# Patient Record
Sex: MALE | Race: WHITE | NOT HISPANIC OR LATINO
[De-identification: names, ages, dates, MRNs, and addresses within clinical notes are randomized per-mention and may not be internally consistent; named-entity substitution may affect disease eponyms.]

---

## 2018-07-18 ENCOUNTER — TRANSCRIPTION ENCOUNTER (OUTPATIENT)
Age: 73
End: 2018-07-18

## 2018-07-26 ENCOUNTER — APPOINTMENT (OUTPATIENT)
Dept: HEART AND VASCULAR | Facility: CLINIC | Age: 73
End: 2018-07-26
Payer: MEDICARE

## 2018-07-26 VITALS
HEIGHT: 67 IN | WEIGHT: 145 LBS | HEART RATE: 62 BPM | SYSTOLIC BLOOD PRESSURE: 130 MMHG | DIASTOLIC BLOOD PRESSURE: 82 MMHG | BODY MASS INDEX: 22.76 KG/M2

## 2018-07-26 DIAGNOSIS — Z87.891 PERSONAL HISTORY OF NICOTINE DEPENDENCE: ICD-10-CM

## 2018-07-26 DIAGNOSIS — I49.1 ATRIAL PREMATURE DEPOLARIZATION: ICD-10-CM

## 2018-07-26 DIAGNOSIS — Z85.828 PERSONAL HISTORY OF OTHER MALIGNANT NEOPLASM OF SKIN: ICD-10-CM

## 2018-07-26 PROCEDURE — 36415 COLL VENOUS BLD VENIPUNCTURE: CPT

## 2018-07-26 PROCEDURE — 99205 OFFICE O/P NEW HI 60 MIN: CPT | Mod: 25

## 2018-07-26 PROCEDURE — 93000 ELECTROCARDIOGRAM COMPLETE: CPT

## 2018-07-30 LAB
ALBUMIN SERPL ELPH-MCNC: 4.2 G/DL
ALP BLD-CCNC: 82 U/L
ALT SERPL-CCNC: 12 U/L
ANION GAP SERPL CALC-SCNC: 11 MMOL/L
APPEARANCE: CLEAR
AST SERPL-CCNC: 18 U/L
BACTERIA: NEGATIVE
BASOPHILS # BLD AUTO: 0.03 K/UL
BASOPHILS NFR BLD AUTO: 0.7 %
BILIRUB SERPL-MCNC: 0.4 MG/DL
BILIRUBIN URINE: NEGATIVE
BLOOD URINE: NEGATIVE
BUN SERPL-MCNC: 18 MG/DL
CALCIUM SERPL-MCNC: 9.5 MG/DL
CHLORIDE SERPL-SCNC: 106 MMOL/L
CHOLEST SERPL-MCNC: 195 MG/DL
CHOLEST/HDLC SERPL: 3.7 RATIO
CK MB BLD-MCNC: 1.5 NG/ML
CK SERPL-CCNC: 89 U/L
CO2 SERPL-SCNC: 28 MMOL/L
COLOR: YELLOW
CREAT SERPL-MCNC: 0.7 MG/DL
EOSINOPHIL # BLD AUTO: 0.08 K/UL
EOSINOPHIL NFR BLD AUTO: 1.9 %
ERYTHROCYTE [SEDIMENTATION RATE] IN BLOOD BY WESTERGREN METHOD: 12 MM/HR
GLUCOSE QUALITATIVE U: NEGATIVE MG/DL
GLUCOSE SERPL-MCNC: 101 MG/DL
HBA1C MFR BLD HPLC: 5.9 %
HCT VFR BLD CALC: 47.6 %
HDLC SERPL-MCNC: 53 MG/DL
HGB BLD-MCNC: 15.2 G/DL
HYALINE CASTS: 0 /LPF
IMM GRANULOCYTES NFR BLD AUTO: 0.2 %
KETONES URINE: NEGATIVE
LDLC SERPL CALC-MCNC: 129 MG/DL
LEUKOCYTE ESTERASE URINE: NEGATIVE
LYMPHOCYTES # BLD AUTO: 1.33 K/UL
LYMPHOCYTES NFR BLD AUTO: 31.1 %
MAGNESIUM SERPL-MCNC: 2.1 MG/DL
MAN DIFF?: NORMAL
MCHC RBC-ENTMCNC: 31 PG
MCHC RBC-ENTMCNC: 31.9 GM/DL
MCV RBC AUTO: 97.1 FL
MICROSCOPIC-UA: NORMAL
MONOCYTES # BLD AUTO: 0.39 K/UL
MONOCYTES NFR BLD AUTO: 9.1 %
NEUTROPHILS # BLD AUTO: 2.44 K/UL
NEUTROPHILS NFR BLD AUTO: 57 %
NITRITE URINE: NEGATIVE
PH URINE: 5.5
PLATELET # BLD AUTO: 201 K/UL
POTASSIUM SERPL-SCNC: 4.5 MMOL/L
PROT SERPL-MCNC: 6.9 G/DL
PROTEIN URINE: NEGATIVE MG/DL
RBC # BLD: 4.9 M/UL
RBC # FLD: 13.8 %
RED BLOOD CELLS URINE: 3 /HPF
SODIUM SERPL-SCNC: 145 MMOL/L
SPECIFIC GRAVITY URINE: 1.03
SQUAMOUS EPITHELIAL CELLS: 0 /HPF
TRIGL SERPL-MCNC: 63 MG/DL
TROPONIN I SERPL-MCNC: 0.01 NG/ML
TSH SERPL-ACNC: 0.88 UIU/ML
UROBILINOGEN URINE: NEGATIVE MG/DL
WBC # FLD AUTO: 4.28 K/UL
WHITE BLOOD CELLS URINE: 4 /HPF

## 2018-08-06 ENCOUNTER — OUTPATIENT (OUTPATIENT)
Dept: OUTPATIENT SERVICES | Facility: HOSPITAL | Age: 73
LOS: 1 days | End: 2018-08-06
Payer: MEDICARE

## 2018-08-06 ENCOUNTER — APPOINTMENT (OUTPATIENT)
Dept: HEART AND VASCULAR | Facility: CLINIC | Age: 73
End: 2018-08-06

## 2018-08-06 DIAGNOSIS — I49.1 ATRIAL PREMATURE DEPOLARIZATION: ICD-10-CM

## 2018-08-06 DIAGNOSIS — R07.9 CHEST PAIN, UNSPECIFIED: ICD-10-CM

## 2018-08-06 PROCEDURE — 93306 TTE W/DOPPLER COMPLETE: CPT | Mod: 26

## 2018-08-06 PROCEDURE — 93018 CV STRESS TEST I&R ONLY: CPT

## 2018-08-06 PROCEDURE — 93017 CV STRESS TEST TRACING ONLY: CPT

## 2018-08-06 PROCEDURE — 93306 TTE W/DOPPLER COMPLETE: CPT

## 2018-08-06 PROCEDURE — 93016 CV STRESS TEST SUPVJ ONLY: CPT

## 2018-08-20 ENCOUNTER — APPOINTMENT (OUTPATIENT)
Dept: HEART AND VASCULAR | Facility: CLINIC | Age: 73
End: 2018-08-20
Payer: MEDICARE

## 2018-08-20 VITALS
SYSTOLIC BLOOD PRESSURE: 110 MMHG | WEIGHT: 144 LBS | HEIGHT: 67 IN | DIASTOLIC BLOOD PRESSURE: 70 MMHG | BODY MASS INDEX: 22.6 KG/M2 | HEART RATE: 70 BPM

## 2018-08-20 PROCEDURE — 99213 OFFICE O/P EST LOW 20 MIN: CPT

## 2019-02-25 ENCOUNTER — APPOINTMENT (OUTPATIENT)
Dept: HEART AND VASCULAR | Facility: CLINIC | Age: 74
End: 2019-02-25

## 2022-07-14 ENCOUNTER — APPOINTMENT (OUTPATIENT)
Dept: HEART AND VASCULAR | Facility: CLINIC | Age: 77
End: 2022-07-14

## 2022-07-14 VITALS
DIASTOLIC BLOOD PRESSURE: 70 MMHG | BODY MASS INDEX: 22.44 KG/M2 | HEIGHT: 67 IN | HEART RATE: 61 BPM | OXYGEN SATURATION: 98 % | SYSTOLIC BLOOD PRESSURE: 110 MMHG | WEIGHT: 143 LBS | TEMPERATURE: 97 F

## 2022-07-14 VITALS
HEART RATE: 61 BPM | SYSTOLIC BLOOD PRESSURE: 110 MMHG | DIASTOLIC BLOOD PRESSURE: 60 MMHG | RESPIRATION RATE: 14 BRPM | OXYGEN SATURATION: 98 % | WEIGHT: 143 LBS | HEIGHT: 67 IN | BODY MASS INDEX: 22.44 KG/M2 | TEMPERATURE: 95 F

## 2022-07-14 DIAGNOSIS — Z00.00 ENCOUNTER FOR GENERAL ADULT MEDICAL EXAMINATION W/OUT ABNORMAL FINDINGS: ICD-10-CM

## 2022-07-14 PROCEDURE — 36415 COLL VENOUS BLD VENIPUNCTURE: CPT

## 2022-07-14 PROCEDURE — 93000 ELECTROCARDIOGRAM COMPLETE: CPT

## 2022-07-14 PROCEDURE — 99387 INIT PM E/M NEW PAT 65+ YRS: CPT | Mod: 25,GY

## 2022-07-14 NOTE — PHYSICAL EXAM
[Well Developed] : well developed [Well Nourished] : well nourished [No Acute Distress] : no acute distress [Normal Conjunctiva] : normal conjunctiva [Normal Venous Pressure] : normal venous pressure [No Carotid Bruit] : no carotid bruit [Normal S1, S2] : normal S1, S2 [No Murmur] : no murmur [No Rub] : no rub [No Gallop] : no gallop [Clear Lung Fields] : clear lung fields [Good Air Entry] : good air entry [No Respiratory Distress] : no respiratory distress  [Soft] : abdomen soft [Non Tender] : non-tender [No Masses/organomegaly] : no masses/organomegaly [Normal Bowel Sounds] : normal bowel sounds [Normal Gait] : normal gait [No Edema] : no edema [No Cyanosis] : no cyanosis [No Clubbing] : no clubbing [Moves all extremities] : moves all extremities [No Focal Deficits] : no focal deficits [Normal Speech] : normal speech [Alert and Oriented] : alert and oriented [Normal memory] : normal memory

## 2022-07-15 LAB
ALBUMIN SERPL ELPH-MCNC: 4.2 G/DL
ALP BLD-CCNC: 90 U/L
ALT SERPL-CCNC: 15 U/L
ANION GAP SERPL CALC-SCNC: 10 MMOL/L
APPEARANCE: CLEAR
APTT BLD: 26.9 SEC
AST SERPL-CCNC: 21 U/L
BASOPHILS # BLD AUTO: 0.04 K/UL
BASOPHILS NFR BLD AUTO: 0.9 %
BILIRUB SERPL-MCNC: 0.6 MG/DL
BILIRUBIN URINE: NEGATIVE
BLOOD URINE: NEGATIVE
BUN SERPL-MCNC: 22 MG/DL
CALCIUM SERPL-MCNC: 9.3 MG/DL
CHLORIDE SERPL-SCNC: 107 MMOL/L
CHOLEST SERPL-MCNC: 199 MG/DL
CO2 SERPL-SCNC: 26 MMOL/L
COLOR: YELLOW
CREAT SERPL-MCNC: 0.82 MG/DL
EGFR: 90 ML/MIN/1.73M2
EOSINOPHIL # BLD AUTO: 0.11 K/UL
EOSINOPHIL NFR BLD AUTO: 2.5 %
ESTIMATED AVERAGE GLUCOSE: 120 MG/DL
GLUCOSE QUALITATIVE U: NEGATIVE
GLUCOSE SERPL-MCNC: 108 MG/DL
HBA1C MFR BLD HPLC: 5.8 %
HCT VFR BLD CALC: 48.3 %
HDLC SERPL-MCNC: 60 MG/DL
HGB BLD-MCNC: 15.1 G/DL
IMM GRANULOCYTES NFR BLD AUTO: 0.2 %
INR PPP: 0.98 RATIO
KETONES URINE: NEGATIVE
LDLC SERPL CALC-MCNC: 126 MG/DL
LEUKOCYTE ESTERASE URINE: NEGATIVE
LYMPHOCYTES # BLD AUTO: 1.47 K/UL
LYMPHOCYTES NFR BLD AUTO: 33.3 %
MAN DIFF?: NORMAL
MCHC RBC-ENTMCNC: 31.3 GM/DL
MCHC RBC-ENTMCNC: 31.9 PG
MCV RBC AUTO: 101.9 FL
MONOCYTES # BLD AUTO: 0.44 K/UL
MONOCYTES NFR BLD AUTO: 10 %
NEUTROPHILS # BLD AUTO: 2.34 K/UL
NEUTROPHILS NFR BLD AUTO: 53.1 %
NITRITE URINE: NEGATIVE
NONHDLC SERPL-MCNC: 139 MG/DL
PH URINE: 5.5
PLATELET # BLD AUTO: 130 K/UL
POTASSIUM SERPL-SCNC: 4.5 MMOL/L
PROT SERPL-MCNC: 6.8 G/DL
PROTEIN URINE: NEGATIVE
PSA SERPL-MCNC: 7.27 NG/ML
PT BLD: 11.5 SEC
RBC # BLD: 4.74 M/UL
RBC # FLD: 14.1 %
SODIUM SERPL-SCNC: 143 MMOL/L
SPECIFIC GRAVITY URINE: 1.03
TRIGL SERPL-MCNC: 65 MG/DL
TSH SERPL-ACNC: 1.57 UIU/ML
UROBILINOGEN URINE: NORMAL
WBC # FLD AUTO: 4.41 K/UL

## 2022-08-25 NOTE — DISCUSSION/SUMMARY
[EKG obtained to assist in diagnosis and management of assessed problem(s)] : EKG obtained to assist in diagnosis and management of assessed problem(s) [FreeTextEntry1] : stable exam, labs\par medically optimized for procedure\par \par ADDENDUM 8/25/22\par remains stable to proceed with planned surgery

## 2022-08-25 NOTE — HISTORY OF PRESENT ILLNESS
[FreeTextEntry1] : looking to establish care\par needs excision of melanoma on forarm\par good diet\par active\par no tob\par no etoh\par not depressed

## 2022-09-01 ENCOUNTER — LABORATORY RESULT (OUTPATIENT)
Age: 77
End: 2022-09-01

## 2022-09-01 ENCOUNTER — APPOINTMENT (OUTPATIENT)
Dept: HEART AND VASCULAR | Facility: CLINIC | Age: 77
End: 2022-09-01

## 2022-09-01 VITALS
OXYGEN SATURATION: 98 % | RESPIRATION RATE: 14 BRPM | TEMPERATURE: 98 F | BODY MASS INDEX: 22.13 KG/M2 | HEIGHT: 67 IN | DIASTOLIC BLOOD PRESSURE: 74 MMHG | HEART RATE: 77 BPM | WEIGHT: 141 LBS | SYSTOLIC BLOOD PRESSURE: 110 MMHG

## 2022-09-01 DIAGNOSIS — Z01.818 ENCOUNTER FOR OTHER PREPROCEDURAL EXAMINATION: ICD-10-CM

## 2022-09-01 PROCEDURE — 36415 COLL VENOUS BLD VENIPUNCTURE: CPT

## 2022-09-01 PROCEDURE — 99214 OFFICE O/P EST MOD 30 MIN: CPT | Mod: 25

## 2022-09-01 PROCEDURE — 93000 ELECTROCARDIOGRAM COMPLETE: CPT

## 2022-09-01 NOTE — DISCUSSION/SUMMARY
[FreeTextEntry1] : stable exam, labs and ecg\par medically optimized planned procedure [EKG obtained to assist in diagnosis and management of assessed problem(s)] : EKG obtained to assist in diagnosis and management of assessed problem(s)

## 2022-09-02 LAB
ALBUMIN SERPL ELPH-MCNC: 4 G/DL
ALP BLD-CCNC: 77 U/L
ALT SERPL-CCNC: 9 U/L
ANION GAP SERPL CALC-SCNC: 12 MMOL/L
APPEARANCE: CLEAR
APTT BLD: 27.9 SEC
AST SERPL-CCNC: 17 U/L
BASOPHILS # BLD AUTO: 0.05 K/UL
BASOPHILS NFR BLD AUTO: 1.2 %
BILIRUB SERPL-MCNC: 0.6 MG/DL
BILIRUBIN URINE: NEGATIVE
BLOOD URINE: NEGATIVE
BUN SERPL-MCNC: 24 MG/DL
CALCIUM SERPL-MCNC: 9.4 MG/DL
CHLORIDE SERPL-SCNC: 109 MMOL/L
CO2 SERPL-SCNC: 24 MMOL/L
COLOR: YELLOW
CREAT SERPL-MCNC: 0.68 MG/DL
EGFR: 95 ML/MIN/1.73M2
EOSINOPHIL # BLD AUTO: 0.11 K/UL
EOSINOPHIL NFR BLD AUTO: 2.6 %
GLUCOSE QUALITATIVE U: NEGATIVE
GLUCOSE SERPL-MCNC: 116 MG/DL
HCT VFR BLD CALC: 45.9 %
HGB BLD-MCNC: 14.4 G/DL
IMM GRANULOCYTES NFR BLD AUTO: 0.2 %
INR PPP: 0.98 RATIO
KETONES URINE: NEGATIVE
LEUKOCYTE ESTERASE URINE: ABNORMAL
LYMPHOCYTES # BLD AUTO: 1.23 K/UL
LYMPHOCYTES NFR BLD AUTO: 29.3 %
MAN DIFF?: NORMAL
MCHC RBC-ENTMCNC: 31.2 PG
MCHC RBC-ENTMCNC: 31.4 GM/DL
MCV RBC AUTO: 99.6 FL
MONOCYTES # BLD AUTO: 0.38 K/UL
MONOCYTES NFR BLD AUTO: 9 %
NEUTROPHILS # BLD AUTO: 2.42 K/UL
NEUTROPHILS NFR BLD AUTO: 57.7 %
NITRITE URINE: NEGATIVE
PH URINE: 5
PLATELET # BLD AUTO: 217 K/UL
POTASSIUM SERPL-SCNC: 3.9 MMOL/L
PROT SERPL-MCNC: 6.2 G/DL
PROTEIN URINE: NORMAL
PT BLD: 11.4 SEC
RBC # BLD: 4.61 M/UL
RBC # FLD: 13.7 %
SODIUM SERPL-SCNC: 145 MMOL/L
SPECIFIC GRAVITY URINE: 1.03
UROBILINOGEN URINE: NORMAL
WBC # FLD AUTO: 4.2 K/UL

## 2022-09-07 ENCOUNTER — NON-APPOINTMENT (OUTPATIENT)
Age: 77
End: 2022-09-07

## 2022-11-28 ENCOUNTER — NON-APPOINTMENT (OUTPATIENT)
Age: 77
End: 2022-11-28

## 2022-11-28 ENCOUNTER — APPOINTMENT (OUTPATIENT)
Dept: HEART AND VASCULAR | Facility: CLINIC | Age: 77
End: 2022-11-28

## 2022-11-28 VITALS
BODY MASS INDEX: 21.97 KG/M2 | RESPIRATION RATE: 14 BRPM | TEMPERATURE: 97.6 F | HEIGHT: 67 IN | SYSTOLIC BLOOD PRESSURE: 130 MMHG | OXYGEN SATURATION: 98 % | HEART RATE: 74 BPM | DIASTOLIC BLOOD PRESSURE: 80 MMHG | WEIGHT: 140 LBS

## 2022-11-28 PROCEDURE — 99214 OFFICE O/P EST MOD 30 MIN: CPT | Mod: 25

## 2022-11-28 PROCEDURE — 93000 ELECTROCARDIOGRAM COMPLETE: CPT

## 2022-11-28 NOTE — DISCUSSION/SUMMARY
[FreeTextEntry1] : stable exam\par prolonged episode of cp/ baseline non specific ecg changes\par recommend f/u for est and echo eval [EKG obtained to assist in diagnosis and management of assessed problem(s)] : EKG obtained to assist in diagnosis and management of assessed problem(s)

## 2022-12-07 ENCOUNTER — APPOINTMENT (OUTPATIENT)
Dept: HEART AND VASCULAR | Facility: CLINIC | Age: 77
End: 2022-12-07

## 2022-12-07 VITALS
HEART RATE: 70 BPM | RESPIRATION RATE: 14 BRPM | BODY MASS INDEX: 21.97 KG/M2 | WEIGHT: 140 LBS | TEMPERATURE: 98 F | OXYGEN SATURATION: 98 % | DIASTOLIC BLOOD PRESSURE: 80 MMHG | HEIGHT: 67 IN | SYSTOLIC BLOOD PRESSURE: 128 MMHG

## 2022-12-07 PROCEDURE — 93015 CV STRESS TEST SUPVJ I&R: CPT

## 2022-12-07 PROCEDURE — 99213 OFFICE O/P EST LOW 20 MIN: CPT | Mod: 25

## 2022-12-07 NOTE — DISCUSSION/SUMMARY
[FreeTextEntry1] : stable\par cp- normal est, results discussed\par f/u for echo eval\par cont risk modification

## 2022-12-14 ENCOUNTER — APPOINTMENT (OUTPATIENT)
Dept: HEART AND VASCULAR | Facility: CLINIC | Age: 77
End: 2022-12-14

## 2022-12-14 VITALS
HEART RATE: 71 BPM | DIASTOLIC BLOOD PRESSURE: 80 MMHG | HEIGHT: 67 IN | BODY MASS INDEX: 21.82 KG/M2 | RESPIRATION RATE: 14 BRPM | OXYGEN SATURATION: 98 % | SYSTOLIC BLOOD PRESSURE: 130 MMHG | WEIGHT: 139 LBS | TEMPERATURE: 98 F

## 2022-12-14 DIAGNOSIS — R94.31 ABNORMAL ELECTROCARDIOGRAM [ECG] [EKG]: ICD-10-CM

## 2022-12-14 DIAGNOSIS — R07.9 CHEST PAIN, UNSPECIFIED: ICD-10-CM

## 2022-12-14 PROCEDURE — 93306 TTE W/DOPPLER COMPLETE: CPT

## 2022-12-14 PROCEDURE — 99213 OFFICE O/P EST LOW 20 MIN: CPT

## 2022-12-14 NOTE — DISCUSSION/SUMMARY
[FreeTextEntry1] : stable exam\par no further cp episode\par structurally normal heart by echo/ results discussed\par continue risk modification

## 2023-01-01 ENCOUNTER — LABORATORY RESULT (OUTPATIENT)
Age: 78
End: 2023-01-01

## 2023-01-01 ENCOUNTER — OUTPATIENT (OUTPATIENT)
Dept: OUTPATIENT SERVICES | Facility: HOSPITAL | Age: 78
LOS: 1 days | End: 2023-01-01
Payer: MEDICARE

## 2023-01-01 ENCOUNTER — RESULT REVIEW (OUTPATIENT)
Age: 78
End: 2023-01-01

## 2023-01-01 ENCOUNTER — APPOINTMENT (OUTPATIENT)
Dept: HEART AND VASCULAR | Facility: CLINIC | Age: 78
End: 2023-01-01
Payer: MEDICARE

## 2023-01-01 ENCOUNTER — NON-APPOINTMENT (OUTPATIENT)
Age: 78
End: 2023-01-01

## 2023-01-01 ENCOUNTER — APPOINTMENT (OUTPATIENT)
Dept: INFUSION THERAPY | Facility: CLINIC | Age: 78
End: 2023-01-01

## 2023-01-01 ENCOUNTER — OUTPATIENT (OUTPATIENT)
Dept: OUTPATIENT SERVICES | Facility: HOSPITAL | Age: 78
LOS: 1 days | Discharge: HOME | End: 2023-01-01
Payer: MEDICARE

## 2023-01-01 ENCOUNTER — APPOINTMENT (OUTPATIENT)
Dept: PULMONOLOGY | Facility: CLINIC | Age: 78
End: 2023-01-01
Payer: MEDICARE

## 2023-01-01 ENCOUNTER — APPOINTMENT (OUTPATIENT)
Dept: INTERVENTIONAL RADIOLOGY/VASCULAR | Facility: HOSPITAL | Age: 78
End: 2023-01-01

## 2023-01-01 ENCOUNTER — APPOINTMENT (OUTPATIENT)
Dept: CT IMAGING | Facility: HOSPITAL | Age: 78
End: 2023-01-01

## 2023-01-01 ENCOUNTER — APPOINTMENT (OUTPATIENT)
Dept: HEMATOLOGY ONCOLOGY | Facility: CLINIC | Age: 78
End: 2023-01-01
Payer: MEDICARE

## 2023-01-01 ENCOUNTER — APPOINTMENT (OUTPATIENT)
Dept: MRI IMAGING | Facility: CLINIC | Age: 78
End: 2023-01-01
Payer: MEDICARE

## 2023-01-01 ENCOUNTER — EMERGENCY (EMERGENCY)
Facility: HOSPITAL | Age: 78
LOS: 1 days | Discharge: HOME | End: 2023-01-01
Attending: EMERGENCY MEDICINE | Admitting: EMERGENCY MEDICINE
Payer: MEDICARE

## 2023-01-01 ENCOUNTER — APPOINTMENT (OUTPATIENT)
Dept: MRI IMAGING | Facility: HOSPITAL | Age: 78
End: 2023-01-01

## 2023-01-01 ENCOUNTER — APPOINTMENT (OUTPATIENT)
Dept: ULTRASOUND IMAGING | Facility: HOSPITAL | Age: 78
End: 2023-01-01
Payer: MEDICARE

## 2023-01-01 ENCOUNTER — APPOINTMENT (OUTPATIENT)
Dept: PALLIATIVE MEDICINE | Facility: CLINIC | Age: 78
End: 2023-01-01
Payer: MEDICARE

## 2023-01-01 ENCOUNTER — APPOINTMENT (OUTPATIENT)
Dept: PULMONOLOGY | Facility: CLINIC | Age: 78
End: 2023-01-01

## 2023-01-01 ENCOUNTER — TRANSCRIPTION ENCOUNTER (OUTPATIENT)
Age: 78
End: 2023-01-01

## 2023-01-01 ENCOUNTER — APPOINTMENT (OUTPATIENT)
Dept: RADIATION ONCOLOGY | Facility: CLINIC | Age: 78
End: 2023-01-01
Payer: MEDICARE

## 2023-01-01 ENCOUNTER — APPOINTMENT (OUTPATIENT)
Dept: HEMATOLOGY ONCOLOGY | Facility: CLINIC | Age: 78
End: 2023-01-01

## 2023-01-01 ENCOUNTER — APPOINTMENT (OUTPATIENT)
Dept: PULMONOLOGY | Facility: HOSPITAL | Age: 78
End: 2023-01-01

## 2023-01-01 VITALS
WEIGHT: 117 LBS | OXYGEN SATURATION: 98 % | HEART RATE: 85 BPM | DIASTOLIC BLOOD PRESSURE: 64 MMHG | RESPIRATION RATE: 18 BRPM | HEIGHT: 67 IN | TEMPERATURE: 97.4 F | SYSTOLIC BLOOD PRESSURE: 90 MMHG | BODY MASS INDEX: 18.36 KG/M2

## 2023-01-01 VITALS
WEIGHT: 120 LBS | SYSTOLIC BLOOD PRESSURE: 97 MMHG | RESPIRATION RATE: 18 BRPM | HEIGHT: 67 IN | TEMPERATURE: 97.1 F | OXYGEN SATURATION: 96 % | BODY MASS INDEX: 18.83 KG/M2 | HEART RATE: 88 BPM | DIASTOLIC BLOOD PRESSURE: 61 MMHG

## 2023-01-01 VITALS
OXYGEN SATURATION: 98 % | HEIGHT: 67 IN | TEMPERATURE: 96.2 F | SYSTOLIC BLOOD PRESSURE: 126 MMHG | BODY MASS INDEX: 18.83 KG/M2 | RESPIRATION RATE: 18 BRPM | DIASTOLIC BLOOD PRESSURE: 75 MMHG | HEART RATE: 81 BPM | WEIGHT: 120 LBS

## 2023-01-01 VITALS
TEMPERATURE: 98 F | RESPIRATION RATE: 17 BRPM | SYSTOLIC BLOOD PRESSURE: 114 MMHG | DIASTOLIC BLOOD PRESSURE: 62 MMHG | HEART RATE: 76 BPM | OXYGEN SATURATION: 98 %

## 2023-01-01 VITALS
HEART RATE: 60 BPM | TEMPERATURE: 97 F | TEMPERATURE: 97 F | HEIGHT: 67 IN | OXYGEN SATURATION: 98 % | DIASTOLIC BLOOD PRESSURE: 65 MMHG | HEART RATE: 79 BPM | RESPIRATION RATE: 18 BRPM | WEIGHT: 136.91 LBS | OXYGEN SATURATION: 96 % | WEIGHT: 119.93 LBS | SYSTOLIC BLOOD PRESSURE: 105 MMHG | HEIGHT: 67 IN | RESPIRATION RATE: 18 BRPM | SYSTOLIC BLOOD PRESSURE: 119 MMHG | DIASTOLIC BLOOD PRESSURE: 74 MMHG

## 2023-01-01 VITALS
SYSTOLIC BLOOD PRESSURE: 112 MMHG | DIASTOLIC BLOOD PRESSURE: 78 MMHG | SYSTOLIC BLOOD PRESSURE: 140 MMHG | BODY MASS INDEX: 19.46 KG/M2 | OXYGEN SATURATION: 99 % | BODY MASS INDEX: 21.03 KG/M2 | TEMPERATURE: 97.1 F | DIASTOLIC BLOOD PRESSURE: 63 MMHG | RESPIRATION RATE: 18 BRPM | OXYGEN SATURATION: 96 % | HEART RATE: 67 BPM | TEMPERATURE: 98.1 F | HEART RATE: 78 BPM | WEIGHT: 124 LBS | HEIGHT: 67 IN | HEIGHT: 67 IN | WEIGHT: 134 LBS

## 2023-01-01 VITALS
WEIGHT: 134.92 LBS | RESPIRATION RATE: 18 BRPM | TEMPERATURE: 98 F | SYSTOLIC BLOOD PRESSURE: 188 MMHG | HEART RATE: 78 BPM | DIASTOLIC BLOOD PRESSURE: 100 MMHG | OXYGEN SATURATION: 98 % | HEIGHT: 67 IN

## 2023-01-01 VITALS
SYSTOLIC BLOOD PRESSURE: 93 MMHG | SYSTOLIC BLOOD PRESSURE: 130 MMHG | WEIGHT: 120 LBS | BODY MASS INDEX: 18.83 KG/M2 | HEIGHT: 67 IN | HEART RATE: 80 BPM | TEMPERATURE: 97 F | HEART RATE: 65 BPM | TEMPERATURE: 97.3 F | RESPIRATION RATE: 16 BRPM | OXYGEN SATURATION: 97 % | WEIGHT: 117.95 LBS | HEIGHT: 67 IN | OXYGEN SATURATION: 97 % | DIASTOLIC BLOOD PRESSURE: 57 MMHG | RESPIRATION RATE: 18 BRPM | DIASTOLIC BLOOD PRESSURE: 80 MMHG

## 2023-01-01 VITALS
OXYGEN SATURATION: 97 % | DIASTOLIC BLOOD PRESSURE: 63 MMHG | RESPIRATION RATE: 18 BRPM | SYSTOLIC BLOOD PRESSURE: 109 MMHG | HEIGHT: 67 IN | BODY MASS INDEX: 20.25 KG/M2 | WEIGHT: 129 LBS | TEMPERATURE: 98 F | HEART RATE: 72 BPM

## 2023-01-01 VITALS
HEART RATE: 70 BPM | SYSTOLIC BLOOD PRESSURE: 120 MMHG | BODY MASS INDEX: 18.83 KG/M2 | DIASTOLIC BLOOD PRESSURE: 60 MMHG | TEMPERATURE: 98.1 F | OXYGEN SATURATION: 98 % | HEIGHT: 67 IN | RESPIRATION RATE: 18 BRPM | WEIGHT: 120 LBS

## 2023-01-01 VITALS
HEART RATE: 62 BPM | RESPIRATION RATE: 18 BRPM | OXYGEN SATURATION: 94 % | SYSTOLIC BLOOD PRESSURE: 117 MMHG | TEMPERATURE: 98 F | DIASTOLIC BLOOD PRESSURE: 68 MMHG

## 2023-01-01 VITALS
HEIGHT: 67 IN | OXYGEN SATURATION: 96 % | HEART RATE: 77 BPM | SYSTOLIC BLOOD PRESSURE: 110 MMHG | TEMPERATURE: 98 F | WEIGHT: 117.95 LBS | DIASTOLIC BLOOD PRESSURE: 66 MMHG | RESPIRATION RATE: 18 BRPM

## 2023-01-01 VITALS
DIASTOLIC BLOOD PRESSURE: 66 MMHG | BODY MASS INDEX: 18.83 KG/M2 | HEIGHT: 67 IN | SYSTOLIC BLOOD PRESSURE: 111 MMHG | RESPIRATION RATE: 18 BRPM | WEIGHT: 120 LBS | TEMPERATURE: 97.8 F | HEART RATE: 84 BPM | OXYGEN SATURATION: 97 %

## 2023-01-01 VITALS
HEIGHT: 67 IN | RESPIRATION RATE: 18 BRPM | HEART RATE: 100 BPM | OXYGEN SATURATION: 97 % | WEIGHT: 122 LBS | DIASTOLIC BLOOD PRESSURE: 72 MMHG | TEMPERATURE: 96.4 F | BODY MASS INDEX: 19.15 KG/M2 | SYSTOLIC BLOOD PRESSURE: 112 MMHG

## 2023-01-01 VITALS
HEART RATE: 80 BPM | TEMPERATURE: 97.1 F | BODY MASS INDEX: 18.52 KG/M2 | RESPIRATION RATE: 18 BRPM | WEIGHT: 118 LBS | OXYGEN SATURATION: 97 % | DIASTOLIC BLOOD PRESSURE: 57 MMHG | SYSTOLIC BLOOD PRESSURE: 93 MMHG | HEIGHT: 67 IN

## 2023-01-01 VITALS
HEART RATE: 76 BPM | BODY MASS INDEX: 18.84 KG/M2 | RESPIRATION RATE: 16 BRPM | SYSTOLIC BLOOD PRESSURE: 119 MMHG | TEMPERATURE: 97.7 F | DIASTOLIC BLOOD PRESSURE: 72 MMHG | OXYGEN SATURATION: 98 % | WEIGHT: 120.3 LBS

## 2023-01-01 VITALS
TEMPERATURE: 97.1 F | RESPIRATION RATE: 18 BRPM | HEART RATE: 80 BPM | BODY MASS INDEX: 19.15 KG/M2 | DIASTOLIC BLOOD PRESSURE: 68 MMHG | OXYGEN SATURATION: 97 % | SYSTOLIC BLOOD PRESSURE: 117 MMHG | HEIGHT: 67 IN | WEIGHT: 122 LBS

## 2023-01-01 VITALS
TEMPERATURE: 98.2 F | BODY MASS INDEX: 18.83 KG/M2 | RESPIRATION RATE: 18 BRPM | DIASTOLIC BLOOD PRESSURE: 65 MMHG | HEART RATE: 75 BPM | SYSTOLIC BLOOD PRESSURE: 108 MMHG | HEIGHT: 67 IN | WEIGHT: 120 LBS | OXYGEN SATURATION: 98 %

## 2023-01-01 VITALS
HEART RATE: 77 BPM | WEIGHT: 123.9 LBS | HEIGHT: 67 IN | RESPIRATION RATE: 18 BRPM | TEMPERATURE: 97 F | DIASTOLIC BLOOD PRESSURE: 71 MMHG | OXYGEN SATURATION: 96 % | SYSTOLIC BLOOD PRESSURE: 114 MMHG

## 2023-01-01 VITALS
RESPIRATION RATE: 14 BRPM | SYSTOLIC BLOOD PRESSURE: 124 MMHG | TEMPERATURE: 97.7 F | OXYGEN SATURATION: 97 % | WEIGHT: 137 LBS | DIASTOLIC BLOOD PRESSURE: 64 MMHG | HEIGHT: 67 IN | BODY MASS INDEX: 21.5 KG/M2 | HEART RATE: 72 BPM

## 2023-01-01 VITALS
BODY MASS INDEX: 21.5 KG/M2 | WEIGHT: 137 LBS | HEIGHT: 67 IN | HEART RATE: 77 BPM | OXYGEN SATURATION: 98 % | DIASTOLIC BLOOD PRESSURE: 64 MMHG | SYSTOLIC BLOOD PRESSURE: 100 MMHG | TEMPERATURE: 97.4 F

## 2023-01-01 VITALS
HEART RATE: 77 BPM | HEIGHT: 67 IN | WEIGHT: 119 LBS | BODY MASS INDEX: 18.68 KG/M2 | RESPIRATION RATE: 18 BRPM | OXYGEN SATURATION: 96 % | SYSTOLIC BLOOD PRESSURE: 110 MMHG | DIASTOLIC BLOOD PRESSURE: 66 MMHG | TEMPERATURE: 98.2 F

## 2023-01-01 VITALS
HEART RATE: 64 BPM | OXYGEN SATURATION: 97 % | TEMPERATURE: 97 F | RESPIRATION RATE: 18 BRPM | SYSTOLIC BLOOD PRESSURE: 109 MMHG | DIASTOLIC BLOOD PRESSURE: 67 MMHG

## 2023-01-01 VITALS
TEMPERATURE: 96 F | HEIGHT: 67 IN | RESPIRATION RATE: 18 BRPM | SYSTOLIC BLOOD PRESSURE: 112 MMHG | HEART RATE: 100 BPM | DIASTOLIC BLOOD PRESSURE: 72 MMHG | WEIGHT: 121.92 LBS | OXYGEN SATURATION: 97 %

## 2023-01-01 VITALS
SYSTOLIC BLOOD PRESSURE: 114 MMHG | TEMPERATURE: 98 F | HEART RATE: 76 BPM | RESPIRATION RATE: 18 BRPM | DIASTOLIC BLOOD PRESSURE: 80 MMHG | OXYGEN SATURATION: 97 %

## 2023-01-01 VITALS
WEIGHT: 120 LBS | DIASTOLIC BLOOD PRESSURE: 65 MMHG | RESPIRATION RATE: 18 BRPM | OXYGEN SATURATION: 96 % | SYSTOLIC BLOOD PRESSURE: 105 MMHG | BODY MASS INDEX: 18.83 KG/M2 | HEART RATE: 79 BPM | HEIGHT: 67 IN | TEMPERATURE: 97.1 F

## 2023-01-01 VITALS
SYSTOLIC BLOOD PRESSURE: 122 MMHG | DIASTOLIC BLOOD PRESSURE: 74 MMHG | HEART RATE: 76 BPM | OXYGEN SATURATION: 100 % | RESPIRATION RATE: 18 BRPM | WEIGHT: 122 LBS | BODY MASS INDEX: 19.15 KG/M2 | HEIGHT: 67 IN | TEMPERATURE: 96.3 F

## 2023-01-01 VITALS
SYSTOLIC BLOOD PRESSURE: 104 MMHG | HEART RATE: 72 BPM | DIASTOLIC BLOOD PRESSURE: 70 MMHG | HEIGHT: 67 IN | TEMPERATURE: 97.7 F | BODY MASS INDEX: 20.88 KG/M2 | WEIGHT: 133 LBS | OXYGEN SATURATION: 98 %

## 2023-01-01 VITALS
RESPIRATION RATE: 18 BRPM | OXYGEN SATURATION: 96 % | DIASTOLIC BLOOD PRESSURE: 68 MMHG | SYSTOLIC BLOOD PRESSURE: 117 MMHG | TEMPERATURE: 98 F | HEART RATE: 72 BPM

## 2023-01-01 VITALS
SYSTOLIC BLOOD PRESSURE: 106 MMHG | DIASTOLIC BLOOD PRESSURE: 70 MMHG | TEMPERATURE: 97 F | RESPIRATION RATE: 16 BRPM | OXYGEN SATURATION: 96 % | HEART RATE: 71 BPM

## 2023-01-01 VITALS
TEMPERATURE: 97 F | OXYGEN SATURATION: 98 % | SYSTOLIC BLOOD PRESSURE: 90 MMHG | DIASTOLIC BLOOD PRESSURE: 64 MMHG | HEART RATE: 85 BPM | RESPIRATION RATE: 18 BRPM

## 2023-01-01 VITALS
RESPIRATION RATE: 18 BRPM | SYSTOLIC BLOOD PRESSURE: 101 MMHG | TEMPERATURE: 98 F | HEART RATE: 68 BPM | OXYGEN SATURATION: 97 % | DIASTOLIC BLOOD PRESSURE: 63 MMHG

## 2023-01-01 VITALS
TEMPERATURE: 96.9 F | BODY MASS INDEX: 20.09 KG/M2 | RESPIRATION RATE: 18 BRPM | DIASTOLIC BLOOD PRESSURE: 76 MMHG | HEART RATE: 83 BPM | OXYGEN SATURATION: 99 % | SYSTOLIC BLOOD PRESSURE: 121 MMHG | HEIGHT: 67 IN | WEIGHT: 128 LBS

## 2023-01-01 VITALS
OXYGEN SATURATION: 97 % | WEIGHT: 121.92 LBS | SYSTOLIC BLOOD PRESSURE: 116 MMHG | RESPIRATION RATE: 18 BRPM | DIASTOLIC BLOOD PRESSURE: 68 MMHG | TEMPERATURE: 98 F | HEIGHT: 67 IN | TEMPERATURE: 97 F | HEART RATE: 80 BPM | RESPIRATION RATE: 18 BRPM | SYSTOLIC BLOOD PRESSURE: 117 MMHG | DIASTOLIC BLOOD PRESSURE: 68 MMHG | OXYGEN SATURATION: 97 % | HEART RATE: 79 BPM

## 2023-01-01 VITALS
BODY MASS INDEX: 18.83 KG/M2 | TEMPERATURE: 98.1 F | RESPIRATION RATE: 18 BRPM | HEART RATE: 70 BPM | SYSTOLIC BLOOD PRESSURE: 120 MMHG | OXYGEN SATURATION: 98 % | DIASTOLIC BLOOD PRESSURE: 71 MMHG | HEIGHT: 67 IN | WEIGHT: 120 LBS

## 2023-01-01 VITALS
DIASTOLIC BLOOD PRESSURE: 69 MMHG | RESPIRATION RATE: 18 BRPM | HEART RATE: 81 BPM | WEIGHT: 120 LBS | HEIGHT: 67 IN | TEMPERATURE: 97.2 F | OXYGEN SATURATION: 100 % | SYSTOLIC BLOOD PRESSURE: 128 MMHG | BODY MASS INDEX: 18.83 KG/M2

## 2023-01-01 VITALS
OXYGEN SATURATION: 99 % | RESPIRATION RATE: 18 BRPM | HEART RATE: 70 BPM | TEMPERATURE: 97 F | DIASTOLIC BLOOD PRESSURE: 74 MMHG | SYSTOLIC BLOOD PRESSURE: 112 MMHG

## 2023-01-01 VITALS
HEART RATE: 74 BPM | TEMPERATURE: 98.2 F | OXYGEN SATURATION: 97 % | DIASTOLIC BLOOD PRESSURE: 71 MMHG | WEIGHT: 120 LBS | HEIGHT: 67 IN | SYSTOLIC BLOOD PRESSURE: 107 MMHG | BODY MASS INDEX: 18.83 KG/M2 | RESPIRATION RATE: 18 BRPM

## 2023-01-01 VITALS
TEMPERATURE: 97 F | RESPIRATION RATE: 18 BRPM | HEART RATE: 63 BPM | SYSTOLIC BLOOD PRESSURE: 116 MMHG | OXYGEN SATURATION: 97 % | DIASTOLIC BLOOD PRESSURE: 53 MMHG

## 2023-01-01 DIAGNOSIS — Z51.5 ENCOUNTER FOR PALLIATIVE CARE: ICD-10-CM

## 2023-01-01 DIAGNOSIS — R73.09 OTHER ABNORMAL GLUCOSE: ICD-10-CM

## 2023-01-01 DIAGNOSIS — G47.00 INSOMNIA, UNSPECIFIED: ICD-10-CM

## 2023-01-01 DIAGNOSIS — C34.10 MALIGNANT NEOPLASM OF UPPER LOBE, UNSPECIFIED BRONCHUS OR LUNG: ICD-10-CM

## 2023-01-01 DIAGNOSIS — R33.9 RETENTION OF URINE, UNSPECIFIED: ICD-10-CM

## 2023-01-01 DIAGNOSIS — R91.8 OTHER NONSPECIFIC ABNORMAL FINDING OF LUNG FIELD: ICD-10-CM

## 2023-01-01 DIAGNOSIS — L30.9 DERMATITIS, UNSPECIFIED: ICD-10-CM

## 2023-01-01 DIAGNOSIS — Z87.09 PERSONAL HISTORY OF OTHER DISEASES OF THE RESPIRATORY SYSTEM: ICD-10-CM

## 2023-01-01 DIAGNOSIS — R06.02 SHORTNESS OF BREATH: ICD-10-CM

## 2023-01-01 DIAGNOSIS — R33.8 OTHER RETENTION OF URINE: ICD-10-CM

## 2023-01-01 DIAGNOSIS — Z87.891 PERSONAL HISTORY OF NICOTINE DEPENDENCE: ICD-10-CM

## 2023-01-01 DIAGNOSIS — N40.1 BENIGN PROSTATIC HYPERPLASIA WITH LOWER URINARY TRACT SYMPTOMS: ICD-10-CM

## 2023-01-01 LAB
25(OH)D3 SERPL-MCNC: 34.3 NG/ML
ALBUMIN SERPL ELPH-MCNC: 2.7 G/DL — LOW (ref 3.3–5)
ALBUMIN SERPL ELPH-MCNC: 3 G/DL
ALBUMIN SERPL ELPH-MCNC: 3.1 G/DL
ALBUMIN SERPL ELPH-MCNC: 3.2 G/DL
ALBUMIN SERPL ELPH-MCNC: 3.2 G/DL — LOW (ref 3.3–5)
ALBUMIN SERPL ELPH-MCNC: 3.4 G/DL
ALBUMIN SERPL ELPH-MCNC: 3.4 G/DL
ALBUMIN SERPL ELPH-MCNC: 3.9 G/DL
ALBUMIN SERPL ELPH-MCNC: 4.2 G/DL
ALP BLD-CCNC: 110 U/L
ALP BLD-CCNC: 113 U/L
ALP BLD-CCNC: 64 U/L
ALP BLD-CCNC: 68 U/L
ALP BLD-CCNC: 68 U/L
ALP BLD-CCNC: 70 U/L
ALP BLD-CCNC: 72 U/L
ALP BLD-CCNC: 72 U/L
ALP BLD-CCNC: 77 U/L
ALP BLD-CCNC: 79 U/L
ALP BLD-CCNC: 79 U/L
ALP BLD-CCNC: 80 U/L
ALP BLD-CCNC: 83 U/L
ALP BLD-CCNC: 84 U/L
ALP SERPL-CCNC: 67 U/L — SIGNIFICANT CHANGE UP (ref 40–120)
ALP SERPL-CCNC: 76 U/L — SIGNIFICANT CHANGE UP (ref 40–120)
ALT FLD-CCNC: 19 U/L — SIGNIFICANT CHANGE UP (ref 10–45)
ALT FLD-CCNC: 22 U/L — SIGNIFICANT CHANGE UP (ref 10–45)
ALT SERPL-CCNC: 13 U/L
ALT SERPL-CCNC: 14 U/L
ALT SERPL-CCNC: 14 U/L
ALT SERPL-CCNC: 15 U/L
ALT SERPL-CCNC: 15 U/L
ALT SERPL-CCNC: 17 U/L
ALT SERPL-CCNC: 19 U/L
ALT SERPL-CCNC: 21 U/L
ALT SERPL-CCNC: 22 U/L
ALT SERPL-CCNC: 22 U/L
ALT SERPL-CCNC: 9 U/L
ANION GAP SERPL CALC-SCNC: 0 MMOL/L
ANION GAP SERPL CALC-SCNC: 10 MMOL/L
ANION GAP SERPL CALC-SCNC: 10 MMOL/L — SIGNIFICANT CHANGE UP (ref 5–17)
ANION GAP SERPL CALC-SCNC: 12 MMOL/L
ANION GAP SERPL CALC-SCNC: 12 MMOL/L
ANION GAP SERPL CALC-SCNC: 13 MMOL/L
ANION GAP SERPL CALC-SCNC: 4 MMOL/L
ANION GAP SERPL CALC-SCNC: 5 MMOL/L
ANION GAP SERPL CALC-SCNC: 7 MMOL/L
ANION GAP SERPL CALC-SCNC: 7 MMOL/L
ANION GAP SERPL CALC-SCNC: 8 MMOL/L
APPEARANCE UR: CLEAR — SIGNIFICANT CHANGE UP
APTT BLD: 27.4 SEC
AST SERPL-CCNC: 14 U/L
AST SERPL-CCNC: 14 U/L — SIGNIFICANT CHANGE UP (ref 10–40)
AST SERPL-CCNC: 16 U/L
AST SERPL-CCNC: 18 U/L
AST SERPL-CCNC: 18 U/L
AST SERPL-CCNC: 19 U/L
AST SERPL-CCNC: 19 U/L — SIGNIFICANT CHANGE UP (ref 10–40)
AST SERPL-CCNC: 20 U/L
AST SERPL-CCNC: 20 U/L
AST SERPL-CCNC: 21 U/L
AST SERPL-CCNC: 21 U/L
AST SERPL-CCNC: 24 U/L
AST SERPL-CCNC: 24 U/L
AST SERPL-CCNC: 26 U/L
BACTERIA # UR AUTO: PRESENT /HPF
BASOPHILS # BLD AUTO: 0 K/UL
BASOPHILS # BLD AUTO: 0.04 K/UL
BASOPHILS NFR BLD AUTO: 0 %
BASOPHILS NFR BLD AUTO: 0.9 %
BILIRUB DIRECT SERPL-MCNC: <0.2 MG/DL — SIGNIFICANT CHANGE UP (ref 0–0.3)
BILIRUB INDIRECT FLD-MCNC: SIGNIFICANT CHANGE UP MG/DL (ref 0.2–1)
BILIRUB SERPL-MCNC: 0.2 MG/DL
BILIRUB SERPL-MCNC: 0.2 MG/DL — SIGNIFICANT CHANGE UP (ref 0.2–1.2)
BILIRUB SERPL-MCNC: 0.5 MG/DL
BILIRUB SERPL-MCNC: 0.5 MG/DL — SIGNIFICANT CHANGE UP (ref 0.2–1.2)
BILIRUB SERPL-MCNC: 0.6 MG/DL
BILIRUB SERPL-MCNC: 0.7 MG/DL
BILIRUB SERPL-MCNC: 0.8 MG/DL
BILIRUB SERPL-MCNC: 0.8 MG/DL
BILIRUB UR-MCNC: NEGATIVE — SIGNIFICANT CHANGE UP
BUN SERPL-MCNC: 11 MG/DL
BUN SERPL-MCNC: 13 MG/DL
BUN SERPL-MCNC: 15 MG/DL
BUN SERPL-MCNC: 16 MG/DL
BUN SERPL-MCNC: 16 MG/DL — SIGNIFICANT CHANGE UP (ref 7–23)
BUN SERPL-MCNC: 17 MG/DL
BUN SERPL-MCNC: 18 MG/DL
BUN SERPL-MCNC: 19 MG/DL
BUN SERPL-MCNC: 20 MG/DL
CALCIUM SERPL-MCNC: 10.2 MG/DL
CALCIUM SERPL-MCNC: 9.1 MG/DL
CALCIUM SERPL-MCNC: 9.1 MG/DL — SIGNIFICANT CHANGE UP (ref 8.4–10.5)
CALCIUM SERPL-MCNC: 9.2 MG/DL
CALCIUM SERPL-MCNC: 9.2 MG/DL
CALCIUM SERPL-MCNC: 9.3 MG/DL
CALCIUM SERPL-MCNC: 9.5 MG/DL
CALCIUM SERPL-MCNC: 9.6 MG/DL
CALCIUM SERPL-MCNC: 9.6 MG/DL
CALCIUM SERPL-MCNC: 9.9 MG/DL
CHLORIDE SERPL-SCNC: 101 MMOL/L
CHLORIDE SERPL-SCNC: 102 MMOL/L
CHLORIDE SERPL-SCNC: 103 MMOL/L
CHLORIDE SERPL-SCNC: 103 MMOL/L — SIGNIFICANT CHANGE UP (ref 96–108)
CHLORIDE SERPL-SCNC: 104 MMOL/L
CHLORIDE SERPL-SCNC: 105 MMOL/L
CHLORIDE SERPL-SCNC: 106 MMOL/L
CHLORIDE SERPL-SCNC: 107 MMOL/L
CHLORIDE SERPL-SCNC: 110 MMOL/L
CO2 SERPL-SCNC: 24 MMOL/L
CO2 SERPL-SCNC: 25 MMOL/L
CO2 SERPL-SCNC: 25 MMOL/L
CO2 SERPL-SCNC: 26 MMOL/L
CO2 SERPL-SCNC: 26 MMOL/L
CO2 SERPL-SCNC: 28 MMOL/L
CO2 SERPL-SCNC: 29 MMOL/L
CO2 SERPL-SCNC: 29 MMOL/L
CO2 SERPL-SCNC: 29 MMOL/L — SIGNIFICANT CHANGE UP (ref 22–31)
CO2 SERPL-SCNC: 30 MMOL/L
CO2 SERPL-SCNC: 30 MMOL/L
CO2 SERPL-SCNC: 32 MMOL/L
CO2 SERPL-SCNC: 32 MMOL/L
COLOR SPEC: YELLOW — SIGNIFICANT CHANGE UP
COMMENT - URINE: SIGNIFICANT CHANGE UP
CREAT SERPL-MCNC: 0.5 MG/DL
CREAT SERPL-MCNC: 0.5 MG/DL
CREAT SERPL-MCNC: 0.5 MG/DL — SIGNIFICANT CHANGE UP (ref 0.5–1.3)
CREAT SERPL-MCNC: 0.55 MG/DL
CREAT SERPL-MCNC: 0.6 MG/DL
CREAT SERPL-MCNC: 0.64 MG/DL
CREAT SERPL-MCNC: 0.66 MG/DL
CREAT SERPL-MCNC: 0.7 MG/DL
CREAT SERPL-MCNC: 0.8 MG/DL
DIFF PNL FLD: ABNORMAL
EGFR: 101 ML/MIN/1.73M2
EGFR: 104 ML/MIN/1.73M2
EGFR: 104 ML/MIN/1.73M2
EGFR: 104 ML/MIN/1.73M2 — SIGNIFICANT CHANGE UP
EGFR: 90 ML/MIN/1.73M2
EGFR: 94 ML/MIN/1.73M2
EGFR: 95 ML/MIN/1.73M2
EGFR: 96 ML/MIN/1.73M2
EGFR: 98 ML/MIN/1.73M2
EOSINOPHIL # BLD AUTO: 0 K/UL
EOSINOPHIL # BLD AUTO: 0.04 K/UL
EOSINOPHIL NFR BLD AUTO: 0 %
EOSINOPHIL NFR BLD AUTO: 0.9 %
EPI CELLS # UR: SIGNIFICANT CHANGE UP /HPF (ref 0–5)
ERYTHROCYTE [SEDIMENTATION RATE] IN BLOOD BY WESTERGREN METHOD: 45 MM/HR
ESTIMATED AVERAGE GLUCOSE: 128 MG/DL
GLUCOSE SERPL-MCNC: 105 MG/DL
GLUCOSE SERPL-MCNC: 116 MG/DL
GLUCOSE SERPL-MCNC: 117 MG/DL
GLUCOSE SERPL-MCNC: 138 MG/DL
GLUCOSE SERPL-MCNC: 140 MG/DL
GLUCOSE SERPL-MCNC: 143 MG/DL
GLUCOSE SERPL-MCNC: 149 MG/DL
GLUCOSE SERPL-MCNC: 159 MG/DL
GLUCOSE SERPL-MCNC: 167 MG/DL — HIGH (ref 70–99)
GLUCOSE SERPL-MCNC: 179 MG/DL
GLUCOSE SERPL-MCNC: 185 MG/DL
GLUCOSE SERPL-MCNC: 197 MG/DL
GLUCOSE SERPL-MCNC: 243 MG/DL
GLUCOSE SERPL-MCNC: 88 MG/DL
GLUCOSE SERPL-MCNC: 99 MG/DL
GLUCOSE UR QL: NEGATIVE — SIGNIFICANT CHANGE UP
HBA1C MFR BLD HPLC: 6.1 %
HCT VFR BLD CALC: 31.5 %
HCT VFR BLD CALC: 34.3 %
HCT VFR BLD CALC: 34.6 % — LOW (ref 39–50)
HGB BLD-MCNC: 10.1 G/DL
HGB BLD-MCNC: 11.2 G/DL
HGB BLD-MCNC: 11.5 G/DL — LOW (ref 13–17)
INR PPP: 1.04 RATIO
KETONES UR-MCNC: NEGATIVE — SIGNIFICANT CHANGE UP
LEUKOCYTE ESTERASE UR-ACNC: NEGATIVE — SIGNIFICANT CHANGE UP
LYMPHOCYTES # BLD AUTO: 0.1 K/UL — LOW (ref 1–3.3)
LYMPHOCYTES # BLD AUTO: 0.11 K/UL
LYMPHOCYTES # BLD AUTO: 0.18 K/UL
LYMPHOCYTES # BLD AUTO: 1.8 % — LOW (ref 13–44)
LYMPHOCYTES NFR BLD AUTO: 2.6 %
LYMPHOCYTES NFR BLD AUTO: 2.6 %
MAGNESIUM SERPL-MCNC: 1.8 MG/DL
MAGNESIUM SERPL-MCNC: 1.9 MG/DL
MAGNESIUM SERPL-MCNC: 2 MG/DL
MAGNESIUM SERPL-MCNC: 2.2 MG/DL
MAGNESIUM SERPL-MCNC: 2.4 MG/DL
MAN DIFF?: NORMAL
MAN DIFF?: NORMAL
MCHC RBC-ENTMCNC: 31.5 PG
MCHC RBC-ENTMCNC: 31.9 PG — SIGNIFICANT CHANGE UP (ref 27–34)
MCHC RBC-ENTMCNC: 32.1 GM/DL
MCHC RBC-ENTMCNC: 32.1 PG
MCHC RBC-ENTMCNC: 32.7 GM/DL
MCHC RBC-ENTMCNC: 33.2 GM/DL — SIGNIFICANT CHANGE UP (ref 32–36)
MCV RBC AUTO: 96.1 FL — SIGNIFICANT CHANGE UP (ref 80–100)
MCV RBC AUTO: 98.1 FL
MCV RBC AUTO: 98.3 FL
MONOCYTES # BLD AUTO: 0.69 K/UL
MONOCYTES # BLD AUTO: 0.84 K/UL
MONOCYTES NFR BLD AUTO: 12.2 %
MONOCYTES NFR BLD AUTO: 15.8 %
NEUTROPHILS # BLD AUTO: 2.9 K/UL — SIGNIFICANT CHANGE UP (ref 1.8–7.4)
NEUTROPHILS # BLD AUTO: 3.5 K/UL
NEUTROPHILS # BLD AUTO: 5.9 K/UL
NEUTROPHILS NFR BLD AUTO: 79.8 %
NEUTROPHILS NFR BLD AUTO: 85.2 %
NEUTROPHILS NFR BLD AUTO: 95.1 % — HIGH (ref 43–77)
NITRITE UR-MCNC: NEGATIVE — SIGNIFICANT CHANGE UP
NON-GYNECOLOGICAL CYTOLOGY STUDY: SIGNIFICANT CHANGE UP
PH UR: 5.5 — SIGNIFICANT CHANGE UP (ref 5–8)
PLATELET # BLD AUTO: 161 K/UL — SIGNIFICANT CHANGE UP (ref 150–400)
PLATELET # BLD AUTO: 184 K/UL
PLATELET # BLD AUTO: 318 K/UL
POTASSIUM SERPL-MCNC: 4 MMOL/L — SIGNIFICANT CHANGE UP (ref 3.5–5.3)
POTASSIUM SERPL-SCNC: 4 MMOL/L
POTASSIUM SERPL-SCNC: 4 MMOL/L
POTASSIUM SERPL-SCNC: 4 MMOL/L — SIGNIFICANT CHANGE UP (ref 3.5–5.3)
POTASSIUM SERPL-SCNC: 4.1 MMOL/L
POTASSIUM SERPL-SCNC: 4.3 MMOL/L
POTASSIUM SERPL-SCNC: 4.5 MMOL/L
POTASSIUM SERPL-SCNC: 4.6 MMOL/L
POTASSIUM SERPL-SCNC: 4.6 MMOL/L
POTASSIUM SERPL-SCNC: 4.7 MMOL/L
POTASSIUM SERPL-SCNC: 4.9 MMOL/L
PROT SERPL-MCNC: 6 G/DL — SIGNIFICANT CHANGE UP (ref 6–8.3)
PROT SERPL-MCNC: 6.2 G/DL
PROT SERPL-MCNC: 6.2 G/DL — SIGNIFICANT CHANGE UP (ref 6–8.3)
PROT SERPL-MCNC: 6.3 G/DL
PROT SERPL-MCNC: 6.3 G/DL
PROT SERPL-MCNC: 6.4 G/DL
PROT SERPL-MCNC: 6.5 G/DL
PROT SERPL-MCNC: 6.7 G/DL
PROT SERPL-MCNC: 6.8 G/DL
PROT SERPL-MCNC: 7 G/DL
PROT SERPL-MCNC: 7.1 G/DL
PROT SERPL-MCNC: 7.1 G/DL
PROT SERPL-MCNC: 7.2 G/DL
PROT SERPL-MCNC: 7.2 G/DL
PROT UR-MCNC: NEGATIVE MG/DL — SIGNIFICANT CHANGE UP
PT BLD: 12.2 SEC
RBC # BLD: 3.21 M/UL
RBC # BLD: 3.49 M/UL
RBC # BLD: 3.6 M/UL — LOW (ref 4.2–5.8)
RBC # FLD: 15.7 % — HIGH (ref 10.3–14.5)
RBC # FLD: 17.9 %
RBC # FLD: 17.9 %
RBC CASTS # UR COMP ASSIST: ABNORMAL /HPF
SODIUM SERPL-SCNC: 138 MMOL/L
SODIUM SERPL-SCNC: 139 MMOL/L
SODIUM SERPL-SCNC: 140 MMOL/L
SODIUM SERPL-SCNC: 141 MMOL/L
SODIUM SERPL-SCNC: 141 MMOL/L
SODIUM SERPL-SCNC: 142 MMOL/L
SODIUM SERPL-SCNC: 142 MMOL/L — SIGNIFICANT CHANGE UP (ref 135–145)
SP GR SPEC: 1.02 — SIGNIFICANT CHANGE UP (ref 1–1.03)
TSH SERPL-ACNC: 0.22 UIU/ML
TSH SERPL-ACNC: 0.23 UIU/ML
TSH SERPL-ACNC: 0.24 UIU/ML
TSH SERPL-ACNC: 0.25 UIU/ML
TSH SERPL-ACNC: 0.26 UIU/ML
TSH SERPL-ACNC: 0.27 UIU/ML
TSH SERPL-ACNC: 0.82 UIU/ML
TSH SERPL-ACNC: 0.84 UIU/ML
TSH SERPL-ACNC: 0.89 UIU/ML
TSH SERPL-ACNC: 0.96 UIU/ML
TSH SERPL-ACNC: 1.02 UIU/ML
TSH SERPL-ACNC: 1.35 UIU/ML
UROBILINOGEN FLD QL: 0.2 E.U./DL — SIGNIFICANT CHANGE UP
WBC # BLD: 3.1 K/UL — LOW (ref 3.8–10.5)
WBC # FLD AUTO: 3.1 K/UL — LOW (ref 3.8–10.5)
WBC # FLD AUTO: 4.38 K/UL
WBC # FLD AUTO: 6.92 K/UL
WBC UR QL: < 5 /HPF — SIGNIFICANT CHANGE UP

## 2023-01-01 PROCEDURE — 99214 OFFICE O/P EST MOD 30 MIN: CPT | Mod: 25

## 2023-01-01 PROCEDURE — A9585: CPT

## 2023-01-01 PROCEDURE — 32408 CORE NDL BX LNG/MED PERQ: CPT

## 2023-01-01 PROCEDURE — 51702 INSERT TEMP BLADDER CATH: CPT

## 2023-01-01 PROCEDURE — 96413 CHEMO IV INFUSION 1 HR: CPT

## 2023-01-01 PROCEDURE — 88173 CYTOPATH EVAL FNA REPORT: CPT

## 2023-01-01 PROCEDURE — 99213 OFFICE O/P EST LOW 20 MIN: CPT | Mod: 25

## 2023-01-01 PROCEDURE — 80076 HEPATIC FUNCTION PANEL: CPT

## 2023-01-01 PROCEDURE — 99283 EMERGENCY DEPT VISIT LOW MDM: CPT

## 2023-01-01 PROCEDURE — 96417 CHEMO IV INFUS EACH ADDL SEQ: CPT

## 2023-01-01 PROCEDURE — 99215 OFFICE O/P EST HI 40 MIN: CPT

## 2023-01-01 PROCEDURE — 88305 TISSUE EXAM BY PATHOLOGIST: CPT | Mod: 26

## 2023-01-01 PROCEDURE — 96375 TX/PRO/DX INJ NEW DRUG ADDON: CPT

## 2023-01-01 PROCEDURE — 82565 ASSAY OF CREATININE: CPT

## 2023-01-01 PROCEDURE — 71260 CT THORAX DX C+: CPT | Mod: 26

## 2023-01-01 PROCEDURE — 88173 CYTOPATH EVAL FNA REPORT: CPT | Mod: 26

## 2023-01-01 PROCEDURE — 96367 TX/PROPH/DG ADDL SEQ IV INF: CPT

## 2023-01-01 PROCEDURE — 71045 X-RAY EXAM CHEST 1 VIEW: CPT | Mod: 26

## 2023-01-01 PROCEDURE — 99204 OFFICE O/P NEW MOD 45 MIN: CPT | Mod: 25

## 2023-01-01 PROCEDURE — 36415 COLL VENOUS BLD VENIPUNCTURE: CPT

## 2023-01-01 PROCEDURE — 88341 IMHCHEM/IMCYTCHM EA ADD ANTB: CPT | Mod: 26

## 2023-01-01 PROCEDURE — 88305 TISSUE EXAM BY PATHOLOGIST: CPT

## 2023-01-01 PROCEDURE — 71260 CT THORAX DX C+: CPT

## 2023-01-01 PROCEDURE — 85025 COMPLETE CBC W/AUTO DIFF WBC: CPT

## 2023-01-01 PROCEDURE — 70553 MRI BRAIN STEM W/O & W/DYE: CPT

## 2023-01-01 PROCEDURE — 99213 OFFICE O/P EST LOW 20 MIN: CPT

## 2023-01-01 PROCEDURE — 80053 COMPREHEN METABOLIC PANEL: CPT

## 2023-01-01 PROCEDURE — 94727 GAS DIL/WSHOT DETER LNG VOL: CPT

## 2023-01-01 PROCEDURE — 31652 BRONCH EBUS SAMPLNG 1/2 NODE: CPT

## 2023-01-01 PROCEDURE — 31652 BRONCH EBUS SAMPLNG 1/2 NODE: CPT | Mod: GC

## 2023-01-01 PROCEDURE — 93970 EXTREMITY STUDY: CPT

## 2023-01-01 PROCEDURE — 88341 IMHCHEM/IMCYTCHM EA ADD ANTB: CPT

## 2023-01-01 PROCEDURE — 99205 OFFICE O/P NEW HI 60 MIN: CPT

## 2023-01-01 PROCEDURE — 71045 X-RAY EXAM CHEST 1 VIEW: CPT | Mod: 26,77

## 2023-01-01 PROCEDURE — 93306 TTE W/DOPPLER COMPLETE: CPT | Mod: 26

## 2023-01-01 PROCEDURE — 94729 DIFFUSING CAPACITY: CPT

## 2023-01-01 PROCEDURE — C1769: CPT

## 2023-01-01 PROCEDURE — 99203 OFFICE O/P NEW LOW 30 MIN: CPT

## 2023-01-01 PROCEDURE — 81001 URINALYSIS AUTO W/SCOPE: CPT

## 2023-01-01 PROCEDURE — 94010 BREATHING CAPACITY TEST: CPT

## 2023-01-01 PROCEDURE — A9585: CPT | Mod: JW

## 2023-01-01 PROCEDURE — 88342 IMHCHEM/IMCYTCHM 1ST ANTB: CPT | Mod: 26

## 2023-01-01 PROCEDURE — 31645 BRNCHSC W/THER ASPIR 1ST: CPT | Mod: GC

## 2023-01-01 PROCEDURE — 71045 X-RAY EXAM CHEST 1 VIEW: CPT

## 2023-01-01 PROCEDURE — 93306 TTE W/DOPPLER COMPLETE: CPT

## 2023-01-01 PROCEDURE — 93000 ELECTROCARDIOGRAM COMPLETE: CPT

## 2023-01-01 PROCEDURE — 99024 POSTOP FOLLOW-UP VISIT: CPT

## 2023-01-01 PROCEDURE — 70553 MRI BRAIN STEM W/O & W/DYE: CPT | Mod: 26

## 2023-01-01 PROCEDURE — 93970 EXTREMITY STUDY: CPT | Mod: 26

## 2023-01-01 PROCEDURE — 99283 EMERGENCY DEPT VISIT LOW MDM: CPT | Mod: 25

## 2023-01-01 RX ORDER — PALONOSETRON HYDROCHLORIDE 0.25 MG/5ML
0.25 INJECTION, SOLUTION INTRAVENOUS ONCE
Refills: 0 | Status: COMPLETED | OUTPATIENT
Start: 2023-01-01 | End: 2023-01-01

## 2023-01-01 RX ORDER — CARBOPLATIN 50 MG
160 VIAL (EA) INTRAVENOUS ONCE
Refills: 0 | Status: COMPLETED | OUTPATIENT
Start: 2023-01-01 | End: 2023-01-01

## 2023-01-01 RX ORDER — PACLITAXEL 6 MG/ML
70 INJECTION, SOLUTION, CONCENTRATE INTRAVENOUS ONCE
Refills: 0 | Status: COMPLETED | OUTPATIENT
Start: 2023-01-01 | End: 2023-01-01

## 2023-01-01 RX ORDER — FOSAPREPITANT DIMEGLUMINE 150 MG/5ML
150 INJECTION, POWDER, LYOPHILIZED, FOR SOLUTION INTRAVENOUS ONCE
Refills: 0 | Status: COMPLETED | OUTPATIENT
Start: 2023-01-01 | End: 2023-01-01

## 2023-01-01 RX ORDER — FAMOTIDINE 10 MG/ML
20 INJECTION INTRAVENOUS ONCE
Refills: 0 | Status: COMPLETED | OUTPATIENT
Start: 2023-01-01 | End: 2023-01-01

## 2023-01-01 RX ORDER — DEXAMETHASONE 0.5 MG/5ML
4 ELIXIR ORAL ONCE
Refills: 0 | Status: COMPLETED | OUTPATIENT
Start: 2023-01-01 | End: 2023-01-01

## 2023-01-01 RX ORDER — DIPHENHYDRAMINE HCL 50 MG
25 CAPSULE ORAL ONCE
Refills: 0 | Status: COMPLETED | OUTPATIENT
Start: 2023-01-01 | End: 2023-01-01

## 2023-01-01 RX ORDER — MOMETASONE FUROATE 1 MG/G
0.1 CREAM TOPICAL TWICE DAILY
Qty: 1 | Refills: 1 | Status: ACTIVE | COMMUNITY
Start: 2023-01-01 | End: 1900-01-01

## 2023-01-01 RX ORDER — DEXAMETHASONE 0.5 MG/5ML
10 ELIXIR ORAL ONCE
Refills: 0 | Status: COMPLETED | OUTPATIENT
Start: 2023-01-01 | End: 2023-01-01

## 2023-01-01 RX ORDER — DEXAMETHASONE 4 MG/1
4 TABLET ORAL
Qty: 10 | Refills: 6 | Status: ACTIVE | COMMUNITY
Start: 2023-01-01 | End: 1900-01-01

## 2023-01-01 RX ORDER — HYDROCORTISONE LOTION 20 MG/ML
LOTION TOPICAL TWICE DAILY
Refills: 0 | Status: DISCONTINUED | COMMUNITY
End: 2023-01-01

## 2023-01-01 RX ORDER — POLYETHYLENE GLYCOL 3350 17 G/17G
17 POWDER, FOR SOLUTION ORAL
Qty: 510 | Refills: 0 | Status: ACTIVE | COMMUNITY
Start: 2023-01-01 | End: 1900-01-01

## 2023-01-01 RX ORDER — MOMETASONE FUROATE 1 MG/G
0.1 CREAM TOPICAL TWICE DAILY
Qty: 1 | Refills: 2 | Status: DISCONTINUED | COMMUNITY
End: 2023-01-01

## 2023-01-01 RX ORDER — BENZONATATE 100 MG/1
100 CAPSULE ORAL 3 TIMES DAILY
Qty: 30 | Refills: 1 | Status: ACTIVE | COMMUNITY
Start: 2023-01-01 | End: 1900-01-01

## 2023-01-01 RX ORDER — TAMSULOSIN HYDROCHLORIDE 0.4 MG/1
0.4 CAPSULE ORAL
Refills: 0 | Status: ACTIVE | COMMUNITY
Start: 2023-01-01

## 2023-01-01 RX ADMIN — FOSAPREPITANT DIMEGLUMINE 150 MILLIGRAM(S): 150 INJECTION, POWDER, LYOPHILIZED, FOR SOLUTION INTRAVENOUS at 14:40

## 2023-01-01 RX ADMIN — Medication 204 MILLIGRAM(S): at 10:23

## 2023-01-01 RX ADMIN — FOSAPREPITANT DIMEGLUMINE 150 MILLIGRAM(S): 150 INJECTION, POWDER, LYOPHILIZED, FOR SOLUTION INTRAVENOUS at 13:55

## 2023-01-01 RX ADMIN — PACLITAXEL 70 MILLIGRAM(S): 6 INJECTION, SOLUTION, CONCENTRATE INTRAVENOUS at 15:09

## 2023-01-01 RX ADMIN — FOSAPREPITANT DIMEGLUMINE 500 MILLIGRAM(S): 150 INJECTION, POWDER, LYOPHILIZED, FOR SOLUTION INTRAVENOUS at 13:25

## 2023-01-01 RX ADMIN — Medication 4 MILLIGRAM(S): at 10:38

## 2023-01-01 RX ADMIN — FOSAPREPITANT DIMEGLUMINE 500 MILLIGRAM(S): 150 INJECTION, POWDER, LYOPHILIZED, FOR SOLUTION INTRAVENOUS at 14:10

## 2023-01-01 RX ADMIN — PACLITAXEL 70 MILLIGRAM(S): 6 INJECTION, SOLUTION, CONCENTRATE INTRAVENOUS at 14:52

## 2023-01-01 RX ADMIN — PACLITAXEL 70 MILLIGRAM(S): 6 INJECTION, SOLUTION, CONCENTRATE INTRAVENOUS at 16:52

## 2023-01-01 RX ADMIN — PALONOSETRON HYDROCHLORIDE 0.25 MILLIGRAM(S): 0.25 INJECTION, SOLUTION INTRAVENOUS at 13:16

## 2023-01-01 RX ADMIN — Medication 204 MILLIGRAM(S): at 15:35

## 2023-01-01 RX ADMIN — Medication 160 MILLIGRAM(S): at 13:00

## 2023-01-01 RX ADMIN — PACLITAXEL 70 MILLIGRAM(S): 6 INJECTION, SOLUTION, CONCENTRATE INTRAVENOUS at 15:45

## 2023-01-01 RX ADMIN — PACLITAXEL 70 MILLIGRAM(S): 6 INJECTION, SOLUTION, CONCENTRATE INTRAVENOUS at 12:24

## 2023-01-01 RX ADMIN — PACLITAXEL 70 MILLIGRAM(S): 6 INJECTION, SOLUTION, CONCENTRATE INTRAVENOUS at 14:09

## 2023-01-01 RX ADMIN — PACLITAXEL 70 MILLIGRAM(S): 6 INJECTION, SOLUTION, CONCENTRATE INTRAVENOUS at 16:50

## 2023-01-01 RX ADMIN — FAMOTIDINE 20 MILLIGRAM(S): 10 INJECTION INTRAVENOUS at 14:10

## 2023-01-01 RX ADMIN — Medication 25 MILLIGRAM(S): at 16:20

## 2023-01-01 RX ADMIN — Medication 204 MILLIGRAM(S): at 12:34

## 2023-01-01 RX ADMIN — FAMOTIDINE 20 MILLIGRAM(S): 10 INJECTION INTRAVENOUS at 12:33

## 2023-01-01 RX ADMIN — FOSAPREPITANT DIMEGLUMINE 500 MILLIGRAM(S): 150 INJECTION, POWDER, LYOPHILIZED, FOR SOLUTION INTRAVENOUS at 11:30

## 2023-01-01 RX ADMIN — Medication 204 MILLIGRAM(S): at 11:13

## 2023-01-01 RX ADMIN — FOSAPREPITANT DIMEGLUMINE 500 MILLIGRAM(S): 150 INJECTION, POWDER, LYOPHILIZED, FOR SOLUTION INTRAVENOUS at 10:01

## 2023-01-01 RX ADMIN — FAMOTIDINE 208 MILLIGRAM(S): 10 INJECTION INTRAVENOUS at 12:35

## 2023-01-01 RX ADMIN — Medication 160 MILLIGRAM(S): at 13:55

## 2023-01-01 RX ADMIN — FAMOTIDINE 208 MILLIGRAM(S): 10 INJECTION INTRAVENOUS at 13:55

## 2023-01-01 RX ADMIN — Medication 4 MILLIGRAM(S): at 15:50

## 2023-01-01 RX ADMIN — PALONOSETRON HYDROCHLORIDE 0.25 MILLIGRAM(S): 0.25 INJECTION, SOLUTION INTRAVENOUS at 13:20

## 2023-01-01 RX ADMIN — Medication 25 MILLIGRAM(S): at 13:10

## 2023-01-01 RX ADMIN — FOSAPREPITANT DIMEGLUMINE 150 MILLIGRAM(S): 150 INJECTION, POWDER, LYOPHILIZED, FOR SOLUTION INTRAVENOUS at 12:00

## 2023-01-01 RX ADMIN — Medication 204 MILLIGRAM(S): at 14:50

## 2023-01-01 RX ADMIN — PACLITAXEL 70 MILLIGRAM(S): 6 INJECTION, SOLUTION, CONCENTRATE INTRAVENOUS at 12:13

## 2023-01-01 RX ADMIN — FOSAPREPITANT DIMEGLUMINE 500 MILLIGRAM(S): 150 INJECTION, POWDER, LYOPHILIZED, FOR SOLUTION INTRAVENOUS at 13:20

## 2023-01-01 RX ADMIN — PALONOSETRON HYDROCHLORIDE 0.25 MILLIGRAM(S): 0.25 INJECTION, SOLUTION INTRAVENOUS at 11:17

## 2023-01-01 RX ADMIN — PACLITAXEL 70 MILLIGRAM(S): 6 INJECTION, SOLUTION, CONCENTRATE INTRAVENOUS at 12:35

## 2023-01-01 RX ADMIN — PACLITAXEL 70 MILLIGRAM(S): 6 INJECTION, SOLUTION, CONCENTRATE INTRAVENOUS at 11:24

## 2023-01-01 RX ADMIN — FAMOTIDINE 208 MILLIGRAM(S): 10 INJECTION INTRAVENOUS at 15:50

## 2023-01-01 RX ADMIN — Medication 160 MILLIGRAM(S): at 16:55

## 2023-01-01 RX ADMIN — PALONOSETRON HYDROCHLORIDE 0.25 MILLIGRAM(S): 0.25 INJECTION, SOLUTION INTRAVENOUS at 11:54

## 2023-01-01 RX ADMIN — Medication 4 MILLIGRAM(S): at 15:05

## 2023-01-01 RX ADMIN — PACLITAXEL 70 MILLIGRAM(S): 6 INJECTION, SOLUTION, CONCENTRATE INTRAVENOUS at 15:58

## 2023-01-01 RX ADMIN — Medication 160 MILLIGRAM(S): at 13:58

## 2023-01-01 RX ADMIN — FOSAPREPITANT DIMEGLUMINE 150 MILLIGRAM(S): 150 INJECTION, POWDER, LYOPHILIZED, FOR SOLUTION INTRAVENOUS at 13:50

## 2023-01-01 RX ADMIN — Medication 25 MILLIGRAM(S): at 13:55

## 2023-01-01 RX ADMIN — PACLITAXEL 70 MILLIGRAM(S): 6 INJECTION, SOLUTION, CONCENTRATE INTRAVENOUS at 13:52

## 2023-01-01 RX ADMIN — FOSAPREPITANT DIMEGLUMINE 500 MILLIGRAM(S): 150 INJECTION, POWDER, LYOPHILIZED, FOR SOLUTION INTRAVENOUS at 10:40

## 2023-01-01 RX ADMIN — Medication 204 MILLIGRAM(S): at 13:17

## 2023-01-01 RX ADMIN — PACLITAXEL 70 MILLIGRAM(S): 6 INJECTION, SOLUTION, CONCENTRATE INTRAVENOUS at 13:13

## 2023-01-01 RX ADMIN — PALONOSETRON HYDROCHLORIDE 0.25 MILLIGRAM(S): 0.25 INJECTION, SOLUTION INTRAVENOUS at 14:49

## 2023-01-01 RX ADMIN — Medication 25 MILLIGRAM(S): at 11:14

## 2023-01-01 RX ADMIN — Medication 4 MILLIGRAM(S): at 13:40

## 2023-01-01 RX ADMIN — PALONOSETRON HYDROCHLORIDE 0.25 MILLIGRAM(S): 0.25 INJECTION, SOLUTION INTRAVENOUS at 15:34

## 2023-01-01 RX ADMIN — FAMOTIDINE 208 MILLIGRAM(S): 10 INJECTION INTRAVENOUS at 13:35

## 2023-01-01 RX ADMIN — Medication 160 MILLIGRAM(S): at 12:30

## 2023-01-01 RX ADMIN — Medication 202 MILLIGRAM(S): at 13:52

## 2023-01-01 RX ADMIN — Medication 25 MILLIGRAM(S): at 13:20

## 2023-01-01 RX ADMIN — Medication 204 MILLIGRAM(S): at 12:50

## 2023-01-01 RX ADMIN — FAMOTIDINE 208 MILLIGRAM(S): 10 INJECTION INTRAVENOUS at 12:18

## 2023-01-01 RX ADMIN — FOSAPREPITANT DIMEGLUMINE 150 MILLIGRAM(S): 150 INJECTION, POWDER, LYOPHILIZED, FOR SOLUTION INTRAVENOUS at 10:28

## 2023-01-01 RX ADMIN — FAMOTIDINE 20 MILLIGRAM(S): 10 INJECTION INTRAVENOUS at 13:50

## 2023-01-01 RX ADMIN — Medication 160 MILLIGRAM(S): at 17:25

## 2023-01-01 RX ADMIN — PACLITAXEL 70 MILLIGRAM(S): 6 INJECTION, SOLUTION, CONCENTRATE INTRAVENOUS at 15:44

## 2023-01-01 RX ADMIN — FOSAPREPITANT DIMEGLUMINE 500 MILLIGRAM(S): 150 INJECTION, POWDER, LYOPHILIZED, FOR SOLUTION INTRAVENOUS at 15:05

## 2023-01-01 RX ADMIN — Medication 160 MILLIGRAM(S): at 13:28

## 2023-01-01 RX ADMIN — Medication 160 MILLIGRAM(S): at 16:15

## 2023-01-01 RX ADMIN — Medication 25 MILLIGRAM(S): at 14:08

## 2023-01-01 RX ADMIN — Medication 202 MILLIGRAM(S): at 13:40

## 2023-01-01 RX ADMIN — Medication 4 MILLIGRAM(S): at 13:05

## 2023-01-01 RX ADMIN — PACLITAXEL 70 MILLIGRAM(S): 6 INJECTION, SOLUTION, CONCENTRATE INTRAVENOUS at 14:45

## 2023-01-01 RX ADMIN — Medication 4 MILLIGRAM(S): at 13:35

## 2023-01-01 RX ADMIN — Medication 160 MILLIGRAM(S): at 15:30

## 2023-01-01 RX ADMIN — PACLITAXEL 70 MILLIGRAM(S): 6 INJECTION, SOLUTION, CONCENTRATE INTRAVENOUS at 14:50

## 2023-01-01 RX ADMIN — FAMOTIDINE 20 MILLIGRAM(S): 10 INJECTION INTRAVENOUS at 10:35

## 2023-01-01 RX ADMIN — PALONOSETRON HYDROCHLORIDE 0.25 MILLIGRAM(S): 0.25 INJECTION, SOLUTION INTRAVENOUS at 10:22

## 2023-01-01 RX ADMIN — Medication 160 MILLIGRAM(S): at 15:45

## 2023-01-01 RX ADMIN — Medication 160 MILLIGRAM(S): at 18:39

## 2023-01-01 RX ADMIN — FOSAPREPITANT DIMEGLUMINE 500 MILLIGRAM(S): 150 INJECTION, POWDER, LYOPHILIZED, FOR SOLUTION INTRAVENOUS at 16:20

## 2023-01-01 RX ADMIN — Medication 202 MILLIGRAM(S): at 13:05

## 2023-01-01 RX ADMIN — Medication 204 MILLIGRAM(S): at 10:35

## 2023-01-01 RX ADMIN — PALONOSETRON HYDROCHLORIDE 0.25 MILLIGRAM(S): 0.25 INJECTION, SOLUTION INTRAVENOUS at 12:34

## 2023-01-01 RX ADMIN — FOSAPREPITANT DIMEGLUMINE 150 MILLIGRAM(S): 150 INJECTION, POWDER, LYOPHILIZED, FOR SOLUTION INTRAVENOUS at 11:10

## 2023-01-01 RX ADMIN — FOSAPREPITANT DIMEGLUMINE 150 MILLIGRAM(S): 150 INJECTION, POWDER, LYOPHILIZED, FOR SOLUTION INTRAVENOUS at 15:35

## 2023-01-01 RX ADMIN — Medication 202 MILLIGRAM(S): at 12:55

## 2023-01-01 RX ADMIN — Medication 160 MILLIGRAM(S): at 15:00

## 2023-01-01 RX ADMIN — Medication 4 MILLIGRAM(S): at 12:49

## 2023-01-01 RX ADMIN — Medication 160 MILLIGRAM(S): at 16:45

## 2023-01-01 RX ADMIN — PALONOSETRON HYDROCHLORIDE 0.25 MILLIGRAM(S): 0.25 INJECTION, SOLUTION INTRAVENOUS at 11:13

## 2023-01-01 RX ADMIN — Medication 204 MILLIGRAM(S): at 13:25

## 2023-01-01 RX ADMIN — Medication 10 MILLIGRAM(S): at 10:50

## 2023-01-01 RX ADMIN — FAMOTIDINE 20 MILLIGRAM(S): 10 INJECTION INTRAVENOUS at 12:50

## 2023-01-01 RX ADMIN — Medication 160 MILLIGRAM(S): at 16:09

## 2023-01-01 RX ADMIN — FAMOTIDINE 20 MILLIGRAM(S): 10 INJECTION INTRAVENOUS at 16:05

## 2023-01-01 RX ADMIN — FOSAPREPITANT DIMEGLUMINE 150 MILLIGRAM(S): 150 INJECTION, POWDER, LYOPHILIZED, FOR SOLUTION INTRAVENOUS at 16:50

## 2023-01-01 RX ADMIN — PACLITAXEL 70 MILLIGRAM(S): 6 INJECTION, SOLUTION, CONCENTRATE INTRAVENOUS at 17:52

## 2023-01-01 RX ADMIN — PACLITAXEL 70 MILLIGRAM(S): 6 INJECTION, SOLUTION, CONCENTRATE INTRAVENOUS at 11:14

## 2023-01-01 RX ADMIN — Medication 202 MILLIGRAM(S): at 16:05

## 2023-01-01 RX ADMIN — Medication 160 MILLIGRAM(S): at 15:18

## 2023-01-01 RX ADMIN — FAMOTIDINE 208 MILLIGRAM(S): 10 INJECTION INTRAVENOUS at 10:20

## 2023-01-01 RX ADMIN — Medication 160 MILLIGRAM(S): at 17:55

## 2023-01-01 RX ADMIN — Medication 202 MILLIGRAM(S): at 10:59

## 2023-01-01 RX ADMIN — Medication 160 MILLIGRAM(S): at 12:25

## 2023-01-01 RX ADMIN — Medication 4 MILLIGRAM(S): at 11:28

## 2023-01-05 ENCOUNTER — APPOINTMENT (OUTPATIENT)
Dept: HEART AND VASCULAR | Facility: CLINIC | Age: 78
End: 2023-01-05
Payer: MEDICARE

## 2023-01-05 VITALS
DIASTOLIC BLOOD PRESSURE: 80 MMHG | TEMPERATURE: 98.6 F | BODY MASS INDEX: 21.97 KG/M2 | RESPIRATION RATE: 14 BRPM | SYSTOLIC BLOOD PRESSURE: 126 MMHG | WEIGHT: 140 LBS | HEART RATE: 79 BPM | OXYGEN SATURATION: 98 % | HEIGHT: 67 IN

## 2023-01-05 DIAGNOSIS — R00.2 PALPITATIONS: ICD-10-CM

## 2023-01-05 PROCEDURE — 93000 ELECTROCARDIOGRAM COMPLETE: CPT

## 2023-01-05 PROCEDURE — 99213 OFFICE O/P EST LOW 20 MIN: CPT | Mod: 25

## 2023-01-05 NOTE — DISCUSSION/SUMMARY
[FreeTextEntry1] : stable exam and ecg\par recent negative non invasive cardiac eval\par reassurance\par if persists recommend zio/ refusing now [EKG obtained to assist in diagnosis and management of assessed problem(s)] : EKG obtained to assist in diagnosis and management of assessed problem(s)

## 2023-05-25 PROBLEM — R06.02 SOB (SHORTNESS OF BREATH): Status: ACTIVE | Noted: 2023-01-01

## 2023-05-25 PROBLEM — L30.9 DERMATITIS: Status: ACTIVE | Noted: 2023-01-01

## 2023-05-25 NOTE — PHYSICAL EXAM
[Well Developed] : well developed [Well Nourished] : well nourished [No Acute Distress] : no acute distress [Normal Conjunctiva] : normal conjunctiva [Normal Venous Pressure] : normal venous pressure [No Carotid Bruit] : no carotid bruit [Normal S1, S2] : normal S1, S2 [No Murmur] : no murmur [No Rub] : no rub [No Gallop] : no gallop [Clear Lung Fields] : clear lung fields [Good Air Entry] : good air entry [No Respiratory Distress] : no respiratory distress  [Soft] : abdomen soft [Non Tender] : non-tender [No Masses/organomegaly] : no masses/organomegaly [Normal Bowel Sounds] : normal bowel sounds [Normal Gait] : normal gait [No Edema] : no edema [No Cyanosis] : no cyanosis [No Clubbing] : no clubbing [Rash] : rash [Moves all extremities] : moves all extremities [No Focal Deficits] : no focal deficits [Normal Speech] : normal speech [Alert and Oriented] : alert and oriented [Normal memory] : normal memory [de-identified] : dermitis rash arm face and head

## 2023-05-25 NOTE — DISCUSSION/SUMMARY
[FreeTextEntry1] : stable exam\par dermatitis advised starting creams\par sob- recent negative cardiac eval, recommend pulmonary eval [EKG obtained to assist in diagnosis and management of assessed problem(s)] : EKG obtained to assist in diagnosis and management of assessed problem(s)

## 2023-06-20 PROBLEM — R91.8 OPACITY OF LUNG ON IMAGING STUDY: Status: ACTIVE | Noted: 2023-01-01

## 2023-06-26 PROBLEM — R73.09 ELEVATED HEMOGLOBIN A1C: Status: ACTIVE | Noted: 2023-01-01

## 2023-06-26 NOTE — PHYSICAL EXAM
[Well Developed] : well developed [Well Nourished] : well nourished [No Acute Distress] : no acute distress [Normal Conjunctiva] : normal conjunctiva [Normal Venous Pressure] : normal venous pressure [No Carotid Bruit] : no carotid bruit [Normal S1, S2] : normal S1, S2 [No Murmur] : no murmur [No Rub] : no rub [No Gallop] : no gallop [Clear Lung Fields] : clear lung fields [Good Air Entry] : good air entry [No Respiratory Distress] : no respiratory distress  [Soft] : abdomen soft [Non Tender] : non-tender [No Masses/organomegaly] : no masses/organomegaly [Normal Bowel Sounds] : normal bowel sounds [Normal Gait] : normal gait [No Edema] : no edema [No Cyanosis] : no cyanosis [No Clubbing] : no clubbing [Rash] : rash [Moves all extremities] : moves all extremities [No Focal Deficits] : no focal deficits [Normal Speech] : normal speech [Alert and Oriented] : alert and oriented [Normal memory] : normal memory [de-identified] : dermitis rash arm face and head

## 2023-07-11 NOTE — ASSESSMENT
[FreeTextEntry1] : Data reviewed:\par \par Labs 5/2023 reviewed and notable for normal Hgb, normal CMP, normal TSH, ESR 45\par \par PFT 06/07/2023 : entirely normal\par \par Impression:\par New dyspnea in former heavy smoker\par Significant GI symptoms\par \par Plan:\par I do not think there is any link with the rash.\par The PFT is normal.\par Have to consider malignancy in the ddx in this heavy smoker w hx melanoma. CXR, and then prb CT.\par Has dysphagia, reflux - consider GI evaluation.\par --\par PA/lat CXR [facility] 6/9/2023 personally reviewed : large RUL opacity, get CT / LM on VM\par --\par CT chest [facility] 7/2023 personally reviewed : large RUL mass, subcarinal adenopathy may explain GI symptoms, spoke to him, PET then bx

## 2023-07-11 NOTE — HISTORY OF PRESENT ILLNESS
[TextBox_4] : 06/07/2023: Asked to evaluate patient by Dr Hurtado for dyspnea w negative cardiac eval. Developed a rash about 3 mos ago w concurrent cough and dyspnea. 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 He notices difficulty swallowing, sensation of solid food getting stuck, liquids go down fine, belching. No sensation of aspiration. Coughs often after eating. Coughs lying down. Sensation of regurgitation. Sensation of PND. Will cough in the cold. And dyspneic going up stairs which is new as he lives in a 6th floor walkup for many years. Newly stopping going up the stairs. No history of lung disease. Smoked x 50 years up to 1 ppd, quit 6 years ago. . Hx melanoma.\par  [ESS] : 6

## 2023-07-19 PROBLEM — R91.8 LUNG MASS: Status: ACTIVE | Noted: 2023-01-01

## 2023-07-19 NOTE — ASSESSMENT
[FreeTextEntry1] : Data reviewed:\par \par CT chest [facility] 7/7/2023 personally reviewed : RUL mass 48mm in largest dimension and subcarinal adenopathy 36mm in largest dimension\par PET [facility] 7/2023 personally reviewed and shows FDG avidity in those lesions but no extrathoracic disease\par \par PFT 06/07/2023 : entirely normal\par \par Impression:\par Lung mass 48mm in largest dimension w bulky subcarinal adenopathy: probably T3N2 lung cancer\par \par Plan:\par He will consult with Dr Chapman tomorrow and undergo bronchoscopy w EBUS tomorrow. Labs sent today, and Dr Hurtado confirms no cardiac contraindication to procedure.\par Pt is aware of my upcoming vacation and that follow up on biopsy results with be through Dr Chapman.\par For tumor board next week after biopsy.

## 2023-07-19 NOTE — HISTORY OF PRESENT ILLNESS
[TextBox_4] : 06/07/2023: Asked to evaluate patient by Dr Hurtado for dyspnea w negative cardiac eval. Developed a rash about 3 mos ago w concurrent cough and dyspnea. 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 He notices difficulty swallowing, sensation of solid food getting stuck, liquids go down fine, belching. No sensation of aspiration. Coughs often after eating. Coughs lying down. Sensation of regurgitation. Sensation of PND. Will cough in the cold. And dyspneic going up stairs which is new as he lives in a 6th floor walkup for many years. Newly stopping going up the stairs. No history of lung disease. Smoked x 50 years up to 1 ppd, quit 6 years ago. . Hx melanoma.\par \par 7/19/2023: In the interim imaging showed a lung mass and adenopathy and that is what we are here to review. He has no change in his symptoms of dyspnea and dysphagia though continues to eat a normal diet and is not losing weight.\par

## 2023-07-21 NOTE — PRE-ANESTHESIA EVALUATION ADULT - NSANTHHOMEMEDSFT_GEN_ALL_CORE
Hydrocortisone LOTN; APPLY SPARINGLY TO AFFECTED AREA(S) TWICE DAILY  Mometasone Furoate 0.1 % External Cream; APPLY SPARINGLY TO THE AFFECTED  AREA(S) TWICE DAILY

## 2023-07-21 NOTE — PACU DISCHARGE NOTE - COMMENTS
Patient will be straight cathed now for urinary retention.  Instructed to return to ED if in urinary retention

## 2023-07-22 NOTE — ED PROVIDER NOTE - OBJECTIVE STATEMENT
78M hx BPH, predm, lung mass, c/o urinary retention. pt had bronchoscopy yesterday for lung biopsy. after procedure he had retention and was straight cathed. pt states has not been able to urinate since. +lower abd discomfort, urgency and pressure. pt states has also been unable to defecate. no fevers. no vomiting.

## 2023-07-22 NOTE — ED PROVIDER NOTE - CLINICAL SUMMARY MEDICAL DECISION MAKING FREE TEXT BOX
urinary retention, likely d/t BPH and recent procedural sedation for bronchoscopy. inability to defecate also likely related to urinary retention  -place river  -check ua

## 2023-07-22 NOTE — ED PROVIDER NOTE - PROVIDER TOKENS
PROVIDER:[TOKEN:[[id number]]] PROVIDER:[TOKEN:[[id number]]],PROVIDER:[TOKEN:[[id number]]],PROVIDER:[TOKEN:[[id number]]]

## 2023-07-22 NOTE — ED ADULT NURSE NOTE - OBJECTIVE STATEMENT
Pt. 78y M pmx BPH, , melanoma, c/o urinary retention since bronchoscopy yesterday. Denies SOB, CP, n/v/d, fever or chills.

## 2023-07-22 NOTE — ED ADULT TRIAGE NOTE - MODE OF ARRIVAL
23 yo female biba for etoh intox, + etoh tonight, no drugs.  No other complaints.  No trauma.  Intoxicated, well appearing, nad, nc/at, lung cta, heart reg, abd soft/nt, ext no trauma, no gross neuro deficits.  Pt here w intox; plan dc when awake, ambulatory w steady gait. Walk in

## 2023-07-22 NOTE — ED PROVIDER NOTE - CARE PROVIDER_API CALL
Isaak Quintanilal  Urology  [address] [address]  Phone: [phone]  Fax: [phone]  Follow Up Time:    Isaak Quintanilla  Urology  [address] [address]  Phone: [phone]  Fax: [phone]  Follow Up Time:     Bogdan Up  Urology  [address]  [place], [place] [id number]  Phone: [phone]  Fax: [phone]  Follow Up Time:     Jonathan Gonzalez  Urology  [address], [address]  [place] [address]  Phone: [phone]  Fax: [phone]  Follow Up Time:

## 2023-07-22 NOTE — ED PROVIDER NOTE - CARE PROVIDERS DIRECT ADDRESSES
,[email or web address],DirectAddress_Unknown,DirectAddress_Unknown

## 2023-07-22 NOTE — ED ADULT NURSE NOTE - NSFALLUNIVINTERV_ED_ALL_ED
Bed/Stretcher in lowest position, wheels locked, appropriate side rails in place/Call bell, personal items and telephone in reach/Instruct patient to call for assistance before getting out of bed/chair/stretcher/Non-slip footwear applied when patient is off stretcher/[place] to call system/Physically safe environment - no spills, clutter or unnecessary equipment/Purposeful proactive rounding/Room/bathroom lighting operational, light cord in reach

## 2023-07-22 NOTE — ED ADULT TRIAGE NOTE - HEIGHT IN CM
----- Message from Jennyfer Chance MD sent at 7/5/2019  7:15 AM CDT -----  GBS BACTEURIA WILL NEED ABXS DURING LABOR
Noted in chart. Pt aware.
170.18

## 2023-07-22 NOTE — ED PROVIDER NOTE - PATIENT PORTAL LINK FT
You can access the FollowMyHealth Patient Portal offered by [facility] by registering at the following website: [email or web address]. By joining [facility]’s FollowMyHealth portal, you will also be able to view your health information using other applications (apps) compatible with our system.

## 2023-07-22 NOTE — ED PROVIDER NOTE - NSFOLLOWUPCLINICS_GEN_ALL_ED_FT
[facility]  Urology  [address], [address]  [place], [place] [address]  Phone: [phone]  Fax:

## 2023-07-22 NOTE — ED PROVIDER NOTE - PROGRESS NOTE DETAILS
~1L yellow urine return, pt feeling better. recommend f/u with urology, will d/c with leg bag  I have discussed the discharge plan with the patient. The patient agrees with the plan, as discussed.  The patient understands Emergency Department diagnosis is a preliminary diagnosis often based on limited information and that the patient must adhere to the follow-up plan as discussed.  The patient understands that if the symptoms worsen the patient may return to the Emergency Department at any time for further evaluation and treatment.

## 2023-07-22 NOTE — ED PROVIDER NOTE - NSFOLLOWUPINSTRUCTIONS_ED_ALL_ED_FT
Follow-up with urology    Urinary Retention in Men    WHAT YOU NEED TO KNOW:    Urinary retention is a condition that develops when your bladder does not empty completely when you urinate.    DISCHARGE INSTRUCTIONS:    Medicines:    Medicines can help decrease the size of your prostate, fight infection, and help you urinate more easily.    Take your medicine as directed. Contact your healthcare provider if you think your medicine is not helping or if you have side effects. Tell your provider if you are allergic to any medicine. Keep a list of the medicines, vitamins, and herbs you take. Include the amounts, and when and why you take them. Bring the list or the pill bottles to follow-up visits. Carry your medicine list with you in case of an emergency.  Martinez catheter care: You may need a Martinez catheter for up to 2 weeks at home. Healthcare providers will give you a smaller leg bag to collect urine. Keep the bag below your waist. This will prevent urine from flowing back into your bladder and causing an infection or other problems. Also, keep the tube free of kinks so the urine will drain properly. Do not pull on the catheter. This can cause pain and bleeding, and may cause the catheter to come out. Ask your healthcare provider or urologist for more information on Martinez catheter care.    Urinate regularly: When your catheter is removed, do not let your bladder become too full before you urinate. Set regular times each day to urinate. Urinate as soon as you feel the need or at least every 3 hours while you are awake. Do not drink liquids before you go to bed. Urinate right before you go to bed.    Follow up with your healthcare provider or urologist as directed: Write down your questions so you remember to ask them during your visits.    Contact your healthcare provider or urologist if:    You have a fever.    You have pain when you urinate.    You have blood in your urine.    You have problems with your catheter.    You have questions or concerns about your condition or care.  Return to the emergency department if:    You have severe abdominal pain.    You are breathing faster than usual.    Your heartbeat is faster than usual.    Your face, hands, feet, or ankles are swollen.    Martinez Catheter Placement and Care    WHAT YOU NEED TO KNOW:    A Martinez catheter is a sterile tube that is inserted into your bladder to drain urine. It is also called an indwelling urinary catheter.    DISCHARGE INSTRUCTIONS:    Return to the emergency department if:    Your catheter comes out.    You suddenly have material that looks like sand in the tubing or drainage bag.    No urine is draining into the bag and you have checked the system.    You have pain in your hip, back, pelvis, or lower abdomen.    You are confused or cannot think clearly.  Call your doctor or urologist if:    You have a fever.    You have bladder spasms for more than 1 day after the catheter is placed.    You see blood in the tubing or drainage bag.    You have a rash or itching where the catheter tube is secured to your skin.    Urine leaks from or around the catheter, tubing, or drainage bag.    The closed drainage system has accidently come open or apart.    You see a layer of crystals inside the tubing.    You have questions or concerns about your condition or care.  Care for your catheter and drainage bag: You can reduce your risk for infection and injury by caring for your catheter and drainage bag properly.    Wash your hands often. Wash before and after you touch your catheter, tubing, or drainage bag. Use soap and water. Wear clean disposable gloves when you care for your catheter or disconnect the drainage bag. Wash your hands before you prepare or eat food.    Clean your genital area 2 times every day. Clean your catheter area and anal opening after every bowel movement.  For men: Use a soapy cloth to clean the tip of your penis. Start where the catheter enters. Wipe backward making sure to pull back the foreskin. Then use a cloth with clear water in the same direction to clean away the soap.    For women: Use a soapy cloth to clean the area that the catheter enters your body. Make sure to separate your labia and wipe toward the anus. Then use a cloth with clear water and wipe in the same direction.    Secure the catheter tube so you do not pull or move the catheter. This helps prevent pain and bladder spasms. Healthcare providers will show you how to use medical tape or a strap to secure the catheter tube to your body.    Keep a closed drainage system. Your catheter should always be attached to the drainage bag to form a closed system. Do not disconnect any part of the closed system unless you need to change the bag.    Keep the drainage bag below the level of your waist. This helps stop urine from moving back up the tubing and into your bladder. Do not loop or kink the tubing. This can cause urine to back up and collect in your bladder. Do not let the drainage bag touch or lie on the floor.    Empty the drainage bag when needed. The weight of a full drainage bag can be painful. Empty the drainage bag every 3 to 6 hours or when it is ? full.    Clean and change the drainage bag as directed. Ask your healthcare provider how often you should change the drainage bag and what cleaning solution to use. Wear disposable gloves when you change the bag. Do not allow the end of the catheter or tubing to touch anything. Clean the ends with an alcohol pad before you reconnect them.  What to do if problems develop:    No urine is draining into the bag:  Check for kinks in the tubing and straighten them out.    Check the tape or strap used to secure the catheter tube to your skin. Make sure it is not blocking the tube.    Make sure you are not sitting or lying on the tubing.    Make sure the urine bag is hanging below the level of your waist.    Urine leaks from or around the catheter, tubing, or drainage bag: Check if the closed drainage system has accidently come open or apart. Clean the catheter and tubing ends with a new alcohol pad and reconnect them.  Follow up with your doctor or urologist as directed: Write down your questions so you remember to ask them during your visits.

## 2023-07-23 PROBLEM — R73.03 PREDIABETES: Chronic | Status: ACTIVE | Noted: 2023-01-01

## 2023-07-23 PROBLEM — N40.0 BENIGN PROSTATIC HYPERPLASIA WITHOUT LOWER URINARY TRACT SYMPTOMS: Chronic | Status: ACTIVE | Noted: 2023-01-01

## 2023-07-27 NOTE — ASSESSMENT
[FreeTextEntry1] : 78-year-old male with no significant past medical history who presents for initial evaluation of newly diagnosed T3N2, stage IIIb non-small cell lung cancer, with features of adenocarcinoma.\par His case was presented at thoracic tumor board on 7/27, unresectable.\par Tumor board recommendation was for concurrent chemoradiation.  Tissue at this time is not sufficient for molecular testing.\par He needs repeat biopsy.\par We will arrange for IR guided transthoracic biopsy.\par No need for tracheal stenting per tumor board discussion at this time.  He will need close observation once he starts treatment.\par Referred to Dr. Wernicke's to be evaluated by radiation oncology.\par MRI brain ordered.\par We will follow with him in 3 weeks after repeat biopsy and MRI brain obtained.\par Discussed chemoradiation therapy treatment schedule.  Chemotherapy consisting general of weekly carboplatin and Taxol for total 7 weeks.  This is then followed by adjuvant durvalumab, provided no targetable alterations for 12 months.  Friend April was present during visit by phone.\par All questions answered to patient's satisfaction.

## 2023-07-27 NOTE — REVIEW OF SYSTEMS
[Fatigue] : fatigue [Dysphagia] : dysphagia [Cough] : cough [SOB on Exertion] : shortness of breath during exertion [Constipation] : constipation [Skin Rash] : skin rash [FreeTextEntry4] : patient states when he swallows it 'feels like something is there" [FreeTextEntry6] : intermittent cough when lying flat [FreeTextEntry7] : acid reflux, burping [de-identified] : arms, shoulders, neck, face & head

## 2023-07-27 NOTE — HISTORY OF PRESENT ILLNESS
[Disease: _____________________] : Disease: [unfilled] [T: ___] : T[unfilled] [N: ___] : N[unfilled] [M: ___] : M[unfilled] [AJCC Stage: ____] : AJCC Stage: [unfilled] [de-identified] : Domingo Galeana is a 78 year old male who presents to the clinic for initial consultation T3N2, stage IIIb,Non-small cell lung cancer, adenocarcinoma.. \par \par Oncologic history:\par 6/7/2023: The patient was referred by Dr. Judge for dyspnea with negative cardiac evaluation.  Rash developed in February 2023 with concurrent cough and dyspnea.  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  This mass is highly suspicious for malignancy.  There is subcarinal lymphadenopathy which is likely metastatic.\par 7/18/2023: PET scan–hypermetabolic right upper lobe pulmonary mass, which is most consistent with a primary bronchogenic malignancy.  Hypermetabolic mediastinal lymphadenopathy which is most consistent with metastatic disease.  Otherwise no abnormal hypermetabolic activity to suggest malignancy at this time.  Liver cysts and additional subcentimeter low-attenuation lesions in the liver which are too small to further characterize and may represent cysts as well.  Enlarged prostate.\par 7/19/2023: Follow-up with Dr. Eason\par 7/21/2023: EBUS with : 4L atypical, 3P NSCLC with adenocarcinoma features\par 7/27/2023: Thoracic tumor board: T3N2 final stage pending MRI brain.  Patient is not surgical candidate.  Tumor board recommends chemoradiation therapy.  Pathology tissue is not sufficient for molecular testing, needs repeat biopsy. [de-identified] : Non-small cell lung cancer with attic adenocarcinoma features [de-identified] : Pulm:  [de-identified] : Patient overall is feeling well.  He is independent in all his ADLs and physically very active.  He walks several miles per day.

## 2023-07-28 NOTE — PHYSICAL EXAM
[Sclera] : the sclera and conjunctiva were normal [Outer Ear] : the ears and nose were normal in appearance [Heart Rate And Rhythm] : heart rate and rhythm were normal [Supraclavicular Lymph Nodes Enlarged Bilaterally] : supraclavicular [Normal] : no joint swelling, no clubbing or cyanosis of the fingernails and muscle strength and tone were normal [Skin Color & Pigmentation] : normal skin color and pigmentation [No Focal Deficits] : no focal deficits [Oriented To Time, Place, And Person] : oriented to person, place, and time

## 2023-07-28 NOTE — VITALS
[Maximal Pain Intensity: 0/10] : 0/10 [Least Pain Intensity: 0/10] : 0/10 [90: Able to carry normal activity; minor signs or symptoms of disease.] : 90: Able to carry normal activity; minor signs or symptoms of disease.  [1 - Distress Level] : Distress Level: 1

## 2023-07-28 NOTE — REVIEW OF SYSTEMS
[Patient Intake Form Reviewed] : Patient intake form was reviewed [Dysphagia] : dysphagia [Cough] : cough [Negative] : Heme/Lymph

## 2023-07-28 NOTE — HISTORY OF PRESENT ILLNESS
[FreeTextEntry1] : Mr. Domingo Galeana is a 79 yo M, former smoker (40 pack years) with recently diagnosed cT3N2, Stage IIIB NSCLC of the RUL with involved pratracheal lymphnode. Patient presents to for initial consultation for consideration of radiation therapy.\par \par Brief Oncological History:\par 7/7/23: CT Chest found large 3.4x4.8cm rregular mass in the right upper lobe with stranding to the the pleural surface. There are subcarinal lymphadenopathy which is likely metastatic including a 3.6x3.5cm subcarinal LN. \par \par 7/18/23: PET showing a hypermetabolic right upper lobe pulmonary mass 4.1 x 3.7 cm with central necrosis. SUV 5.3-21.9.Hypermetabolic metabolic mediastinal lymphadenopathy including a posterior trachea wall node 4.4 x 3.1cm, SUV 6.3-13.5. Additionally a 1cm node at R tracheobronchial angle - SUV 7.6-14.5.\par \par 7/21/23 - Underwent EBUS guided biopsy of 4L LN showing atypical cells but no lymphoid tissue present and 3P node positive for NSCLC, with features of adenocarcinoma. \par \par Today patient is doing well. Notes he first noticed a cough and a rash 3 months ago which prompted imaging and the findings above.  He is a formed smoker, 40 pack years. He noted mild dry cough and discomfort with swallowing food. HE is able to eat everything but discomfort mid chest when food gets stuck sometimes. HE has lost 15 pounds in the last 3 months. He saw Dr. Chapman this morning and understands plan for chemoradiation.

## 2023-08-17 NOTE — REVIEW OF SYSTEMS
[Fatigue] : fatigue [Dysphagia] : dysphagia [Cough] : cough [SOB on Exertion] : shortness of breath during exertion [Constipation] : constipation [Skin Rash] : skin rash [FreeTextEntry4] : patient states when he swallows it 'feels like something is there" [FreeTextEntry6] : intermittent cough when lying flat [FreeTextEntry7] : acid reflux, burping [de-identified] : arms, shoulders, neck, face & head

## 2023-08-17 NOTE — ASSESSMENT
[FreeTextEntry1] : 78-year-old male with no significant past medical history who presents for f/u of T3N2, stage IIIb non-small cell lung cancer, with features of adenocarcinoma. His case was presented at thoracic tumor board on 7/27, unresectable.  Given concern for near airway obstruction I recommend he start chemotherapy before starting radiation therapy.  Extensive discussion about risks, benefits, possible side effects and schedule done today with his friend Ailyn present via phone.    Plan for curative intent therapy with carboplatin/paclitaxel weekly concurrent with radiation therapy. The patient verbalized understanding we will start chemotherapy a bit sooner due to delay in radiation. Premedicate with dexamethasone 20 mg 12 hours before and 6 hours before Taxol. Zofran as needed for nausea sent to pharmacy. Plan to start treatment in 1 week. Follow-up with CHRYSTAL Mulligan during tx. Nutrition referral.  New pathology was sent for NGS via [facility]. Currently pending. All questions were answered to patient and friend satisfaction.

## 2023-08-17 NOTE — HISTORY OF PRESENT ILLNESS
[Disease: _____________________] : Disease: [unfilled] [T: ___] : T[unfilled] [N: ___] : N[unfilled] [M: ___] : M[unfilled] [AJCC Stage: ____] : AJCC Stage: [unfilled] [de-identified] : Domingo Galeana is a 78 year old male who presents to the clinic for initial consultation T3N2, stage IIIb, Non-small cell lung cancer, adenocarcinoma..   Oncologic history: 6/7/2023: The patient was referred by Dr. Judge for dyspnea with negative cardiac evaluation.  Rash developed in February 2023 with concurrent cough and dyspnea.  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 7/10/2023: Large irregular mass in the right upper lobe with stranding to the pleural surface.  This mass is highly suspicious for malignancy.  There is subcarinal lymphadenopathy which is likely metastatic. 7/18/2023: PET scan-hypermetabolic right upper lobe pulmonary mass, which is most consistent with a primary bronchogenic malignancy.  Hypermetabolic mediastinal lymphadenopathy which is most consistent with metastatic disease.  Otherwise no abnormal hypermetabolic activity to suggest malignancy at this time.  Liver cysts and additional subcentimeter low-attenuation lesions in the liver which are too small to further characterize and may represent cysts as well.  Enlarged prostate. 7/19/2023: Follow-up with Dr. Eason 7/21/2023: EBUS with : 4L atypical, 3P NSCLC with adenocarcinoma features 7/27/2023: Thoracic tumor board: T3N2 final stage pending MRI brain.  Patient is not surgical candidate.  Tumor board recommends chemoradiation therapy.  Pathology tissue is not sufficient for molecular testing, needs repeat biopsy. 8/2/23: Repeat IR biopsy: Pathology:  Specimen(s) Submitted 1  Right upper lobe lung core bx + touch prep Final Diagnosis Right upper lobe lung, core biopsy and touch prep -   Non-small cell carcinoma, with necrosis (see note). 8/16/2023: The patient canceled his simulation with [employer] yesterday. [de-identified] : Non-small cell lung cancer with attic adenocarcinoma features [de-identified] : Patient overall is feeling well.  He is independent in all his ADLs and physically very active.   He is exhibiting progressive shortness of breath.  He has not started any therapy yet.  Previously he has declined to sign consent for chemotherapy.  He canceled his simulation yesterday. We discussed at length today the need to start treatment. I rescheduled his simulation with radiation therapy again.  [de-identified] : Pulm:

## 2023-08-17 NOTE — PHYSICAL EXAM
[Fully active, able to carry on all pre-disease performance without restriction] : Status 0 - Fully active, able to carry on all pre-disease performance without restriction [Normal] : affect appropriate [Restricted in physically strenuous activity but ambulatory and able to carry out work of a light or sedentary nature] : Status 1- Restricted in physically strenuous activity but ambulatory and able to carry out work of a light or sedentary nature, e.g., light house work, office work

## 2023-08-24 NOTE — PHYSICAL EXAM
[Restricted in physically strenuous activity but ambulatory and able to carry out work of a light or sedentary nature] : Status 1- Restricted in physically strenuous activity but ambulatory and able to carry out work of a light or sedentary nature, e.g., light house work, office work [Normal] : affect appropriate [de-identified] : + bilateral dorsally excoriation and dry erythematous scabbing to forearm s

## 2023-08-24 NOTE — REVIEW OF SYSTEMS
[Dysphagia] : dysphagia [Cough] : cough [SOB on Exertion] : shortness of breath during exertion [Skin Rash] : skin rash [FreeTextEntry7] : acid reflux, burping [Constipation] : constipation [Negative] : Allergic/Immunologic [Fatigue] : no fatigue [FreeTextEntry4] : patient states when he swallows it 'feels like something is there" [FreeTextEntry6] : intermittent cough when lying flat [de-identified] : arms, shoulders, neck, face & head

## 2023-08-24 NOTE — HISTORY OF PRESENT ILLNESS
[Disease: _____________________] : Disease: [unfilled] [T: ___] : T[unfilled] [N: ___] : N[unfilled] [M: ___] : M[unfilled] [AJCC Stage: ____] : AJCC Stage: [unfilled] [de-identified] : Domingo Galeana is a 78 year old male who presents to the clinic for initial consultation T3N2, stage IIIb, Non-small cell lung cancer, adenocarcinoma..   Oncologic history: 6/7/2023: The patient was referred by Dr. Judge for dyspnea with negative cardiac evaluation.  Rash developed in February 2023 with concurrent cough and dyspnea.  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 7/10/2023: Large irregular mass in the right upper lobe with stranding to the pleural surface.  This mass is highly suspicious for malignancy.  There is subcarinal lymphadenopathy which is likely metastatic. 7/18/2023: PET scan-hypermetabolic right upper lobe pulmonary mass, which is most consistent with a primary bronchogenic malignancy.  Hypermetabolic mediastinal lymphadenopathy which is most consistent with metastatic disease.  Otherwise no abnormal hypermetabolic activity to suggest malignancy at this time.  Liver cysts and additional subcentimeter low-attenuation lesions in the liver which are too small to further characterize and may represent cysts as well.  Enlarged prostate. 7/19/2023: Follow-up with Dr. Eason 7/21/2023: EBUS with : 4L atypical, 3P NSCLC with adenocarcinoma features 7/27/2023: Thoracic tumor board: T3N2 final stage pending MRI brain.  Patient is not surgical candidate.  Tumor board recommends chemoradiation therapy.  Pathology tissue is not sufficient for molecular testing, needs repeat biopsy. 8/2/23: Repeat IR biopsy: Pathology:  Specimen(s) Submitted 1  Right upper lobe lung core bx + touch prep Final Diagnosis Right upper lobe lung, core biopsy and touch prep -   Non-small cell carcinoma, with necrosis (see note). 8/16/2023: The patient canceled his simulation with [employer] yesterday.  824/23  onc clinic   78 year old male who presents to the clinic for first tx for T3N2, stage IIIb, Non-small cell lung cancer, adenocarcinoma. Patient report of some mild constipation. Denies any recent illness, fever, colds, chest pain, n,v, or abd pain. No other ac complaints.    [de-identified] : Non-small cell lung cancer with attic adenocarcinoma features [de-identified] : Pulm:  [Treatment Protocol] : Treatment Protocol [FreeTextEntry1] : 8/24/23 C1 carboplatin/paclitaxel for 7 cycles  8/29/23  C2 carboplatin/paclitaxel [de-identified] : Patient overall is feeling well.  He is independent in all his ADLs and physically very active.   He is exhibiting progressive shortness of breath.  He has not started any therapy yet.  Previously he has declined to sign consent for chemotherapy.  He canceled his simulation yesterday. We discussed at length today the need to start treatment. I rescheduled his simulation with radiation therapy again.

## 2023-08-28 NOTE — DISCUSSION/SUMMARY
[FreeTextEntry1] : stable exam\par labs reviewed with patient\par discussed elevated A1c and diet control/ dietary choices
no back pain, no gout, no musculoskeletal pain, no neck pain, and no weakness.

## 2023-09-05 NOTE — DISCHARGE INSTRUCTIONS: CHEMOTHERAPY - PATIENT/SIGNIFICANT OTHER VERBALIZE(S) UNDERSTANDING OF TREATMENT PLAN AND THE POTENTIAL SIDE EFFECTS OF THE MEDICATIONS GIVEN DURING TREATMENT AND/OR MEDICATIONS USED AT HOME TO MANAGE SIDE EFFECTS
[address] [facility] [address]  [place] [address]  Dept: [phone]  Dept Fax: [phone]  Loc: [phone]    Visit Date: 9/15/2021    Ms. Florie Dakins is a 78 y.o. female  who presented for:  Chief Complaint   Patient presents with    New Patient     AORTIC STENOSIS/ [address]       HPI:   H/o R CHRIS in 2012 and BRENDA w/ Hgb 9.3 09/01 on iron supplementation but otherwise no other cardiac or medical history. Here because echo ordered due to lower extremity edema and heart murmur finding concerning aortic valve stenosis and regurgitation. Currently on furosemide 20 mg daily and potassium 10 mEq daily. Endorses mild dyspnea when climbing stairs but otherwise no dyspnea on exertion. Denies chest pain, back pain, abdominal pain, orthopnea, PND, SOB, headaches, and dizziness. Current Outpatient Medications:     ferrous sulfate (IRON 325) 325 (65 Fe) MG tablet, Take 325 mg by mouth daily (with breakfast), Disp: , Rfl:     furosemide (LASIX) 20 MG tablet, Take 1 tablet by mouth daily, Disp: 30 tablet, Rfl: 0    potassium chloride (KLOR-CON M) 10 MEQ extended release tablet, Take 1 tablet by mouth daily, Disp: 30 tablet, Rfl: 0    Multiple Vitamins-Minerals (PRESERVISION AREDS PO), Take 2 tablets by mouth daily, Disp: , Rfl:     fluticasone (FLONASE) 50 MCG/ACT nasal spray, 1 spray by Each Nostril route daily, Disp: 2 Bottle, Rfl: 1    Multiple Vitamin TABS, Take by mouth daily, Disp: , Rfl:     Biotin 1000 MCG TABS, Take by mouth daily, Disp: , Rfl:     Past Medical History  Andry Kumari  has no past medical history on file. Social History  Andry Kumari  reports that she quit smoking about 8 months ago. Her smoking use included cigarettes. She started smoking about 49 years ago. She has a 48.00 pack-year smoking history. She has never used smokeless tobacco. She reports current alcohol use. She reports that she does not use drugs.     Family History  Pranay Sanabria family history includes Cancer in her father; Kidney Disease in her mother. There is no family history of bicuspid aortic valve, aneurysms, heart transplant, pacemakers, defibrillators, or sudden cardiac death. Past Surgical History   Past Surgical History:   Procedure Laterality Date    TOTAL HIP ARTHROPLASTY  2012    [facility]         Review of Systems   Constitutional: Negative for chills and fever  HENT: Negative for congestion, sinus pressure, sneezing and sore throat. Eyes: Negative for pain, discharge, redness and itching. Respiratory: Negative for apnea, cough  Gastrointestinal: Negative for blood in stool, constipation, diarrhea   Endocrine: Negative for cold intolerance, heat intolerance, polydipsia. Genitourinary: Negative for dysuria, enuresis, flank pain and hematuria. Musculoskeletal: Negative for arthralgias, joint swelling and neck pain. Neurological: Negative for numbness and headaches. Psychiatric/Behavioral: Negative for agitation, confusion, decreased concentration and dysphoric mood. Objective:     BP (!) 120/54   Pulse 100   Ht 5' (1.524 m)   Wt 106 lb 6.4 oz (48.3 kg)   BMI 20.78 kg/m²     Wt Readings from Last 3 Encounters:   09/15/21 106 lb 6.4 oz (48.3 kg)   09/14/21 106 lb (48.1 kg)   08/24/21 108 lb 12.8 oz (49.4 kg)     BP Readings from Last 3 Encounters:   09/15/21 (!) 120/54   09/14/21 (!) 118/52   08/24/21 (!) 122/58       Nursing note and vitals reviewed. Physical Exam   Constitutional: Oriented to person, place, and time. Appears well-developed and well-nourished. HENT:   Head: Normocephalic and atraumatic. Eyes: EOM are normal. Pupils are equal, round, and reactive to light. Neck: Normal range of motion. Neck supple. No JVD present. Cardiovascular: Normal rate, regular rhythm, normal heart sounds and intact distal pulses. 1/2 Blowing diastolic murmur.    Pulmonary/Chest: Effort normal and breath sounds normal. No Statement Selected     Ethnicity    Account #       [de-identified]      Room Number    Accession       [id number]     Date of Study        09/09/2021  Number    Date of Birth   1955     Referring Physician  Ciara, [address]    Age             78 year(s)     [address] [facility]    Interpreting         Echo reader of the  Physician            su Felton MD    Procedure    Type of Study    TTE procedure:ECHOCARDIOGRAM COMPLETE 2D W DOPPLER W COLOR. Procedure Date  Date: 09/09/2021 Start: 10:12 AM    Study Location: Echo Lab  Technical Quality: Adequate visualization    Indications:Lower extremity edema and Heart murmur. Additional Medical History: Former smoker, heart murmur    Patient Status: Routine    Height: 64 inches Weight: 108 pounds BSA: 1.51 m^2 BMI: 18.54 kg/m^2    BP: 122/58 mmHg    Conclusions    Summary  Ejection fraction is visually estimated at 50%. Overall left ventricular function is normal.  Aortic valve appears tricuspid. Thickened aortic valve leaflets noted. Aortic valve leaflets are Moderately calcified. Moderate-to-severe aortic regurgitation is noted. Mild aortic stenosis is present. Signature    ----------------------------------------------------------------  Electronically signed by Jesse Felton MD (Interpreting  physician) on 09/09/2021 at 04:33 PM  ----------------------------------------------------------------    Findings    Mitral Valve  Mild mitral regurgitation is present. Aortic Valve  Aortic valve appears tricuspid. Thickened aortic valve leaflets noted. Aortic valve leaflets are Moderately calcified. Moderate-to-severe aortic regurgitation is noted. Mild aortic stenosis is present. Tricuspid Valve  Mild tricuspid regurgitation visualized. Pulmonic Valve  The pulmonic valve leaflets exhibited normal thickness, no calcification,  and normal cuspal separation.  DOPPLER: The transpulmonic velocity was  within the normal range with no evidence for regurgitation. Left Atrium  Left atrial size was normal.    Left Ventricle  Ejection fraction is visually estimated at 50%. Overall left ventricular function is normal.    Right Atrium  Right atrial size was normal.    Right Ventricle  The right ventricular size was normal with normal systolic function and  wall thickness. Pericardial Effusion  The pericardium was normal in appearance with no evidence of a pericardial  effusion. Pleural Effusion  No evidence of pleural effusion. Aorta / Great Vessels  -Aortic root dimension within normal limits.  -The Pulmonary artery is within normal limits. -IVC size is within normal limits with normal respiratory phasic changes.     M-Mode/2D Measurements & Calculations    LV Diastolic   LV Systolic Dimension:    AV Cusp Separation: 1.4 cmLA  Dimension: 5   3.6 cm                    Dimension: 3.2 cmAO Root  cm             LV Volume Diastolic: 530  Dimension: 3.7 cmLA Area: 18.2  LV FS:28 %     ml                        cm^2  LV PW          LV Volume Systolic: 43.1  Diastolic: 0.9 ml  cm             LV EDV/LV EDV Index: 118  Septum         ml/78 m^2LV ESV/LV ESV    RV Diastolic Dimension: 2.4 cm  Diastolic: 0.7 Index: 62.0 ml/36 m^2  cm             EF Calculated: 53.9 %     LA/Aorta: 0.86    LA volume/Index: 52.5 ml /35m^2    LVOT: 2.1 cm    Doppler Measurements & Calculations    MV Peak E-Wave:     AV Peak Velocity: 183   LVOT Peak Velocity: 131 cm/s  93.8 cm/s           cm/s                    LVOT Mean Velocity: 95.6 cm/s  MV Peak A-Wave: 59  AV Peak Gradient: 13.4  LVOT Peak Gradient: 7 mmHgLVOT  cm/s                mmHg                    Mean Gradient: 4 mmHg  MV E/A Ratio: 1.59  AV Mean Velocity: 135  MV Peak Gradient:   cm/s                    TV Peak E-Wave: 74.6 cm/s  3.52 mmHg           AV Mean Gradient: 8     TV Peak A-Wave: 42.4 cm/s  mmHg  MV Deceleration     AV VTI: 42 cm           TV Peak Gradient: 2.23 mmHg  Time: 201 msec AV Area                 TR Velocity:271 cm/s  MV P1/2t: 59 msec   (Continuity):2.09 cm^2  TR Gradient:29.38 mmHg  MVA by PHT:3.73                             PV Peak Velocity: 55.7 cm/s  cm^2                LVOT VTI: 25.4 cm       PV Peak Gradient: 1.24 mmHg  AV P1/2t: 255 msec  MV E' Septal  Velocity: 11.9 cm/s  MV A' Septal  Velocity: 15.8 cm/s AV DVI (VTI): 0.6AV DVI  MV E' Lateral       (Vmax):0.72  Velocity: 12.1 cm/s  MV A' Lateral  Velocity: 7 cm/s  E/E' septal: 7.88  E/E' lateral: 7.75  MR Velocity: 404  cm/s    [id number]       Assessment/Plan    Asymptomatic, moderate to severe aortic valve regurgitation with mild stenosis. Mild tricuspid and mitral regurgitation  EF on echo 09/09 was 50%. Last EKG NSR. Plan:   Schedule for DEREK to determine severity of aortic regurgitation, if EF is truly reduced and LV dilating, with severe AR, will need CT surgery. Continue current care. Disposition:   DEREK      Electronically signed by Kelley Dias MD   9/15/2021 at 2:44 PM EDT      Attending Supervising Physician's Attestation Statement  I performed a history and physical examination on the patient and discussed the management with the resident physician. I reviewed and agree with the findings and plan as documented in the resident's note.     Electronically signed by Kyle Dawn MD on 9/16/21 at 12:14 PM EDT

## 2023-09-06 NOTE — REVIEW OF SYSTEMS
[Dysphagia] : dysphagia [Cough] : cough [Constipation] : constipation [Skin Rash] : skin rash [Negative] : Allergic/Immunologic [Fatigue] : no fatigue [SOB on Exertion] : no shortness of breath during exertion [FreeTextEntry4] : patient states when he swallows it 'feels like something is there" [FreeTextEntry6] : intermittent cough when lying flat [de-identified] : arms, shoulders, neck, face & head

## 2023-09-06 NOTE — PHYSICAL EXAM
[Restricted in physically strenuous activity but ambulatory and able to carry out work of a light or sedentary nature] : Status 1- Restricted in physically strenuous activity but ambulatory and able to carry out work of a light or sedentary nature, e.g., light house work, office work [Normal] : affect appropriate [de-identified] : + bilateral dorsally excoriation and dry erythematous scabbing to forearms. Improved from last visit.

## 2023-09-06 NOTE — ASSESSMENT
[FreeTextEntry1] : 78-year-old male with no significant past medical history who presents for f/u of T3N2, stage IIIb non-small cell lung cancer, with features of adenocarcinoma. His case was presented at thoracic tumor board on 7/27, unresectable.  Given concern for near airway obstruction I recommend he start chemotherapy before starting radiation therapy.  Extensive discussion about risks, benefits, possible side effects and schedule done today with his friend Ailyn present via phone.    Plan for curative intent therapy with carboplatin/paclitaxel weekly concurrent with radiation therapy. The patient verbalized understanding we will start chemotherapy a bit sooner due to delay in radiation. Premedicate with dexamethasone 20 mg 12 hours before and 6 hours before Taxol. Zofran as needed for nausea sent to pharmacy. Plan to start treatment in 1 week. Follow-up with CHRYSTAL Mulligan during tx. Nutrition referral.  New pathology was sent for NGS via [facility]. Currently pending. All questions were answered to patient and friend satisfaction.  8/24/23  onc clinic 79 y/o m with h/o lung cancer her for 1st time tx. labs wnl proceed with carbo/paclitaxel C1 will monitor weekly labs and f/u x 7 cycles.  continue current meds dexa prior to every chemo tx. RTC next week for tx and f/u.  .Encourage to contact the office for any concerns or worsening symptoms. Pt verbalizes understanding.  9/5/23 onc clinic 79 y/o m with lung cancer labs wnl , proceed with cycle 2  will monitor s/s post tx  c/w current meds as directed  RTC next week for tx and f/u  .Encourage to contact the office for any concerns or worsening symptoms. Pt verbalizes understanding

## 2023-09-06 NOTE — HISTORY OF PRESENT ILLNESS
[Disease: _____________________] : Disease: [unfilled] [T: ___] : T[unfilled] [N: ___] : N[unfilled] [M: ___] : M[unfilled] [AJCC Stage: ____] : AJCC Stage: [unfilled] [Treatment Protocol] : Treatment Protocol [de-identified] : Domingo Galeana is a 78 year old male who presents to the clinic for initial consultation T3N2, stage IIIb, Non-small cell lung cancer, adenocarcinoma..   Oncologic history: 6/7/2023: The patient was referred by Dr. Judge for dyspnea with negative cardiac evaluation.  Rash developed in February 2023 with concurrent cough and dyspnea.  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 7/10/2023: Large irregular mass in the right upper lobe with stranding to the pleural surface.  This mass is highly suspicious for malignancy.  There is subcarinal lymphadenopathy which is likely metastatic. 7/18/2023: PET scan-hypermetabolic right upper lobe pulmonary mass, which is most consistent with a primary bronchogenic malignancy.  Hypermetabolic mediastinal lymphadenopathy which is most consistent with metastatic disease.  Otherwise no abnormal hypermetabolic activity to suggest malignancy at this time.  Liver cysts and additional subcentimeter low-attenuation lesions in the liver which are too small to further characterize and may represent cysts as well.  Enlarged prostate. 7/19/2023: Follow-up with Dr. Eason 7/21/2023: EBUS with : 4L atypical, 3P NSCLC with adenocarcinoma features 7/27/2023: Thoracic tumor board: T3N2 final stage pending MRI brain.  Patient is not surgical candidate.  Tumor board recommends chemoradiation therapy.  Pathology tissue is not sufficient for molecular testing, needs repeat biopsy. 8/2/23: Repeat IR biopsy: Pathology:  Specimen(s) Submitted 1  Right upper lobe lung core bx + touch prep Final Diagnosis Right upper lobe lung, core biopsy and touch prep -   Non-small cell carcinoma, with necrosis (see note). 8/16/2023: The patient canceled his simulation with [employer] yesterday.  8/24/23  onc clinic   78 year old male who presents to the clinic for first tx for T3N2, stage IIIb, Non-small cell lung cancer, adenocarcinoma. Patient report of some mild constipation. Denies any recent illness, fever, colds, chest pain, n,v, or abd pain. No other ac complaints.   9/5/23 onc clinic  77 y/o m with Lung adenocarcinoma presents to clinic for follow up and tx. Patient report of some mild constipation but has not utilized MiraLAX as prescribed. No ac symptoms. Patient actually states he felt slightly better after last chemo tx. Less discomfort in his throat and breathing more at ease.  [de-identified] : Non-small cell lung cancer with attic adenocarcinoma features [de-identified] : Pulm:  [FreeTextEntry1] : 8/24/23 C1 carboplatin/paclitaxel for 7 cycles  8/29/23  C2 carboplatin/paclitaxel [de-identified] : Patient overall is feeling well.  He is independent in all his ADLs and physically very active.   He is exhibiting progressive shortness of breath.  He has not started any therapy yet.  Previously he has declined to sign consent for chemotherapy.  He canceled his simulation yesterday. We discussed at length today the need to start treatment. I rescheduled his simulation with radiation therapy again.

## 2023-09-08 NOTE — PHYSICAL EXAM
[Restricted in physically strenuous activity but ambulatory and able to carry out work of a light or sedentary nature] : Status 1- Restricted in physically strenuous activity but ambulatory and able to carry out work of a light or sedentary nature, e.g., light house work, office work [Normal] : affect appropriate [de-identified] : + bilateral dorsally excoriation and dry erythematous scabbing to forearms. Improved from last visit.

## 2023-09-08 NOTE — REVIEW OF SYSTEMS
[Fatigue] : no fatigue [Dysphagia] : dysphagia [Cough] : cough [SOB on Exertion] : no shortness of breath during exertion [Constipation] : constipation [Skin Rash] : skin rash [Negative] : Allergic/Immunologic [FreeTextEntry4] : patient states when he swallows it 'feels like something is there" [FreeTextEntry6] : intermittent cough when lying flat [de-identified] : arms, shoulders, neck, face & head

## 2023-09-08 NOTE — HISTORY OF PRESENT ILLNESS
[Disease: _____________________] : Disease: [unfilled] [T: ___] : T[unfilled] [N: ___] : N[unfilled] [M: ___] : M[unfilled] [AJCC Stage: ____] : AJCC Stage: [unfilled] [de-identified] : Non-small cell lung cancer with attic adenocarcinoma features [de-identified] : Domingo Galeana is a 78 year old male who presents to the clinic for initial consultation T3N2, stage IIIb, Non-small cell lung cancer, adenocarcinoma..   Oncologic history: 6/7/2023: The patient was referred by Dr. Judge for dyspnea with negative cardiac evaluation.  Rash developed in February 2023 with concurrent cough and dyspnea.  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 7/10/2023: Large irregular mass in the right upper lobe with stranding to the pleural surface.  This mass is highly suspicious for malignancy.  There is subcarinal lymphadenopathy which is likely metastatic. 7/18/2023: PET scan-hypermetabolic right upper lobe pulmonary mass, which is most consistent with a primary bronchogenic malignancy.  Hypermetabolic mediastinal lymphadenopathy which is most consistent with metastatic disease.  Otherwise no abnormal hypermetabolic activity to suggest malignancy at this time.  Liver cysts and additional subcentimeter low-attenuation lesions in the liver which are too small to further characterize and may represent cysts as well.  Enlarged prostate. 7/19/2023: Follow-up with Dr. Eason 7/21/2023: EBUS with : 4L atypical, 3P NSCLC with adenocarcinoma features 7/27/2023: Thoracic tumor board: T3N2 final stage pending MRI brain.  Patient is not surgical candidate.  Tumor board recommends chemoradiation therapy.  Pathology tissue is not sufficient for molecular testing, needs repeat biopsy. 8/2/23: Repeat IR biopsy: Pathology:  Specimen(s) Submitted 1  Right upper lobe lung core bx + touch prep Final Diagnosis Right upper lobe lung, core biopsy and touch prep -   Non-small cell carcinoma, with necrosis (see note). 8/16/2023: The patient canceled his simulation with [employer] yesterday.  8/24/23  onc clinic   78 year old male who presents to the clinic for first tx for T3N2, stage IIIb, Non-small cell lung cancer, adenocarcinoma. Patient report of some mild constipation. Denies any recent illness, fever, colds, chest pain, n,v, or abd pain. No other ac complaints.   9/5/23 onc clinic  79 y/o m with Lung adenocarcinoma presents to clinic for follow up and tx. Patient report of some mild constipation but has not utilized MiraLAX as prescribed. No ac symptoms. Patient actually states he felt slightly better after last chemo tx. Less discomfort in his throat and breathing more at ease.  [de-identified] : Pulm:  [Treatment Protocol] : Treatment Protocol [FreeTextEntry1] : 8/24/23 C1 carboplatin/paclitaxel for 7 cycles  8/29/23  C2 carboplatin/paclitaxel  [de-identified] : Patient overall is feeling well.  He is independent in all his ADLs and physically very active.   He is exhibiting progressive shortness of breath.  He has not started any therapy yet.  Previously he has declined to sign consent for chemotherapy.  He canceled his simulation yesterday. We discussed at length today the need to start treatment. I rescheduled his simulation with radiation therapy again.

## 2023-09-08 NOTE — ASSESSMENT
[FreeTextEntry1] : 78-year-old male with no significant past medical history who presents for f/u of T3N2, stage IIIb non-small cell lung cancer, with features of adenocarcinoma. His case was presented at thoracic tumor board on 7/27, unresectable.  Given concern for near airway obstruction I recommend he start chemotherapy before starting radiation therapy.  Extensive discussion about risks, benefits, possible side effects and schedule done today with his friend Ailyn present via phone.    Plan for curative intent therapy with carboplatin/paclitaxel weekly concurrent with radiation therapy. The patient verbalized understanding we will start chemotherapy a bit sooner due to delay in radiation. Premedicate with dexamethasone 20 mg 12 hours before and 6 hours before Taxol. Zofran as needed for nausea sent to pharmacy. Plan to start treatment in 1 week. Follow-up with CHRYSTAL Mulligan during tx. Nutrition referral.  New pathology was sent for NGS via [facility]. Currently pending. All questions were answered to patient and friend satisfaction.  8/24/23  onc clinic 79 y/o m with h/o lung cancer her for 1st time tx. labs wnl proceed with carbo/paclitaxel C1 will monitor weekly labs and f/u x 7 cycles.  continue current meds dexa prior to every chemo tx. RTC next week for tx and f/u.  .Encourage to contact the office for any concerns or worsening symptoms. Pt verbalizes understanding.  9/5/23 onc clinic 79 y/o m with lung cancer labs wnl , proceed with cycle 2  will monitor s/s post tx  c/w current meds as directed  RTC next week for tx and f/u  .Encourage to contact the office for any concerns or worsening symptoms. Pt verbalizes understanding

## 2023-09-11 PROBLEM — Z51.5 ENCOUNTER FOR PALLIATIVE CARE: Status: ACTIVE | Noted: 2023-01-01

## 2023-09-11 PROBLEM — G47.00 INSOMNIA: Status: ACTIVE | Noted: 2023-01-01

## 2023-10-09 NOTE — DISCHARGE INSTRUCTIONS: CHEMOTHERAPY - NSRNDCEVAL_HEME_A_AMB_QA
Please share these instructions with your physicians if you are seen by a physician between treatment room visits.
none

## 2023-12-09 NOTE — PRE-ANESTHESIA EVALUATION ADULT - NSANTHAIRWAYFT_ENT_ALL_CORE
Normal mouth opening, normal thyromental distance, normal neck extension without pain.
tripped / fell walking in the street yesterday , pain both knees , mild pain  Left cheek . no LOC

## 2023-12-20 NOTE — REVIEW OF SYSTEMS
[Lower Ext Edema] : lower extremity edema [Cough] : cough [Negative] : Allergic/Immunologic [Fatigue] : no fatigue [Recent Change In Weight] : ~T no recent weight change [Dysphagia] : no dysphagia [Wheezing] : no wheezing [SOB on Exertion] : no shortness of breath during exertion [Constipation] : no constipation [Muscle Pain] : no muscle pain [FreeTextEntry6] : intermittent cough when lying flat [FreeTextEntry9] : lower leg swelling +1 peripheral edema. + improvement 12/19/23 b/l

## 2023-12-20 NOTE — ASSESSMENT
[FreeTextEntry1] : 78-year-old male with no significant past medical history who presents for f/u of T3N2, stage IIIb non-small cell lung cancer, with features of adenocarcinoma. His case was presented at thoracic tumor board on 7/27, unresectable.  Completed chemoRT on 10/27/2023. Chemotherapy was started early, due to delay in radiation therapy.  His most recent CT chest with notable decrease in primary lung mass, as well as LAD. SOC per NCCN guidelines consists of maintance durvalumab for 1 year per [place] trial.  Lengthy discussion was held with patient about benefits of durvalumab therapy. He was advised this is SOC. He still is reluctant to proceed with any further therapy, as he thinks radiation will be sufficient. I advised him this is not in line with guidelines and I do recommend treatment with durvalumab. We discussed risks of durvalumab, administered every 4 weeks, he was provided with educational materials.  I had explained to him that if he wants to proceed with therapy he should make a decision within 1 week, to follow study schema that led to the approval of the drug.  He might night gain any benefit if he waits for too long. He was given a consent form to review and sign at home if willing to.  He will bring the consent form to the office.  If he decides to not proceed with durvalumab his maintenance scans would include every 3 month CT chest. He should have a PET scan repeated 4 months post RT completion. All questions answered to the best of my ability.  Patient to call for follow up. Labs: CBC, CMP, TSH  12/19/23 onc clinic  a/p Lung cancer post chemo/rt tx 10/2023: foot pain  Pending podiatry consultation Recommend xrays Recommend RICE , tylenol PRN   F/U with podiatry post consultation  Lung cancer: Patient has not made a decision yet in regard to concurrent tx.  It has been explained to the patient on three separate visits the significance of continuing  tx. Medication explanation, risk factors, chemo education was provided. All questions have been answered.

## 2023-12-20 NOTE — PHYSICAL EXAM
[Restricted in physically strenuous activity but ambulatory and able to carry out work of a light or sedentary nature] : Status 1- Restricted in physically strenuous activity but ambulatory and able to carry out work of a light or sedentary nature, e.g., light house work, office work [Normal] : affect appropriate [de-identified] : right foot + TTP at plantar aspect along base of 5th metatarsal. + swelling appreciated. No erythema, ecchymosis or deformity. No motor or sensory deficit. NVI.  [de-identified] : All rashes have resolved and are drying up.

## 2023-12-20 NOTE — HISTORY OF PRESENT ILLNESS
[Disease: _____________________] : Disease: [unfilled] [T: ___] : T[unfilled] [N: ___] : N[unfilled] [M: ___] : M[unfilled] [AJCC Stage: ____] : AJCC Stage: [unfilled] [Treatment Protocol] : Treatment Protocol [90: Able to carry normal activity; minor signs or symptoms of disease.] : 90: Able to carry normal activity; minor signs or symptoms of disease.  [ECOG Performance Status: 1 - Restricted in physically strenuous activity but ambulatory and able to carry out work of a light or sedentary nature] : Performance Status: 1 - Restricted in physically strenuous activity but ambulatory and able to carry out work of a light or sedentary nature, e.g., light house work, office work [de-identified] : Domingo Galeana is a 78 year old male who presents to the clinic for follow up of T3N2, stage IIIB, Non-small cell lung cancer, adenocarcinoma..   Oncologic history: 6/7/2023: The patient was referred by Dr. Judge for dyspnea with negative cardiac evaluation.  Rash developed in February 2023 with concurrent cough and dyspnea.  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 7/10/2023: Large irregular mass in the right upper lobe with stranding to the pleural surface.  This mass is highly suspicious for malignancy.  There is subcarinal lymphadenopathy which is likely metastatic. 7/18/2023: PET scan-hypermetabolic right upper lobe pulmonary mass, which is most consistent with a primary bronchogenic malignancy.  Hypermetabolic mediastinal lymphadenopathy which is most consistent with metastatic disease.  Otherwise no abnormal hypermetabolic activity to suggest malignancy at this time.  Liver cysts and additional subcentimeter low-attenuation lesions in the liver which are too small to further characterize and may represent cysts as well.  Enlarged prostate. 7/19/2023: Follow-up with Dr. Eason 7/21/2023: EBUS with : 4L atypical, 3P NSCLC with adenocarcinoma features 7/27/2023: Thoracic tumor board: T3N2 final stage pending MRI brain.  Patient is not surgical candidate.  Tumor board recommends chemoradiation therapy.  Pathology tissue is not sufficient for molecular testing, needs repeat biopsy. 8/2/23: Repeat IR biopsy: Pathology:  Specimen(s) Submitted 1  Right upper lobe lung core bx + touch prep Final Diagnosis Right upper lobe lung, core biopsy and touch prep -   Non-small cell carcinoma, with necrosis (see note). 8/16/2023: The patient canceled his simulation with [employer]. 11/27/23: CT CHEST: 1. Since July 7, 2023, slightly decreased left upper lobe malignancy with new treatment induced changes and decreased thoracic melly metastases. 2. Postradiation pneumonitis and foci of large and small airway disease in right lung.  12/19/23 onc clinic 77 y/o lung cancer  post chemo/RT tx in 10/2023. Presents to clinic c/o right foot pain. He state pain has been going on for a few days. He states he has pain if he bears weight on it but at rest there is no pain. Patient report of right foot swelling and tenderness at the plantar surface.  He denies any injury, fall, accidents (banging or bumping) foot, no erythema or numbness. Patient has a podiatry appt 12/20/23.  [de-identified] : Non-small cell lung cancer with attic adenocarcinoma features [de-identified] : Pulm:  [FreeTextEntry1] : RT - planned completion 10/27/2023 8/24/23 C1 carboplatin/paclitaxel 9/5/23  C2 carboplatin/paclitaxel 9/11/23    C3 carboplatin/paclitaxel  9/18/23   C4 carboplatin/paclitaxel  9/25/23   C5 carboplatin/paclitaxel  10/2/23   C6 carboplatin/paclitaxel 10/9/23: C7 carboplatin/paclitaxel 10/16/23: C8 carboplatin/paclitaxel 10/23/23: C9 carboplatin/paclitaxel   [de-identified] : No symptoms.  He is here again to discuss benefits of durvalumab maintenance therapy per [place] trial. He asked again a number of questions.  We discussed again the side effect profile and risks.

## 2024-01-01 ENCOUNTER — INPATIENT (INPATIENT)
Facility: HOSPITAL | Age: 79
LOS: 6 days | Discharge: HOSPICE | DRG: 374 | End: 2024-03-08
Attending: INTERNAL MEDICINE | Admitting: NEUROLOGICAL SURGERY
Payer: MEDICARE

## 2024-01-01 ENCOUNTER — APPOINTMENT (OUTPATIENT)
Dept: PALLIATIVE MEDICINE | Facility: CLINIC | Age: 79
End: 2024-01-01

## 2024-01-01 ENCOUNTER — NON-APPOINTMENT (OUTPATIENT)
Age: 79
End: 2024-01-01

## 2024-01-01 ENCOUNTER — APPOINTMENT (OUTPATIENT)
Dept: HEMATOLOGY ONCOLOGY | Facility: CLINIC | Age: 79
End: 2024-01-01

## 2024-01-01 ENCOUNTER — APPOINTMENT (OUTPATIENT)
Dept: HEMATOLOGY ONCOLOGY | Facility: CLINIC | Age: 79
End: 2024-01-01
Payer: MEDICARE

## 2024-01-01 ENCOUNTER — APPOINTMENT (OUTPATIENT)
Dept: INFUSION THERAPY | Facility: CLINIC | Age: 79
End: 2024-01-01

## 2024-01-01 ENCOUNTER — LABORATORY RESULT (OUTPATIENT)
Age: 79
End: 2024-01-01

## 2024-01-01 ENCOUNTER — EMERGENCY (EMERGENCY)
Facility: HOSPITAL | Age: 79
LOS: 1 days | Discharge: HOME | End: 2024-01-01
Attending: STUDENT IN AN ORGANIZED HEALTH CARE EDUCATION/TRAINING PROGRAM | Admitting: STUDENT IN AN ORGANIZED HEALTH CARE EDUCATION/TRAINING PROGRAM
Payer: MEDICARE

## 2024-01-01 ENCOUNTER — TRANSCRIPTION ENCOUNTER (OUTPATIENT)
Age: 79
End: 2024-01-01

## 2024-01-01 ENCOUNTER — APPOINTMENT (OUTPATIENT)
Dept: HEART AND VASCULAR | Facility: CLINIC | Age: 79
End: 2024-01-01
Payer: MEDICARE

## 2024-01-01 ENCOUNTER — APPOINTMENT (OUTPATIENT)
Dept: MRI IMAGING | Facility: HOSPITAL | Age: 79
End: 2024-01-01

## 2024-01-01 ENCOUNTER — RESULT REVIEW (OUTPATIENT)
Age: 79
End: 2024-01-01

## 2024-01-01 ENCOUNTER — APPOINTMENT (OUTPATIENT)
Dept: RADIATION ONCOLOGY | Facility: CLINIC | Age: 79
End: 2024-01-01

## 2024-01-01 ENCOUNTER — OUTPATIENT (OUTPATIENT)
Dept: OUTPATIENT SERVICES | Facility: HOSPITAL | Age: 79
LOS: 1 days | End: 2024-01-01
Payer: MEDICARE

## 2024-01-01 ENCOUNTER — APPOINTMENT (OUTPATIENT)
Dept: CT IMAGING | Facility: HOSPITAL | Age: 79
End: 2024-01-01

## 2024-01-01 ENCOUNTER — INPATIENT (INPATIENT)
Facility: HOSPITAL | Age: 79
LOS: 0 days | Discharge: HOME | DRG: 180 | End: 2024-02-15
Attending: STUDENT IN AN ORGANIZED HEALTH CARE EDUCATION/TRAINING PROGRAM | Admitting: STUDENT IN AN ORGANIZED HEALTH CARE EDUCATION/TRAINING PROGRAM
Payer: MEDICARE

## 2024-01-01 ENCOUNTER — APPOINTMENT (OUTPATIENT)
Dept: NEUROSURGERY | Facility: HOSPITAL | Age: 79
End: 2024-01-01

## 2024-01-01 ENCOUNTER — INPATIENT (INPATIENT)
Facility: HOSPITAL | Age: 79
LOS: 6 days | Discharge: HOME HEALTH | DRG: 25 | End: 2024-01-17
Attending: NEUROLOGICAL SURGERY | Admitting: NEUROLOGICAL SURGERY
Payer: MEDICARE

## 2024-01-01 ENCOUNTER — APPOINTMENT (OUTPATIENT)
Dept: NEUROSURGERY | Facility: CLINIC | Age: 79
End: 2024-01-01
Payer: MEDICARE

## 2024-01-01 VITALS
SYSTOLIC BLOOD PRESSURE: 159 MMHG | OXYGEN SATURATION: 97 % | BODY MASS INDEX: 17.42 KG/M2 | DIASTOLIC BLOOD PRESSURE: 88 MMHG | TEMPERATURE: 96.8 F | HEIGHT: 67 IN | WEIGHT: 111 LBS | RESPIRATION RATE: 18 BRPM | HEART RATE: 62 BPM

## 2024-01-01 VITALS
WEIGHT: 111 LBS | HEIGHT: 67 IN | SYSTOLIC BLOOD PRESSURE: 172 MMHG | RESPIRATION RATE: 18 BRPM | BODY MASS INDEX: 17.42 KG/M2 | HEART RATE: 57 BPM | DIASTOLIC BLOOD PRESSURE: 101 MMHG | OXYGEN SATURATION: 97 % | TEMPERATURE: 97.2 F

## 2024-01-01 VITALS
BODY MASS INDEX: 17.11 KG/M2 | DIASTOLIC BLOOD PRESSURE: 70 MMHG | SYSTOLIC BLOOD PRESSURE: 116 MMHG | HEIGHT: 67 IN | OXYGEN SATURATION: 98 % | HEART RATE: 67 BPM | WEIGHT: 109 LBS | TEMPERATURE: 98.2 F

## 2024-01-01 VITALS
OXYGEN SATURATION: 93 % | HEART RATE: 71 BPM | RESPIRATION RATE: 18 BRPM | SYSTOLIC BLOOD PRESSURE: 108 MMHG | DIASTOLIC BLOOD PRESSURE: 71 MMHG | TEMPERATURE: 98 F

## 2024-01-01 VITALS
TEMPERATURE: 98 F | WEIGHT: 111 LBS | HEART RATE: 71 BPM | BODY MASS INDEX: 17.42 KG/M2 | SYSTOLIC BLOOD PRESSURE: 120 MMHG | DIASTOLIC BLOOD PRESSURE: 78 MMHG | OXYGEN SATURATION: 98 % | HEIGHT: 67 IN

## 2024-01-01 VITALS
DIASTOLIC BLOOD PRESSURE: 71 MMHG | RESPIRATION RATE: 18 BRPM | BODY MASS INDEX: 17.42 KG/M2 | HEART RATE: 70 BPM | OXYGEN SATURATION: 97 % | SYSTOLIC BLOOD PRESSURE: 136 MMHG | WEIGHT: 111 LBS | HEIGHT: 67 IN | TEMPERATURE: 96.6 F

## 2024-01-01 VITALS
HEIGHT: 67 IN | SYSTOLIC BLOOD PRESSURE: 125 MMHG | OXYGEN SATURATION: 96 % | DIASTOLIC BLOOD PRESSURE: 71 MMHG | RESPIRATION RATE: 20 BRPM | TEMPERATURE: 98 F | HEART RATE: 92 BPM

## 2024-01-01 VITALS
RESPIRATION RATE: 18 BRPM | HEART RATE: 65 BPM | OXYGEN SATURATION: 97 % | DIASTOLIC BLOOD PRESSURE: 75 MMHG | SYSTOLIC BLOOD PRESSURE: 119 MMHG

## 2024-01-01 VITALS
DIASTOLIC BLOOD PRESSURE: 57 MMHG | HEART RATE: 92 BPM | OXYGEN SATURATION: 93 % | SYSTOLIC BLOOD PRESSURE: 99 MMHG | RESPIRATION RATE: 17 BRPM

## 2024-01-01 VITALS
HEIGHT: 67 IN | WEIGHT: 110.01 LBS | TEMPERATURE: 98 F | OXYGEN SATURATION: 97 % | SYSTOLIC BLOOD PRESSURE: 146 MMHG | DIASTOLIC BLOOD PRESSURE: 85 MMHG | RESPIRATION RATE: 16 BRPM | HEART RATE: 68 BPM

## 2024-01-01 VITALS
SYSTOLIC BLOOD PRESSURE: 148 MMHG | TEMPERATURE: 98 F | DIASTOLIC BLOOD PRESSURE: 73 MMHG | WEIGHT: 108.91 LBS | OXYGEN SATURATION: 99 % | HEART RATE: 61 BPM | RESPIRATION RATE: 18 BRPM | HEIGHT: 67 IN

## 2024-01-01 VITALS
SYSTOLIC BLOOD PRESSURE: 119 MMHG | WEIGHT: 105 LBS | HEIGHT: 67 IN | TEMPERATURE: 97.1 F | DIASTOLIC BLOOD PRESSURE: 78 MMHG | RESPIRATION RATE: 18 BRPM | HEART RATE: 79 BPM | BODY MASS INDEX: 16.48 KG/M2 | OXYGEN SATURATION: 98 %

## 2024-01-01 VITALS
HEART RATE: 63 BPM | OXYGEN SATURATION: 99 % | TEMPERATURE: 98 F | RESPIRATION RATE: 18 BRPM | DIASTOLIC BLOOD PRESSURE: 81 MMHG | SYSTOLIC BLOOD PRESSURE: 153 MMHG

## 2024-01-01 VITALS
RESPIRATION RATE: 17 BRPM | DIASTOLIC BLOOD PRESSURE: 78 MMHG | SYSTOLIC BLOOD PRESSURE: 136 MMHG | OXYGEN SATURATION: 96 % | HEART RATE: 67 BPM | TEMPERATURE: 98 F

## 2024-01-01 VITALS
SYSTOLIC BLOOD PRESSURE: 99 MMHG | DIASTOLIC BLOOD PRESSURE: 63 MMHG | HEART RATE: 92 BPM | TEMPERATURE: 98 F | RESPIRATION RATE: 18 BRPM | OXYGEN SATURATION: 91 %

## 2024-01-01 DIAGNOSIS — C34.90 MALIGNANT NEOPLASM OF UNSPECIFIED PART OF UNSPECIFIED BRONCHUS OR LUNG: ICD-10-CM

## 2024-01-01 DIAGNOSIS — Z98.890 OTHER SPECIFIED POSTPROCEDURAL STATES: Chronic | ICD-10-CM

## 2024-01-01 DIAGNOSIS — Z92.89 PERSONAL HISTORY OF OTHER MEDICAL TREATMENT: Chronic | ICD-10-CM

## 2024-01-01 DIAGNOSIS — R63.0 ANOREXIA: ICD-10-CM

## 2024-01-01 DIAGNOSIS — J70.1 CHRONIC AND OTHER PULMONARY MANIFESTATIONS DUE TO RADIATION: ICD-10-CM

## 2024-01-01 DIAGNOSIS — C79.51 SECONDARY MALIGNANT NEOPLASM OF BONE: ICD-10-CM

## 2024-01-01 DIAGNOSIS — F41.9 ANXIETY DISORDER, UNSPECIFIED: ICD-10-CM

## 2024-01-01 DIAGNOSIS — H49.21 SIXTH [ABDUCENT] NERVE PALSY, RIGHT EYE: ICD-10-CM

## 2024-01-01 DIAGNOSIS — Z92.3 PERSONAL HISTORY OF IRRADIATION: ICD-10-CM

## 2024-01-01 DIAGNOSIS — E43 UNSPECIFIED SEVERE PROTEIN-CALORIE MALNUTRITION: ICD-10-CM

## 2024-01-01 DIAGNOSIS — C79.31 SECONDARY MALIGNANT NEOPLASM OF BRAIN: ICD-10-CM

## 2024-01-01 DIAGNOSIS — I26.99 OTHER PULMONARY EMBOLISM W/OUT ACUTE COR PULMONALE: ICD-10-CM

## 2024-01-01 DIAGNOSIS — N40.0 BENIGN PROSTATIC HYPERPLASIA WITHOUT LOWER URINARY TRACT SYMPTOMS: ICD-10-CM

## 2024-01-01 DIAGNOSIS — H53.2 DIPLOPIA: ICD-10-CM

## 2024-01-01 DIAGNOSIS — J86.0 PYOTHORAX WITH FISTULA: ICD-10-CM

## 2024-01-01 DIAGNOSIS — Z09 ENCOUNTER FOR FOLLOW-UP EXAMINATION AFTER COMPLETED TREATMENT FOR CONDITIONS OTHER THAN MALIGNANT NEOPLASM: ICD-10-CM

## 2024-01-01 DIAGNOSIS — I49.8 OTHER SPECIFIED CARDIAC ARRHYTHMIAS: ICD-10-CM

## 2024-01-01 DIAGNOSIS — R73.03 PREDIABETES: ICD-10-CM

## 2024-01-01 DIAGNOSIS — Z87.891 PERSONAL HISTORY OF NICOTINE DEPENDENCE: ICD-10-CM

## 2024-01-01 DIAGNOSIS — Z29.9 ENCOUNTER FOR PROPHYLACTIC MEASURES, UNSPECIFIED: ICD-10-CM

## 2024-01-01 DIAGNOSIS — L98.429 NON-PRESSURE CHRONIC ULCER OF BACK WITH UNSPECIFIED SEVERITY: ICD-10-CM

## 2024-01-01 DIAGNOSIS — Z78.9 OTHER SPECIFIED HEALTH STATUS: ICD-10-CM

## 2024-01-01 DIAGNOSIS — R13.10 DYSPHAGIA, UNSPECIFIED: ICD-10-CM

## 2024-01-01 DIAGNOSIS — R27.0 ATAXIA, UNSPECIFIED: ICD-10-CM

## 2024-01-01 DIAGNOSIS — Z85.820 PERSONAL HISTORY OF MALIGNANT MELANOMA OF SKIN: ICD-10-CM

## 2024-01-01 DIAGNOSIS — Z23 ENCOUNTER FOR IMMUNIZATION: ICD-10-CM

## 2024-01-01 DIAGNOSIS — R26.81 UNSTEADINESS ON FEET: ICD-10-CM

## 2024-01-01 DIAGNOSIS — Z87.898 PERSONAL HISTORY OF OTHER SPECIFIED CONDITIONS: Chronic | ICD-10-CM

## 2024-01-01 DIAGNOSIS — T38.0X5A ADVERSE EFFECT OF GLUCOCORTICOIDS AND SYNTHETIC ANALOGUES, INITIAL ENCOUNTER: ICD-10-CM

## 2024-01-01 DIAGNOSIS — C34.11 MALIGNANT NEOPLASM OF UPPER LOBE, RIGHT BRONCHUS OR LUNG: ICD-10-CM

## 2024-01-01 DIAGNOSIS — G93.5 COMPRESSION OF BRAIN: ICD-10-CM

## 2024-01-01 DIAGNOSIS — I26.99 OTHER PULMONARY EMBOLISM WITHOUT ACUTE COR PULMONALE: ICD-10-CM

## 2024-01-01 DIAGNOSIS — E46 UNSPECIFIED PROTEIN-CALORIE MALNUTRITION: ICD-10-CM

## 2024-01-01 DIAGNOSIS — E87.1 HYPO-OSMOLALITY AND HYPONATREMIA: ICD-10-CM

## 2024-01-01 DIAGNOSIS — G93.6 CEREBRAL EDEMA: ICD-10-CM

## 2024-01-01 DIAGNOSIS — Z79.01 LONG TERM (CURRENT) USE OF ANTICOAGULANTS: ICD-10-CM

## 2024-01-01 DIAGNOSIS — C34.91 MALIGNANT NEOPLASM OF UNSPECIFIED PART OF RIGHT BRONCHUS OR LUNG: ICD-10-CM

## 2024-01-01 DIAGNOSIS — R53.83 OTHER FATIGUE: ICD-10-CM

## 2024-01-01 DIAGNOSIS — J96.11 CHRONIC RESPIRATORY FAILURE WITH HYPOXIA: ICD-10-CM

## 2024-01-01 DIAGNOSIS — R53.82 CHRONIC FATIGUE, UNSPECIFIED: ICD-10-CM

## 2024-01-01 DIAGNOSIS — R09.89 OTHER SPECIFIED SYMPTOMS AND SIGNS INVOLVING THE CIRCULATORY AND RESPIRATORY SYSTEMS: ICD-10-CM

## 2024-01-01 DIAGNOSIS — C78.1 SECONDARY MALIGNANT NEOPLASM OF MEDIASTINUM: ICD-10-CM

## 2024-01-01 DIAGNOSIS — Z79.899 OTHER LONG TERM (CURRENT) DRUG THERAPY: ICD-10-CM

## 2024-01-01 DIAGNOSIS — G91.9 HYDROCEPHALUS, UNSPECIFIED: ICD-10-CM

## 2024-01-01 DIAGNOSIS — C34.10 MALIGNANT NEOPLASM OF UPPER LOBE, UNSPECIFIED BRONCHUS OR LUNG: ICD-10-CM

## 2024-01-01 DIAGNOSIS — Z48.02 ENCOUNTER FOR REMOVAL OF SUTURES: ICD-10-CM

## 2024-01-01 DIAGNOSIS — Z11.52 ENCOUNTER FOR SCREENING FOR COVID-19: ICD-10-CM

## 2024-01-01 DIAGNOSIS — R11.10 VOMITING, UNSPECIFIED: ICD-10-CM

## 2024-01-01 DIAGNOSIS — Z71.89 OTHER SPECIFIED COUNSELING: ICD-10-CM

## 2024-01-01 DIAGNOSIS — R33.9 RETENTION OF URINE, UNSPECIFIED: ICD-10-CM

## 2024-01-01 DIAGNOSIS — Z51.89 ENCOUNTER FOR OTHER SPECIFIED AFTERCARE: ICD-10-CM

## 2024-01-01 DIAGNOSIS — R53.0 NEOPLASTIC (MALIGNANT) RELATED FATIGUE: ICD-10-CM

## 2024-01-01 DIAGNOSIS — L89.90 PRESSURE ULCER OF UNSPECIFIED SITE, UNSPECIFIED STAGE: ICD-10-CM

## 2024-01-01 DIAGNOSIS — Z85.828 PERSONAL HISTORY OF OTHER MALIGNANT NEOPLASM OF SKIN: ICD-10-CM

## 2024-01-01 DIAGNOSIS — C78.89 SECONDARY MALIGNANT NEOPLASM OF OTHER DIGESTIVE ORGANS: ICD-10-CM

## 2024-01-01 DIAGNOSIS — R53.81 OTHER MALAISE: ICD-10-CM

## 2024-01-01 DIAGNOSIS — Z51.5 ENCOUNTER FOR PALLIATIVE CARE: ICD-10-CM

## 2024-01-01 DIAGNOSIS — Z98.890 OTHER SPECIFIED POSTPROCEDURAL STATES: ICD-10-CM

## 2024-01-01 DIAGNOSIS — K22.89 OTHER SPECIFIED DISEASE OF ESOPHAGUS: ICD-10-CM

## 2024-01-01 DIAGNOSIS — D72.829 ELEVATED WHITE BLOOD CELL COUNT, UNSPECIFIED: ICD-10-CM

## 2024-01-01 DIAGNOSIS — G96.198 OTHER DISORDERS OF MENINGES, NOT ELSEWHERE CLASSIFIED: ICD-10-CM

## 2024-01-01 DIAGNOSIS — Z66 DO NOT RESUSCITATE: ICD-10-CM

## 2024-01-01 DIAGNOSIS — C79.31 MALIGNANT NEOPLASM OF UNSPECIFIED PART OF UNSPECIFIED BRONCHUS OR LUNG: ICD-10-CM

## 2024-01-01 DIAGNOSIS — R62.7 ADULT FAILURE TO THRIVE: ICD-10-CM

## 2024-01-01 DIAGNOSIS — Z92.21 PERSONAL HISTORY OF ANTINEOPLASTIC CHEMOTHERAPY: ICD-10-CM

## 2024-01-01 DIAGNOSIS — R06.00 DYSPNEA, UNSPECIFIED: ICD-10-CM

## 2024-01-01 DIAGNOSIS — J98.59 OTHER DISEASES OF MEDIASTINUM, NOT ELSEWHERE CLASSIFIED: ICD-10-CM

## 2024-01-01 DIAGNOSIS — R79.1 ABNORMAL COAGULATION PROFILE: ICD-10-CM

## 2024-01-01 DIAGNOSIS — C43.60 MALIGNANT MELANOMA OF UNSPECIFIED UPPER LIMB, INCLUDING SHOULDER: ICD-10-CM

## 2024-01-01 DIAGNOSIS — N40.1 BENIGN PROSTATIC HYPERPLASIA WITH LOWER URINARY TRACT SYMPTOMS: ICD-10-CM

## 2024-01-01 DIAGNOSIS — R63.4 ABNORMAL WEIGHT LOSS: ICD-10-CM

## 2024-01-01 DIAGNOSIS — Z20.822 CONTACT WITH AND (SUSPECTED) EXPOSURE TO COVID-19: ICD-10-CM

## 2024-01-01 DIAGNOSIS — G89.3 NEOPLASM RELATED PAIN (ACUTE) (CHRONIC): ICD-10-CM

## 2024-01-01 DIAGNOSIS — E22.2 SYNDROME OF INAPPROPRIATE SECRETION OF ANTIDIURETIC HORMONE: ICD-10-CM

## 2024-01-01 LAB
A1C WITH ESTIMATED AVERAGE GLUCOSE RESULT: 5.9 % — HIGH (ref 4–5.6)
A1C WITH ESTIMATED AVERAGE GLUCOSE RESULT: 5.9 % — HIGH (ref 4–5.6)
ADD ON TEST-SPECIMEN IN LAB: SIGNIFICANT CHANGE UP
ALBUMIN SERPL ELPH-MCNC: 2.2 G/DL — LOW (ref 3.3–5)
ALBUMIN SERPL ELPH-MCNC: 2.3 G/DL — LOW (ref 3.3–5)
ALBUMIN SERPL ELPH-MCNC: 2.3 G/DL — LOW (ref 3.3–5)
ALBUMIN SERPL ELPH-MCNC: 2.4 G/DL — LOW (ref 3.3–5)
ALBUMIN SERPL ELPH-MCNC: 2.7 G/DL — LOW (ref 3.3–5)
ALBUMIN SERPL ELPH-MCNC: 2.9 G/DL
ALBUMIN SERPL ELPH-MCNC: 3 G/DL
ALBUMIN SERPL ELPH-MCNC: 3.3 G/DL — SIGNIFICANT CHANGE UP (ref 3.3–5)
ALBUMIN SERPL ELPH-MCNC: 3.3 G/DL — SIGNIFICANT CHANGE UP (ref 3.3–5)
ALBUMIN SERPL ELPH-MCNC: 3.8 G/DL
ALBUMIN SERPL ELPH-MCNC: 4.3 G/DL
ALP BLD-CCNC: 111 U/L
ALP BLD-CCNC: 61 U/L
ALP BLD-CCNC: 71 U/L
ALP BLD-CCNC: 87 U/L
ALP SERPL-CCNC: 77 U/L — SIGNIFICANT CHANGE UP (ref 40–120)
ALP SERPL-CCNC: 87 U/L — SIGNIFICANT CHANGE UP (ref 40–120)
ALP SERPL-CCNC: 88 U/L — SIGNIFICANT CHANGE UP (ref 40–120)
ALP SERPL-CCNC: 91 U/L — SIGNIFICANT CHANGE UP (ref 40–120)
ALP SERPL-CCNC: 91 U/L — SIGNIFICANT CHANGE UP (ref 40–120)
ALP SERPL-CCNC: 97 U/L — SIGNIFICANT CHANGE UP (ref 40–120)
ALP SERPL-CCNC: 97 U/L — SIGNIFICANT CHANGE UP (ref 40–120)
ALT FLD-CCNC: 15 U/L — SIGNIFICANT CHANGE UP (ref 10–45)
ALT FLD-CCNC: 15 U/L — SIGNIFICANT CHANGE UP (ref 10–45)
ALT FLD-CCNC: 17 U/L — SIGNIFICANT CHANGE UP (ref 10–45)
ALT FLD-CCNC: 19 U/L — SIGNIFICANT CHANGE UP (ref 10–45)
ALT FLD-CCNC: 19 U/L — SIGNIFICANT CHANGE UP (ref 10–45)
ALT FLD-CCNC: 9 U/L — LOW (ref 10–45)
ALT FLD-CCNC: 9 U/L — LOW (ref 10–45)
ALT SERPL-CCNC: 12 U/L
ALT SERPL-CCNC: 21 U/L
ALT SERPL-CCNC: 22 U/L
ALT SERPL-CCNC: 24 U/L
ANION GAP SERPL CALC-SCNC: 0 MMOL/L
ANION GAP SERPL CALC-SCNC: 11 MMOL/L — SIGNIFICANT CHANGE UP (ref 5–17)
ANION GAP SERPL CALC-SCNC: 12 MMOL/L
ANION GAP SERPL CALC-SCNC: 12 MMOL/L — SIGNIFICANT CHANGE UP (ref 5–17)
ANION GAP SERPL CALC-SCNC: 12 MMOL/L — SIGNIFICANT CHANGE UP (ref 5–17)
ANION GAP SERPL CALC-SCNC: 13 MMOL/L — SIGNIFICANT CHANGE UP (ref 5–17)
ANION GAP SERPL CALC-SCNC: 13 MMOL/L — SIGNIFICANT CHANGE UP (ref 5–17)
ANION GAP SERPL CALC-SCNC: 14 MMOL/L
ANION GAP SERPL CALC-SCNC: 2 MMOL/L
ANION GAP SERPL CALC-SCNC: 4 MMOL/L — LOW (ref 5–17)
ANION GAP SERPL CALC-SCNC: 6 MMOL/L — SIGNIFICANT CHANGE UP (ref 5–17)
ANION GAP SERPL CALC-SCNC: 7 MMOL/L — SIGNIFICANT CHANGE UP (ref 5–17)
ANION GAP SERPL CALC-SCNC: 7 MMOL/L — SIGNIFICANT CHANGE UP (ref 5–17)
ANION GAP SERPL CALC-SCNC: 8 MMOL/L — SIGNIFICANT CHANGE UP (ref 5–17)
ANION GAP SERPL CALC-SCNC: 9 MMOL/L — SIGNIFICANT CHANGE UP (ref 5–17)
ANISOCYTOSIS BLD QL: SLIGHT — SIGNIFICANT CHANGE UP
APPEARANCE UR: ABNORMAL
APPEARANCE UR: CLEAR — SIGNIFICANT CHANGE UP
APPEARANCE: ABNORMAL
APTT BLD: 22.9 SEC — LOW (ref 24.5–35.6)
APTT BLD: 22.9 SEC — LOW (ref 24.5–35.6)
APTT BLD: 23 SEC — LOW (ref 24.5–35.6)
APTT BLD: 23 SEC — LOW (ref 24.5–35.6)
APTT BLD: 24.2 SEC — LOW (ref 24.5–35.6)
APTT BLD: 24.2 SEC — LOW (ref 24.5–35.6)
APTT BLD: 26.4 SEC — SIGNIFICANT CHANGE UP (ref 24.5–35.6)
APTT BLD: 26.4 SEC — SIGNIFICANT CHANGE UP (ref 24.5–35.6)
APTT BLD: 28.1 SEC — SIGNIFICANT CHANGE UP (ref 24.5–35.6)
APTT BLD: 28.1 SEC — SIGNIFICANT CHANGE UP (ref 24.5–35.6)
APTT BLD: 29.3 SEC — SIGNIFICANT CHANGE UP (ref 24.5–35.6)
APTT BLD: 29.3 SEC — SIGNIFICANT CHANGE UP (ref 24.5–35.6)
APTT BLD: 33.4 SEC — SIGNIFICANT CHANGE UP (ref 24.5–35.6)
APTT BLD: 33.4 SEC — SIGNIFICANT CHANGE UP (ref 24.5–35.6)
APTT BLD: 44.9 SEC — HIGH (ref 24.5–35.6)
APTT BLD: 45.6 SEC — HIGH (ref 24.5–35.6)
APTT BLD: 45.7 SEC — HIGH (ref 24.5–35.6)
APTT BLD: 45.7 SEC — HIGH (ref 24.5–35.6)
APTT BLD: 45.9 SEC — HIGH (ref 24.5–35.6)
APTT BLD: 64 SEC — HIGH (ref 24.5–35.6)
APTT BLD: 79.6 SEC — HIGH (ref 24.5–35.6)
AST SERPL-CCNC: 10 U/L — SIGNIFICANT CHANGE UP (ref 10–40)
AST SERPL-CCNC: 11 U/L — SIGNIFICANT CHANGE UP (ref 10–40)
AST SERPL-CCNC: 12 U/L
AST SERPL-CCNC: 15 U/L
AST SERPL-CCNC: 16 U/L
AST SERPL-CCNC: 16 U/L — SIGNIFICANT CHANGE UP (ref 10–40)
AST SERPL-CCNC: 17 U/L
AST SERPL-CCNC: 17 U/L — SIGNIFICANT CHANGE UP (ref 10–40)
BACTERIA # UR AUTO: ABNORMAL /HPF
BACTERIA # UR AUTO: ABNORMAL /HPF
BACTERIA # UR AUTO: NEGATIVE /HPF — SIGNIFICANT CHANGE UP
BASOPHILS # BLD AUTO: 0 K/UL — SIGNIFICANT CHANGE UP (ref 0–0.2)
BASOPHILS # BLD AUTO: 0.02 K/UL
BASOPHILS # BLD AUTO: 0.03 K/UL
BASOPHILS # BLD AUTO: 0.05 K/UL — SIGNIFICANT CHANGE UP (ref 0–0.2)
BASOPHILS # BLD AUTO: 0.1 K/UL — SIGNIFICANT CHANGE UP (ref 0–0.2)
BASOPHILS # BLD AUTO: 0.1 K/UL — SIGNIFICANT CHANGE UP (ref 0–0.2)
BASOPHILS NFR BLD AUTO: 0 % — SIGNIFICANT CHANGE UP (ref 0–2)
BASOPHILS NFR BLD AUTO: 0.2 %
BASOPHILS NFR BLD AUTO: 0.5 %
BASOPHILS NFR BLD AUTO: 0.6 % — SIGNIFICANT CHANGE UP (ref 0–2)
BASOPHILS NFR BLD AUTO: 1.8 % — SIGNIFICANT CHANGE UP (ref 0–2)
BASOPHILS NFR BLD AUTO: 1.8 % — SIGNIFICANT CHANGE UP (ref 0–2)
BILIRUB DIRECT SERPL-MCNC: 0.2 MG/DL — SIGNIFICANT CHANGE UP (ref 0–0.3)
BILIRUB INDIRECT FLD-MCNC: 0.2 MG/DL — SIGNIFICANT CHANGE UP (ref 0.2–1)
BILIRUB SERPL-MCNC: 0.3 MG/DL — SIGNIFICANT CHANGE UP (ref 0.2–1.2)
BILIRUB SERPL-MCNC: 0.4 MG/DL
BILIRUB SERPL-MCNC: 0.4 MG/DL
BILIRUB SERPL-MCNC: 0.4 MG/DL — SIGNIFICANT CHANGE UP (ref 0.2–1.2)
BILIRUB SERPL-MCNC: 0.5 MG/DL — SIGNIFICANT CHANGE UP (ref 0.2–1.2)
BILIRUB SERPL-MCNC: 0.5 MG/DL — SIGNIFICANT CHANGE UP (ref 0.2–1.2)
BILIRUB SERPL-MCNC: 0.7 MG/DL
BILIRUB SERPL-MCNC: 0.8 MG/DL
BILIRUB UR-MCNC: NEGATIVE — SIGNIFICANT CHANGE UP
BILIRUBIN URINE: NEGATIVE
BLD GP AB SCN SERPL QL: NEGATIVE — SIGNIFICANT CHANGE UP
BLOOD URINE: NEGATIVE
BUN SERPL-MCNC: 10 MG/DL — SIGNIFICANT CHANGE UP (ref 7–23)
BUN SERPL-MCNC: 13 MG/DL — SIGNIFICANT CHANGE UP (ref 7–23)
BUN SERPL-MCNC: 14 MG/DL — SIGNIFICANT CHANGE UP (ref 7–23)
BUN SERPL-MCNC: 14 MG/DL — SIGNIFICANT CHANGE UP (ref 7–23)
BUN SERPL-MCNC: 15 MG/DL — SIGNIFICANT CHANGE UP (ref 7–23)
BUN SERPL-MCNC: 15 MG/DL — SIGNIFICANT CHANGE UP (ref 7–23)
BUN SERPL-MCNC: 16 MG/DL
BUN SERPL-MCNC: 18 MG/DL — SIGNIFICANT CHANGE UP (ref 7–23)
BUN SERPL-MCNC: 19 MG/DL — SIGNIFICANT CHANGE UP (ref 7–23)
BUN SERPL-MCNC: 20 MG/DL — SIGNIFICANT CHANGE UP (ref 7–23)
BUN SERPL-MCNC: 21 MG/DL — SIGNIFICANT CHANGE UP (ref 7–23)
BUN SERPL-MCNC: 22 MG/DL
BUN SERPL-MCNC: 24 MG/DL — HIGH (ref 7–23)
BUN SERPL-MCNC: 26 MG/DL
BUN SERPL-MCNC: 27 MG/DL
BUN SERPL-MCNC: 27 MG/DL — HIGH (ref 7–23)
BUN SERPL-MCNC: 29 MG/DL — HIGH (ref 7–23)
BUN SERPL-MCNC: 8 MG/DL — SIGNIFICANT CHANGE UP (ref 7–23)
BUN SERPL-MCNC: 9 MG/DL — SIGNIFICANT CHANGE UP (ref 7–23)
BURR CELLS BLD QL SMEAR: PRESENT — SIGNIFICANT CHANGE UP
CALCIUM SERPL-MCNC: 10 MG/DL
CALCIUM SERPL-MCNC: 10 MG/DL — SIGNIFICANT CHANGE UP (ref 8.4–10.5)
CALCIUM SERPL-MCNC: 10 MG/DL — SIGNIFICANT CHANGE UP (ref 8.4–10.5)
CALCIUM SERPL-MCNC: 10.4 MG/DL
CALCIUM SERPL-MCNC: 8.6 MG/DL — SIGNIFICANT CHANGE UP (ref 8.4–10.5)
CALCIUM SERPL-MCNC: 8.7 MG/DL — SIGNIFICANT CHANGE UP (ref 8.4–10.5)
CALCIUM SERPL-MCNC: 8.7 MG/DL — SIGNIFICANT CHANGE UP (ref 8.4–10.5)
CALCIUM SERPL-MCNC: 8.8 MG/DL — SIGNIFICANT CHANGE UP (ref 8.4–10.5)
CALCIUM SERPL-MCNC: 8.9 MG/DL — SIGNIFICANT CHANGE UP (ref 8.4–10.5)
CALCIUM SERPL-MCNC: 9 MG/DL — SIGNIFICANT CHANGE UP (ref 8.4–10.5)
CALCIUM SERPL-MCNC: 9.1 MG/DL — SIGNIFICANT CHANGE UP (ref 8.4–10.5)
CALCIUM SERPL-MCNC: 9.1 MG/DL — SIGNIFICANT CHANGE UP (ref 8.4–10.5)
CALCIUM SERPL-MCNC: 9.2 MG/DL
CALCIUM SERPL-MCNC: 9.2 MG/DL — SIGNIFICANT CHANGE UP (ref 8.4–10.5)
CALCIUM SERPL-MCNC: 9.3 MG/DL — SIGNIFICANT CHANGE UP (ref 8.4–10.5)
CALCIUM SERPL-MCNC: 9.4 MG/DL — SIGNIFICANT CHANGE UP (ref 8.4–10.5)
CALCIUM SERPL-MCNC: 9.5 MG/DL
CALCIUM SERPL-MCNC: 9.5 MG/DL — SIGNIFICANT CHANGE UP (ref 8.4–10.5)
CALCIUM SERPL-MCNC: 9.8 MG/DL — SIGNIFICANT CHANGE UP (ref 8.4–10.5)
CAST: 1 /LPF — SIGNIFICANT CHANGE UP (ref 0–4)
CHLORIDE SERPL-SCNC: 100 MMOL/L
CHLORIDE SERPL-SCNC: 100 MMOL/L — SIGNIFICANT CHANGE UP (ref 96–108)
CHLORIDE SERPL-SCNC: 101 MMOL/L
CHLORIDE SERPL-SCNC: 101 MMOL/L — SIGNIFICANT CHANGE UP (ref 96–108)
CHLORIDE SERPL-SCNC: 101 MMOL/L — SIGNIFICANT CHANGE UP (ref 96–108)
CHLORIDE SERPL-SCNC: 102 MMOL/L — SIGNIFICANT CHANGE UP (ref 96–108)
CHLORIDE SERPL-SCNC: 103 MMOL/L — SIGNIFICANT CHANGE UP (ref 96–108)
CHLORIDE SERPL-SCNC: 104 MMOL/L — SIGNIFICANT CHANGE UP (ref 96–108)
CHLORIDE SERPL-SCNC: 106 MMOL/L
CHLORIDE SERPL-SCNC: 96 MMOL/L — SIGNIFICANT CHANGE UP (ref 96–108)
CHLORIDE SERPL-SCNC: 96 MMOL/L — SIGNIFICANT CHANGE UP (ref 96–108)
CHLORIDE SERPL-SCNC: 97 MMOL/L — SIGNIFICANT CHANGE UP (ref 96–108)
CHLORIDE SERPL-SCNC: 99 MMOL/L
CHLORIDE SERPL-SCNC: 99 MMOL/L — SIGNIFICANT CHANGE UP (ref 96–108)
CHOLEST SERPL-MCNC: 246 MG/DL
CK SERPL-CCNC: 27 U/L
CO2 SERPL-SCNC: 22 MMOL/L — SIGNIFICANT CHANGE UP (ref 22–31)
CO2 SERPL-SCNC: 25 MMOL/L — SIGNIFICANT CHANGE UP (ref 22–31)
CO2 SERPL-SCNC: 25 MMOL/L — SIGNIFICANT CHANGE UP (ref 22–31)
CO2 SERPL-SCNC: 26 MMOL/L
CO2 SERPL-SCNC: 26 MMOL/L — SIGNIFICANT CHANGE UP (ref 22–31)
CO2 SERPL-SCNC: 27 MMOL/L — SIGNIFICANT CHANGE UP (ref 22–31)
CO2 SERPL-SCNC: 28 MMOL/L
CO2 SERPL-SCNC: 28 MMOL/L — SIGNIFICANT CHANGE UP (ref 22–31)
CO2 SERPL-SCNC: 29 MMOL/L — SIGNIFICANT CHANGE UP (ref 22–31)
CO2 SERPL-SCNC: 30 MMOL/L — SIGNIFICANT CHANGE UP (ref 22–31)
CO2 SERPL-SCNC: 30 MMOL/L — SIGNIFICANT CHANGE UP (ref 22–31)
CO2 SERPL-SCNC: 31 MMOL/L — SIGNIFICANT CHANGE UP (ref 22–31)
CO2 SERPL-SCNC: 32 MMOL/L
CO2 SERPL-SCNC: 33 MMOL/L — HIGH (ref 22–31)
CO2 SERPL-SCNC: 34 MMOL/L
COD CRY URNS QL: PRESENT
COD CRY URNS QL: PRESENT
COLOR SPEC: YELLOW — SIGNIFICANT CHANGE UP
COLOR: YELLOW
CREAT SERPL-MCNC: 0.32 MG/DL — LOW (ref 0.5–1.3)
CREAT SERPL-MCNC: 0.35 MG/DL — LOW (ref 0.5–1.3)
CREAT SERPL-MCNC: 0.37 MG/DL — LOW (ref 0.5–1.3)
CREAT SERPL-MCNC: 0.44 MG/DL — LOW (ref 0.5–1.3)
CREAT SERPL-MCNC: 0.47 MG/DL — LOW (ref 0.5–1.3)
CREAT SERPL-MCNC: 0.48 MG/DL
CREAT SERPL-MCNC: 0.49 MG/DL — LOW (ref 0.5–1.3)
CREAT SERPL-MCNC: 0.5 MG/DL
CREAT SERPL-MCNC: 0.51 MG/DL — SIGNIFICANT CHANGE UP (ref 0.5–1.3)
CREAT SERPL-MCNC: 0.51 MG/DL — SIGNIFICANT CHANGE UP (ref 0.5–1.3)
CREAT SERPL-MCNC: 0.52 MG/DL — SIGNIFICANT CHANGE UP (ref 0.5–1.3)
CREAT SERPL-MCNC: 0.53 MG/DL — SIGNIFICANT CHANGE UP (ref 0.5–1.3)
CREAT SERPL-MCNC: 0.53 MG/DL — SIGNIFICANT CHANGE UP (ref 0.5–1.3)
CREAT SERPL-MCNC: 0.56 MG/DL — SIGNIFICANT CHANGE UP (ref 0.5–1.3)
CREAT SERPL-MCNC: 0.6 MG/DL — SIGNIFICANT CHANGE UP (ref 0.5–1.3)
CREAT SERPL-MCNC: 0.6 MG/DL — SIGNIFICANT CHANGE UP (ref 0.5–1.3)
CREAT SERPL-MCNC: 0.62 MG/DL — SIGNIFICANT CHANGE UP (ref 0.5–1.3)
CREAT SERPL-MCNC: 0.65 MG/DL — SIGNIFICANT CHANGE UP (ref 0.5–1.3)
CREAT SERPL-MCNC: 0.65 MG/DL — SIGNIFICANT CHANGE UP (ref 0.5–1.3)
CREAT SERPL-MCNC: 0.7 MG/DL
CREAT SERPL-MCNC: 0.79 MG/DL
CREAT SPEC-SCNC: 90 MG/DL
CULTURE RESULTS: SIGNIFICANT CHANGE UP
CYSTATIN C SERPL-MCNC: 0.95 MG/L — SIGNIFICANT CHANGE UP (ref 0.82–1.52)
DACRYOCYTES BLD QL SMEAR: SLIGHT — SIGNIFICANT CHANGE UP
DACRYOCYTES BLD QL SMEAR: SLIGHT — SIGNIFICANT CHANGE UP
DIFF PNL FLD: ABNORMAL
DIFF PNL FLD: NEGATIVE — SIGNIFICANT CHANGE UP
EGFR: 100 ML/MIN/1.73M2 — SIGNIFICANT CHANGE UP
EGFR: 102 ML/MIN/1.73M2 — SIGNIFICANT CHANGE UP
EGFR: 102 ML/MIN/1.73M2 — SIGNIFICANT CHANGE UP
EGFR: 103 ML/MIN/1.73M2 — SIGNIFICANT CHANGE UP
EGFR: 104 ML/MIN/1.73M2
EGFR: 104 ML/MIN/1.73M2 — SIGNIFICANT CHANGE UP
EGFR: 105 ML/MIN/1.73M2
EGFR: 106 ML/MIN/1.73M2 — SIGNIFICANT CHANGE UP
EGFR: 108 ML/MIN/1.73M2 — SIGNIFICANT CHANGE UP
EGFR: 114 ML/MIN/1.73M2 — SIGNIFICANT CHANGE UP
EGFR: 116 ML/MIN/1.73M2 — SIGNIFICANT CHANGE UP
EGFR: 119 ML/MIN/1.73M2 — SIGNIFICANT CHANGE UP
EGFR: 90 ML/MIN/1.73M2
EGFR: 94 ML/MIN/1.73M2
EGFR: 96 ML/MIN/1.73M2 — SIGNIFICANT CHANGE UP
EGFR: 96 ML/MIN/1.73M2 — SIGNIFICANT CHANGE UP
EGFR: 97 ML/MIN/1.73M2 — SIGNIFICANT CHANGE UP
EGFR: 98 ML/MIN/1.73M2 — SIGNIFICANT CHANGE UP
EGFR: 98 ML/MIN/1.73M2 — SIGNIFICANT CHANGE UP
EOSINOPHIL # BLD AUTO: 0 K/UL — SIGNIFICANT CHANGE UP (ref 0–0.5)
EOSINOPHIL # BLD AUTO: 0.02 K/UL
EOSINOPHIL # BLD AUTO: 0.02 K/UL — SIGNIFICANT CHANGE UP (ref 0–0.5)
EOSINOPHIL # BLD AUTO: 0.05 K/UL
EOSINOPHIL # BLD AUTO: 0.1 K/UL — SIGNIFICANT CHANGE UP (ref 0–0.5)
EOSINOPHIL # BLD AUTO: 0.1 K/UL — SIGNIFICANT CHANGE UP (ref 0–0.5)
EOSINOPHIL NFR BLD AUTO: 0 % — SIGNIFICANT CHANGE UP (ref 0–6)
EOSINOPHIL NFR BLD AUTO: 0.2 %
EOSINOPHIL NFR BLD AUTO: 0.2 % — SIGNIFICANT CHANGE UP (ref 0–6)
EOSINOPHIL NFR BLD AUTO: 0.8 %
EOSINOPHIL NFR BLD AUTO: 1.8 % — SIGNIFICANT CHANGE UP (ref 0–6)
EOSINOPHIL NFR BLD AUTO: 1.8 % — SIGNIFICANT CHANGE UP (ref 0–6)
ESTIMATED AVERAGE GLUCOSE: 123 MG/DL — HIGH (ref 68–114)
ESTIMATED AVERAGE GLUCOSE: 123 MG/DL — HIGH (ref 68–114)
ESTIMATED AVERAGE GLUCOSE: 126 MG/DL
FLUAV AG NPH QL: SIGNIFICANT CHANGE UP
FLUAV AG NPH QL: SIGNIFICANT CHANGE UP
FLUBV AG NPH QL: SIGNIFICANT CHANGE UP
FLUBV AG NPH QL: SIGNIFICANT CHANGE UP
GFR/BSA.PRED SERPLBLD CYS-BASED-ARV: 77 ML/MIN/1.73M2 — SIGNIFICANT CHANGE UP
GIANT PLATELETS BLD QL SMEAR: PRESENT — SIGNIFICANT CHANGE UP
GLUCOSE BLDC GLUCOMTR-MCNC: 102 MG/DL — HIGH (ref 70–99)
GLUCOSE BLDC GLUCOMTR-MCNC: 110 MG/DL — HIGH (ref 70–99)
GLUCOSE BLDC GLUCOMTR-MCNC: 110 MG/DL — HIGH (ref 70–99)
GLUCOSE BLDC GLUCOMTR-MCNC: 114 MG/DL — HIGH (ref 70–99)
GLUCOSE BLDC GLUCOMTR-MCNC: 114 MG/DL — HIGH (ref 70–99)
GLUCOSE BLDC GLUCOMTR-MCNC: 116 MG/DL — HIGH (ref 70–99)
GLUCOSE BLDC GLUCOMTR-MCNC: 116 MG/DL — HIGH (ref 70–99)
GLUCOSE BLDC GLUCOMTR-MCNC: 117 MG/DL — HIGH (ref 70–99)
GLUCOSE BLDC GLUCOMTR-MCNC: 117 MG/DL — HIGH (ref 70–99)
GLUCOSE BLDC GLUCOMTR-MCNC: 119 MG/DL — HIGH (ref 70–99)
GLUCOSE BLDC GLUCOMTR-MCNC: 119 MG/DL — HIGH (ref 70–99)
GLUCOSE BLDC GLUCOMTR-MCNC: 122 MG/DL — HIGH (ref 70–99)
GLUCOSE BLDC GLUCOMTR-MCNC: 122 MG/DL — HIGH (ref 70–99)
GLUCOSE BLDC GLUCOMTR-MCNC: 123 MG/DL — HIGH (ref 70–99)
GLUCOSE BLDC GLUCOMTR-MCNC: 123 MG/DL — HIGH (ref 70–99)
GLUCOSE BLDC GLUCOMTR-MCNC: 125 MG/DL — HIGH (ref 70–99)
GLUCOSE BLDC GLUCOMTR-MCNC: 126 MG/DL — HIGH (ref 70–99)
GLUCOSE BLDC GLUCOMTR-MCNC: 131 MG/DL — HIGH (ref 70–99)
GLUCOSE BLDC GLUCOMTR-MCNC: 131 MG/DL — HIGH (ref 70–99)
GLUCOSE BLDC GLUCOMTR-MCNC: 140 MG/DL — HIGH (ref 70–99)
GLUCOSE BLDC GLUCOMTR-MCNC: 140 MG/DL — HIGH (ref 70–99)
GLUCOSE BLDC GLUCOMTR-MCNC: 145 MG/DL — HIGH (ref 70–99)
GLUCOSE BLDC GLUCOMTR-MCNC: 145 MG/DL — HIGH (ref 70–99)
GLUCOSE BLDC GLUCOMTR-MCNC: 146 MG/DL — HIGH (ref 70–99)
GLUCOSE BLDC GLUCOMTR-MCNC: 146 MG/DL — HIGH (ref 70–99)
GLUCOSE BLDC GLUCOMTR-MCNC: 154 MG/DL — HIGH (ref 70–99)
GLUCOSE BLDC GLUCOMTR-MCNC: 154 MG/DL — HIGH (ref 70–99)
GLUCOSE BLDC GLUCOMTR-MCNC: 155 MG/DL — HIGH (ref 70–99)
GLUCOSE BLDC GLUCOMTR-MCNC: 155 MG/DL — HIGH (ref 70–99)
GLUCOSE BLDC GLUCOMTR-MCNC: 160 MG/DL — HIGH (ref 70–99)
GLUCOSE BLDC GLUCOMTR-MCNC: 160 MG/DL — HIGH (ref 70–99)
GLUCOSE BLDC GLUCOMTR-MCNC: 199 MG/DL — HIGH (ref 70–99)
GLUCOSE BLDC GLUCOMTR-MCNC: 199 MG/DL — HIGH (ref 70–99)
GLUCOSE BLDC GLUCOMTR-MCNC: 207 MG/DL — HIGH (ref 70–99)
GLUCOSE BLDC GLUCOMTR-MCNC: 207 MG/DL — HIGH (ref 70–99)
GLUCOSE BLDC GLUCOMTR-MCNC: 99 MG/DL — SIGNIFICANT CHANGE UP (ref 70–99)
GLUCOSE BLDC GLUCOMTR-MCNC: 99 MG/DL — SIGNIFICANT CHANGE UP (ref 70–99)
GLUCOSE QUALITATIVE U: NEGATIVE MG/DL
GLUCOSE SERPL-MCNC: 100 MG/DL — HIGH (ref 70–99)
GLUCOSE SERPL-MCNC: 101 MG/DL — HIGH (ref 70–99)
GLUCOSE SERPL-MCNC: 101 MG/DL — HIGH (ref 70–99)
GLUCOSE SERPL-MCNC: 103 MG/DL — HIGH (ref 70–99)
GLUCOSE SERPL-MCNC: 104 MG/DL — HIGH (ref 70–99)
GLUCOSE SERPL-MCNC: 105 MG/DL
GLUCOSE SERPL-MCNC: 105 MG/DL — HIGH (ref 70–99)
GLUCOSE SERPL-MCNC: 105 MG/DL — HIGH (ref 70–99)
GLUCOSE SERPL-MCNC: 110 MG/DL
GLUCOSE SERPL-MCNC: 112 MG/DL — HIGH (ref 70–99)
GLUCOSE SERPL-MCNC: 116 MG/DL
GLUCOSE SERPL-MCNC: 117 MG/DL — HIGH (ref 70–99)
GLUCOSE SERPL-MCNC: 117 MG/DL — HIGH (ref 70–99)
GLUCOSE SERPL-MCNC: 121 MG/DL — HIGH (ref 70–99)
GLUCOSE SERPL-MCNC: 125 MG/DL — HIGH (ref 70–99)
GLUCOSE SERPL-MCNC: 125 MG/DL — HIGH (ref 70–99)
GLUCOSE SERPL-MCNC: 127 MG/DL — HIGH (ref 70–99)
GLUCOSE SERPL-MCNC: 132 MG/DL — HIGH (ref 70–99)
GLUCOSE SERPL-MCNC: 132 MG/DL — HIGH (ref 70–99)
GLUCOSE SERPL-MCNC: 134 MG/DL — HIGH (ref 70–99)
GLUCOSE SERPL-MCNC: 134 MG/DL — HIGH (ref 70–99)
GLUCOSE SERPL-MCNC: 135 MG/DL — HIGH (ref 70–99)
GLUCOSE SERPL-MCNC: 135 MG/DL — HIGH (ref 70–99)
GLUCOSE SERPL-MCNC: 137 MG/DL — HIGH (ref 70–99)
GLUCOSE SERPL-MCNC: 138 MG/DL
GLUCOSE SERPL-MCNC: 143 MG/DL — HIGH (ref 70–99)
GLUCOSE SERPL-MCNC: 143 MG/DL — HIGH (ref 70–99)
GLUCOSE SERPL-MCNC: 180 MG/DL — HIGH (ref 70–99)
GLUCOSE SERPL-MCNC: 180 MG/DL — HIGH (ref 70–99)
GLUCOSE SERPL-MCNC: 96 MG/DL — SIGNIFICANT CHANGE UP (ref 70–99)
GLUCOSE UR QL: NEGATIVE MG/DL — SIGNIFICANT CHANGE UP
HBA1C MFR BLD HPLC: 6 %
HCT VFR BLD CALC: 34.1 % — LOW (ref 39–50)
HCT VFR BLD CALC: 34.1 % — LOW (ref 39–50)
HCT VFR BLD CALC: 34.2 % — LOW (ref 39–50)
HCT VFR BLD CALC: 34.2 % — LOW (ref 39–50)
HCT VFR BLD CALC: 34.7 % — LOW (ref 39–50)
HCT VFR BLD CALC: 34.7 % — LOW (ref 39–50)
HCT VFR BLD CALC: 35.1 % — LOW (ref 39–50)
HCT VFR BLD CALC: 35.1 % — LOW (ref 39–50)
HCT VFR BLD CALC: 35.6 % — LOW (ref 39–50)
HCT VFR BLD CALC: 36.3 % — LOW (ref 39–50)
HCT VFR BLD CALC: 36.3 % — LOW (ref 39–50)
HCT VFR BLD CALC: 37.6 % — LOW (ref 39–50)
HCT VFR BLD CALC: 37.7 % — LOW (ref 39–50)
HCT VFR BLD CALC: 37.7 % — LOW (ref 39–50)
HCT VFR BLD CALC: 38.1 % — LOW (ref 39–50)
HCT VFR BLD CALC: 38.3 % — LOW (ref 39–50)
HCT VFR BLD CALC: 38.3 % — LOW (ref 39–50)
HCT VFR BLD CALC: 40 %
HCT VFR BLD CALC: 40 % — SIGNIFICANT CHANGE UP (ref 39–50)
HCT VFR BLD CALC: 42.5 % — SIGNIFICANT CHANGE UP (ref 39–50)
HCT VFR BLD CALC: 42.5 % — SIGNIFICANT CHANGE UP (ref 39–50)
HCT VFR BLD CALC: 42.8 % — SIGNIFICANT CHANGE UP (ref 39–50)
HCT VFR BLD CALC: 42.8 % — SIGNIFICANT CHANGE UP (ref 39–50)
HCT VFR BLD CALC: 43.1 % — SIGNIFICANT CHANGE UP (ref 39–50)
HCT VFR BLD CALC: 44 %
HCT VFR BLD CALC: 47.4 % — SIGNIFICANT CHANGE UP (ref 39–50)
HDLC SERPL-MCNC: 72 MG/DL
HGB BLD-MCNC: 10.6 G/DL — LOW (ref 13–17)
HGB BLD-MCNC: 10.6 G/DL — LOW (ref 13–17)
HGB BLD-MCNC: 10.8 G/DL — LOW (ref 13–17)
HGB BLD-MCNC: 11.1 G/DL — LOW (ref 13–17)
HGB BLD-MCNC: 11.1 G/DL — LOW (ref 13–17)
HGB BLD-MCNC: 11.2 G/DL — LOW (ref 13–17)
HGB BLD-MCNC: 11.2 G/DL — LOW (ref 13–17)
HGB BLD-MCNC: 11.6 G/DL — LOW (ref 13–17)
HGB BLD-MCNC: 11.7 G/DL — LOW (ref 13–17)
HGB BLD-MCNC: 11.7 G/DL — LOW (ref 13–17)
HGB BLD-MCNC: 12 G/DL — LOW (ref 13–17)
HGB BLD-MCNC: 12.1 G/DL — LOW (ref 13–17)
HGB BLD-MCNC: 12.3 G/DL — LOW (ref 13–17)
HGB BLD-MCNC: 12.4 G/DL
HGB BLD-MCNC: 13.3 G/DL
HGB BLD-MCNC: 13.3 G/DL — SIGNIFICANT CHANGE UP (ref 13–17)
HGB BLD-MCNC: 13.5 G/DL — SIGNIFICANT CHANGE UP (ref 13–17)
HGB BLD-MCNC: 13.5 G/DL — SIGNIFICANT CHANGE UP (ref 13–17)
HGB BLD-MCNC: 13.6 G/DL — SIGNIFICANT CHANGE UP (ref 13–17)
HGB BLD-MCNC: 15.3 G/DL — SIGNIFICANT CHANGE UP (ref 13–17)
HYPOCHROMIA BLD QL: SLIGHT — SIGNIFICANT CHANGE UP
IMM GRANULOCYTES NFR BLD AUTO: 0.2 %
IMM GRANULOCYTES NFR BLD AUTO: 0.4 %
IMM GRANULOCYTES NFR BLD AUTO: 1.3 % — HIGH (ref 0–0.9)
INFLUENZA A RESULT: NOT DETECTED
INFLUENZA B RESULT: NOT DETECTED
INR BLD: 0.91 — SIGNIFICANT CHANGE UP (ref 0.85–1.18)
INR BLD: 0.91 — SIGNIFICANT CHANGE UP (ref 0.85–1.18)
INR BLD: 1.02 — SIGNIFICANT CHANGE UP (ref 0.85–1.18)
INR BLD: 1.02 — SIGNIFICANT CHANGE UP (ref 0.85–1.18)
INR BLD: 1.06 — SIGNIFICANT CHANGE UP (ref 0.85–1.18)
INR BLD: 1.14 — SIGNIFICANT CHANGE UP (ref 0.85–1.18)
INR BLD: 1.22 — HIGH (ref 0.85–1.18)
KETONES UR-MCNC: 15 MG/DL
KETONES UR-MCNC: ABNORMAL MG/DL
KETONES UR-MCNC: ABNORMAL MG/DL
KETONES UR-MCNC: NEGATIVE MG/DL — SIGNIFICANT CHANGE UP
KETONES URINE: NEGATIVE MG/DL
LACTATE SERPL-SCNC: 1.2 MMOL/L — SIGNIFICANT CHANGE UP (ref 0.5–2)
LACTATE SERPL-SCNC: 1.2 MMOL/L — SIGNIFICANT CHANGE UP (ref 0.5–2)
LACTATE SERPL-SCNC: 1.8 MMOL/L — SIGNIFICANT CHANGE UP (ref 0.5–2)
LDLC SERPL CALC-MCNC: 156 MG/DL
LEUKOCYTE ESTERASE UR-ACNC: ABNORMAL
LEUKOCYTE ESTERASE UR-ACNC: ABNORMAL
LEUKOCYTE ESTERASE UR-ACNC: NEGATIVE — SIGNIFICANT CHANGE UP
LEUKOCYTE ESTERASE UR-ACNC: NEGATIVE — SIGNIFICANT CHANGE UP
LEUKOCYTE ESTERASE URINE: ABNORMAL
LIDOCAIN IGE QN: 11 U/L — SIGNIFICANT CHANGE UP (ref 7–60)
LIDOCAIN IGE QN: 11 U/L — SIGNIFICANT CHANGE UP (ref 7–60)
LYMPHOCYTES # BLD AUTO: 0 % — LOW (ref 13–44)
LYMPHOCYTES # BLD AUTO: 0 % — LOW (ref 13–44)
LYMPHOCYTES # BLD AUTO: 0 K/UL — LOW (ref 1–3.3)
LYMPHOCYTES # BLD AUTO: 0 K/UL — LOW (ref 1–3.3)
LYMPHOCYTES # BLD AUTO: 0.1 K/UL — LOW (ref 1–3.3)
LYMPHOCYTES # BLD AUTO: 0.22 K/UL
LYMPHOCYTES # BLD AUTO: 0.23 K/UL — LOW (ref 1–3.3)
LYMPHOCYTES # BLD AUTO: 0.26 K/UL
LYMPHOCYTES # BLD AUTO: 0.35 K/UL — LOW (ref 1–3.3)
LYMPHOCYTES # BLD AUTO: 0.35 K/UL — LOW (ref 1–3.3)
LYMPHOCYTES # BLD AUTO: 0.8 % — LOW (ref 13–44)
LYMPHOCYTES # BLD AUTO: 2.7 % — LOW (ref 13–44)
LYMPHOCYTES # BLD AUTO: 6.2 % — LOW (ref 13–44)
LYMPHOCYTES # BLD AUTO: 6.2 % — LOW (ref 13–44)
LYMPHOCYTES NFR BLD AUTO: 2.5 %
LYMPHOCYTES NFR BLD AUTO: 3.6 %
MACROCYTES BLD QL: SLIGHT — SIGNIFICANT CHANGE UP
MACROCYTES BLD QL: SLIGHT — SIGNIFICANT CHANGE UP
MAGNESIUM SERPL-MCNC: 1.7 MG/DL — SIGNIFICANT CHANGE UP (ref 1.6–2.6)
MAGNESIUM SERPL-MCNC: 1.7 MG/DL — SIGNIFICANT CHANGE UP (ref 1.6–2.6)
MAGNESIUM SERPL-MCNC: 1.8 MG/DL — SIGNIFICANT CHANGE UP (ref 1.6–2.6)
MAGNESIUM SERPL-MCNC: 1.9 MG/DL — SIGNIFICANT CHANGE UP (ref 1.6–2.6)
MAGNESIUM SERPL-MCNC: 2 MG/DL — SIGNIFICANT CHANGE UP (ref 1.6–2.6)
MAGNESIUM SERPL-MCNC: 2.1 MG/DL — SIGNIFICANT CHANGE UP (ref 1.6–2.6)
MAGNESIUM SERPL-MCNC: 2.2 MG/DL — SIGNIFICANT CHANGE UP (ref 1.6–2.6)
MAN DIFF?: NORMAL
MAN DIFF?: NORMAL
MANUAL SMEAR VERIFICATION: SIGNIFICANT CHANGE UP
MCHC RBC-ENTMCNC: 29.1 PG — SIGNIFICANT CHANGE UP (ref 27–34)
MCHC RBC-ENTMCNC: 29.3 PG — SIGNIFICANT CHANGE UP (ref 27–34)
MCHC RBC-ENTMCNC: 29.4 PG — SIGNIFICANT CHANGE UP (ref 27–34)
MCHC RBC-ENTMCNC: 29.4 PG — SIGNIFICANT CHANGE UP (ref 27–34)
MCHC RBC-ENTMCNC: 29.5 PG
MCHC RBC-ENTMCNC: 29.6 PG — SIGNIFICANT CHANGE UP (ref 27–34)
MCHC RBC-ENTMCNC: 29.7 PG — SIGNIFICANT CHANGE UP (ref 27–34)
MCHC RBC-ENTMCNC: 29.7 PG — SIGNIFICANT CHANGE UP (ref 27–34)
MCHC RBC-ENTMCNC: 29.8 PG — SIGNIFICANT CHANGE UP (ref 27–34)
MCHC RBC-ENTMCNC: 29.9 PG — SIGNIFICANT CHANGE UP (ref 27–34)
MCHC RBC-ENTMCNC: 30 PG
MCHC RBC-ENTMCNC: 30.1 PG — SIGNIFICANT CHANGE UP (ref 27–34)
MCHC RBC-ENTMCNC: 30.1 PG — SIGNIFICANT CHANGE UP (ref 27–34)
MCHC RBC-ENTMCNC: 30.2 GM/DL
MCHC RBC-ENTMCNC: 30.3 PG — SIGNIFICANT CHANGE UP (ref 27–34)
MCHC RBC-ENTMCNC: 30.8 GM/DL — LOW (ref 32–36)
MCHC RBC-ENTMCNC: 30.8 GM/DL — LOW (ref 32–36)
MCHC RBC-ENTMCNC: 30.9 GM/DL — LOW (ref 32–36)
MCHC RBC-ENTMCNC: 30.9 GM/DL — LOW (ref 32–36)
MCHC RBC-ENTMCNC: 31 GM/DL
MCHC RBC-ENTMCNC: 31 GM/DL — LOW (ref 32–36)
MCHC RBC-ENTMCNC: 31 GM/DL — LOW (ref 32–36)
MCHC RBC-ENTMCNC: 31.1 GM/DL — LOW (ref 32–36)
MCHC RBC-ENTMCNC: 31.1 GM/DL — LOW (ref 32–36)
MCHC RBC-ENTMCNC: 31.3 GM/DL — LOW (ref 32–36)
MCHC RBC-ENTMCNC: 31.6 GM/DL — LOW (ref 32–36)
MCHC RBC-ENTMCNC: 31.6 GM/DL — LOW (ref 32–36)
MCHC RBC-ENTMCNC: 31.7 GM/DL — LOW (ref 32–36)
MCHC RBC-ENTMCNC: 31.7 GM/DL — LOW (ref 32–36)
MCHC RBC-ENTMCNC: 31.8 GM/DL — LOW (ref 32–36)
MCHC RBC-ENTMCNC: 31.9 GM/DL — LOW (ref 32–36)
MCHC RBC-ENTMCNC: 32 GM/DL — SIGNIFICANT CHANGE UP (ref 32–36)
MCHC RBC-ENTMCNC: 32 GM/DL — SIGNIFICANT CHANGE UP (ref 32–36)
MCHC RBC-ENTMCNC: 32.3 GM/DL — SIGNIFICANT CHANGE UP (ref 32–36)
MCHC RBC-ENTMCNC: 32.3 GM/DL — SIGNIFICANT CHANGE UP (ref 32–36)
MCHC RBC-ENTMCNC: 32.6 GM/DL — SIGNIFICANT CHANGE UP (ref 32–36)
MCHC RBC-ENTMCNC: 32.9 GM/DL — SIGNIFICANT CHANGE UP (ref 32–36)
MCHC RBC-ENTMCNC: 32.9 GM/DL — SIGNIFICANT CHANGE UP (ref 32–36)
MCHC RBC-ENTMCNC: 33.3 GM/DL — SIGNIFICANT CHANGE UP (ref 32–36)
MCV RBC AUTO: 89.5 FL — SIGNIFICANT CHANGE UP (ref 80–100)
MCV RBC AUTO: 90.8 FL — SIGNIFICANT CHANGE UP (ref 80–100)
MCV RBC AUTO: 90.8 FL — SIGNIFICANT CHANGE UP (ref 80–100)
MCV RBC AUTO: 91.6 FL — SIGNIFICANT CHANGE UP (ref 80–100)
MCV RBC AUTO: 91.6 FL — SIGNIFICANT CHANGE UP (ref 80–100)
MCV RBC AUTO: 92.2 FL — SIGNIFICANT CHANGE UP (ref 80–100)
MCV RBC AUTO: 92.5 FL — SIGNIFICANT CHANGE UP (ref 80–100)
MCV RBC AUTO: 93 FL — SIGNIFICANT CHANGE UP (ref 80–100)
MCV RBC AUTO: 93.3 FL — SIGNIFICANT CHANGE UP (ref 80–100)
MCV RBC AUTO: 93.5 FL — SIGNIFICANT CHANGE UP (ref 80–100)
MCV RBC AUTO: 93.5 FL — SIGNIFICANT CHANGE UP (ref 80–100)
MCV RBC AUTO: 93.7 FL — SIGNIFICANT CHANGE UP (ref 80–100)
MCV RBC AUTO: 93.8 FL — SIGNIFICANT CHANGE UP (ref 80–100)
MCV RBC AUTO: 94 FL — SIGNIFICANT CHANGE UP (ref 80–100)
MCV RBC AUTO: 94 FL — SIGNIFICANT CHANGE UP (ref 80–100)
MCV RBC AUTO: 94.5 FL — SIGNIFICANT CHANGE UP (ref 80–100)
MCV RBC AUTO: 94.5 FL — SIGNIFICANT CHANGE UP (ref 80–100)
MCV RBC AUTO: 94.6 FL — SIGNIFICANT CHANGE UP (ref 80–100)
MCV RBC AUTO: 94.6 FL — SIGNIFICANT CHANGE UP (ref 80–100)
MCV RBC AUTO: 95 FL
MCV RBC AUTO: 95 FL — SIGNIFICANT CHANGE UP (ref 80–100)
MCV RBC AUTO: 95 FL — SIGNIFICANT CHANGE UP (ref 80–100)
MCV RBC AUTO: 95.5 FL — SIGNIFICANT CHANGE UP (ref 80–100)
MCV RBC AUTO: 95.8 FL — SIGNIFICANT CHANGE UP (ref 80–100)
MCV RBC AUTO: 95.8 FL — SIGNIFICANT CHANGE UP (ref 80–100)
MCV RBC AUTO: 99.1 FL
MICROALBUMIN 24H UR DL<=1MG/L-MCNC: <1.2 MG/DL
MICROALBUMIN/CREAT 24H UR-RTO: NORMAL MG/G
MICROCYTES BLD QL: SLIGHT — SIGNIFICANT CHANGE UP
MICROCYTES BLD QL: SLIGHT — SIGNIFICANT CHANGE UP
MONOCYTES # BLD AUTO: 0.23 K/UL — SIGNIFICANT CHANGE UP (ref 0–0.9)
MONOCYTES # BLD AUTO: 0.45 K/UL — SIGNIFICANT CHANGE UP (ref 0–0.9)
MONOCYTES # BLD AUTO: 0.59 K/UL — SIGNIFICANT CHANGE UP (ref 0–0.9)
MONOCYTES # BLD AUTO: 0.59 K/UL — SIGNIFICANT CHANGE UP (ref 0–0.9)
MONOCYTES # BLD AUTO: 0.62 K/UL
MONOCYTES # BLD AUTO: 0.68 K/UL
MONOCYTES NFR BLD AUTO: 10.3 %
MONOCYTES NFR BLD AUTO: 10.6 % — SIGNIFICANT CHANGE UP (ref 2–14)
MONOCYTES NFR BLD AUTO: 10.6 % — SIGNIFICANT CHANGE UP (ref 2–14)
MONOCYTES NFR BLD AUTO: 2.6 % — SIGNIFICANT CHANGE UP (ref 2–14)
MONOCYTES NFR BLD AUTO: 3.5 % — SIGNIFICANT CHANGE UP (ref 2–14)
MONOCYTES NFR BLD AUTO: 3.5 % — SIGNIFICANT CHANGE UP (ref 2–14)
MONOCYTES NFR BLD AUTO: 5.3 % — SIGNIFICANT CHANGE UP (ref 2–14)
MONOCYTES NFR BLD AUTO: 6.5 %
MYELOCYTES NFR BLD: 0.9 % — HIGH (ref 0–0)
NEUTROPHILS # BLD AUTO: 12.07 K/UL — HIGH (ref 1.8–7.4)
NEUTROPHILS # BLD AUTO: 12.36 K/UL — HIGH (ref 1.8–7.4)
NEUTROPHILS # BLD AUTO: 4.47 K/UL — SIGNIFICANT CHANGE UP (ref 1.8–7.4)
NEUTROPHILS # BLD AUTO: 4.47 K/UL — SIGNIFICANT CHANGE UP (ref 1.8–7.4)
NEUTROPHILS # BLD AUTO: 5.11 K/UL
NEUTROPHILS # BLD AUTO: 7.7 K/UL — HIGH (ref 1.8–7.4)
NEUTROPHILS # BLD AUTO: 8.72 K/UL — HIGH (ref 1.8–7.4)
NEUTROPHILS # BLD AUTO: 9.37 K/UL
NEUTROPHILS NFR BLD AUTO: 79.6 % — HIGH (ref 43–77)
NEUTROPHILS NFR BLD AUTO: 79.6 % — HIGH (ref 43–77)
NEUTROPHILS NFR BLD AUTO: 84.6 %
NEUTROPHILS NFR BLD AUTO: 89.9 % — HIGH (ref 43–77)
NEUTROPHILS NFR BLD AUTO: 90.2 %
NEUTROPHILS NFR BLD AUTO: 94.8 % — HIGH (ref 43–77)
NEUTROPHILS NFR BLD AUTO: 95.6 % — HIGH (ref 43–77)
NEUTROPHILS NFR BLD AUTO: 97.4 % — HIGH (ref 43–77)
NITRITE UR-MCNC: NEGATIVE — SIGNIFICANT CHANGE UP
NITRITE URINE: NEGATIVE
NONHDLC SERPL-MCNC: 174 MG/DL
NRBC # BLD: 0 /100 WBCS — SIGNIFICANT CHANGE UP (ref 0–0)
OSMOLALITY SERPL: 289 MOSM/KG — SIGNIFICANT CHANGE UP (ref 280–301)
OSMOLALITY UR: 562 MOSM/KG — SIGNIFICANT CHANGE UP (ref 300–900)
OSMOLALITY UR: 562 MOSM/KG — SIGNIFICANT CHANGE UP (ref 300–900)
OVALOCYTES BLD QL SMEAR: SLIGHT — SIGNIFICANT CHANGE UP
PH UR: 5.5 — SIGNIFICANT CHANGE UP (ref 5–8)
PH UR: 5.5 — SIGNIFICANT CHANGE UP (ref 5–8)
PH UR: 7 — SIGNIFICANT CHANGE UP (ref 5–8)
PH UR: 8 — SIGNIFICANT CHANGE UP (ref 5–8)
PH URINE: 6.5
PHOSPHATE SERPL-MCNC: 1.3 MG/DL — LOW (ref 2.5–4.5)
PHOSPHATE SERPL-MCNC: 1.3 MG/DL — LOW (ref 2.5–4.5)
PHOSPHATE SERPL-MCNC: 1.5 MG/DL — LOW (ref 2.5–4.5)
PHOSPHATE SERPL-MCNC: 1.5 MG/DL — LOW (ref 2.5–4.5)
PHOSPHATE SERPL-MCNC: 2 MG/DL — LOW (ref 2.5–4.5)
PHOSPHATE SERPL-MCNC: 2.2 MG/DL — LOW (ref 2.5–4.5)
PHOSPHATE SERPL-MCNC: 2.5 MG/DL — SIGNIFICANT CHANGE UP (ref 2.5–4.5)
PHOSPHATE SERPL-MCNC: 2.7 MG/DL — SIGNIFICANT CHANGE UP (ref 2.5–4.5)
PHOSPHATE SERPL-MCNC: 3 MG/DL — SIGNIFICANT CHANGE UP (ref 2.5–4.5)
PHOSPHATE SERPL-MCNC: 3.9 MG/DL — SIGNIFICANT CHANGE UP (ref 2.5–4.5)
PHOSPHATE SERPL-MCNC: 3.9 MG/DL — SIGNIFICANT CHANGE UP (ref 2.5–4.5)
PHOSPHATE SERPL-MCNC: 4 MG/DL — SIGNIFICANT CHANGE UP (ref 2.5–4.5)
PHOSPHATE SERPL-MCNC: 4 MG/DL — SIGNIFICANT CHANGE UP (ref 2.5–4.5)
PHOSPHATE SERPL-MCNC: 4.3 MG/DL — SIGNIFICANT CHANGE UP (ref 2.5–4.5)
PHOSPHATE SERPL-MCNC: 4.3 MG/DL — SIGNIFICANT CHANGE UP (ref 2.5–4.5)
PHOSPHATE SERPL-MCNC: 4.5 MG/DL — SIGNIFICANT CHANGE UP (ref 2.5–4.5)
PHOSPHATE SERPL-MCNC: 4.5 MG/DL — SIGNIFICANT CHANGE UP (ref 2.5–4.5)
PLAT MORPH BLD: ABNORMAL
PLAT MORPH BLD: NORMAL — SIGNIFICANT CHANGE UP
PLATELET # BLD AUTO: 126 K/UL — LOW (ref 150–400)
PLATELET # BLD AUTO: 126 K/UL — LOW (ref 150–400)
PLATELET # BLD AUTO: 185 K/UL — SIGNIFICANT CHANGE UP (ref 150–400)
PLATELET # BLD AUTO: 185 K/UL — SIGNIFICANT CHANGE UP (ref 150–400)
PLATELET # BLD AUTO: 189 K/UL — SIGNIFICANT CHANGE UP (ref 150–400)
PLATELET # BLD AUTO: 189 K/UL — SIGNIFICANT CHANGE UP (ref 150–400)
PLATELET # BLD AUTO: 209 K/UL — SIGNIFICANT CHANGE UP (ref 150–400)
PLATELET # BLD AUTO: 209 K/UL — SIGNIFICANT CHANGE UP (ref 150–400)
PLATELET # BLD AUTO: 220 K/UL — SIGNIFICANT CHANGE UP (ref 150–400)
PLATELET # BLD AUTO: 222 K/UL — SIGNIFICANT CHANGE UP (ref 150–400)
PLATELET # BLD AUTO: 222 K/UL — SIGNIFICANT CHANGE UP (ref 150–400)
PLATELET # BLD AUTO: 228 K/UL
PLATELET # BLD AUTO: 229 K/UL — SIGNIFICANT CHANGE UP (ref 150–400)
PLATELET # BLD AUTO: 237 K/UL — SIGNIFICANT CHANGE UP (ref 150–400)
PLATELET # BLD AUTO: 242 K/UL — SIGNIFICANT CHANGE UP (ref 150–400)
PLATELET # BLD AUTO: 242 K/UL — SIGNIFICANT CHANGE UP (ref 150–400)
PLATELET # BLD AUTO: 248 K/UL — SIGNIFICANT CHANGE UP (ref 150–400)
PLATELET # BLD AUTO: 257 K/UL — SIGNIFICANT CHANGE UP (ref 150–400)
PLATELET # BLD AUTO: 258 K/UL — SIGNIFICANT CHANGE UP (ref 150–400)
PLATELET # BLD AUTO: 258 K/UL — SIGNIFICANT CHANGE UP (ref 150–400)
PLATELET # BLD AUTO: 264 K/UL — SIGNIFICANT CHANGE UP (ref 150–400)
PLATELET # BLD AUTO: 269 K/UL — SIGNIFICANT CHANGE UP (ref 150–400)
PLATELET # BLD AUTO: 269 K/UL — SIGNIFICANT CHANGE UP (ref 150–400)
PLATELET # BLD AUTO: 309 K/UL — SIGNIFICANT CHANGE UP (ref 150–400)
PLATELET # BLD AUTO: 309 K/UL — SIGNIFICANT CHANGE UP (ref 150–400)
PLATELET # BLD AUTO: 428 K/UL
POIKILOCYTOSIS BLD QL AUTO: SIGNIFICANT CHANGE UP
POIKILOCYTOSIS BLD QL AUTO: SLIGHT — SIGNIFICANT CHANGE UP
POLYCHROMASIA BLD QL SMEAR: SLIGHT — SIGNIFICANT CHANGE UP
POTASSIUM SERPL-MCNC: 3.6 MMOL/L — SIGNIFICANT CHANGE UP (ref 3.5–5.3)
POTASSIUM SERPL-MCNC: 3.7 MMOL/L — SIGNIFICANT CHANGE UP (ref 3.5–5.3)
POTASSIUM SERPL-MCNC: 3.7 MMOL/L — SIGNIFICANT CHANGE UP (ref 3.5–5.3)
POTASSIUM SERPL-MCNC: 3.8 MMOL/L — SIGNIFICANT CHANGE UP (ref 3.5–5.3)
POTASSIUM SERPL-MCNC: 4 MMOL/L — SIGNIFICANT CHANGE UP (ref 3.5–5.3)
POTASSIUM SERPL-MCNC: 4.1 MMOL/L — SIGNIFICANT CHANGE UP (ref 3.5–5.3)
POTASSIUM SERPL-MCNC: 4.2 MMOL/L — SIGNIFICANT CHANGE UP (ref 3.5–5.3)
POTASSIUM SERPL-MCNC: 4.3 MMOL/L — SIGNIFICANT CHANGE UP (ref 3.5–5.3)
POTASSIUM SERPL-MCNC: 4.3 MMOL/L — SIGNIFICANT CHANGE UP (ref 3.5–5.3)
POTASSIUM SERPL-MCNC: 4.4 MMOL/L — SIGNIFICANT CHANGE UP (ref 3.5–5.3)
POTASSIUM SERPL-MCNC: 4.5 MMOL/L — SIGNIFICANT CHANGE UP (ref 3.5–5.3)
POTASSIUM SERPL-MCNC: 4.6 MMOL/L — SIGNIFICANT CHANGE UP (ref 3.5–5.3)
POTASSIUM SERPL-MCNC: SIGNIFICANT CHANGE UP MMOL/L (ref 3.5–5.3)
POTASSIUM SERPL-MCNC: SIGNIFICANT CHANGE UP MMOL/L (ref 3.5–5.3)
POTASSIUM SERPL-SCNC: 3.6 MMOL/L — SIGNIFICANT CHANGE UP (ref 3.5–5.3)
POTASSIUM SERPL-SCNC: 3.7 MMOL/L — SIGNIFICANT CHANGE UP (ref 3.5–5.3)
POTASSIUM SERPL-SCNC: 3.7 MMOL/L — SIGNIFICANT CHANGE UP (ref 3.5–5.3)
POTASSIUM SERPL-SCNC: 3.8 MMOL/L — SIGNIFICANT CHANGE UP (ref 3.5–5.3)
POTASSIUM SERPL-SCNC: 4 MMOL/L — SIGNIFICANT CHANGE UP (ref 3.5–5.3)
POTASSIUM SERPL-SCNC: 4.1 MMOL/L
POTASSIUM SERPL-SCNC: 4.1 MMOL/L — SIGNIFICANT CHANGE UP (ref 3.5–5.3)
POTASSIUM SERPL-SCNC: 4.2 MMOL/L — SIGNIFICANT CHANGE UP (ref 3.5–5.3)
POTASSIUM SERPL-SCNC: 4.3 MMOL/L — SIGNIFICANT CHANGE UP (ref 3.5–5.3)
POTASSIUM SERPL-SCNC: 4.3 MMOL/L — SIGNIFICANT CHANGE UP (ref 3.5–5.3)
POTASSIUM SERPL-SCNC: 4.4 MMOL/L — SIGNIFICANT CHANGE UP (ref 3.5–5.3)
POTASSIUM SERPL-SCNC: 4.5 MMOL/L — SIGNIFICANT CHANGE UP (ref 3.5–5.3)
POTASSIUM SERPL-SCNC: 4.6 MMOL/L
POTASSIUM SERPL-SCNC: 4.6 MMOL/L — SIGNIFICANT CHANGE UP (ref 3.5–5.3)
POTASSIUM SERPL-SCNC: 4.8 MMOL/L
POTASSIUM SERPL-SCNC: 4.9 MMOL/L
POTASSIUM SERPL-SCNC: SIGNIFICANT CHANGE UP MMOL/L (ref 3.5–5.3)
POTASSIUM SERPL-SCNC: SIGNIFICANT CHANGE UP MMOL/L (ref 3.5–5.3)
PROCALCITONIN SERPL-MCNC: 0.15 NG/ML — HIGH (ref 0.02–0.1)
PROT SERPL-MCNC: 5.3 G/DL — LOW (ref 6–8.3)
PROT SERPL-MCNC: 5.8 G/DL — LOW (ref 6–8.3)
PROT SERPL-MCNC: 5.9 G/DL — LOW (ref 6–8.3)
PROT SERPL-MCNC: 5.9 G/DL — LOW (ref 6–8.3)
PROT SERPL-MCNC: 6 G/DL — SIGNIFICANT CHANGE UP (ref 6–8.3)
PROT SERPL-MCNC: 6.1 G/DL
PROT SERPL-MCNC: 6.4 G/DL
PROT SERPL-MCNC: 6.6 G/DL
PROT SERPL-MCNC: 6.7 G/DL — SIGNIFICANT CHANGE UP (ref 6–8.3)
PROT SERPL-MCNC: 6.7 G/DL — SIGNIFICANT CHANGE UP (ref 6–8.3)
PROT SERPL-MCNC: 7.7 G/DL
PROT UR-MCNC: NEGATIVE MG/DL — SIGNIFICANT CHANGE UP
PROT UR-MCNC: NEGATIVE MG/DL — SIGNIFICANT CHANGE UP
PROT UR-MCNC: SIGNIFICANT CHANGE UP MG/DL
PROT UR-MCNC: SIGNIFICANT CHANGE UP MG/DL
PROTEIN URINE: NEGATIVE MG/DL
PROTHROM AB SERPL-ACNC: 10.4 SEC — SIGNIFICANT CHANGE UP (ref 9.5–13)
PROTHROM AB SERPL-ACNC: 10.4 SEC — SIGNIFICANT CHANGE UP (ref 9.5–13)
PROTHROM AB SERPL-ACNC: 11.6 SEC — SIGNIFICANT CHANGE UP (ref 9.5–13)
PROTHROM AB SERPL-ACNC: 11.6 SEC — SIGNIFICANT CHANGE UP (ref 9.5–13)
PROTHROM AB SERPL-ACNC: 12.1 SEC — SIGNIFICANT CHANGE UP (ref 9.5–13)
PROTHROM AB SERPL-ACNC: 13 SEC — SIGNIFICANT CHANGE UP (ref 9.5–13)
PROTHROM AB SERPL-ACNC: 13.8 SEC — HIGH (ref 9.5–13)
RAPID RVP RESULT: SIGNIFICANT CHANGE UP
RAPID RVP RESULT: SIGNIFICANT CHANGE UP
RBC # BLD: 3.57 M/UL — LOW (ref 4.2–5.8)
RBC # BLD: 3.57 M/UL — LOW (ref 4.2–5.8)
RBC # BLD: 3.61 M/UL — LOW (ref 4.2–5.8)
RBC # BLD: 3.61 M/UL — LOW (ref 4.2–5.8)
RBC # BLD: 3.71 M/UL — LOW (ref 4.2–5.8)
RBC # BLD: 3.71 M/UL — LOW (ref 4.2–5.8)
RBC # BLD: 3.79 M/UL — LOW (ref 4.2–5.8)
RBC # BLD: 3.79 M/UL — LOW (ref 4.2–5.8)
RBC # BLD: 3.82 M/UL — LOW (ref 4.2–5.8)
RBC # BLD: 3.82 M/UL — LOW (ref 4.2–5.8)
RBC # BLD: 3.85 M/UL — LOW (ref 4.2–5.8)
RBC # BLD: 3.92 M/UL — LOW (ref 4.2–5.8)
RBC # BLD: 3.92 M/UL — LOW (ref 4.2–5.8)
RBC # BLD: 4.01 M/UL — LOW (ref 4.2–5.8)
RBC # BLD: 4.03 M/UL — LOW (ref 4.2–5.8)
RBC # BLD: 4.03 M/UL — LOW (ref 4.2–5.8)
RBC # BLD: 4.06 M/UL — LOW (ref 4.2–5.8)
RBC # BLD: 4.08 M/UL — LOW (ref 4.2–5.8)
RBC # BLD: 4.12 M/UL — LOW (ref 4.2–5.8)
RBC # BLD: 4.21 M/UL
RBC # BLD: 4.44 M/UL
RBC # BLD: 4.47 M/UL — SIGNIFICANT CHANGE UP (ref 4.2–5.8)
RBC # BLD: 4.52 M/UL — SIGNIFICANT CHANGE UP (ref 4.2–5.8)
RBC # BLD: 4.52 M/UL — SIGNIFICANT CHANGE UP (ref 4.2–5.8)
RBC # BLD: 4.57 M/UL — SIGNIFICANT CHANGE UP (ref 4.2–5.8)
RBC # BLD: 4.57 M/UL — SIGNIFICANT CHANGE UP (ref 4.2–5.8)
RBC # BLD: 4.6 M/UL — SIGNIFICANT CHANGE UP (ref 4.2–5.8)
RBC # BLD: 5.08 M/UL — SIGNIFICANT CHANGE UP (ref 4.2–5.8)
RBC # FLD: 15.1 % — HIGH (ref 10.3–14.5)
RBC # FLD: 15.3 % — HIGH (ref 10.3–14.5)
RBC # FLD: 15.3 % — HIGH (ref 10.3–14.5)
RBC # FLD: 15.4 % — HIGH (ref 10.3–14.5)
RBC # FLD: 15.4 % — HIGH (ref 10.3–14.5)
RBC # FLD: 15.5 % — HIGH (ref 10.3–14.5)
RBC # FLD: 15.6 % — HIGH (ref 10.3–14.5)
RBC # FLD: 15.9 %
RBC # FLD: 15.9 % — HIGH (ref 10.3–14.5)
RBC # FLD: 15.9 % — HIGH (ref 10.3–14.5)
RBC # FLD: 16.5 % — HIGH (ref 10.3–14.5)
RBC # FLD: 16.9 % — HIGH (ref 10.3–14.5)
RBC # FLD: 17.6 %
RBC # FLD: 17.7 % — HIGH (ref 10.3–14.5)
RBC BLD AUTO: ABNORMAL
RBC CASTS # UR COMP ASSIST: 2 /HPF — SIGNIFICANT CHANGE UP (ref 0–4)
RBC CASTS # UR COMP ASSIST: 2 /HPF — SIGNIFICANT CHANGE UP (ref 0–4)
RBC CASTS # UR COMP ASSIST: 5 /HPF — HIGH (ref 0–4)
RESP SYN VIRUS RESULT: NOT DETECTED
RH IG SCN BLD-IMP: POSITIVE — SIGNIFICANT CHANGE UP
RSV RNA NPH QL NAA+NON-PROBE: SIGNIFICANT CHANGE UP
RSV RNA NPH QL NAA+NON-PROBE: SIGNIFICANT CHANGE UP
SARS-COV-2 RESULT: NOT DETECTED
SARS-COV-2 RNA SPEC QL NAA+PROBE: SIGNIFICANT CHANGE UP
SCHISTOCYTES BLD QL AUTO: SLIGHT — SIGNIFICANT CHANGE UP
SCHISTOCYTES BLD QL AUTO: SLIGHT — SIGNIFICANT CHANGE UP
SODIUM SERPL-SCNC: 132 MMOL/L — LOW (ref 135–145)
SODIUM SERPL-SCNC: 132 MMOL/L — LOW (ref 135–145)
SODIUM SERPL-SCNC: 133 MMOL/L — LOW (ref 135–145)
SODIUM SERPL-SCNC: 134 MMOL/L — LOW (ref 135–145)
SODIUM SERPL-SCNC: 134 MMOL/L — LOW (ref 135–145)
SODIUM SERPL-SCNC: 135 MMOL/L — SIGNIFICANT CHANGE UP (ref 135–145)
SODIUM SERPL-SCNC: 136 MMOL/L — SIGNIFICANT CHANGE UP (ref 135–145)
SODIUM SERPL-SCNC: 137 MMOL/L
SODIUM SERPL-SCNC: 137 MMOL/L — SIGNIFICANT CHANGE UP (ref 135–145)
SODIUM SERPL-SCNC: 138 MMOL/L
SODIUM SERPL-SCNC: 138 MMOL/L
SODIUM SERPL-SCNC: 138 MMOL/L — SIGNIFICANT CHANGE UP (ref 135–145)
SODIUM SERPL-SCNC: 139 MMOL/L — SIGNIFICANT CHANGE UP (ref 135–145)
SODIUM SERPL-SCNC: 141 MMOL/L
SODIUM SERPL-SCNC: 141 MMOL/L — SIGNIFICANT CHANGE UP (ref 135–145)
SODIUM SERPL-SCNC: 141 MMOL/L — SIGNIFICANT CHANGE UP (ref 135–145)
SODIUM UR-SCNC: 133 MMOL/L — SIGNIFICANT CHANGE UP
SODIUM UR-SCNC: 133 MMOL/L — SIGNIFICANT CHANGE UP
SP GR SPEC: 1.02 — SIGNIFICANT CHANGE UP (ref 1–1.03)
SP GR SPEC: >1.03 — HIGH (ref 1–1.03)
SPECIFIC GRAVITY URINE: 1.02
SPECIMEN SOURCE: SIGNIFICANT CHANGE UP
SPHEROCYTES BLD QL SMEAR: SLIGHT — SIGNIFICANT CHANGE UP
SPHEROCYTES BLD QL SMEAR: SLIGHT — SIGNIFICANT CHANGE UP
SQUAMOUS # UR AUTO: 0 /HPF — SIGNIFICANT CHANGE UP (ref 0–5)
SQUAMOUS # UR AUTO: 1 /HPF — SIGNIFICANT CHANGE UP (ref 0–5)
SQUAMOUS # UR AUTO: 1 /HPF — SIGNIFICANT CHANGE UP (ref 0–5)
SURGICAL PATHOLOGY STUDY: SIGNIFICANT CHANGE UP
TRIGL SERPL-MCNC: 100 MG/DL
TSH SERPL-ACNC: 0.28 UIU/ML
TSH SERPL-ACNC: 1.02 UIU/ML
UROBILINOGEN FLD QL: 1 MG/DL — SIGNIFICANT CHANGE UP (ref 0.2–1)
UROBILINOGEN FLD QL: 2 MG/DL (ref 0.2–1)
UROBILINOGEN URINE: 0.2 MG/DL
VARIANT LYMPHS # BLD: 0.9 % — SIGNIFICANT CHANGE UP (ref 0–6)
VIT B12 SERPL-MCNC: 373 PG/ML
WBC # BLD: 10.2 K/UL — SIGNIFICANT CHANGE UP (ref 3.8–10.5)
WBC # BLD: 10.36 K/UL — SIGNIFICANT CHANGE UP (ref 3.8–10.5)
WBC # BLD: 11.82 K/UL — HIGH (ref 3.8–10.5)
WBC # BLD: 11.82 K/UL — HIGH (ref 3.8–10.5)
WBC # BLD: 12.44 K/UL — HIGH (ref 3.8–10.5)
WBC # BLD: 12.44 K/UL — HIGH (ref 3.8–10.5)
WBC # BLD: 12.73 K/UL — HIGH (ref 3.8–10.5)
WBC # BLD: 12.93 K/UL — HIGH (ref 3.8–10.5)
WBC # BLD: 13.39 K/UL — HIGH (ref 3.8–10.5)
WBC # BLD: 13.39 K/UL — HIGH (ref 3.8–10.5)
WBC # BLD: 13.59 K/UL — HIGH (ref 3.8–10.5)
WBC # BLD: 13.59 K/UL — HIGH (ref 3.8–10.5)
WBC # BLD: 13.6 K/UL — HIGH (ref 3.8–10.5)
WBC # BLD: 14.23 K/UL — HIGH (ref 3.8–10.5)
WBC # BLD: 14.23 K/UL — HIGH (ref 3.8–10.5)
WBC # BLD: 14.35 K/UL — HIGH (ref 3.8–10.5)
WBC # BLD: 14.79 K/UL — HIGH (ref 3.8–10.5)
WBC # BLD: 14.79 K/UL — HIGH (ref 3.8–10.5)
WBC # BLD: 4.5 K/UL — SIGNIFICANT CHANGE UP (ref 3.8–10.5)
WBC # BLD: 4.5 K/UL — SIGNIFICANT CHANGE UP (ref 3.8–10.5)
WBC # BLD: 5.61 K/UL — SIGNIFICANT CHANGE UP (ref 3.8–10.5)
WBC # BLD: 5.61 K/UL — SIGNIFICANT CHANGE UP (ref 3.8–10.5)
WBC # BLD: 6.87 K/UL — SIGNIFICANT CHANGE UP (ref 3.8–10.5)
WBC # BLD: 6.87 K/UL — SIGNIFICANT CHANGE UP (ref 3.8–10.5)
WBC # BLD: 8.56 K/UL — SIGNIFICANT CHANGE UP (ref 3.8–10.5)
WBC # BLD: 8.95 K/UL — SIGNIFICANT CHANGE UP (ref 3.8–10.5)
WBC # FLD AUTO: 10.2 K/UL — SIGNIFICANT CHANGE UP (ref 3.8–10.5)
WBC # FLD AUTO: 10.36 K/UL — SIGNIFICANT CHANGE UP (ref 3.8–10.5)
WBC # FLD AUTO: 10.39 K/UL
WBC # FLD AUTO: 11.82 K/UL — HIGH (ref 3.8–10.5)
WBC # FLD AUTO: 11.82 K/UL — HIGH (ref 3.8–10.5)
WBC # FLD AUTO: 12.44 K/UL — HIGH (ref 3.8–10.5)
WBC # FLD AUTO: 12.44 K/UL — HIGH (ref 3.8–10.5)
WBC # FLD AUTO: 12.73 K/UL — HIGH (ref 3.8–10.5)
WBC # FLD AUTO: 12.93 K/UL — HIGH (ref 3.8–10.5)
WBC # FLD AUTO: 13.39 K/UL — HIGH (ref 3.8–10.5)
WBC # FLD AUTO: 13.39 K/UL — HIGH (ref 3.8–10.5)
WBC # FLD AUTO: 13.59 K/UL — HIGH (ref 3.8–10.5)
WBC # FLD AUTO: 13.59 K/UL — HIGH (ref 3.8–10.5)
WBC # FLD AUTO: 13.6 K/UL — HIGH (ref 3.8–10.5)
WBC # FLD AUTO: 14.23 K/UL — HIGH (ref 3.8–10.5)
WBC # FLD AUTO: 14.23 K/UL — HIGH (ref 3.8–10.5)
WBC # FLD AUTO: 14.35 K/UL — HIGH (ref 3.8–10.5)
WBC # FLD AUTO: 14.79 K/UL — HIGH (ref 3.8–10.5)
WBC # FLD AUTO: 14.79 K/UL — HIGH (ref 3.8–10.5)
WBC # FLD AUTO: 4.5 K/UL — SIGNIFICANT CHANGE UP (ref 3.8–10.5)
WBC # FLD AUTO: 4.5 K/UL — SIGNIFICANT CHANGE UP (ref 3.8–10.5)
WBC # FLD AUTO: 5.61 K/UL — SIGNIFICANT CHANGE UP (ref 3.8–10.5)
WBC # FLD AUTO: 5.61 K/UL — SIGNIFICANT CHANGE UP (ref 3.8–10.5)
WBC # FLD AUTO: 6.04 K/UL
WBC # FLD AUTO: 6.87 K/UL — SIGNIFICANT CHANGE UP (ref 3.8–10.5)
WBC # FLD AUTO: 6.87 K/UL — SIGNIFICANT CHANGE UP (ref 3.8–10.5)
WBC # FLD AUTO: 8.56 K/UL — SIGNIFICANT CHANGE UP (ref 3.8–10.5)
WBC # FLD AUTO: 8.95 K/UL — SIGNIFICANT CHANGE UP (ref 3.8–10.5)
WBC UR QL: 2 /HPF — SIGNIFICANT CHANGE UP (ref 0–5)
WBC UR QL: 7 /HPF — HIGH (ref 0–5)
WBC UR QL: 7 /HPF — HIGH (ref 0–5)

## 2024-01-01 PROCEDURE — 85730 THROMBOPLASTIN TIME PARTIAL: CPT

## 2024-01-01 PROCEDURE — 99233 SBSQ HOSP IP/OBS HIGH 50: CPT | Mod: GC

## 2024-01-01 PROCEDURE — 71260 CT THORAX DX C+: CPT | Mod: 26

## 2024-01-01 PROCEDURE — 99291 CRITICAL CARE FIRST HOUR: CPT

## 2024-01-01 PROCEDURE — 61797 SRS CRAN LES SIMPLE ADDL: CPT | Mod: 78

## 2024-01-01 PROCEDURE — 72158 MRI LUMBAR SPINE W/O & W/DYE: CPT | Mod: 26

## 2024-01-01 PROCEDURE — 80053 COMPREHEN METABOLIC PANEL: CPT

## 2024-01-01 PROCEDURE — 85027 COMPLETE CBC AUTOMATED: CPT

## 2024-01-01 PROCEDURE — 71045 X-RAY EXAM CHEST 1 VIEW: CPT

## 2024-01-01 PROCEDURE — 83605 ASSAY OF LACTIC ACID: CPT

## 2024-01-01 PROCEDURE — 97110 THERAPEUTIC EXERCISES: CPT

## 2024-01-01 PROCEDURE — 82962 GLUCOSE BLOOD TEST: CPT

## 2024-01-01 PROCEDURE — 94640 AIRWAY INHALATION TREATMENT: CPT

## 2024-01-01 PROCEDURE — 97116 GAIT TRAINING THERAPY: CPT

## 2024-01-01 PROCEDURE — 99221 1ST HOSP IP/OBS SF/LOW 40: CPT

## 2024-01-01 PROCEDURE — 83036 HEMOGLOBIN GLYCOSYLATED A1C: CPT

## 2024-01-01 PROCEDURE — 99215 OFFICE O/P EST HI 40 MIN: CPT

## 2024-01-01 PROCEDURE — 99223 1ST HOSP IP/OBS HIGH 75: CPT

## 2024-01-01 PROCEDURE — 88307 TISSUE EXAM BY PATHOLOGIST: CPT | Mod: 26

## 2024-01-01 PROCEDURE — 70553 MRI BRAIN STEM W/O & W/DYE: CPT

## 2024-01-01 PROCEDURE — 99233 SBSQ HOSP IP/OBS HIGH 50: CPT

## 2024-01-01 PROCEDURE — 85610 PROTHROMBIN TIME: CPT

## 2024-01-01 PROCEDURE — 77334 RADIATION TREATMENT AID(S): CPT

## 2024-01-01 PROCEDURE — C8929: CPT

## 2024-01-01 PROCEDURE — 88307 TISSUE EXAM BY PATHOLOGIST: CPT

## 2024-01-01 PROCEDURE — 61781 SCAN PROC CRANIAL INTRA: CPT

## 2024-01-01 PROCEDURE — 85025 COMPLETE CBC W/AUTO DIFF WBC: CPT

## 2024-01-01 PROCEDURE — 96374 THER/PROPH/DIAG INJ IV PUSH: CPT

## 2024-01-01 PROCEDURE — C1889: CPT

## 2024-01-01 PROCEDURE — 36415 COLL VENOUS BLD VENIPUNCTURE: CPT

## 2024-01-01 PROCEDURE — 84100 ASSAY OF PHOSPHORUS: CPT

## 2024-01-01 PROCEDURE — 99232 SBSQ HOSP IP/OBS MODERATE 35: CPT

## 2024-01-01 PROCEDURE — 0225U NFCT DS DNA&RNA 21 SARSCOV2: CPT

## 2024-01-01 PROCEDURE — 88342 IMHCHEM/IMCYTCHM 1ST ANTB: CPT | Mod: 26

## 2024-01-01 PROCEDURE — 83930 ASSAY OF BLOOD OSMOLALITY: CPT

## 2024-01-01 PROCEDURE — 99024 POSTOP FOLLOW-UP VISIT: CPT

## 2024-01-01 PROCEDURE — 86901 BLOOD TYPING SEROLOGIC RH(D): CPT

## 2024-01-01 PROCEDURE — 61520 REMOVAL OF BRAIN LESION: CPT

## 2024-01-01 PROCEDURE — A9585: CPT

## 2024-01-01 PROCEDURE — 99232 SBSQ HOSP IP/OBS MODERATE 35: CPT | Mod: GC

## 2024-01-01 PROCEDURE — 97165 OT EVAL LOW COMPLEX 30 MIN: CPT

## 2024-01-01 PROCEDURE — 71260 CT THORAX DX C+: CPT | Mod: 26,MA

## 2024-01-01 PROCEDURE — 99284 EMERGENCY DEPT VISIT MOD MDM: CPT

## 2024-01-01 PROCEDURE — 83690 ASSAY OF LIPASE: CPT

## 2024-01-01 PROCEDURE — 86900 BLOOD TYPING SEROLOGIC ABO: CPT

## 2024-01-01 PROCEDURE — 93010 ELECTROCARDIOGRAM REPORT: CPT

## 2024-01-01 PROCEDURE — 71260 CT THORAX DX C+: CPT

## 2024-01-01 PROCEDURE — 97161 PT EVAL LOW COMPLEX 20 MIN: CPT

## 2024-01-01 PROCEDURE — 93005 ELECTROCARDIOGRAM TRACING: CPT

## 2024-01-01 PROCEDURE — 74018 RADEX ABDOMEN 1 VIEW: CPT | Mod: 26

## 2024-01-01 PROCEDURE — 72156 MRI NECK SPINE W/O & W/DYE: CPT | Mod: 26

## 2024-01-01 PROCEDURE — 71260 CT THORAX DX C+: CPT | Mod: MA

## 2024-01-01 PROCEDURE — 43235 EGD DIAGNOSTIC BRUSH WASH: CPT | Mod: GC

## 2024-01-01 PROCEDURE — 99285 EMERGENCY DEPT VISIT HI MDM: CPT

## 2024-01-01 PROCEDURE — G2211 COMPLEX E/M VISIT ADD ON: CPT

## 2024-01-01 PROCEDURE — 81001 URINALYSIS AUTO W/SCOPE: CPT

## 2024-01-01 PROCEDURE — 83735 ASSAY OF MAGNESIUM: CPT

## 2024-01-01 PROCEDURE — 80076 HEPATIC FUNCTION PANEL: CPT

## 2024-01-01 PROCEDURE — 99223 1ST HOSP IP/OBS HIGH 75: CPT | Mod: 25

## 2024-01-01 PROCEDURE — 70450 CT HEAD/BRAIN W/O DYE: CPT | Mod: 26

## 2024-01-01 PROCEDURE — 88332 PATH CONSLTJ SURG EA ADD BLK: CPT

## 2024-01-01 PROCEDURE — 74177 CT ABD & PELVIS W/CONTRAST: CPT | Mod: 26

## 2024-01-01 PROCEDURE — 99214 OFFICE O/P EST MOD 30 MIN: CPT | Mod: 25

## 2024-01-01 PROCEDURE — 88334 PATH CONSLTJ SURG CYTO XM EA: CPT | Mod: 26,59

## 2024-01-01 PROCEDURE — 88333 PATH CONSLTJ SURG CYTO XM 1: CPT

## 2024-01-01 PROCEDURE — 88331 PATH CONSLTJ SURG 1 BLK 1SPC: CPT

## 2024-01-01 PROCEDURE — 97530 THERAPEUTIC ACTIVITIES: CPT

## 2024-01-01 PROCEDURE — 99285 EMERGENCY DEPT VISIT HI MDM: CPT | Mod: 25

## 2024-01-01 PROCEDURE — 80048 BASIC METABOLIC PNL TOTAL CA: CPT

## 2024-01-01 PROCEDURE — 77334 RADIATION TREATMENT AID(S): CPT | Mod: 26

## 2024-01-01 PROCEDURE — 87086 URINE CULTURE/COLONY COUNT: CPT

## 2024-01-01 PROCEDURE — 70553 MRI BRAIN STEM W/O & W/DYE: CPT | Mod: 26

## 2024-01-01 PROCEDURE — 88341 IMHCHEM/IMCYTCHM EA ADD ANTB: CPT | Mod: 26

## 2024-01-01 PROCEDURE — 62140 CRNOP SKULL DEFECT<5 CM DIAM: CPT | Mod: 59

## 2024-01-01 PROCEDURE — 72157 MRI CHEST SPINE W/O & W/DYE: CPT

## 2024-01-01 PROCEDURE — 70450 CT HEAD/BRAIN W/O DYE: CPT

## 2024-01-01 PROCEDURE — 99204 OFFICE O/P NEW MOD 45 MIN: CPT

## 2024-01-01 PROCEDURE — 70450 CT HEAD/BRAIN W/O DYE: CPT | Mod: MC

## 2024-01-01 PROCEDURE — 72156 MRI NECK SPINE W/O & W/DYE: CPT

## 2024-01-01 PROCEDURE — 99239 HOSP IP/OBS DSCHRG MGMT >30: CPT

## 2024-01-01 PROCEDURE — 69990 MICROSURGERY ADD-ON: CPT | Mod: 59

## 2024-01-01 PROCEDURE — 99222 1ST HOSP IP/OBS MODERATE 55: CPT | Mod: GC

## 2024-01-01 PROCEDURE — 83935 ASSAY OF URINE OSMOLALITY: CPT

## 2024-01-01 PROCEDURE — 87040 BLOOD CULTURE FOR BACTERIA: CPT

## 2024-01-01 PROCEDURE — 71260 CT THORAX DX C+: CPT | Mod: MC

## 2024-01-01 PROCEDURE — 61798 SRS CRANIAL LESION COMPLEX: CPT | Mod: 78

## 2024-01-01 PROCEDURE — 74177 CT ABD & PELVIS W/CONTRAST: CPT | Mod: MH

## 2024-01-01 PROCEDURE — 71045 X-RAY EXAM CHEST 1 VIEW: CPT | Mod: 26

## 2024-01-01 PROCEDURE — 74177 CT ABD & PELVIS W/CONTRAST: CPT | Mod: MC

## 2024-01-01 PROCEDURE — 86850 RBC ANTIBODY SCREEN: CPT

## 2024-01-01 PROCEDURE — 99497 ADVNCD CARE PLAN 30 MIN: CPT | Mod: 25

## 2024-01-01 PROCEDURE — 88341 IMHCHEM/IMCYTCHM EA ADD ANTB: CPT

## 2024-01-01 PROCEDURE — 93306 TTE W/DOPPLER COMPLETE: CPT | Mod: 26

## 2024-01-01 PROCEDURE — 70491 CT SOFT TISSUE NECK W/DYE: CPT | Mod: 26,MA

## 2024-01-01 PROCEDURE — C1713: CPT

## 2024-01-01 PROCEDURE — 77290 THER RAD SIMULAJ FIELD CPLX: CPT | Mod: 26

## 2024-01-01 PROCEDURE — 92610 EVALUATE SWALLOWING FUNCTION: CPT

## 2024-01-01 PROCEDURE — 74018 RADEX ABDOMEN 1 VIEW: CPT

## 2024-01-01 PROCEDURE — 87637 SARSCOV2&INF A&B&RSV AMP PRB: CPT

## 2024-01-01 PROCEDURE — 72157 MRI CHEST SPINE W/O & W/DYE: CPT | Mod: 26

## 2024-01-01 PROCEDURE — 77290 THER RAD SIMULAJ FIELD CPLX: CPT

## 2024-01-01 PROCEDURE — 82610 CYSTATIN C: CPT

## 2024-01-01 PROCEDURE — 99284 EMERGENCY DEPT VISIT MOD MDM: CPT | Mod: 25

## 2024-01-01 PROCEDURE — 88331 PATH CONSLTJ SURG 1 BLK 1SPC: CPT | Mod: 26

## 2024-01-01 PROCEDURE — 70491 CT SOFT TISSUE NECK W/DYE: CPT | Mod: MA

## 2024-01-01 PROCEDURE — 84300 ASSAY OF URINE SODIUM: CPT

## 2024-01-01 PROCEDURE — 88332 PATH CONSLTJ SURG EA ADD BLK: CPT | Mod: 26

## 2024-01-01 PROCEDURE — 84145 PROCALCITONIN (PCT): CPT

## 2024-01-01 PROCEDURE — 93970 EXTREMITY STUDY: CPT

## 2024-01-01 PROCEDURE — 72158 MRI LUMBAR SPINE W/O & W/DYE: CPT

## 2024-01-01 PROCEDURE — 99239 HOSP IP/OBS DSCHRG MGMT >30: CPT | Mod: GC

## 2024-01-01 PROCEDURE — 93970 EXTREMITY STUDY: CPT | Mod: 26

## 2024-01-01 DEVICE — CLIP APPLIER ETHICON LIGACLIP 11.5" MEDIUM: Type: IMPLANTABLE DEVICE | Site: LEFT | Status: FUNCTIONAL

## 2024-01-01 DEVICE — CLIP APPLIER ETHICON LIGACLIP 9 3/8" SMALL: Type: IMPLANTABLE DEVICE | Site: LEFT | Status: FUNCTIONAL

## 2024-01-01 DEVICE — DURASEAL SEALANT 5ML: Type: IMPLANTABLE DEVICE | Site: LEFT | Status: FUNCTIONAL

## 2024-01-01 DEVICE — MAYFIELD SKULL PIN ADULT PLASTIC: Type: IMPLANTABLE DEVICE | Site: LEFT | Status: FUNCTIONAL

## 2024-01-01 DEVICE — PLATE RETROSIGMOID MALLEABLE MED: Type: IMPLANTABLE DEVICE | Site: LEFT | Status: FUNCTIONAL

## 2024-01-01 DEVICE — TACHOSIL 4.8 X 4.8CM: Type: IMPLANTABLE DEVICE | Site: LEFT | Status: FUNCTIONAL

## 2024-01-01 DEVICE — CLIP APPLIER COVIDIEN SURGICLIP III 9" SM: Type: IMPLANTABLE DEVICE | Site: LEFT | Status: FUNCTIONAL

## 2024-01-01 DEVICE — SURGCEL 4 X 8": Type: IMPLANTABLE DEVICE | Site: LEFT | Status: FUNCTIONAL

## 2024-01-01 DEVICE — SCREW UN3 AXS SELF DRILL 1.5X4MM: Type: IMPLANTABLE DEVICE | Site: LEFT | Status: FUNCTIONAL

## 2024-01-01 DEVICE — SURGIFLO HEMOSTATIC MATRIX KIT: Type: IMPLANTABLE DEVICE | Site: LEFT | Status: FUNCTIONAL

## 2024-01-01 DEVICE — FLOSEAL NT 5ML: Type: IMPLANTABLE DEVICE | Site: LEFT | Status: FUNCTIONAL

## 2024-01-01 DEVICE — SURGICEL FIBRILLAR 4 X 4": Type: IMPLANTABLE DEVICE | Site: LEFT | Status: FUNCTIONAL

## 2024-01-01 DEVICE — CEMENT HYDROSET INJ 5CC: Type: IMPLANTABLE DEVICE | Site: LEFT | Status: FUNCTIONAL

## 2024-01-01 DEVICE — SURGIFOAM PAD 8CM X 12.5CM X 10MM (100): Type: IMPLANTABLE DEVICE | Site: LEFT | Status: FUNCTIONAL

## 2024-01-01 RX ORDER — DEXAMETHASONE 0.5 MG/5ML
1 ELIXIR ORAL
Qty: 60 | Refills: 0
Start: 2024-01-01 | End: 2024-01-01

## 2024-01-01 RX ORDER — PANTOPRAZOLE SODIUM 20 MG/1
40 TABLET, DELAYED RELEASE ORAL ONCE
Refills: 0 | Status: COMPLETED | OUTPATIENT
Start: 2024-01-01 | End: 2024-01-01

## 2024-01-01 RX ORDER — OXYCODONE HYDROCHLORIDE 5 MG/1
5 TABLET ORAL EVERY 4 HOURS
Refills: 0 | Status: DISCONTINUED | OUTPATIENT
Start: 2024-01-01 | End: 2024-01-01

## 2024-01-01 RX ORDER — ACETAMINOPHEN 500 MG
1000 TABLET ORAL EVERY 8 HOURS
Refills: 0 | Status: DISCONTINUED | OUTPATIENT
Start: 2024-01-01 | End: 2024-01-01

## 2024-01-01 RX ORDER — TAMSULOSIN HYDROCHLORIDE 0.4 MG/1
0.4 CAPSULE ORAL AT BEDTIME
Refills: 0 | Status: DISCONTINUED | OUTPATIENT
Start: 2024-01-01 | End: 2024-01-01

## 2024-01-01 RX ORDER — SODIUM CHLORIDE 9 MG/ML
1000 INJECTION INTRAMUSCULAR; INTRAVENOUS; SUBCUTANEOUS
Refills: 0 | Status: DISCONTINUED | OUTPATIENT
Start: 2024-01-01 | End: 2024-01-01

## 2024-01-01 RX ORDER — IPRATROPIUM/ALBUTEROL SULFATE 18-103MCG
3 AEROSOL WITH ADAPTER (GRAM) INHALATION EVERY 6 HOURS
Refills: 0 | Status: DISCONTINUED | OUTPATIENT
Start: 2024-01-01 | End: 2024-01-01

## 2024-01-01 RX ORDER — ACETAMINOPHEN 500 MG
750 TABLET ORAL EVERY 6 HOURS
Refills: 0 | Status: DISCONTINUED | OUTPATIENT
Start: 2024-01-01 | End: 2024-01-01

## 2024-01-01 RX ORDER — ROBINUL 0.2 MG/ML
0.4 INJECTION INTRAMUSCULAR; INTRAVENOUS
Qty: 0 | Refills: 0 | DISCHARGE
Start: 2024-01-01

## 2024-01-01 RX ORDER — SENNOSIDES 8.6 MG/1
8.6 CAPSULE, GELATIN COATED ORAL AT BEDTIME
Qty: 28 | Refills: 0 | Status: ACTIVE | COMMUNITY
Start: 2024-01-01 | End: 1900-01-01

## 2024-01-01 RX ORDER — INSULIN LISPRO 100/ML
VIAL (ML) SUBCUTANEOUS
Refills: 0 | Status: DISCONTINUED | OUTPATIENT
Start: 2024-01-01 | End: 2024-01-01

## 2024-01-01 RX ORDER — CEFAZOLIN SODIUM 1 G
2000 VIAL (EA) INJECTION EVERY 8 HOURS
Refills: 0 | Status: COMPLETED | OUTPATIENT
Start: 2024-01-01 | End: 2024-01-01

## 2024-01-01 RX ORDER — APIXABAN 2.5 MG/1
2 TABLET, FILM COATED ORAL
Qty: 160 | Refills: 0
Start: 2024-01-01 | End: 2024-01-01

## 2024-01-01 RX ORDER — HEPARIN SODIUM 5000 [USP'U]/ML
1000 INJECTION INTRAVENOUS; SUBCUTANEOUS
Qty: 25000 | Refills: 0 | Status: DISCONTINUED | OUTPATIENT
Start: 2024-01-01 | End: 2024-01-01

## 2024-01-01 RX ORDER — POLYETHYLENE GLYCOL 3350 17 G/17G
17 POWDER, FOR SOLUTION ORAL
Refills: 0 | Status: DISCONTINUED | OUTPATIENT
Start: 2024-01-01 | End: 2024-01-01

## 2024-01-01 RX ORDER — BUDESONIDE AND FORMOTEROL FUMARATE DIHYDRATE 160; 4.5 UG/1; UG/1
1 AEROSOL RESPIRATORY (INHALATION) ONCE
Refills: 0 | Status: DISCONTINUED | OUTPATIENT
Start: 2024-01-01 | End: 2024-01-01

## 2024-01-01 RX ORDER — APREPITANT 80 MG/1
40 CAPSULE ORAL ONCE
Refills: 0 | Status: DISCONTINUED | OUTPATIENT
Start: 2024-01-01 | End: 2024-01-01

## 2024-01-01 RX ORDER — MORPHINE SULFATE 50 MG/1
2 CAPSULE, EXTENDED RELEASE ORAL EVERY 4 HOURS
Refills: 0 | Status: DISCONTINUED | OUTPATIENT
Start: 2024-01-01 | End: 2024-01-01

## 2024-01-01 RX ORDER — REPOTRECTINIB 40 MG/1
40 CAPSULE ORAL
Qty: 56 | Refills: 0 | Status: ACTIVE | COMMUNITY
Start: 2024-01-01 | End: 1900-01-01

## 2024-01-01 RX ORDER — SODIUM CHLORIDE 9 MG/ML
1000 INJECTION, SOLUTION INTRAVENOUS
Refills: 0 | Status: DISCONTINUED | OUTPATIENT
Start: 2024-01-01 | End: 2024-01-01

## 2024-01-01 RX ORDER — MORPHINE SULFATE 50 MG/1
2 CAPSULE, EXTENDED RELEASE ORAL
Qty: 60 | Refills: 0
Start: 2024-01-01 | End: 2024-01-01

## 2024-01-01 RX ORDER — HEPARIN SODIUM 5000 [USP'U]/ML
1600 INJECTION INTRAVENOUS; SUBCUTANEOUS
Qty: 25000 | Refills: 0 | Status: DISCONTINUED | OUTPATIENT
Start: 2024-01-01 | End: 2024-01-01

## 2024-01-01 RX ORDER — SENNA PLUS 8.6 MG/1
2 TABLET ORAL AT BEDTIME
Refills: 0 | Status: DISCONTINUED | OUTPATIENT
Start: 2024-01-01 | End: 2024-01-01

## 2024-01-01 RX ORDER — SODIUM CHLORIDE 9 MG/ML
2 INJECTION INTRAMUSCULAR; INTRAVENOUS; SUBCUTANEOUS EVERY 8 HOURS
Refills: 0 | Status: DISCONTINUED | OUTPATIENT
Start: 2024-01-01 | End: 2024-01-01

## 2024-01-01 RX ORDER — DEXAMETHASONE 0.5 MG/5ML
4 ELIXIR ORAL EVERY 8 HOURS
Refills: 0 | Status: COMPLETED | OUTPATIENT
Start: 2024-01-01 | End: 2024-01-01

## 2024-01-01 RX ORDER — PANTOPRAZOLE SODIUM 20 MG/1
40 TABLET, DELAYED RELEASE ORAL DAILY
Refills: 0 | Status: DISCONTINUED | OUTPATIENT
Start: 2024-01-01 | End: 2024-01-01

## 2024-01-01 RX ORDER — ROBINUL 0.2 MG/ML
0.4 INJECTION INTRAMUSCULAR; INTRAVENOUS EVERY 6 HOURS
Refills: 0 | Status: DISCONTINUED | OUTPATIENT
Start: 2024-01-01 | End: 2024-01-01

## 2024-01-01 RX ORDER — DEXAMETHASONE 1 MG/1
1 TABLET ORAL
Qty: 120 | Refills: 2 | Status: ACTIVE | COMMUNITY
Start: 2024-01-01 | End: 1900-01-01

## 2024-01-01 RX ORDER — DEXAMETHASONE 0.5 MG/5ML
3 ELIXIR ORAL ONCE
Refills: 0 | Status: COMPLETED | OUTPATIENT
Start: 2024-01-01 | End: 2024-01-01

## 2024-01-01 RX ORDER — REPOTRECTINIB 40 MG/1
40 CAPSULE ORAL
Qty: 120 | Refills: 6 | Status: ACTIVE | COMMUNITY
Start: 2024-01-01 | End: 1900-01-01

## 2024-01-01 RX ORDER — ACETAMINOPHEN 500 MG
650 TABLET ORAL EVERY 6 HOURS
Refills: 0 | Status: DISCONTINUED | OUTPATIENT
Start: 2024-01-01 | End: 2024-01-01

## 2024-01-01 RX ORDER — FOLIC ACID 0.8 MG
1 TABLET ORAL DAILY
Refills: 0 | Status: COMPLETED | OUTPATIENT
Start: 2024-01-01 | End: 2024-01-01

## 2024-01-01 RX ORDER — SODIUM CHLORIDE 9 MG/ML
1000 INJECTION INTRAMUSCULAR; INTRAVENOUS; SUBCUTANEOUS ONCE
Refills: 0 | Status: COMPLETED | OUTPATIENT
Start: 2024-01-01 | End: 2024-01-01

## 2024-01-01 RX ORDER — DEXAMETHASONE 0.5 MG/5ML
2 ELIXIR ORAL EVERY 12 HOURS
Refills: 0 | Status: DISCONTINUED | OUTPATIENT
Start: 2024-01-01 | End: 2024-01-01

## 2024-01-01 RX ORDER — ACETAMINOPHEN 500 MG
1000 TABLET ORAL EVERY 6 HOURS
Refills: 0 | Status: DISCONTINUED | OUTPATIENT
Start: 2024-01-01 | End: 2024-01-01

## 2024-01-01 RX ORDER — HEPARIN SODIUM 5000 [USP'U]/ML
1400 INJECTION INTRAVENOUS; SUBCUTANEOUS
Qty: 25000 | Refills: 0 | Status: DISCONTINUED | OUTPATIENT
Start: 2024-01-01 | End: 2024-01-01

## 2024-01-01 RX ORDER — APIXABAN 5 MG/1
5 TABLET, FILM COATED ORAL
Qty: 60 | Refills: 5 | Status: ACTIVE | COMMUNITY
Start: 2024-01-01 | End: 1900-01-01

## 2024-01-01 RX ORDER — ACETAMINOPHEN 500 MG
2 TABLET ORAL
Qty: 0 | Refills: 0 | DISCHARGE
Start: 2024-01-01

## 2024-01-01 RX ORDER — HYDROPHILIC CREAM
PASTE (GRAM) TOPICAL
Qty: 1 | Refills: 0 | Status: ACTIVE | COMMUNITY
Start: 2024-01-01 | End: 1900-01-01

## 2024-01-01 RX ORDER — REPOTRECTINIB 40 MG/1
40 CAPSULE ORAL
Qty: 240 | Refills: 4 | Status: ACTIVE | COMMUNITY
Start: 2024-01-01 | End: 1900-01-01

## 2024-01-01 RX ORDER — CHLORHEXIDINE GLUCONATE 213 G/1000ML
1 SOLUTION TOPICAL
Refills: 0 | Status: DISCONTINUED | OUTPATIENT
Start: 2024-01-01 | End: 2024-01-01

## 2024-01-01 RX ORDER — ENOXAPARIN SODIUM 100 MG/ML
40 INJECTION SUBCUTANEOUS
Refills: 0 | Status: DISCONTINUED | OUTPATIENT
Start: 2024-01-01 | End: 2024-01-01

## 2024-01-01 RX ORDER — PANTOPRAZOLE 40 MG/1
40 TABLET, DELAYED RELEASE ORAL
Qty: 30 | Refills: 3 | Status: ACTIVE | COMMUNITY
Start: 2024-01-01 | End: 1900-01-01

## 2024-01-01 RX ORDER — MORPHINE SULFATE 50 MG/1
2 CAPSULE, EXTENDED RELEASE ORAL
Refills: 0 | Status: DISCONTINUED | OUTPATIENT
Start: 2024-01-01 | End: 2024-01-01

## 2024-01-01 RX ORDER — POLYETHYLENE GLYCOL 3350 17 G/17G
17 POWDER, FOR SOLUTION ORAL DAILY
Refills: 0 | Status: DISCONTINUED | OUTPATIENT
Start: 2024-01-01 | End: 2024-01-01

## 2024-01-01 RX ORDER — SCOPALAMINE 1 MG/3D
1 PATCH, EXTENDED RELEASE TRANSDERMAL
Refills: 0 | Status: DISCONTINUED | OUTPATIENT
Start: 2024-01-01 | End: 2024-01-01

## 2024-01-01 RX ORDER — DEXTROSE 50 % IN WATER 50 %
12.5 SYRINGE (ML) INTRAVENOUS ONCE
Refills: 0 | Status: DISCONTINUED | OUTPATIENT
Start: 2024-01-01 | End: 2024-01-01

## 2024-01-01 RX ORDER — ACETAMINOPHEN 500 MG
1000 TABLET ORAL EVERY 8 HOURS
Refills: 0 | Status: COMPLETED | OUTPATIENT
Start: 2024-01-01 | End: 2024-01-01

## 2024-01-01 RX ORDER — ACETAMINOPHEN 500 MG
750 TABLET ORAL ONCE
Refills: 0 | Status: COMPLETED | OUTPATIENT
Start: 2024-01-01 | End: 2024-01-01

## 2024-01-01 RX ORDER — HEPARIN SODIUM 5000 [USP'U]/ML
1500 INJECTION INTRAVENOUS; SUBCUTANEOUS
Qty: 25000 | Refills: 0 | Status: DISCONTINUED | OUTPATIENT
Start: 2024-01-01 | End: 2024-01-01

## 2024-01-01 RX ORDER — INFLUENZA VIRUS VACCINE 15; 15; 15; 15 UG/.5ML; UG/.5ML; UG/.5ML; UG/.5ML
0.7 SUSPENSION INTRAMUSCULAR ONCE
Refills: 0 | Status: DISCONTINUED | OUTPATIENT
Start: 2024-01-01 | End: 2024-01-01

## 2024-01-01 RX ORDER — POLYETHYLENE GLYCOL 3350 17 G/17G
17 POWDER, FOR SOLUTION ORAL
Qty: 28 | Refills: 0 | Status: ACTIVE | COMMUNITY
Start: 2024-01-01 | End: 1900-01-01

## 2024-01-01 RX ORDER — DEXAMETHASONE 4 MG/1
4 TABLET ORAL
Qty: 60 | Refills: 0 | Status: ACTIVE | COMMUNITY
Start: 2024-01-01 | End: 1900-01-01

## 2024-01-01 RX ORDER — PANTOPRAZOLE SODIUM 20 MG/1
40 TABLET, DELAYED RELEASE ORAL
Refills: 0 | Status: DISCONTINUED | OUTPATIENT
Start: 2024-01-01 | End: 2024-01-01

## 2024-01-01 RX ORDER — PIPERACILLIN AND TAZOBACTAM 4; .5 G/20ML; G/20ML
4.5 INJECTION, POWDER, LYOPHILIZED, FOR SOLUTION INTRAVENOUS EVERY 8 HOURS
Refills: 0 | Status: DISCONTINUED | OUTPATIENT
Start: 2024-01-01 | End: 2024-01-01

## 2024-01-01 RX ORDER — DEXAMETHASONE 0.5 MG/5ML
10 ELIXIR ORAL ONCE
Refills: 0 | Status: COMPLETED | OUTPATIENT
Start: 2024-01-01 | End: 2024-01-01

## 2024-01-01 RX ORDER — ONDANSETRON 8 MG/1
4 TABLET, FILM COATED ORAL ONCE
Refills: 0 | Status: COMPLETED | OUTPATIENT
Start: 2024-01-01 | End: 2024-01-01

## 2024-01-01 RX ORDER — APIXABAN 2.5 MG/1
5 TABLET, FILM COATED ORAL EVERY 12 HOURS
Refills: 0 | Status: DISCONTINUED | OUTPATIENT
Start: 2024-01-01 | End: 2024-01-01

## 2024-01-01 RX ORDER — SODIUM,POTASSIUM PHOSPHATES 278-250MG
1 POWDER IN PACKET (EA) ORAL ONCE
Refills: 0 | Status: COMPLETED | OUTPATIENT
Start: 2024-01-01 | End: 2024-01-01

## 2024-01-01 RX ORDER — ONDANSETRON 8 MG/1
4 TABLET, FILM COATED ORAL EVERY 6 HOURS
Refills: 0 | Status: COMPLETED | OUTPATIENT
Start: 2024-01-01 | End: 2024-01-01

## 2024-01-01 RX ORDER — TAMSULOSIN HYDROCHLORIDE 0.4 MG/1
1 CAPSULE ORAL
Refills: 0 | DISCHARGE

## 2024-01-01 RX ORDER — APIXABAN 2.5 MG/1
1 TABLET, FILM COATED ORAL
Qty: 30 | Refills: 0
Start: 2024-01-01

## 2024-01-01 RX ORDER — HEPARIN SODIUM 5000 [USP'U]/ML
INJECTION INTRAVENOUS; SUBCUTANEOUS
Qty: 25000 | Refills: 0 | Status: DISCONTINUED | OUTPATIENT
Start: 2024-01-01 | End: 2024-01-01

## 2024-01-01 RX ORDER — DEXAMETHASONE 0.5 MG/5ML
4 ELIXIR ORAL EVERY 6 HOURS
Refills: 0 | Status: COMPLETED | OUTPATIENT
Start: 2024-01-01 | End: 2024-01-01

## 2024-01-01 RX ORDER — PANTOPRAZOLE SODIUM 20 MG/1
1 TABLET, DELAYED RELEASE ORAL
Qty: 30 | Refills: 0
Start: 2024-01-01 | End: 2024-01-01

## 2024-01-01 RX ORDER — DEXAMETHASONE 0.5 MG/5ML
ELIXIR ORAL
Refills: 0 | Status: COMPLETED | OUTPATIENT
Start: 2024-01-01 | End: 2024-01-01

## 2024-01-01 RX ORDER — IPRATROPIUM/ALBUTEROL SULFATE 18-103MCG
3 AEROSOL WITH ADAPTER (GRAM) INHALATION
Qty: 21 | Refills: 0
Start: 2024-01-01 | End: 2024-01-01

## 2024-01-01 RX ORDER — ACETAMINOPHEN 500 MG
750 TABLET ORAL ONCE
Refills: 0 | Status: DISCONTINUED | OUTPATIENT
Start: 2024-01-01 | End: 2024-01-01

## 2024-01-01 RX ORDER — DEXTROSE 50 % IN WATER 50 %
25 SYRINGE (ML) INTRAVENOUS ONCE
Refills: 0 | Status: DISCONTINUED | OUTPATIENT
Start: 2024-01-01 | End: 2024-01-01

## 2024-01-01 RX ORDER — SODIUM CHLORIDE 9 MG/ML
2 INJECTION INTRAMUSCULAR; INTRAVENOUS; SUBCUTANEOUS
Qty: 42 | Refills: 0
Start: 2024-01-01 | End: 2024-01-01

## 2024-01-01 RX ORDER — MAGNESIUM SULFATE 500 MG/ML
1 VIAL (ML) INJECTION ONCE
Refills: 0 | Status: COMPLETED | OUTPATIENT
Start: 2024-01-01 | End: 2024-01-01

## 2024-01-01 RX ORDER — ONDANSETRON 4 MG/1
4 TABLET ORAL DAILY
Qty: 10 | Refills: 0 | Status: ACTIVE | COMMUNITY
Start: 2024-01-01 | End: 1900-01-01

## 2024-01-01 RX ORDER — DEXAMETHASONE 0.5 MG/5ML
3 ELIXIR ORAL EVERY 8 HOURS
Refills: 0 | Status: COMPLETED | OUTPATIENT
Start: 2024-01-01 | End: 2024-01-01

## 2024-01-01 RX ORDER — HEPARIN SODIUM 5000 [USP'U]/ML
1200 INJECTION INTRAVENOUS; SUBCUTANEOUS
Qty: 25000 | Refills: 0 | Status: DISCONTINUED | OUTPATIENT
Start: 2024-01-01 | End: 2024-01-01

## 2024-01-01 RX ORDER — GLUCAGON INJECTION, SOLUTION 0.5 MG/.1ML
1 INJECTION, SOLUTION SUBCUTANEOUS ONCE
Refills: 0 | Status: DISCONTINUED | OUTPATIENT
Start: 2024-01-01 | End: 2024-01-01

## 2024-01-01 RX ORDER — SCOPALAMINE 1 MG/3D
1 PATCH, EXTENDED RELEASE TRANSDERMAL
Qty: 1 | Refills: 2
Start: 2024-01-01 | End: 2024-06-04

## 2024-01-01 RX ORDER — POVIDONE-IODINE 5 %
1 AEROSOL (ML) TOPICAL ONCE
Refills: 0 | Status: COMPLETED | OUTPATIENT
Start: 2024-01-01 | End: 2024-01-01

## 2024-01-01 RX ORDER — CHLORHEXIDINE GLUCONATE 213 G/1000ML
1 SOLUTION TOPICAL EVERY 12 HOURS
Refills: 0 | Status: COMPLETED | OUTPATIENT
Start: 2024-01-01 | End: 2024-01-01

## 2024-01-01 RX ORDER — DEXAMETHASONE 0.5 MG/5ML
8.33 ELIXIR ORAL
Qty: 0 | Refills: 0 | DISCHARGE
Start: 2024-01-01

## 2024-01-01 RX ORDER — DEXAMETHASONE 0.5 MG/5ML
4 ELIXIR ORAL EVERY 6 HOURS
Refills: 0 | Status: DISCONTINUED | OUTPATIENT
Start: 2024-01-01 | End: 2024-01-01

## 2024-01-01 RX ORDER — POLYETHYLENE GLYCOL 3350 17 G/17G
17 POWDER, FOR SOLUTION ORAL
Qty: 0 | Refills: 0 | DISCHARGE
Start: 2024-01-01

## 2024-01-01 RX ORDER — SODIUM CHLORIDE 5 G/100ML
250 INJECTION, SOLUTION INTRAVENOUS ONCE
Refills: 0 | Status: COMPLETED | OUTPATIENT
Start: 2024-01-01 | End: 2024-01-01

## 2024-01-01 RX ORDER — DEXAMETHASONE 0.5 MG/5ML
2 ELIXIR ORAL
Refills: 0 | Status: DISCONTINUED | OUTPATIENT
Start: 2024-01-01 | End: 2024-01-01

## 2024-01-01 RX ORDER — APIXABAN 2.5 MG/1
10 TABLET, FILM COATED ORAL EVERY 12 HOURS
Refills: 0 | Status: DISCONTINUED | OUTPATIENT
Start: 2024-01-01 | End: 2024-01-01

## 2024-01-01 RX ORDER — ENOXAPARIN SODIUM 100 MG/ML
50 INJECTION SUBCUTANEOUS EVERY 12 HOURS
Refills: 0 | Status: DISCONTINUED | OUTPATIENT
Start: 2024-01-01 | End: 2024-01-01

## 2024-01-01 RX ORDER — IOHEXOL 300 MG/ML
30 INJECTION, SOLUTION INTRAVENOUS ONCE
Refills: 0 | Status: COMPLETED | OUTPATIENT
Start: 2024-01-01 | End: 2024-01-01

## 2024-01-01 RX ORDER — SENNA PLUS 8.6 MG/1
2 TABLET ORAL
Qty: 0 | Refills: 0 | DISCHARGE
Start: 2024-01-01

## 2024-01-01 RX ORDER — DEXTROSE 50 % IN WATER 50 %
15 SYRINGE (ML) INTRAVENOUS ONCE
Refills: 0 | Status: DISCONTINUED | OUTPATIENT
Start: 2024-01-01 | End: 2024-01-01

## 2024-01-01 RX ORDER — THIAMINE MONONITRATE (VIT B1) 100 MG
100 TABLET ORAL DAILY
Refills: 0 | Status: COMPLETED | OUTPATIENT
Start: 2024-01-01 | End: 2024-01-01

## 2024-01-01 RX ORDER — ONDANSETRON 8 MG/1
1 TABLET, FILM COATED ORAL
Qty: 1 | Refills: 0
Start: 2024-01-01 | End: 2024-01-01

## 2024-01-01 RX ORDER — MAGNESIUM OXIDE 400 MG ORAL TABLET 241.3 MG
400 TABLET ORAL ONCE
Refills: 0 | Status: COMPLETED | OUTPATIENT
Start: 2024-01-01 | End: 2024-01-01

## 2024-01-01 RX ORDER — ONDANSETRON 8 MG/1
4 TABLET, FILM COATED ORAL EVERY 6 HOURS
Refills: 0 | Status: DISCONTINUED | OUTPATIENT
Start: 2024-01-01 | End: 2024-01-01

## 2024-01-01 RX ORDER — LACTULOSE 10 G/15ML
10 SOLUTION ORAL ONCE
Refills: 0 | Status: DISCONTINUED | OUTPATIENT
Start: 2024-01-01 | End: 2024-01-01

## 2024-01-01 RX ORDER — INFLUENZA VIRUS VACCINE 15; 15; 15; 15 UG/.5ML; UG/.5ML; UG/.5ML; UG/.5ML
0.7 SUSPENSION INTRAMUSCULAR ONCE
Refills: 0 | Status: COMPLETED | OUTPATIENT
Start: 2024-01-01 | End: 2024-01-01

## 2024-01-01 RX ORDER — LACTULOSE 10 G/15ML
10 SOLUTION ORAL ONCE
Refills: 0 | Status: COMPLETED | OUTPATIENT
Start: 2024-01-01 | End: 2024-01-01

## 2024-01-01 RX ORDER — BACLOFEN 5 MG/5ML
5 SOLUTION ORAL 3 TIMES DAILY
Qty: 1 | Refills: 0 | Status: ACTIVE | COMMUNITY
Start: 2024-01-01 | End: 1900-01-01

## 2024-01-01 RX ORDER — SODIUM,POTASSIUM PHOSPHATES 278-250MG
2 POWDER IN PACKET (EA) ORAL EVERY 4 HOURS
Refills: 0 | Status: DISCONTINUED | OUTPATIENT
Start: 2024-01-01 | End: 2024-01-01

## 2024-01-01 RX ORDER — OMEPRAZOLE 20 MG/1
20 TABLET, DELAYED RELEASE ORAL
Qty: 30 | Refills: 1 | Status: ACTIVE | COMMUNITY
Start: 2024-01-01 | End: 1900-01-01

## 2024-01-01 RX ADMIN — Medication 1: at 06:36

## 2024-01-01 RX ADMIN — Medication 3 MILLILITER(S): at 03:06

## 2024-01-01 RX ADMIN — ONDANSETRON 4 MILLIGRAM(S): 8 TABLET, FILM COATED ORAL at 23:11

## 2024-01-01 RX ADMIN — SODIUM CHLORIDE 75 MILLILITER(S): 9 INJECTION INTRAMUSCULAR; INTRAVENOUS; SUBCUTANEOUS at 09:51

## 2024-01-01 RX ADMIN — MORPHINE SULFATE 2 MILLIGRAM(S): 50 CAPSULE, EXTENDED RELEASE ORAL at 00:59

## 2024-01-01 RX ADMIN — SCOPALAMINE 1 PATCH: 1 PATCH, EXTENDED RELEASE TRANSDERMAL at 18:11

## 2024-01-01 RX ADMIN — CHLORHEXIDINE GLUCONATE 1 APPLICATION(S): 213 SOLUTION TOPICAL at 19:07

## 2024-01-01 RX ADMIN — ONDANSETRON 4 MILLIGRAM(S): 8 TABLET, FILM COATED ORAL at 12:05

## 2024-01-01 RX ADMIN — Medication 2 MILLIGRAM(S): at 12:24

## 2024-01-01 RX ADMIN — Medication 1000 MILLIGRAM(S): at 21:29

## 2024-01-01 RX ADMIN — Medication 2 MILLIGRAM(S): at 18:03

## 2024-01-01 RX ADMIN — Medication 3 MILLILITER(S): at 13:27

## 2024-01-01 RX ADMIN — Medication 3 MILLIGRAM(S): at 21:43

## 2024-01-01 RX ADMIN — PANTOPRAZOLE SODIUM 40 MILLIGRAM(S): 20 TABLET, DELAYED RELEASE ORAL at 11:24

## 2024-01-01 RX ADMIN — Medication 1 TABLET(S): at 12:35

## 2024-01-01 RX ADMIN — Medication 1 TABLET(S): at 11:46

## 2024-01-01 RX ADMIN — Medication 4 MILLIGRAM(S): at 00:14

## 2024-01-01 RX ADMIN — Medication 1: at 21:22

## 2024-01-01 RX ADMIN — Medication 2 MILLIGRAM(S): at 05:16

## 2024-01-01 RX ADMIN — SODIUM CHLORIDE 60 MILLILITER(S): 9 INJECTION, SOLUTION INTRAVENOUS at 17:58

## 2024-01-01 RX ADMIN — TAMSULOSIN HYDROCHLORIDE 0.4 MILLIGRAM(S): 0.4 CAPSULE ORAL at 21:00

## 2024-01-01 RX ADMIN — HEPARIN SODIUM 15 UNIT(S)/HR: 5000 INJECTION INTRAVENOUS; SUBCUTANEOUS at 15:25

## 2024-01-01 RX ADMIN — Medication 4 MILLIGRAM(S): at 00:11

## 2024-01-01 RX ADMIN — MORPHINE SULFATE 2 MILLIGRAM(S): 50 CAPSULE, EXTENDED RELEASE ORAL at 09:42

## 2024-01-01 RX ADMIN — APIXABAN 5 MILLIGRAM(S): 2.5 TABLET, FILM COATED ORAL at 12:16

## 2024-01-01 RX ADMIN — ENOXAPARIN SODIUM 40 MILLIGRAM(S): 100 INJECTION SUBCUTANEOUS at 21:29

## 2024-01-01 RX ADMIN — Medication 4 MILLIGRAM(S): at 06:55

## 2024-01-01 RX ADMIN — Medication 4 MILLIGRAM(S): at 18:19

## 2024-01-01 RX ADMIN — SODIUM CHLORIDE 1000 MILLILITER(S): 9 INJECTION INTRAMUSCULAR; INTRAVENOUS; SUBCUTANEOUS at 23:11

## 2024-01-01 RX ADMIN — Medication 2: at 12:01

## 2024-01-01 RX ADMIN — SODIUM CHLORIDE 1000 MILLILITER(S): 9 INJECTION INTRAMUSCULAR; INTRAVENOUS; SUBCUTANEOUS at 15:50

## 2024-01-01 RX ADMIN — Medication 4 MILLIGRAM(S): at 13:21

## 2024-01-01 RX ADMIN — HEPARIN SODIUM 900 UNIT(S)/HR: 5000 INJECTION INTRAVENOUS; SUBCUTANEOUS at 03:05

## 2024-01-01 RX ADMIN — Medication 3 MILLIGRAM(S): at 13:44

## 2024-01-01 RX ADMIN — TAMSULOSIN HYDROCHLORIDE 0.4 MILLIGRAM(S): 0.4 CAPSULE ORAL at 21:12

## 2024-01-01 RX ADMIN — SODIUM CHLORIDE 2 GRAM(S): 9 INJECTION INTRAMUSCULAR; INTRAVENOUS; SUBCUTANEOUS at 12:27

## 2024-01-01 RX ADMIN — Medication 100 MILLIGRAM(S): at 12:35

## 2024-01-01 RX ADMIN — Medication 1 MILLIGRAM(S): at 12:35

## 2024-01-01 RX ADMIN — Medication 3 MILLILITER(S): at 12:37

## 2024-01-01 RX ADMIN — ONDANSETRON 4 MILLIGRAM(S): 8 TABLET, FILM COATED ORAL at 00:12

## 2024-01-01 RX ADMIN — ENOXAPARIN SODIUM 50 MILLIGRAM(S): 100 INJECTION SUBCUTANEOUS at 18:31

## 2024-01-01 RX ADMIN — MORPHINE SULFATE 2 MILLIGRAM(S): 50 CAPSULE, EXTENDED RELEASE ORAL at 16:44

## 2024-01-01 RX ADMIN — Medication 1000 MILLIGRAM(S): at 06:07

## 2024-01-01 RX ADMIN — CHLORHEXIDINE GLUCONATE 1 APPLICATION(S): 213 SOLUTION TOPICAL at 06:09

## 2024-01-01 RX ADMIN — POLYETHYLENE GLYCOL 3350 17 GRAM(S): 17 POWDER, FOR SOLUTION ORAL at 05:21

## 2024-01-01 RX ADMIN — Medication 3 MILLILITER(S): at 00:31

## 2024-01-01 RX ADMIN — Medication 1 MILLIGRAM(S): at 11:08

## 2024-01-01 RX ADMIN — Medication 4 MILLIGRAM(S): at 12:05

## 2024-01-01 RX ADMIN — SODIUM CHLORIDE 60 MILLILITER(S): 9 INJECTION, SOLUTION INTRAVENOUS at 19:35

## 2024-01-01 RX ADMIN — Medication 3 MILLILITER(S): at 23:33

## 2024-01-01 RX ADMIN — ENOXAPARIN SODIUM 50 MILLIGRAM(S): 100 INJECTION SUBCUTANEOUS at 05:50

## 2024-01-01 RX ADMIN — Medication 2 MILLIGRAM(S): at 18:31

## 2024-01-01 RX ADMIN — Medication 4 MILLIGRAM(S): at 19:20

## 2024-01-01 RX ADMIN — OXYCODONE HYDROCHLORIDE 5 MILLIGRAM(S): 5 TABLET ORAL at 15:51

## 2024-01-01 RX ADMIN — MORPHINE SULFATE 2 MILLIGRAM(S): 50 CAPSULE, EXTENDED RELEASE ORAL at 22:59

## 2024-01-01 RX ADMIN — Medication 100 MILLIGRAM(S): at 11:09

## 2024-01-01 RX ADMIN — SCOPALAMINE 1 PATCH: 1 PATCH, EXTENDED RELEASE TRANSDERMAL at 19:06

## 2024-01-01 RX ADMIN — Medication 750 MILLIGRAM(S): at 23:30

## 2024-01-01 RX ADMIN — Medication 2 MILLIGRAM(S): at 18:43

## 2024-01-01 RX ADMIN — Medication 3 MILLILITER(S): at 06:54

## 2024-01-01 RX ADMIN — Medication 3 MILLILITER(S): at 12:03

## 2024-01-01 RX ADMIN — SODIUM CHLORIDE 60 MILLILITER(S): 9 INJECTION, SOLUTION INTRAVENOUS at 05:21

## 2024-01-01 RX ADMIN — MORPHINE SULFATE 2 MILLIGRAM(S): 50 CAPSULE, EXTENDED RELEASE ORAL at 17:05

## 2024-01-01 RX ADMIN — MORPHINE SULFATE 2 MILLIGRAM(S): 50 CAPSULE, EXTENDED RELEASE ORAL at 11:30

## 2024-01-01 RX ADMIN — MORPHINE SULFATE 2 MILLIGRAM(S): 50 CAPSULE, EXTENDED RELEASE ORAL at 22:13

## 2024-01-01 RX ADMIN — Medication 2 MILLIGRAM(S): at 18:05

## 2024-01-01 RX ADMIN — Medication 300 MILLIGRAM(S): at 22:49

## 2024-01-01 RX ADMIN — Medication 4 MILLIGRAM(S): at 09:49

## 2024-01-01 RX ADMIN — APIXABAN 10 MILLIGRAM(S): 2.5 TABLET, FILM COATED ORAL at 05:50

## 2024-01-01 RX ADMIN — MORPHINE SULFATE 2 MILLIGRAM(S): 50 CAPSULE, EXTENDED RELEASE ORAL at 11:08

## 2024-01-01 RX ADMIN — Medication 62.5 MILLIMOLE(S): at 13:44

## 2024-01-01 RX ADMIN — SODIUM CHLORIDE 2 GRAM(S): 9 INJECTION INTRAMUSCULAR; INTRAVENOUS; SUBCUTANEOUS at 05:50

## 2024-01-01 RX ADMIN — Medication 102 MILLIGRAM(S): at 15:50

## 2024-01-01 RX ADMIN — SENNA PLUS 2 TABLET(S): 8.6 TABLET ORAL at 21:00

## 2024-01-01 RX ADMIN — POLYETHYLENE GLYCOL 3350 17 GRAM(S): 17 POWDER, FOR SOLUTION ORAL at 05:50

## 2024-01-01 RX ADMIN — HEPARIN SODIUM 900 UNIT(S)/HR: 5000 INJECTION INTRAVENOUS; SUBCUTANEOUS at 12:49

## 2024-01-01 RX ADMIN — Medication 3 MILLILITER(S): at 22:19

## 2024-01-01 RX ADMIN — Medication 3 MILLILITER(S): at 12:09

## 2024-01-01 RX ADMIN — PANTOPRAZOLE SODIUM 40 MILLIGRAM(S): 20 TABLET, DELAYED RELEASE ORAL at 11:07

## 2024-01-01 RX ADMIN — MORPHINE SULFATE 2 MILLIGRAM(S): 50 CAPSULE, EXTENDED RELEASE ORAL at 00:30

## 2024-01-01 RX ADMIN — Medication 3 MILLILITER(S): at 07:22

## 2024-01-01 RX ADMIN — TAMSULOSIN HYDROCHLORIDE 0.4 MILLIGRAM(S): 0.4 CAPSULE ORAL at 22:19

## 2024-01-01 RX ADMIN — Medication 1000 MILLIGRAM(S): at 22:58

## 2024-01-01 RX ADMIN — Medication 3 MILLILITER(S): at 17:58

## 2024-01-01 RX ADMIN — Medication 1 TABLET(S): at 11:24

## 2024-01-01 RX ADMIN — Medication 4 MILLIGRAM(S): at 11:31

## 2024-01-01 RX ADMIN — POLYETHYLENE GLYCOL 3350 17 GRAM(S): 17 POWDER, FOR SOLUTION ORAL at 11:24

## 2024-01-01 RX ADMIN — Medication 3 MILLILITER(S): at 05:16

## 2024-01-01 RX ADMIN — Medication 2 MILLIGRAM(S): at 02:38

## 2024-01-01 RX ADMIN — SODIUM CHLORIDE 2 GRAM(S): 9 INJECTION INTRAMUSCULAR; INTRAVENOUS; SUBCUTANEOUS at 13:44

## 2024-01-01 RX ADMIN — Medication 3 MILLILITER(S): at 11:04

## 2024-01-01 RX ADMIN — PANTOPRAZOLE SODIUM 40 MILLIGRAM(S): 20 TABLET, DELAYED RELEASE ORAL at 12:16

## 2024-01-01 RX ADMIN — Medication 3 MILLILITER(S): at 06:59

## 2024-01-01 RX ADMIN — ENOXAPARIN SODIUM 50 MILLIGRAM(S): 100 INJECTION SUBCUTANEOUS at 18:05

## 2024-01-01 RX ADMIN — MORPHINE SULFATE 2 MILLIGRAM(S): 50 CAPSULE, EXTENDED RELEASE ORAL at 23:30

## 2024-01-01 RX ADMIN — SCOPALAMINE 1 PATCH: 1 PATCH, EXTENDED RELEASE TRANSDERMAL at 18:19

## 2024-01-01 RX ADMIN — SCOPALAMINE 1 PATCH: 1 PATCH, EXTENDED RELEASE TRANSDERMAL at 07:00

## 2024-01-01 RX ADMIN — Medication 2 MILLIGRAM(S): at 17:58

## 2024-01-01 RX ADMIN — Medication 3 MILLILITER(S): at 18:31

## 2024-01-01 RX ADMIN — PANTOPRAZOLE SODIUM 40 MILLIGRAM(S): 20 TABLET, DELAYED RELEASE ORAL at 12:05

## 2024-01-01 RX ADMIN — SODIUM CHLORIDE 45 MILLILITER(S): 9 INJECTION INTRAMUSCULAR; INTRAVENOUS; SUBCUTANEOUS at 00:00

## 2024-01-01 RX ADMIN — Medication 3 MILLILITER(S): at 12:29

## 2024-01-01 RX ADMIN — POLYETHYLENE GLYCOL 3350 17 GRAM(S): 17 POWDER, FOR SOLUTION ORAL at 05:20

## 2024-01-01 RX ADMIN — Medication 3 MILLILITER(S): at 23:26

## 2024-01-01 RX ADMIN — Medication 1: at 16:48

## 2024-01-01 RX ADMIN — Medication 100 MILLIGRAM(S): at 11:24

## 2024-01-01 RX ADMIN — Medication 10 MILLIGRAM(S): at 21:44

## 2024-01-01 RX ADMIN — Medication 1000 MILLIGRAM(S): at 23:16

## 2024-01-01 RX ADMIN — TAMSULOSIN HYDROCHLORIDE 0.4 MILLIGRAM(S): 0.4 CAPSULE ORAL at 21:44

## 2024-01-01 RX ADMIN — TAMSULOSIN HYDROCHLORIDE 0.4 MILLIGRAM(S): 0.4 CAPSULE ORAL at 21:28

## 2024-01-01 RX ADMIN — Medication 4 MILLIGRAM(S): at 06:01

## 2024-01-01 RX ADMIN — Medication 1000 MILLIGRAM(S): at 21:59

## 2024-01-01 RX ADMIN — SODIUM CHLORIDE 60 MILLILITER(S): 9 INJECTION, SOLUTION INTRAVENOUS at 18:43

## 2024-01-01 RX ADMIN — CHLORHEXIDINE GLUCONATE 1 APPLICATION(S): 213 SOLUTION TOPICAL at 06:10

## 2024-01-01 RX ADMIN — PIPERACILLIN AND TAZOBACTAM 25 GRAM(S): 4; .5 INJECTION, POWDER, LYOPHILIZED, FOR SOLUTION INTRAVENOUS at 03:04

## 2024-01-01 RX ADMIN — Medication 100 MILLIGRAM(S): at 11:07

## 2024-01-01 RX ADMIN — Medication 100 MILLIGRAM(S): at 23:15

## 2024-01-01 RX ADMIN — Medication 3 MILLILITER(S): at 16:21

## 2024-01-01 RX ADMIN — Medication 100 GRAM(S): at 19:20

## 2024-01-01 RX ADMIN — MORPHINE SULFATE 2 MILLIGRAM(S): 50 CAPSULE, EXTENDED RELEASE ORAL at 22:49

## 2024-01-01 RX ADMIN — PANTOPRAZOLE SODIUM 40 MILLIGRAM(S): 20 TABLET, DELAYED RELEASE ORAL at 11:09

## 2024-01-01 RX ADMIN — SENNA PLUS 2 TABLET(S): 8.6 TABLET ORAL at 21:12

## 2024-01-01 RX ADMIN — ROBINUL 0.4 MILLIGRAM(S): 0.2 INJECTION INTRAMUSCULAR; INTRAVENOUS at 04:20

## 2024-01-01 RX ADMIN — SCOPALAMINE 1 PATCH: 1 PATCH, EXTENDED RELEASE TRANSDERMAL at 07:30

## 2024-01-01 RX ADMIN — ENOXAPARIN SODIUM 40 MILLIGRAM(S): 100 INJECTION SUBCUTANEOUS at 22:58

## 2024-01-01 RX ADMIN — MORPHINE SULFATE 2 MILLIGRAM(S): 50 CAPSULE, EXTENDED RELEASE ORAL at 10:30

## 2024-01-01 RX ADMIN — Medication 3 MILLILITER(S): at 06:11

## 2024-01-01 RX ADMIN — ONDANSETRON 4 MILLIGRAM(S): 8 TABLET, FILM COATED ORAL at 21:00

## 2024-01-01 RX ADMIN — OXYCODONE HYDROCHLORIDE 5 MILLIGRAM(S): 5 TABLET ORAL at 14:36

## 2024-01-01 RX ADMIN — Medication 1000 MILLIGRAM(S): at 06:37

## 2024-01-01 RX ADMIN — Medication 4 MILLIGRAM(S): at 06:08

## 2024-01-01 RX ADMIN — PANTOPRAZOLE SODIUM 40 MILLIGRAM(S): 20 TABLET, DELAYED RELEASE ORAL at 12:20

## 2024-01-01 RX ADMIN — APIXABAN 5 MILLIGRAM(S): 2.5 TABLET, FILM COATED ORAL at 02:38

## 2024-01-01 RX ADMIN — Medication 1000 MILLIGRAM(S): at 06:09

## 2024-01-01 RX ADMIN — IOHEXOL 30 MILLILITER(S): 300 INJECTION, SOLUTION INTRAVENOUS at 16:15

## 2024-01-01 RX ADMIN — SCOPALAMINE 1 PATCH: 1 PATCH, EXTENDED RELEASE TRANSDERMAL at 13:41

## 2024-01-01 RX ADMIN — SODIUM CHLORIDE 83.33 MILLILITER(S): 5 INJECTION, SOLUTION INTRAVENOUS at 22:49

## 2024-01-01 RX ADMIN — Medication 85 MILLIMOLE(S): at 10:14

## 2024-01-01 RX ADMIN — SCOPALAMINE 1 PATCH: 1 PATCH, EXTENDED RELEASE TRANSDERMAL at 05:47

## 2024-01-01 RX ADMIN — Medication 2 MILLIGRAM(S): at 05:33

## 2024-01-01 RX ADMIN — MORPHINE SULFATE 2 MILLIGRAM(S): 50 CAPSULE, EXTENDED RELEASE ORAL at 04:48

## 2024-01-01 RX ADMIN — Medication 3 MILLILITER(S): at 22:03

## 2024-01-01 RX ADMIN — SODIUM CHLORIDE 250 MILLILITER(S): 9 INJECTION INTRAMUSCULAR; INTRAVENOUS; SUBCUTANEOUS at 07:50

## 2024-01-01 RX ADMIN — LACTULOSE 10 GRAM(S): 10 SOLUTION ORAL at 11:09

## 2024-01-01 RX ADMIN — Medication 1 MILLIGRAM(S): at 11:09

## 2024-01-01 RX ADMIN — APIXABAN 10 MILLIGRAM(S): 2.5 TABLET, FILM COATED ORAL at 17:38

## 2024-01-01 RX ADMIN — Medication 3 MILLILITER(S): at 05:48

## 2024-01-01 RX ADMIN — POLYETHYLENE GLYCOL 3350 17 GRAM(S): 17 POWDER, FOR SOLUTION ORAL at 11:09

## 2024-01-01 RX ADMIN — Medication 3 MILLILITER(S): at 10:23

## 2024-01-01 RX ADMIN — TAMSULOSIN HYDROCHLORIDE 0.4 MILLIGRAM(S): 0.4 CAPSULE ORAL at 22:58

## 2024-01-01 RX ADMIN — Medication 4 MILLIGRAM(S): at 14:17

## 2024-01-01 RX ADMIN — Medication 1 TABLET(S): at 11:08

## 2024-01-01 RX ADMIN — PIPERACILLIN AND TAZOBACTAM 25 GRAM(S): 4; .5 INJECTION, POWDER, LYOPHILIZED, FOR SOLUTION INTRAVENOUS at 10:23

## 2024-01-01 RX ADMIN — SCOPALAMINE 1 PATCH: 1 PATCH, EXTENDED RELEASE TRANSDERMAL at 17:35

## 2024-01-01 RX ADMIN — Medication 3 MILLILITER(S): at 18:04

## 2024-01-01 RX ADMIN — SENNA PLUS 2 TABLET(S): 8.6 TABLET ORAL at 22:58

## 2024-01-01 RX ADMIN — SODIUM CHLORIDE 2 GRAM(S): 9 INJECTION INTRAMUSCULAR; INTRAVENOUS; SUBCUTANEOUS at 22:19

## 2024-01-01 RX ADMIN — ENOXAPARIN SODIUM 50 MILLIGRAM(S): 100 INJECTION SUBCUTANEOUS at 17:58

## 2024-01-01 RX ADMIN — Medication 4 MILLIGRAM(S): at 03:56

## 2024-01-01 RX ADMIN — Medication 2 MILLIGRAM(S): at 14:29

## 2024-01-01 RX ADMIN — Medication 4 MILLIGRAM(S): at 21:12

## 2024-01-01 RX ADMIN — Medication 2 MILLIGRAM(S): at 05:50

## 2024-01-01 RX ADMIN — Medication 3 MILLILITER(S): at 05:33

## 2024-01-01 RX ADMIN — ONDANSETRON 4 MILLIGRAM(S): 8 TABLET, FILM COATED ORAL at 06:55

## 2024-01-01 RX ADMIN — CHLORHEXIDINE GLUCONATE 1 APPLICATION(S): 213 SOLUTION TOPICAL at 06:56

## 2024-01-01 RX ADMIN — ENOXAPARIN SODIUM 50 MILLIGRAM(S): 100 INJECTION SUBCUTANEOUS at 05:48

## 2024-01-01 RX ADMIN — MORPHINE SULFATE 2 MILLIGRAM(S): 50 CAPSULE, EXTENDED RELEASE ORAL at 04:17

## 2024-01-01 RX ADMIN — Medication 1 TABLET(S): at 12:27

## 2024-01-01 RX ADMIN — SODIUM CHLORIDE 60 MILLILITER(S): 9 INJECTION, SOLUTION INTRAVENOUS at 11:54

## 2024-01-01 RX ADMIN — CHLORHEXIDINE GLUCONATE 1 APPLICATION(S): 213 SOLUTION TOPICAL at 05:21

## 2024-01-01 RX ADMIN — Medication 2 MILLIGRAM(S): at 18:30

## 2024-01-01 RX ADMIN — Medication 3 MILLIGRAM(S): at 05:19

## 2024-01-01 RX ADMIN — SODIUM CHLORIDE 60 MILLILITER(S): 9 INJECTION, SOLUTION INTRAVENOUS at 03:04

## 2024-01-01 RX ADMIN — Medication 1000 MILLIGRAM(S): at 06:01

## 2024-01-01 RX ADMIN — Medication 3 MILLILITER(S): at 03:52

## 2024-01-01 RX ADMIN — Medication 3 MILLIGRAM(S): at 02:35

## 2024-01-01 RX ADMIN — ENOXAPARIN SODIUM 50 MILLIGRAM(S): 100 INJECTION SUBCUTANEOUS at 18:03

## 2024-01-01 RX ADMIN — Medication 1 MILLIGRAM(S): at 11:24

## 2024-01-01 RX ADMIN — Medication 1 PACKET(S): at 07:25

## 2024-01-01 RX ADMIN — Medication 1 APPLICATION(S): at 06:23

## 2024-01-01 RX ADMIN — SODIUM CHLORIDE 75 MILLILITER(S): 9 INJECTION INTRAMUSCULAR; INTRAVENOUS; SUBCUTANEOUS at 19:21

## 2024-01-01 RX ADMIN — Medication 1: at 17:21

## 2024-01-01 RX ADMIN — Medication 1 TABLET(S): at 11:09

## 2024-01-01 RX ADMIN — Medication 2 MILLIGRAM(S): at 05:00

## 2024-01-01 RX ADMIN — Medication 4 MILLIGRAM(S): at 21:28

## 2024-01-01 RX ADMIN — HEPARIN SODIUM 16 UNIT(S)/HR: 5000 INJECTION INTRAVENOUS; SUBCUTANEOUS at 07:00

## 2024-01-01 RX ADMIN — Medication 4 MILLIGRAM(S): at 21:00

## 2024-01-01 RX ADMIN — Medication 3 MILLILITER(S): at 17:35

## 2024-01-01 RX ADMIN — Medication 85 MILLIMOLE(S): at 14:22

## 2024-01-01 RX ADMIN — POLYETHYLENE GLYCOL 3350 17 GRAM(S): 17 POWDER, FOR SOLUTION ORAL at 12:05

## 2024-01-01 RX ADMIN — Medication 2 MILLIGRAM(S): at 06:54

## 2024-01-01 RX ADMIN — Medication 4 MILLIGRAM(S): at 05:21

## 2024-01-01 RX ADMIN — Medication 2 MILLIGRAM(S): at 06:59

## 2024-01-01 RX ADMIN — ONDANSETRON 4 MILLIGRAM(S): 8 TABLET, FILM COATED ORAL at 18:19

## 2024-01-01 RX ADMIN — Medication 100 MILLIGRAM(S): at 06:55

## 2024-01-01 RX ADMIN — ONDANSETRON 4 MILLIGRAM(S): 8 TABLET, FILM COATED ORAL at 15:50

## 2024-01-01 RX ADMIN — SENNA PLUS 2 TABLET(S): 8.6 TABLET ORAL at 21:44

## 2024-01-01 RX ADMIN — POLYETHYLENE GLYCOL 3350 17 GRAM(S): 17 POWDER, FOR SOLUTION ORAL at 17:35

## 2024-01-01 RX ADMIN — MAGNESIUM OXIDE 400 MG ORAL TABLET 400 MILLIGRAM(S): 241.3 TABLET ORAL at 07:25

## 2024-01-01 RX ADMIN — Medication 2 MILLIGRAM(S): at 17:35

## 2024-01-01 RX ADMIN — CHLORHEXIDINE GLUCONATE 1 APPLICATION(S): 213 SOLUTION TOPICAL at 06:08

## 2024-01-01 RX ADMIN — SENNA PLUS 2 TABLET(S): 8.6 TABLET ORAL at 21:29

## 2024-01-01 RX ADMIN — Medication 2 MILLIGRAM(S): at 06:11

## 2024-01-05 PROBLEM — R63.4 WEIGHT LOSS, UNINTENTIONAL: Status: ACTIVE | Noted: 2023-01-01

## 2024-01-05 PROBLEM — R53.82 CHRONIC FATIGUE: Status: ACTIVE | Noted: 2024-01-01

## 2024-01-05 NOTE — PHYSICAL EXAM
[No Acute Distress] : no acute distress [Frail] : frail [Normal Conjunctiva] : normal conjunctiva [Normal Venous Pressure] : normal venous pressure [No Carotid Bruit] : no carotid bruit [Normal S1, S2] : normal S1, S2 [No Murmur] : no murmur [No Rub] : no rub [No Gallop] : no gallop [Clear Lung Fields] : clear lung fields [Good Air Entry] : good air entry [No Respiratory Distress] : no respiratory distress  [Soft] : abdomen soft [Non Tender] : non-tender [No Masses/organomegaly] : no masses/organomegaly [Normal Bowel Sounds] : normal bowel sounds [Normal Gait] : normal gait [No Edema] : no edema [No Cyanosis] : no cyanosis [No Clubbing] : no clubbing [Rash] : rash [Moves all extremities] : moves all extremities [No Focal Deficits] : no focal deficits [Normal Speech] : normal speech [Alert and Oriented] : alert and oriented [Normal memory] : normal memory [de-identified] : dermitis rash arm face and head

## 2024-01-05 NOTE — DISCUSSION/SUMMARY
[FreeTextEntry1] : stable exam, labs in office, chart reviewed weight loss/ fatigue- liberalize diet, check labs f/u 2 weeks

## 2024-01-05 NOTE — HISTORY OF PRESENT ILLNESS
[FreeTextEntry1] : complete recent chemo, to start immuno therapy since completing c/o worsening fatigue and weight loss

## 2024-01-07 NOTE — ED ADULT NURSE NOTE - CHPI ED NUR SYMPTOMS NEG
no burning urination/no chills/no diarrhea/no dysuria/no fever/no hematuria no abdominal distension/no burning urination/no chills/no diarrhea/no dysuria/no fever/no hematuria

## 2024-01-07 NOTE — ED PROVIDER NOTE - PHYSICAL EXAMINATION
VITAL SIGNS: I have reviewed nursing notes and confirm.  CONSTITUTIONAL: Well-developed; in no acute distress.   SKIN:  warm and dry, no acute rash.   HEAD:  normocephalic, atraumatic.  EYES: PERRL, EOM intact; conjunctiva and sclera clear.  ENT: No nasal discharge; airway clear. Moist mucous membranes.  NECK: Supple; non tender.  CARD: S1, S2 normal; no murmurs, gallops, or rubs. Regular rate and rhythm.   RESP:  Clear to auscultation b/l, no wheezes, rales or rhonchi.  ABD: Normal bowel sounds; soft; non-distended; non-tender; no guarding/ rebound.  EXT: Normal ROM. No clubbing, cyanosis or edema. 2+ pulses to b/l ue/le.  NEURO: Alert, oriented, grossly unremarkable  PSYCH: Cooperative, mood and affect appropriate.

## 2024-01-07 NOTE — ED PROVIDER NOTE - CLINICAL SUMMARY MEDICAL DECISION MAKING FREE TEXT BOX
Pt is afebrile and hemodynamically stable. He is non-toxic appearing. He has a soft non-tender abdomen. Reviewed labs from his visit with Dr. Hurtado 2d ago. He has no acute abnormalities today. He has trace leuk esterase and 7 wbcs, will follow urine culture as pt states he does not have urinary symptoms. Flu/covid/rsv neg. Has close follow up with pmd. Will send rx for zofran, plan to purchase miralax over the counter. Strict return precautions given.

## 2024-01-07 NOTE — ED PROVIDER NOTE - PATIENT PORTAL LINK FT
You can access the FollowMyHealth Patient Portal offered by [facility] by registering at the following website: [email or web address]. By joining [employer]’s FollowMyHealth portal, you will also be able to view your health information using other applications (apps) compatible with our system. You can access the FollowMyHealth Patient Portal offered by [facility] by registering at the following website: [email or web address]. By joining [employer]’s FollowMyHealth portal, you will also be able to view your health information using other applications (apps) compatible with our system.

## 2024-01-07 NOTE — ED PROVIDER NOTE - OBJECTIVE STATEMENT
77yo male former smoker (40 pack years) with pmhx of Stage IIIB NSCLC (completed radiation therapy and chemo ~2mo ago, planning to start immunotherapy) who presents with 1wk of fatigue, weight loss (~10lb), and intermittent vomiting. Pt reports he is exhausted, sleeps most of the day. He reports occasional NBNB emesis after solids and liquids over the past few days. He denies fever, sore throat, cough, chest pain, shortness of breath, abdominal pain, diarrhea, constipation, urinary symptoms. He was seen by  his pmd Dr. Hurtado 2d ago for same complaint, labs were sent at that time but he does not know the results. Tonight he was concerned he may be dehydrated so he came to ED.

## 2024-01-07 NOTE — ED ADULT TRIAGE NOTE - CHIEF COMPLAINT QUOTE
I feel dehydrated, weak and tired for 1 week; has been vomiting the last 2 days; denies pain, fever or cough

## 2024-01-07 NOTE — ED ADULT NURSE NOTE - NSFALLHARMRISKINTERV_ED_ALL_ED
Communicate risk of Fall with Harm to all staff, patient, and family/Provide visual cue: red socks, yellow wristband, yellow gown, etc/Reinforce activity limits and safety measures with patient and family/Bed in lowest position, wheels locked, appropriate side rails in place/Call bell, personal items and telephone in reach/Instruct patient to call for assistance before getting out of bed/chair/stretcher/Non-slip footwear applied when patient is off stretcher/[place] to call system/Physically safe environment - no spills, clutter or unnecessary equipment/Purposeful Proactive Rounding/Room/bathroom lighting operational, light cord in reach Communicate risk of Fall with Harm to all staff, patient, and family/Provide visual cue: red socks, yellow wristband, yellow gown, etc/Reinforce activity limits and safety measures with patient and family/Bed in lowest position, wheels locked, appropriate side rails in place/Call bell, personal items and telephone in reach/Instruct patient to call for assistance before getting out of bed/chair/stretcher/Non-slip footwear applied when patient is off stretcher/[place] to call system/Physically safe environment - no spills, clutter or unnecessary equipment/Purposeful Proactive Rounding/Room/bathroom lighting operational, light cord in reach

## 2024-01-07 NOTE — ED ADULT NURSE NOTE - OBJECTIVE STATEMENT
pt presents to the ED c/o a week of decreased energy, decreased PO intake, " I feel dehydrated" - pt has been sleeping for prolonged periods of the day due to decreased energy. As per daughter - pt is a lung ca pt - finished chemo and radiation in the fall and is to begin immunotherapy trmt.

## 2024-01-07 NOTE — ED ADULT NURSE NOTE - HOW PATIENT ADDRESSED, PROFILE
Infusion Nursing Note:  Bonny Raymundo presents today for 1 unit Platelets.    Patient seen by provider today: No   present during visit today: Not Applicable.    Note: N/A.      Intravenous Access:  Peripheral IV placed.    Treatment Conditions:  Lab Results   Component Value Date    HGB 8.6 (L) 10/19/2023    WBC 1.5 (L) 10/19/2023    ANEU 0.3 (LL) 10/19/2023    ANEUTAUTO 1.3 (L) 09/12/2022    PLT 5 (LL) 10/19/2023        Results reviewed, labs MET treatment parameters, ok to proceed with treatment.  Blood transfusion consent signed 08/02/23.      Post Infusion Assessment:  Patient tolerated infusion without incident.  Blood return noted pre and post infusion.  Site patent and intact, free from redness, edema or discomfort.  No evidence of extravasations.  Access discontinued per protocol.       Discharge Plan:   Patient declined prescription refills.  Discharge instructions reviewed with: Patient.  Patient and/or family verbalized understanding of discharge instructions and all questions answered.  AVS to patient via [employer].  Patient will return 10/28 for next appointment.   Patient discharged in stable condition accompanied by: self.  Departure Mode: Ambulatory.      Rohith Bell RN    
[place]

## 2024-01-07 NOTE — ED ADULT NURSE NOTE - COVID-19 RESULT
Your recent endoscopy showed a lot of irritation in your stomach which could be the result of the Motrin/Naprosyn/ibuprofen that you take for your pain.  Hopefully Dexilant will help reduce that inflammation but every GI doctor automatically will recommend stopping those offending agents.  I would suggest the Carafate as it can bind to those irritated areas and hopefully protect the meds that he will so if you would like I can give you a prescription for that.  The alternative over-the-counter medication for pain is Tylenol.  Any other pain medicines are going to be by prescription only and of the opiate or opiate like variety.   NEGATIVE

## 2024-01-07 NOTE — ED PROVIDER NOTE - NSFOLLOWUPINSTRUCTIONS_ED_ALL_ED_FT
Your labs today were reassuring.    Your urine had a small amount of bacteria. We will follow the urine culture and contact you to prescribe antibiotics if it grows out bacteria.    Take zofran 30 minutes prior to eating or drinking to minimize nausea/vomiting. This was sent to your pharmacy.    Take miralax per package instructions to help with constipation. Increase water and fiber intake.    Please follow up with Dr. Hurtado.    Return to the Emergency Department if you develop fever>100.4F, shortness of breath, chest pain, worsening abdominal pain, inability to eat or drink due to vomiting, or any other concerns.

## 2024-01-07 NOTE — ED PROVIDER NOTE - NS ED ROS FT
Constitutional: No fever. No chills. +fatigue. +weight loss.   Eyes: No redness. No discharge. No vision change.   ENT: No sore throat. No ear pain.  Cardiovascular: No chest pain. No leg swelling.  Respiratory: No cough. No shortness of breath.  GI: No abdominal pain. +vomiting. No diarrhea.   MSK: No joint pain. No back pain.   Skin: No rash. No abrasions.   Neuro: No numbness. No weakness.   Psych: No known mental health issues.

## 2024-01-09 PROBLEM — R27.0: Status: ACTIVE | Noted: 2024-01-01

## 2024-01-10 PROBLEM — R26.81 UNSTEADY GAIT: Status: ACTIVE | Noted: 2024-01-01

## 2024-01-10 PROBLEM — G93.6 VASOGENIC BRAIN EDEMA: Status: ACTIVE | Noted: 2024-01-01

## 2024-01-10 PROBLEM — H53.2 DOUBLE VISION: Status: ACTIVE | Noted: 2024-01-01

## 2024-01-10 PROBLEM — C34.10 NSCLC OF UPPER LOBE: Status: ACTIVE | Noted: 2023-01-01

## 2024-01-10 NOTE — ED ADULT TRIAGE NOTE - CHIEF COMPLAINT QUOTE
"I was sent in by my doctor for surgery tomorrow. I have a cyst in my brain that they are operating on."

## 2024-01-10 NOTE — ED PROVIDER NOTE - NSICDXPASTMEDICALHX_GEN_ALL_CORE_FT
PAST MEDICAL HISTORY:  BPH (benign prostatic hyperplasia)     Lung cancer metastatic to brain     Prediabetes

## 2024-01-10 NOTE — H&P ADULT - HISTORY OF PRESENT ILLNESS
77 y/o M with pmh melanoma (L arm skin resection 2023), basal cell carcinoma, non small cell lung cancer - adenocarcinoma (s/p radiation chemo, completed 10/2023), and BPH presents with loss of balance, HA, fatigue, generalized weakness, diplopia and b/l foot tingling x 2 weeks and nausea/vomiting x 2-4 days. Patient also admits to mild memory loss x 3-4 months. Pt found to have 5 areas of brain metastases, largest in the  L cerebellum, on outpatient MRI today. Patient sent to ER for neurosurgery admission. 79 y/o M with pmh melanoma (L arm skin resection 2023), basal cell carcinoma, non small cell lung cancer - adenocarcinoma (s/p radiation chemo, completed 10/2023), and BPH presents with loss of balance, HA, fatigue, generalized weakness, diplopia and b/l foot tingling x 2 weeks and nausea/vomiting x 3-4 days. Patient also admits to mild memory loss x 3-4 months. Pt found to have 5 areas of brain metastases, largest in the  L cerebellum, on outpatient MRI today. Patient sent to ER for neurosurgery admission. 77 y/o M with pmh melanoma (L arm skin resection 2023), basal cell carcinoma, non small cell lung cancer - adenocarcinoma (s/p radiation chemo, completed 10/2023), and BPH presents with loss of balance, HA, fatigue, generalized weakness, diplopia and b/l foot tingling x 2 weeks and nausea/vomiting x 3-4 days. Patient also admits to mild memory loss x 3-4 months. Pt found to have 5 areas of brain metastases, largest in the  L cerebellum, on outpatient MRI today. Patient sent to ER for neurosurgery admission.

## 2024-01-10 NOTE — ED ADULT NURSE NOTE - NSFALLUNIVINTERV_ED_ALL_ED
Bed/Stretcher in lowest position, wheels locked, appropriate side rails in place/Call bell, personal items and telephone in reach/Instruct patient to call for assistance before getting out of bed/chair/stretcher/Non-slip footwear applied when patient is off stretcher/[place] to call system/Physically safe environment - no spills, clutter or unnecessary equipment/Purposeful proactive rounding/Room/bathroom lighting operational, light cord in reach Bed/Stretcher in lowest position, wheels locked, appropriate side rails in place/Call bell, personal items and telephone in reach/Instruct patient to call for assistance before getting out of bed/chair/stretcher/Non-slip footwear applied when patient is off stretcher/[place] to call system/Physically safe environment - no spills, clutter or unnecessary equipment/Purposeful proactive rounding/Room/bathroom lighting operational, light cord in reach

## 2024-01-10 NOTE — ED PROVIDER NOTE - PROGRESS NOTE DETAILS
NSG consulted - request preop labs and pt tba to Dr D'amico.  They are clarifying steroid dose and state they will enter the order for the steroids.

## 2024-01-10 NOTE — H&P ADULT - NSHPLABSRESULTS_GEN_ALL_CORE
.  LABS:                         13.5   6.87  )-----------( 222      ( 10 Familia 2024 15:18 )             42.5     01-10    138  |  99  |  15  ----------------------------<  105<H>  see note   |  28  |  0.56    Ca    10.0      10 Familia 2024 15:18      PT/INR - ( 10 Familia 2024 15:18 )   PT: 11.6 sec;   INR: 1.02          PTT - ( 10 Familia 2024 15:18 )  PTT:24.2 sec  Urinalysis Basic - ( 10 Familia 2024 15:18 )    Color: x / Appearance: x / SG: x / pH: x  Gluc: 105 mg/dL / Ketone: x  / Bili: x / Urobili: x   Blood: x / Protein: x / Nitrite: x   Leuk Esterase: x / RBC: x / WBC x   Sq Epi: x / Non Sq Epi: x / Bacteria: x            RADIOLOGY, EKG & ADDITIONAL TESTS: Reviewed.

## 2024-01-10 NOTE — CONSULT NOTE ADULT - ASSESSMENT
78 YOM with PMH of TUD and metastatic NSCLC s/p chemoradiation presenting with FTT and double vision/gait instability, found to have metastatic brain lesions pending OR.     Metastatic brain lesions  - outpatient MRI with five brain tumors of different sizes and locations  - management per NSGY, started on steroids with plan for OR tomorrow    Pre-op risk stratification and medical optimization  - elevated risk procedure: craniotomy  - METS >4, able to walk flight of stairs, no s/s ACS or ADHF  - ECG interpretation 1/10: poor baseline, sinus oracio 55, no acute ischemic changes, similar to prior  - CXR pending, CT chest 11/2023 reviewed  - TTE 10/2023: LVEF normal, no gross valvular abnormalities, PASP 47mmHg  - RCRI score of 0: class I risk (3.9% 30-day risk of MACE)  - FRANCISCO with 1.5% 30-day risk of MACE  - [facility] with above average risk (0.5% risk of cardiac events, 4.9%risk of death)  - patient is at intermediate risk for intermediate risk procedure, can proceed to OR without further testing    RUL NSCLC  - diagnosed 7/2023, initial staging T3N2 IIIB adeno  - s/p 9-cycles carboplatin and paclitaxel 8/24/23-10/23/23  - s/p 60 Gy to R lung 9/18/23 - 10/27/23  - plan was to start adjuvant maintenance durvalumab x12mo (before MRI results)    Malnutrition  Nausea/vomiting, abdominal pain  - BMI 17 in setting of malignancy and anorexia, recent worsening of symptoms with N/V  - obtain KUB and bladder scan to r/o retention/constipation as contributing factors  - ondansetron PRN  - nutrition consult    Dispo: TBD    Plan discussed with primary team and daughter at bedside. 78 YOM with PMH of TUD and metastatic NSCLC s/p chemoradiation presenting with FTT and double vision/gait instability, found to have metastatic brain lesions pending OR.     Metastatic brain lesions  - outpatient MRI with five brain tumors of different sizes and locations  - management per NSGY, started on steroids with plan for OR tomorrow    Pre-op risk stratification and medical optimization  - elevated risk procedure: craniotomy  - METS >4, able to walk flight of stairs, no s/s ACS or ADHF  - ECG interpretation 1/10: poor baseline, sinus oracio 55, no acute ischemic changes, similar to prior  - CXR pending, CT chest 11/2023 reviewed  - TTE 10/2023: LVEF normal, no gross valvular abnormalities, PASP 47mmHg  - RCRI score of 0: class I risk (3.9% 30-day risk of MACE)  - FRANCISCO with 1.5% 30-day risk of MACE  - [facility] with above average risk (0.5% risk of cardiac events, 4.9%risk of death)  - patient is at intermediate risk for intermediate risk procedure, can proceed to OR without further testing    RUL NSCLC  - diagnosed 7/2023, initial staging T3N2 IIIB adeno  - s/p 9-cycles carboplatin and paclitaxel 8/24/23-10/23/23  - s/p 60 Gy to R lung 9/18/23 - 10/27/23  - plan was to start adjuvant maintenance durvalumab x12mo (before MRI results)    Malnutrition  Nausea/vomiting, abdominal pain  - BMI 17 in setting of malignancy and anorexia, recent worsening of symptoms with N/V  - obtain KUB and bladder scan to r/o retention/constipation as contributing factors  - ondansetron PRN  - nutrition consult    Dispo: TBD    Plan discussed with primary team and daughter at bedside.

## 2024-01-10 NOTE — H&P ADULT - NSHPPHYSICALEXAM_GEN_ALL_CORE
General: patient seen laying supine in bed in NAD  Neuro: AAOx3, follows commands, opens eyes spontaneously, speech clear and fluent, CNII-XI grossly intact, face symmetric, no pronator drift, finger to nose intact b/l, heel to shin intact b/l, rapid alternating movements intact b/l UEs, strength 5/5 b/l upper extremities and lower extremities, sensation intact to light touch throughout  HEENT: PERRL, EOMI, VFs intact b/l  Neck: supple  Cardiac: RRR, S1S2  Pulmonary: chest rise symmetric  Abdomen: soft, nontender, nondistended  Ext: perfusing well  Skin: warm, dry

## 2024-01-10 NOTE — PATIENT PROFILE ADULT - FUNCTIONAL ASSESSMENT - BASIC MOBILITY 6.
2-calculated by average/Not able to assess (calculate score using [facility] averaging method)  2-calculated by average/Not able to assess (calculate score using [facility] averaging method)  2-calculated by average/Not able to assess (calculate score using [facility] averaging method)

## 2024-01-10 NOTE — ED PROVIDER NOTE - PHYSICAL EXAMINATION
VITAL SIGNS: I have reviewed nursing notes and confirm.  CONSTITUTIONAL:  in no acute distress.   SKIN:  warm and dry, no acute rash.   HEAD:  normocephalic, atraumatic.  EYES: PERRL, EOM intact; conjunctiva and sclera clear.  ENT: No nasal discharge; airway clear.   NECK: Supple; non tender.  CARD: S1, S2 normal; no murmurs, gallops, or rubs. Regular rate and rhythm.   RESP:  Clear to auscultation b/l, no wheezes, rales or rhonchi.  ABD: Normal bowel sounds; soft; non-distended; non-tender; no guarding/ rebound.  MSK: Normal ROM. No clubbing, cyanosis or edema. no ttp bilat le  NEURO: Alert, oriented, grossly unremarkable  PSYCH: Cooperative, mood and affect appropriate.

## 2024-01-10 NOTE — HISTORY OF PRESENT ILLNESS
[de-identified] : 79 y/o male with hx of non small cell lung cancer, adenocarcinoma who presents today as referral from oncologist Dr. Chapman for new finding of brain metastases.   In brief:  Underwent workup with Dr. Hurtado for cough and dyspnea, along with rash. Biopsy of rash showed lichenoid dermatitis. 7/10/2023: Large irregular mass in the right upper lobe with stranding to the pleural surface. This mass is highly suspicious for malignancy. There is subcarinal lymphadenopathy which is likely metastatic. 7/18/2023: PET scan-hypermetabolic right upper lobe pulmonary mass, which is most consistent with a primary bronchogenic malignancy. Hypermetabolic mediastinal lymphadenopathy which is most consistent with metastatic disease. Otherwise no abnormal hypermetabolic activity to suggest malignancy at this time. Liver cysts and additional subcentimeter low-attenuation lesions in the liver which are too small to further characterize and may represent cysts as well. Enlarged prostate. 7/21/2023: EBUS with : 4L atypical, 3P NSCLC with adenocarcinoma features 7/27/2023: Thoracic tumor board: T3N2 final stage pending MRI brain. Patient is not surgical candidate. Tumor board recommends chemoradiation therapy. Pathology tissue is not sufficient for molecular testing, needs repeat biopsy. 8/2/23: Repeat IR biopsy: Non-small cell carcinoma, with necrosis 8/24/23 started on cycle 1 carboplatin/paclitaxel per Dr. Chapman 10/27/23 completed chemo/RT 1/9/24 f/u with onc Dr. Serrano and noted to have double vision and intermittent nausea, Also gait instability, leaning to the right. MRI brain ordered.   TODAY patient completed MRI brain concerning for multiple brain mets. (prev MRI brain 10/5/23 without intracranial mets).  Referred to neurosurgery.  Patient endorses about 2 weeks of progressively worsening double vision, headaches, balance issues. Also with worsening nausea. Was vomiting all night last night.  Endorses generalized body weakness. Denies right or left sided weakness.  Also trouble ambulating that has worsened over the past few days.    Onc: Billy Chapman Cardiac: Abelino Hurtado Pulm:

## 2024-01-10 NOTE — ED ADULT NURSE NOTE - OBJECTIVE STATEMENT
Pt A&Ox4 and able to speak in complete sentences. Pt arrived d/t mets to brain being discovered on MRI per pt and now requiring surgery for cyst in brain. Pt endorsing occasional nausea, double vision and headache. Pt denies cp, n, v, lightheadedness, dizziness, sob, fevers, chills at this time. pt endorsed constipation over the last five days. pt endorsing some suprapubic discomfort.

## 2024-01-10 NOTE — ED PROVIDER NOTE - CLINICAL SUMMARY MEDICAL DECISION MAKING FREE TEXT BOX
Pt sent for admit for new brain mets for surg mgmt w nsg.  Plan for preop eval, nsg consult, admit.  Pt c/o n, weakness - will order ivf, antiemetics.

## 2024-01-10 NOTE — PATIENT PROFILE ADULT - FALL HARM RISK - HARM RISK INTERVENTIONS
Assistance with ambulation/Assistance OOB with selected safe patient handling equipment/Communicate Risk of Fall with Harm to all staff/Discuss with provider need for PT consult/Monitor gait and stability/Reinforce activity limits and safety measures with patient and family/Tailored Fall Risk Interventions/Visual Cue: Yellow wristband and red socks/Bed in lowest position, wheels locked, appropriate side rails in place/Call bell, personal items and telephone in reach/Instruct patient to call for assistance before getting out of bed or chair/Non-slip footwear when patient is out of bed/[place] to call system/Physically safe environment - no spills, clutter or unnecessary equipment/Purposeful Proactive Rounding/Room/bathroom lighting operational, light cord in reach Assistance with ambulation/Assistance OOB with selected safe patient handling equipment/Communicate Risk of Fall with Harm to all staff/Discuss with provider need for PT consult/Monitor gait and stability/Reinforce activity limits and safety measures with patient and family/Tailored Fall Risk Interventions/Visual Cue: Yellow wristband and red socks/Bed in lowest position, wheels locked, appropriate side rails in place/Call bell, personal items and telephone in reach/Instruct patient to call for assistance before getting out of bed or chair/Non-slip footwear when patient is out of bed/[place] to call system/Physically safe environment - no spills, clutter or unnecessary equipment/Purposeful Proactive Rounding/Room/bathroom lighting operational, light cord in reach Assistance with ambulation/Assistance OOB with selected safe patient handling equipment/Communicate Risk of Fall with Harm to all staff/Discuss with provider need for PT consult/Monitor gait and stability/Reinforce activity limits and safety measures with patient and family/Tailored Fall Risk Interventions/Visual Cue: Yellow wristband and red socks/Bed in lowest position, wheels locked, appropriate side rails in place/Call bell, personal items and telephone in reach/Instruct patient to call for assistance before getting out of bed or chair/Non-slip footwear when patient is out of bed/[place] to call system/Physically safe environment - no spills, clutter or unnecessary equipment/Purposeful Proactive Rounding/Room/bathroom lighting operational, light cord in reach

## 2024-01-10 NOTE — PHYSICAL EXAM
[FreeTextEntry5] : CN II-XII grossly intact  [FreeTextEntry6] : LUE strength 4/5 with positive drift. Otherwise 4+/5 strength

## 2024-01-10 NOTE — PROGRESS NOTE ADULT - SUBJECTIVE AND OBJECTIVE BOX
Surgery: Left suboccipital craniotomy for brain tumor resection  Consent: Signed by patient vs HCP - pending                   NAME/NUMBER of HCP:     Representative Consent: [  x] Signed by patient vs HCP                                                 [  ] N/A -> only for cerebral angiogram    No Known Allergies    Subjective: MRI brain completed outpatient. Patient admitted to [facility] for OR tomorrow.    T(C): 36.5 (01-10-24 @ 16:09), Max: 36.6 (01-10-24 @ 13:33)  HR: 75 (01-10-24 @ 16:09) (61 - 75)  BP: 155/75 (01-10-24 @ 16:09) (148/73 - 155/75)  RR: 17 (01-10-24 @ 16:09) (17 - 18)  SpO2: 99% (01-10-24 @ 16:09) (99% - 99%)  Wt(kg): --    EXAM:      01-10    138  |  99  |  15  ----------------------------<  105<H>  see note   |  28  |  0.56    Ca    10.0      10 Familia 2024 15:18      CBC Full  -  ( 10 Familia 2024 15:18 )  WBC Count : 6.87 K/uL  RBC Count : 4.52 M/uL  Hemoglobin : 13.5 g/dL  Hematocrit : 42.5 %  Platelet Count - Automated : 222 K/uL  Mean Cell Volume : 94.0 fl  Mean Cell Hemoglobin : 29.9 pg  Mean Cell Hemoglobin Concentration : 31.8 gm/dL  Auto Neutrophil # : x  Auto Lymphocyte # : x  Auto Monocyte # : x  Auto Eosinophil # : x  Auto Basophil # : x  Auto Neutrophil % : x  Auto Lymphocyte % : x  Auto Monocyte % : x  Auto Eosinophil % : x  Auto Basophil % : x    PT/INR - ( 10 Familia 2024 15:18 )   PT: 11.6 sec;   INR: 1.02          PTT - ( 10 Familia 2024 15:18 )  PTT:24.2 sec    Pregnancy test (serum hcg for any female under 56, must be resulted day before OR): [ ] Negative Result  [ ] Positive Result  [ ] N/A : male or female>55 y/o  Type & Screen (in past 72hrs):     2 Type & Screen within 72 hours if anticipate blood need in OR:  _ Y _ N     Blood ordered and on hold for OR:   [ ] No need     [ ] 1u pRBC on hold      [ ] 2u pRBC on hold    COVID swab (in past 48hrs): _ Y  _N    CXR: no infiltrates  EKG: sinus bradycardia HR 55  Medical Clearances: Dr. Daniel 1/10 -  patient is at intermediate risk for intermediate risk procedure, can proceed to OR without further testing  TTE 10/2023: LVEF normal, no gross valvular abnormalities, PASP 47mmHg      Last dose of antiplatelet/anticoagulation drug: none    Implanted Devices (pacemaker, drug pump...etc):  []YES   [x] NO                  If yes --> EPS consulted to interrogate device: [ ] YES  [ ] NO                            If yes -->  EPS called to let them know patient going for surgery: [ ] device needs to be turned off                                                                                                                                                 [ ] magnet needs to be placed for surgery                                                                                                                                                [ ] nothing to do per EP, may proceed with cautery use in OR                                       3M nasal swab ordered?  x_Y  _N    Cranial surgery: Order written for hair to be shampooed night before surgery and morning before surgery  [x] yes   []no  Chlorhexidine Wipes ordered for Neck Down?  x_ Y  _ N  (twice a day if 1 day before surgery, daily for 3 days if 3 days prior, daily if in ICU)                 Assessment:  77 y/o M with pmh melanoma (L arm skin resection 2023), basal cell carcinoma, non small cell lung cancer - adenocarcinoma (s/p radiation chemo, completed 10/2023), and BPH presents with loss of balance, HA, fatigue, generalized weakness, diplopia and b/l foot tingling x 2 weeks and nausea/vomiting x 2-4 days. Pt found to have 5 areas of brain metastases, largest in the  L cerebellum, on outpatient MRI. Admitted for OR tomorrow    Plan:  - preop for OR tomorrow  - pending consent signing  - preop ERAS ordered  - NPO after midnight with IVF  - pending KUB and bladder scan  - pending CT A/P for metastatic workup  - medically cleared      D/ w Dr. D'Amico    Assessment:  Present when checked    []  GCS  E   V  M     Heart Failure: []Acute, [] acute on chronic , []chronic  Heart Failure:  [] Diastolic (HFpEF), [] Systolic (HFrEF), []Combined (HFpEF and HFrEF), [] RHF, [] Pulm HTN, [] Other    [] MEHREEN, [] ATN, [] AIN, [] other  [] CKD1, [] CKD2, [] CKD 3, [] CKD 4, [] CKD 5, []ESRD    Encephalopathy: [] Metabolic, [] Hepatic, [] toxic, [] Neurological, [] Other    Abnormal Nurtitional Status: [] malnurtition (see nutrition note), [ ]underweight: BMI < 19, [] morbid obesity: BMI >40, [] Cachexia    [] Sepsis  [] hypovolemic shock,[] cardiogenic shock, [] hemorrhagic shock, [] neuogenic shock  [] Acute Respiratory Failure  []Cerebral edema, [] Brain compression/ herniation,   [] Functional quadriplegia  [] Acute blood loss anemia   Surgery: Left suboccipital craniotomy for brain tumor resection  Consent: Signed by patient vs HCP - pending                   NAME/NUMBER of HCP:     Representative Consent: [  x] Signed by patient vs HCP                                                 [  ] N/A -> only for cerebral angiogram    No Known Allergies    Subjective: MRI brain completed outpatient. Patient admitted to [facility] for OR tomorrow.    T(C): 36.5 (01-10-24 @ 16:09), Max: 36.6 (01-10-24 @ 13:33)  HR: 75 (01-10-24 @ 16:09) (61 - 75)  BP: 155/75 (01-10-24 @ 16:09) (148/73 - 155/75)  RR: 17 (01-10-24 @ 16:09) (17 - 18)  SpO2: 99% (01-10-24 @ 16:09) (99% - 99%)  Wt(kg): --    EXAM:      01-10    138  |  99  |  15  ----------------------------<  105<H>  see note   |  28  |  0.56    Ca    10.0      10 Familia 2024 15:18      CBC Full  -  ( 10 Familia 2024 15:18 )  WBC Count : 6.87 K/uL  RBC Count : 4.52 M/uL  Hemoglobin : 13.5 g/dL  Hematocrit : 42.5 %  Platelet Count - Automated : 222 K/uL  Mean Cell Volume : 94.0 fl  Mean Cell Hemoglobin : 29.9 pg  Mean Cell Hemoglobin Concentration : 31.8 gm/dL  Auto Neutrophil # : x  Auto Lymphocyte # : x  Auto Monocyte # : x  Auto Eosinophil # : x  Auto Basophil # : x  Auto Neutrophil % : x  Auto Lymphocyte % : x  Auto Monocyte % : x  Auto Eosinophil % : x  Auto Basophil % : x    PT/INR - ( 10 Familia 2024 15:18 )   PT: 11.6 sec;   INR: 1.02          PTT - ( 10 Familia 2024 15:18 )  PTT:24.2 sec    Pregnancy test (serum hcg for any female under 56, must be resulted day before OR): [ ] Negative Result  [ ] Positive Result  [ ] N/A : male or female>57 y/o  Type & Screen (in past 72hrs):     2 Type & Screen within 72 hours if anticipate blood need in OR:  _ Y _ N     Blood ordered and on hold for OR:   [ ] No need     [ ] 1u pRBC on hold      [ ] 2u pRBC on hold    COVID swab (in past 48hrs): _ Y  _N    CXR: no infiltrates  EKG: sinus bradycardia HR 55  Medical Clearances: Dr. Daniel 1/10 -  patient is at intermediate risk for intermediate risk procedure, can proceed to OR without further testing  TTE 10/2023: LVEF normal, no gross valvular abnormalities, PASP 47mmHg      Last dose of antiplatelet/anticoagulation drug: none    Implanted Devices (pacemaker, drug pump...etc):  []YES   [x] NO                  If yes --> EPS consulted to interrogate device: [ ] YES  [ ] NO                            If yes -->  EPS called to let them know patient going for surgery: [ ] device needs to be turned off                                                                                                                                                 [ ] magnet needs to be placed for surgery                                                                                                                                                [ ] nothing to do per EP, may proceed with cautery use in OR                                       3M nasal swab ordered?  x_Y  _N    Cranial surgery: Order written for hair to be shampooed night before surgery and morning before surgery  [x] yes   []no  Chlorhexidine Wipes ordered for Neck Down?  x_ Y  _ N  (twice a day if 1 day before surgery, daily for 3 days if 3 days prior, daily if in ICU)                 Assessment:  79 y/o M with pmh melanoma (L arm skin resection 2023), basal cell carcinoma, non small cell lung cancer - adenocarcinoma (s/p radiation chemo, completed 10/2023), and BPH presents with loss of balance, HA, fatigue, generalized weakness, diplopia and b/l foot tingling x 2 weeks and nausea/vomiting x 2-4 days. Pt found to have 5 areas of brain metastases, largest in the  L cerebellum, on outpatient MRI. Admitted for OR tomorrow    Plan:  - preop for OR tomorrow  - pending consent signing  - preop ERAS ordered  - NPO after midnight with IVF  - pending KUB and bladder scan  - pending CT A/P for metastatic workup  - medically cleared      D/ w Dr. D'Amico    Assessment:  Present when checked    []  GCS  E   V  M     Heart Failure: []Acute, [] acute on chronic , []chronic  Heart Failure:  [] Diastolic (HFpEF), [] Systolic (HFrEF), []Combined (HFpEF and HFrEF), [] RHF, [] Pulm HTN, [] Other    [] MEHREEN, [] ATN, [] AIN, [] other  [] CKD1, [] CKD2, [] CKD 3, [] CKD 4, [] CKD 5, []ESRD    Encephalopathy: [] Metabolic, [] Hepatic, [] toxic, [] Neurological, [] Other    Abnormal Nurtitional Status: [] malnurtition (see nutrition note), [ ]underweight: BMI < 19, [] morbid obesity: BMI >40, [] Cachexia    [] Sepsis  [] hypovolemic shock,[] cardiogenic shock, [] hemorrhagic shock, [] neuogenic shock  [] Acute Respiratory Failure  []Cerebral edema, [] Brain compression/ herniation,   [] Functional quadriplegia  [] Acute blood loss anemia   Surgery: Left suboccipital craniotomy for brain tumor resection  Consent: Signed by patient vs HCP                       Representative Consent: [  x] Signed by patient vs HCP                                                 [  ] N/A -> only for cerebral angiogram    No Known Allergies    Subjective: MRI brain completed outpatient. Patient admitted to [facility] for OR tomorrow.    T(C): 36.5 (01-10-24 @ 16:09), Max: 36.6 (01-10-24 @ 13:33)  HR: 75 (01-10-24 @ 16:09) (61 - 75)  BP: 155/75 (01-10-24 @ 16:09) (148/73 - 155/75)  RR: 17 (01-10-24 @ 16:09) (17 - 18)  SpO2: 99% (01-10-24 @ 16:09) (99% - 99%)  Wt(kg): --    EXAM:  General: patient seen laying supine in bed in NAD  Neuro: AAOx3, FC, OE spontaneously, speech clear and fluent, CNII-XI grossly intact, face symmetric, no pronator drift, +b/l UE dysmetria,   strength 5/5 b/l UE and LE, sensation intact to light touch throughout  HEENT: PERRL, EOMI  Neck: supple  Cardiac: RRR, S1S2  Pulmonary: chest rise symmetric  Abdomen: soft, nontender, nondistended  Ext: perfusing well  Skin: warm, dry        01-10    138  |  99  |  15  ----------------------------<  105<H>  see note   |  28  |  0.56    Ca    10.0      10 Familia 2024 15:18      CBC Full  -  ( 10 Familia 2024 15:18 )  WBC Count : 6.87 K/uL  RBC Count : 4.52 M/uL  Hemoglobin : 13.5 g/dL  Hematocrit : 42.5 %  Platelet Count - Automated : 222 K/uL  Mean Cell Volume : 94.0 fl  Mean Cell Hemoglobin : 29.9 pg  Mean Cell Hemoglobin Concentration : 31.8 gm/dL  Auto Neutrophil # : x  Auto Lymphocyte # : x  Auto Monocyte # : x  Auto Eosinophil # : x  Auto Basophil # : x  Auto Neutrophil % : x  Auto Lymphocyte % : x  Auto Monocyte % : x  Auto Eosinophil % : x  Auto Basophil % : x    PT/INR - ( 10 Familia 2024 15:18 )   PT: 11.6 sec;   INR: 1.02          PTT - ( 10 Familia 2024 15:18 )  PTT:24.2 sec    Pregnancy test (serum hcg for any female under 56, must be resulted day before OR): [ ] Negative Result  [ ] Positive Result  [X ] N/A : male or female>55 y/o  Type & Screen (in past 72hrs):     2 Type & Screen within 72 hours if anticipate blood need in OR:  X_ Y _ N     Blood ordered and on hold for OR:   [ ] No need     [ ] 1u pRBC on hold      [ x] 2u pRBC on hold    COVID swab (in past 48hrs): _ Y  X_N    CXR: no infiltrates  EKG: sinus bradycardia HR 55  Medical Clearances: Dr. Daniel 1/10 -  patient is at intermediate risk for intermediate risk procedure, can proceed to OR without further testing  TTE 10/2023: LVEF normal, no gross valvular abnormalities, PASP 47mmHg      Last dose of antiplatelet/anticoagulation drug: none    Implanted Devices (pacemaker, drug pump...etc):  []YES   [x] NO                  If yes --> EPS consulted to interrogate device: [ ] YES  [ ] NO                            If yes -->  EPS called to let them know patient going for surgery: [ ] device needs to be turned off                                                                                                                                                 [ ] magnet needs to be placed for surgery                                                                                                                                                [ ] nothing to do per EP, may proceed with cautery use in OR                                       3M nasal swab ordered?  x_Y  _N    Cranial surgery: Order written for hair to be shampooed night before surgery and morning before surgery  [x] yes   []no  Chlorhexidine Wipes ordered for Neck Down?  x_ Y  _ N  (twice a day if 1 day before surgery, daily for 3 days if 3 days prior, daily if in ICU)                 Assessment:  79 y/o M with pmh melanoma (L arm skin resection 2023), basal cell carcinoma, non small cell lung cancer - adenocarcinoma (s/p radiation chemo, completed 10/2023), and BPH presents with loss of balance, HA, fatigue, generalized weakness, diplopia and b/l foot tingling x 2 weeks and nausea/vomiting x 2-4 days. Pt found to have 5 areas of brain metastases, largest in the  L cerebellum, on outpatient MRI. Admitted for OR tomorrow    Plan:  - preop for OR tomorrow  - Consent signed and in chart  - preop ERAS ordered  - NPO after midnight with IVF  - pending KUB official read, bladder scan PVR <300  - CT A/P negative for metastatic workup  - medically cleared      D/ w Dr. D'Amico    Assessment:  Present when checked    []  GCS  E   V  M     Heart Failure: []Acute, [] acute on chronic , []chronic  Heart Failure:  [] Diastolic (HFpEF), [] Systolic (HFrEF), []Combined (HFpEF and HFrEF), [] RHF, [] Pulm HTN, [] Other    [] MEHREEN, [] ATN, [] AIN, [] other  [] CKD1, [] CKD2, [] CKD 3, [] CKD 4, [] CKD 5, []ESRD    Encephalopathy: [] Metabolic, [] Hepatic, [] toxic, [] Neurological, [] Other    Abnormal Nurtitional Status: [] malnurtition (see nutrition note), [ ]underweight: BMI < 19, [] morbid obesity: BMI >40, [] Cachexia    [] Sepsis  [] hypovolemic shock,[] cardiogenic shock, [] hemorrhagic shock, [] neuogenic shock  [] Acute Respiratory Failure  []Cerebral edema, [] Brain compression/ herniation,   [] Functional quadriplegia  [] Acute blood loss anemia   Surgery: Left suboccipital craniotomy for brain tumor resection  Consent: Signed by patient vs HCP                       Representative Consent: [  x] Signed by patient vs HCP                                                 [  ] N/A -> only for cerebral angiogram    No Known Allergies    Subjective: MRI brain completed outpatient. Patient admitted to [facility] for OR tomorrow.    T(C): 36.5 (01-10-24 @ 16:09), Max: 36.6 (01-10-24 @ 13:33)  HR: 75 (01-10-24 @ 16:09) (61 - 75)  BP: 155/75 (01-10-24 @ 16:09) (148/73 - 155/75)  RR: 17 (01-10-24 @ 16:09) (17 - 18)  SpO2: 99% (01-10-24 @ 16:09) (99% - 99%)  Wt(kg): --    EXAM:  General: patient seen laying supine in bed in NAD  Neuro: AAOx3, FC, OE spontaneously, speech clear and fluent, CNII-XI grossly intact, face symmetric, no pronator drift, +b/l UE dysmetria,   strength 5/5 b/l UE and LE, sensation intact to light touch throughout  HEENT: PERRL, EOMI  Neck: supple  Cardiac: RRR, S1S2  Pulmonary: chest rise symmetric  Abdomen: soft, nontender, nondistended  Ext: perfusing well  Skin: warm, dry        01-10    138  |  99  |  15  ----------------------------<  105<H>  see note   |  28  |  0.56    Ca    10.0      10 Familia 2024 15:18      CBC Full  -  ( 10 Familia 2024 15:18 )  WBC Count : 6.87 K/uL  RBC Count : 4.52 M/uL  Hemoglobin : 13.5 g/dL  Hematocrit : 42.5 %  Platelet Count - Automated : 222 K/uL  Mean Cell Volume : 94.0 fl  Mean Cell Hemoglobin : 29.9 pg  Mean Cell Hemoglobin Concentration : 31.8 gm/dL  Auto Neutrophil # : x  Auto Lymphocyte # : x  Auto Monocyte # : x  Auto Eosinophil # : x  Auto Basophil # : x  Auto Neutrophil % : x  Auto Lymphocyte % : x  Auto Monocyte % : x  Auto Eosinophil % : x  Auto Basophil % : x    PT/INR - ( 10 Familia 2024 15:18 )   PT: 11.6 sec;   INR: 1.02          PTT - ( 10 Familia 2024 15:18 )  PTT:24.2 sec    Pregnancy test (serum hcg for any female under 56, must be resulted day before OR): [ ] Negative Result  [ ] Positive Result  [X ] N/A : male or female>57 y/o  Type & Screen (in past 72hrs):     2 Type & Screen within 72 hours if anticipate blood need in OR:  X_ Y _ N     Blood ordered and on hold for OR:   [ ] No need     [ ] 1u pRBC on hold      [ x] 2u pRBC on hold    COVID swab (in past 48hrs): _ Y  X_N    CXR: no infiltrates  EKG: sinus bradycardia HR 55  Medical Clearances: Dr. Daniel 1/10 -  patient is at intermediate risk for intermediate risk procedure, can proceed to OR without further testing  TTE 10/2023: LVEF normal, no gross valvular abnormalities, PASP 47mmHg      Last dose of antiplatelet/anticoagulation drug: none    Implanted Devices (pacemaker, drug pump...etc):  []YES   [x] NO                  If yes --> EPS consulted to interrogate device: [ ] YES  [ ] NO                            If yes -->  EPS called to let them know patient going for surgery: [ ] device needs to be turned off                                                                                                                                                 [ ] magnet needs to be placed for surgery                                                                                                                                                [ ] nothing to do per EP, may proceed with cautery use in OR                                       3M nasal swab ordered?  x_Y  _N    Cranial surgery: Order written for hair to be shampooed night before surgery and morning before surgery  [x] yes   []no  Chlorhexidine Wipes ordered for Neck Down?  x_ Y  _ N  (twice a day if 1 day before surgery, daily for 3 days if 3 days prior, daily if in ICU)                 Assessment:  79 y/o M with pmh melanoma (L arm skin resection 2023), basal cell carcinoma, non small cell lung cancer - adenocarcinoma (s/p radiation chemo, completed 10/2023), and BPH presents with loss of balance, HA, fatigue, generalized weakness, diplopia and b/l foot tingling x 2 weeks and nausea/vomiting x 2-4 days. Pt found to have 5 areas of brain metastases, largest in the  L cerebellum, on outpatient MRI. Admitted for OR tomorrow    Plan:  - preop for OR tomorrow  - Consent signed and in chart  - preop ERAS ordered  - NPO after midnight with IVF  - pending KUB official read, bladder scan PVR <300  - CT A/P negative for metastatic workup  - medically cleared      D/ w Dr. D'Amico    Assessment:  Present when checked    []  GCS  E   V  M     Heart Failure: []Acute, [] acute on chronic , []chronic  Heart Failure:  [] Diastolic (HFpEF), [] Systolic (HFrEF), []Combined (HFpEF and HFrEF), [] RHF, [] Pulm HTN, [] Other    [] MEHREEN, [] ATN, [] AIN, [] other  [] CKD1, [] CKD2, [] CKD 3, [] CKD 4, [] CKD 5, []ESRD    Encephalopathy: [] Metabolic, [] Hepatic, [] toxic, [] Neurological, [] Other    Abnormal Nurtitional Status: [] malnurtition (see nutrition note), [ ]underweight: BMI < 19, [] morbid obesity: BMI >40, [] Cachexia    [] Sepsis  [] hypovolemic shock,[] cardiogenic shock, [] hemorrhagic shock, [] neuogenic shock  [] Acute Respiratory Failure  []Cerebral edema, [] Brain compression/ herniation,   [] Functional quadriplegia  [] Acute blood loss anemia

## 2024-01-10 NOTE — PATIENT PROFILE ADULT - FUNCTIONAL ASSESSMENT - BASIC MOBILITY 5.
Patient has been assessed for Home Oxygen needs. Oxygen readings:    *Pulse oximetry (SpO2) = 89% on room air at rest while awake.    *SpO2 improved to 95% on 1 liter/minute at rest.    *SpO2 = 86% on room air during activity/with exercise.    *SpO2 improved to 92% on 1 liter/minute during activity/with exercise.       2 = A lot of assistance

## 2024-01-10 NOTE — CONSULT NOTE ADULT - SUBJECTIVE AND OBJECTIVE BOX
HPI:  78 YOM with PMH of TUD, BPH, and metastatic NSCLC s/p chemoradiation presenting with two weeks of fatigue, anorexia, N/V, and double vision. He was seen in ED on 1/7/24 for similar symptoms, recieved IVFH and anti-emetic prior to discharge home with close PCP follow up. Has been following outpatient with his PCP as well as heme/onc ([facility]). Most recent visit with Dr. Mart - patient c/o double vision and gait instability. Plan was to start immunotherapy if repeat MRI brain negative. However, MRI brain revealed multiple brain mets so patient was sent in by Dr. D'Amico today for further neurosurgical management.     Patient states fatigue and anorexia started about two weeks ago. He has been having N/V with difficulty keeping food down - feels intermittent cramping in abdomen when nausea severe. States has not had a BM for several days, voiding spontaneously. Also endorses doubel vision for 1.5 weeks, no headache or extremity weakness/numbness though his gait has been off. Lives in a 6th floor walk-up with his sister. Around Oct/Nov time, patient was able to walk up all five flights (usually stopping at each level). However daughter notes in recent 2-4w stairs have become more difficult though patient states he is still able to do them (last climbed them yesterday). Has never had chest pain going up stairs. Does grocery shopping as well for himself and his sister. Denies fever, rhinorrhea, sore throat, chest pain, cough, orthopnea, PND, LE edema, diarrhea, urinary symptoms, rash, LOC.     ROS: negative except as per HPI    PMH: NSCLC  PSH: melanoma resection on arm (last year), dental surgery  Medications: tamsulosin  Allergies: reviewed  SH: ~50-pack year history of smoking quit 5 years ago  FH:    Diet, NPO after Midnight:      NPO Start Date: 10-Familia-2024,   NPO Start Time: 23:59  Except Medications (01-10-24 @ 15:53) [Active]      MEDICATIONS:  MEDICATIONS  (STANDING):  chlorhexidine 2% Cloths 1 Application(s) Topical every 12 hours  dexAMETHasone     Tablet 4 milliGRAM(s) Oral every 6 hours  insulin lispro (ADMELOG) corrective regimen sliding scale   SubCutaneous three times a day before meals  iohexol 300 mG (iodine)/mL Oral Solution 30 milliLiter(s) Oral once  pantoprazole    Tablet 40 milliGRAM(s) Oral before breakfast  sodium chloride 0.9%. 1000 milliLiter(s) (45 mL/Hr) IV Continuous <Continuous>  tamsulosin 0.4 milliGRAM(s) Oral at bedtime    MEDICATIONS  (PRN):  acetaminophen     Tablet .. 650 milliGRAM(s) Oral every 6 hours PRN Temp greater or equal to 38.5C (101.3F), Mild Pain (1 - 3)  ondansetron Injectable 4 milliGRAM(s) IV Push every 6 hours PRN Nausea and/or Vomiting      Allergies    No Known Allergies    Intolerances        OBJECTIVE:  Vital Signs Last 24 Hrs  T(C): 36.6 (10 Familia 2024 13:33), Max: 36.6 (10 Familia 2024 13:33)  T(F): 97.9 (10 Familia 2024 13:33), Max: 97.9 (10 Familia 2024 13:33)  HR: 61 (10 Familia 2024 13:33) (61 - 61)  BP: 148/73 (10 Familia 2024 13:33) (148/73 - 148/73)  BP(mean): --  RR: 18 (10 Familia 2024 13:33) (18 - 18)  SpO2: 99% (10 Familia 2024 13:33) (99% - 99%)    Parameters below as of 10 Familia 2024 13:33  Patient On (Oxygen Delivery Method): room air      I&O's Summary      PHYSICAL EXAM:  Gen: appears stated age, resting comfortably, thin appearing  HEENT: NCAT, MMM, clear OP  Neck: supple  CV: RRR, no m/r/g, peripheral pulses 2+  Pulm: CTAB, no increased work of breathing, no rales/rhonchi  Abd: soft, ND, NT, no rebound or guarding  Skin: warm and dry  Ext: non-tender, no edema  Neuro: AOx3, speaking in full sentences  Psych: affect and behavior appropriate, pleasant at time of interview    LABS:                        13.5   6.87  )-----------( 222      ( 10 Familia 2024 15:18 )             42.5     01-10    138  |  99  |  15  ----------------------------<  105<H>  see note   |  28  |  0.56    Ca    10.0      10 Familia 2024 15:18          CAPILLARY BLOOD GLUCOSE        Urinalysis Basic - ( 10 Familia 2024 15:18 )    Color: x / Appearance: x / SG: x / pH: x  Gluc: 105 mg/dL / Ketone: x  / Bili: x / Urobili: x   Blood: x / Protein: x / Nitrite: x   Leuk Esterase: x / RBC: x / WBC x   Sq Epi: x / Non Sq Epi: x / Bacteria: x        MICRODATA:    Urinalysis with Rflx Culture (collected 07 Jan 2024 22:48)    Culture - Blood (collected 07 Jan 2024 22:48)  Source: .Blood Blood-Peripheral  Preliminary Report (10 Familia 2024 02:00):    No growth at 2 days.    Culture - Urine (collected 07 Jan 2024 22:48)  Source: Clean Catch None  Final Report (09 Jan 2024 08:57):    Less than 10,000 cols/cc; Insignificant amount of growth.    Culture - Blood (collected 07 Jan 2024 22:48)  Source: .Blood Blood-Peripheral  Preliminary Report (10 Familia 2024 02:00):    No growth at 2 days.        RADIOLOGY/OTHER STUDIES:    TTE 10/20/23:  CONCLUSIONS:  1. Normal left and right ventricular size and systolic function.  2. Left ventricular global longitudinal strain was -18.00 % (normal =< -18%).  3. Mildly dilated right atrium.  4. Aortic sclerosis without significant stenosis.  5. Mild tricuspid regurgitation.  6. Pulmonary hypertension present, pulmonary artery systolic pressure is 47 mmHg.  7. No pericardial effusion.

## 2024-01-10 NOTE — H&P ADULT - NSICDXPASTMEDICALHX_GEN_ALL_CORE_FT
PAST MEDICAL HISTORY:  Basal cell carcinoma     BPH (benign prostatic hyperplasia)     Lung cancer metastatic to brain     Prediabetes     Skin melanoma

## 2024-01-10 NOTE — H&P ADULT - ASSESSMENT
Assessment:   77 y/o M with pmh melanoma (L arm skin resection 2023), basal cell carcinoma, non small cell lung cancer - adenocarcinoma (s/p radiation chemo, completed 10/2023), and BPH presents with loss of balance, HA, fatigue, generalized weakness, diplopia and b/l foot tingling x 2 weeks and nausea/vomiting x 2-4 days. Pt found to have 5 areas of brain metastases, largest in the  L cerebellum, on outpatient MRI. Admitted for OR tomorrow.    Plan:    Neuro:  - admit to neurosurgery  - neuro/vital checks q4  - pain control prn  - pending medicine clearance  - MRI brain completed outpatient 1/10  - preop for OR 1/11    Cardiac:  - SBP goal 100-160    Pulmonary:  - on RA    GI:  - NPO after midnight for OR  - bowel regimen  - protonix while on steroids  - zofran prn nausea    Renal:  - NS at 45 when NPO  - pending KUB and bladder scan to r/o retention  - cont home flomax    Heme:  - pending CT A/P for metastatic workup  - SCDs for DVT prophylaxis, hold chemoprophylaxis due to preop status    Endo:  - ISS, f/u A1C    ID:  - afebrile    Disposition: SDU status, full code, dispo pending      D/w Dr. D'Amico   Assessment:   77 y/o M with pmh melanoma (L arm skin resection 2023), basal cell carcinoma, non small cell lung cancer - adenocarcinoma (s/p radiation chemo, completed 10/2023), and BPH presents with loss of balance, HA, fatigue, generalized weakness, diplopia and b/l foot tingling x 2 weeks and nausea/vomiting x 3-4 days. Pt found to have 5 areas of brain metastases, largest in the  L cerebellum, on outpatient MRI. Admitted for OR tomorrow.    Plan:    Neuro:  - admit to neurosurgery  - neuro/vital checks q4  - pain control prn  - pending medicine clearance  - MRI brain completed outpatient 1/10  - preop for OR 1/11    Cardiac:  - SBP goal 100-160    Pulmonary:  - on RA    GI:  - NPO after midnight for OR  - bowel regimen  - protonix while on steroids  - zofran prn nausea    Renal:  - NS at 45 when NPO  - pending KUB and bladder scan to r/o retention  - cont home flomax    Heme:  - pending CT A/P for metastatic workup  - SCDs for DVT prophylaxis, hold chemoprophylaxis due to preop status    Endo:  - ISS, f/u A1C    ID:  - afebrile    Disposition: SDU status, full code, dispo pending      D/w Dr. D'Amico

## 2024-01-10 NOTE — ASSESSMENT
[FreeTextEntry1] : Patient diagnosed with lung cancer (lung adenocarcinoma s/p chemo and radiation to lung; PDL 1 - 100%) six to seven months ago, currently under the care of Dr. Clark. Recently developed Worsening balance issues and nausea. MRI of the brain revealed five brain tumors of different sizes and locations. Larger tumors display cystic components and surrounding swelling causing compression on the brain in left cerebellar hemisphere with associated mild hydrocephalus. Immediate steps involve admitting the patient to the hospital via the ER, starting IV steroids, and preparing for necessary surgery tomorrow to address the major brain tumor. This will prepare the patient for further treatment with immunotherapy and targeted radiation.  Dr. D'Amico independently reviewed all available images with patient and his daughter.    PLAN: - Sent to ER   Patient verbalizes understanding of today's discussion and next steps in treatment plan.     A total of 45 minutes was spent reviewing the labs, imaging and physical examination of the patient. We discussed the diagnosis, and the plan. The patient's questions were answered. The patient demonstrated an excellent understanding of the plan.

## 2024-01-10 NOTE — ED PROVIDER NOTE - OBJECTIVE STATEMENT
79 yo male former smoker (40 pack years) h/o skin ca, Stage IIIB NSCLC (completed radiation therapy and chemo ~2mo ago, planning to start immunotherapy) recently diagnosed mult brain mets seen in ed 1/7 for c/o n/v, s/p fu w onc 1/9 and noted to have ha, double vision and nausea, MRI done and showed mult brain mets w vasogenic edema sent by NSG tba for iv steroids, surg mgmt of mets.  No sig change in ongoing sx.

## 2024-01-11 PROBLEM — C34.90 MALIGNANT NEOPLASM OF UNSPECIFIED PART OF UNSPECIFIED BRONCHUS OR LUNG: Chronic | Status: ACTIVE | Noted: 2024-01-01

## 2024-01-11 NOTE — PRE-ANESTHESIA EVALUATION ADULT - NSANTHSNORERD_ENT_A_CORE
General Surgery Progress Note     Surgery: sylvia repair, SBR, primary anastomosis    Subjective:   No acute events overnight. Afebrile,tachycardic to 120-150s currently in Afib, otherwise VSS. No complaints of pain. Tolerating clear liquids.   (-) BM, (+) flatus    Objective:   Vitals:    11/06/21 0006 11/06/21 0320 11/06/21 0506 11/06/21 0523   BP: 132/78 126/83 128/80 108/71   Pulse: (!) 125 (!) 122 (!) 129 (!) 137   Resp:  18  18   Temp: 98.1 °F (36.7 °C)   97.7 °F (36.5 °C)   TempSrc: Oral   Oral   SpO2: 98% 95%  94%   Weight:       Height:          General- in no acute distress   HEENT- normocephalic, atraumatic   Cardiac- regular rate, well perfused   Pulm- unlabored respirations, symmetric chest rise   Abd- soft, non-tender, non-distended, incision site CDI  Ext- moves all 4   Neuro- no focal deficits  Psych- cooperative     No intake or output data in the 24 hours ending 11/06/21 0547  Labs    Recent Labs     11/03/21  1632 11/03/21  1705 11/03/21  2327 11/05/21  0742   SODIUM 130*  --  132* 134*   POTASSIUM 4.1  --  4.1 4.9   CHLORIDE 99  --  101 107   CO2 23  --  22 23   BUN 42*  --  41* 35*   CREATININE 1.28*  --  1.20* 1.12*   GLUCOSE 92  --  111* 98   WBC  --  7.5 3.8* 8.6   HCT  --  32.5* 35.8* 27.3*   HGB  --  10.8* 11.4* 8.7*   PLT  --  291 311 235   AST 51*  --   --   --    GPT 35  --   --   --    ALKPT 98  --   --   --    BILIRUBIN 1.2*  --   --   --      Assessment/Plan   Sugey Soares is an 81 F with left hernia incarcerated ischemic bowel  s/p open left inguinal hernia repair- Sylvia repair, small bowel resection with primary anastomosis.    - per cardiology for Afib  - OK for AC  - continue multimodal pain mgmt  -  Awaiting return of bowel function  - clear liquid diet --> advance diet  - Activity as ordered, PT/OT help ambulation  - Rest of care per primary team    Discussed with attending Dr. Wilcox, addendum as appropriate    Soledad Salinas MD PGY1  Family Medicine    Please Perfect  serve \"general surgery intern\" for question/concerns regarding patient.    No

## 2024-01-11 NOTE — CONSULT NOTE ADULT - ATTENDING COMMENTS
Patient seen on 1/12/2024.    77 yo M with h/o basal cell carcinoma and melanoma (resected several years ago), stage IIIB NSCLC s/p chemoRT (completed 10/2023) now developed nausea, vomiting, double vision, unsteady gait. Urgent outpatient MRI done showing multiple new brain metastases, largest one left cerebellum.  Sent urgently to see Dr.D'Amico, who sent him to ED.  Now s/p craniotomy with tumor resection on 1/11/2024 by Dr.D'Amico.  Seen postoperatively today, doing much better. Dysmetria improved. Passed SLP eval.  Plan for PT today.    Overall treatment plan is to start single agent pembrolizumab (due to PDL1 TPS score 100%) as soon as possible as outpatient (IV 30 mins every 3 weeks). Except a response>60% given high PDL1 positivity.  In addition to that, he should get SRS to the other BMs, most importantly the brain stem one (likely total 3 fractions), and dc planning should not delay this plan, as delay in treatment of his residual BMs might significantly worsen his morbidity/mortality.    Please take into account above recommendations with dc planning.    Total time spent on encounter, including but not limited to counseling/coordination of care: 75 mis.

## 2024-01-11 NOTE — PROGRESS NOTE ADULT - SUBJECTIVE AND OBJECTIVE BOX
=================================  NEUROCRITICAL CARE ATTENDING NOTE  =================================    ERMA HER   MRN-[id number]  Summary:  78y/M  with melanoma (L arm skin resection 2023), basal cell carcinoma, non small cell lung cancer - adenocarcinoma (s/p radiation chemo, completed 10/2023), and BPH presents with loss of balance, HA, fatigue, generalized weakness, diplopia and b/l foot tingling x 2 weeks and nausea/vomiting x 3-4 days. Patient also admits to mild memory loss x 3-4 months. Pt found to have 5 areas of brain metastases, largest in the  L cerebellum, on outpatient MRI today. Patient sent to ER for neurosurgery admission. (10 Familia 2024 15:57)    COURSE IN THE HOSPITAL:  01/10 admitted to [facility]  01/11 s/p retrosig resection (Frozen metastatic carcinoma)    Past Medical History: BPH (benign prostatic hyperplasia) Lung mass Prediabetes Lung cancer metastatic to brain Skin melanoma Basal cell carcinoma  Allergies:  No Known Allergies  Home meds:   ·	tamsulosin 0.4 mg oral capsule: 1 cap(s) orally once a day    PHYSICAL EXAMINATION  T(C): 36.7 (01-11 @ 10:50), Max: 36.7 (01-11 @ 09:22) HR: 56 (01-11 @ 10:50) (56 - 99) BP: 120/63 (01-11 @ 10:50) (120/63 - 168/80) RR: 18 (01-11 @ 10:50) (16 - 18) SpO2: 98% (01-11 @ 10:50) (96% - 99%)  NEUROLOGIC EXAMINATION:  Patient is lethargic, waking up from anesthesia  GENERAL: not intubated, not in cardiorespiratory distress  EENT:  anicteric  CARDIOVASCULAR: (+) S1 S2, bradycardic rate and regular rhythm  PULMONARY: clear to auscultation bilaterally  ABDOMEN: soft, nontender with normoactive bowel sounds  EXTREMITIES: no edema  SKIN: no rash    LABS:  CAPILLARY BLOOD GLUCOSE 114 110 99                13.3   4.50  )-----------( 269      ( 11 Jan 2024 05:30 )             42.8     141  |  100  |  14  ----------------------------<  117<H>  4.3   |  28  |  0.62    Ca    9.8      11 Jan 2024 05:30  Phos  4.5     01-11  Mg     2.1     01-11    01-10 @ 07:01 - 01-11 @ 07:00  --------------------------------------------------------  IN: 425 mL / OUT: 1050 mL / NET: -625 mL    01-11 @ 07:01 - 01-11 @ 16:56  --------------------------------------------------------  IN: 100 mL / OUT: 0 mL / NET: 100 mL    Bacteriology:  CSF studies:  EEG:  Neuroimaging:  Other imaging:    MEDICATIONS: 01-11    ·	acetaminophen     Tablet .. 1000 Oral every 6 hours  ·	ondansetron Injectable 4 IV Push every 6 hours    IV FLUIDS:  DRIPS:  DIET:  Lines:  Drains:    01-10-24 @ 07:01  -  01-11-24 @ 07:00  --------------------------------------------------------  OUT:    Voided (mL): 1050 mL  Total OUT: 1050 mL        Wounds:    CODE STATUS:  Full Code                       GOALS OF CARE:  aggressive                      DISPOSITION:  ICU =================================  NEUROCRITICAL CARE ATTENDING NOTE  =================================    ERMA HER   MRN-[id number]  Summary:  78y/M  with melanoma (L arm skin resection 2023), basal cell carcinoma, non small cell lung cancer - adenocarcinoma (s/p radiation chemo, completed 10/2023), and BPH presents with loss of balance, HA, fatigue, generalized weakness, diplopia and b/l foot tingling x 2 weeks and nausea/vomiting x 3-4 days. Patient also admits to mild memory loss x 3-4 months. Pt found to have 5 areas of brain metastases, largest in the  L cerebellum, on outpatient MRI today. Patient sent to ER for neurosurgery admission. (10 Familia 2024 15:57)    COURSE IN THE HOSPITAL:  01/10 admitted to [facility]  01/11 s/p retrosig resection (Frozen metastatic carcinoma)    Past Medical History: BPH (benign prostatic hyperplasia) Lung mass Prediabetes Lung cancer metastatic to brain Skin melanoma Basal cell carcinoma  Allergies:  No Known Allergies  Home meds:   ·	tamsulosin 0.4 mg oral capsule: 1 cap(s) orally once a day    PHYSICAL EXAMINATION  T(C): 36.7 (01-11 @ 10:50), Max: 36.7 (01-11 @ 09:22) HR: 56 (01-11 @ 10:50) (56 - 99) BP: 120/63 (01-11 @ 10:50) (120/63 - 168/80) RR: 18 (01-11 @ 10:50) (16 - 18) SpO2: 98% (01-11 @ 10:50) (96% - 99%)  NEUROLOGIC EXAMINATION:  Patient is lethargic, waking up from anesthesia  GENERAL: not intubated, not in cardiorespiratory distress  EENT:  anicteric  CARDIOVASCULAR: (+) S1 S2, bradycardic rate and regular rhythm  PULMONARY: clear to auscultation bilaterally  ABDOMEN: soft, nontender with normoactive bowel sounds  EXTREMITIES: no edema  SKIN: no rash    LABS:  CAPILLARY BLOOD GLUCOSE 114 110 99                13.3   4.50  )-----------( 269      ( 11 Jan 2024 05:30 )             42.8     141  |  100  |  14  ----------------------------<  117<H>  4.3   |  28  |  0.62    Ca    9.8      11 Jan 2024 05:30  Phos  4.5     01-11  Mg     2.1     01-11    01-10 @ 07:01 - 01-11 @ 07:00  --------------------------------------------------------  IN: 425 mL / OUT: 1050 mL / NET: -625 mL    01-11 @ 07:01 - 01-11 @ 16:56  --------------------------------------------------------  IN: 100 mL / OUT: 0 mL / NET: 100 mL    Bacteriology:  CSF studies:  EEG:  Neuroimaging:  Other imaging:    MEDICATIONS: 01-11    ·	acetaminophen     Tablet .. 1000 Oral every 6 hours  ·	ondansetron Injectable 4 IV Push every 6 hours    IV FLUIDS:  DRIPS:  DIET:  Lines:  Drains:    01-10-24 @ 07:01  -  01-11-24 @ 07:00  --------------------------------------------------------  OUT:    Voided (mL): 1050 mL  Total OUT: 1050 mL        Wounds:    CODE STATUS:  Full Code                       GOALS OF CARE:  aggressive                      DISPOSITION:  ICU

## 2024-01-11 NOTE — PROGRESS NOTE ADULT - ASSESSMENT
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  BPH (benign prostatic hyperplasia)  Prediabetes  Skin melanoma and Basal cell carcinoma    PLAN:   NEURO: neurochecks q1h, PRN pain meds with acetaminophen  MRI stepdown, steroids over 1 week taper  REHAB:  physical therapy evaluation and management    EARLY MOB:      PULM:  PRN O2 support to keep sats >/=92%, incentive spirometry  CARDIO:  SBP goal 100-140mm Hg  ENDO:  Blood sugar goals 140-180 mg/dL, continue insulin sliding scale  GI:  PPI for GI prophylaxis  DIET: NPO advance as tolerated  RENAL:  judicious fluids, cont tamsulosin  HEM/ONC: check post-op Hb  VTE Prophylaxis: SCDs, no DVT chemoprophylaxis for now as patient is high risk for bleed (fresh post-op)  ID: afebrile, no leukocytosis, periop ancef  Social: will update family    Active issues:  What's keeping patient in the ICU? close neurochecks   What is this patient's dispo plan? stepdown once stasble    ATTENDING ATTESTATION:  I was physically present for the key portions of the evaluation and management (E/M) service provided.  I agree with the above history, physical and plan, which I have reviewed and edited where appropriate.    Patient at high risk for neurological deterioration or death due to:  ICU delirium, aspiration PNA, DVT / PE.  Critical care time:  I have personally provided 60 minutes of critical care time, excluding time spent on separate procedures.      Plan discussed with RN, house staff. 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; brain compression, cerebral edema  BPH (benign prostatic hyperplasia)  Prediabetes  Skin melanoma and Basal cell carcinoma    PLAN:   NEURO: neurochecks q1h, PRN pain meds with acetaminophen  MRI stepdown, steroids over 1 week taper  REHAB:  physical therapy evaluation and management    EARLY MOB:      PULM:  PRN O2 support to keep sats >/=92%, incentive spirometry  CARDIO:  SBP goal 100-140mm Hg  ENDO:  Blood sugar goals 140-180 mg/dL, continue insulin sliding scale  GI:  PPI for GI prophylaxis  DIET: NPO advance as tolerated  RENAL:  judicious fluids, cont tamsulosin  HEM/ONC: check post-op Hb  VTE Prophylaxis: SCDs, no DVT chemoprophylaxis for now as patient is high risk for bleed (fresh post-op)  ID: afebrile, no leukocytosis, periop ancef  Social: will update family    Active issues:  What's keeping patient in the ICU? close neurochecks   What is this patient's dispo plan? stepdown once stasble    ATTENDING ATTESTATION:  I was physically present for the key portions of the evaluation and management (E/M) service provided.  I agree with the above history, physical and plan, which I have reviewed and edited where appropriate.    Patient at high risk for neurological deterioration or death due to:  ICU delirium, aspiration PNA, DVT / PE.  Critical care time:  I have personally provided 60 minutes of critical care time, excluding time spent on separate procedures.      Plan discussed with RN, house staff.

## 2024-01-11 NOTE — PRE-ANESTHESIA EVALUATION ADULT - NSANTHPMHFT_GEN_ALL_CORE
7 y/o M with pmh melanoma (L arm skin resection 2023), basal cell carcinoma, non small cell lung cancer - adenocarcinoma (s/p radiation chemo, completed 10/2023), and BPH presents with loss of balance, HA, fatigue, generalized weakness, diplopia and b/l foot tingling x 2 weeks and nausea/vomiting x 3-4 days. Pt found to have 5 areas of brain metastases, largest in the  L cerebellum, on outpatient MRI 9 y/o M with pmh melanoma (L arm skin resection 2023), basal cell carcinoma, non small cell lung cancer - adenocarcinoma (s/p radiation chemo, completed 10/2023), and BPH presents with loss of balance, HA, fatigue, generalized weakness, diplopia and b/l foot tingling x 2 weeks and nausea/vomiting x 3-4 days. Pt found to have 5 areas of brain metastases, largest in the  L cerebellum, on outpatient MRI

## 2024-01-11 NOTE — PROGRESS NOTE ADULT - SUBJECTIVE AND OBJECTIVE BOX
NSCU ATTENDING -- ADDITIONAL PROGRESS NOTE    Nighttime rounds were performed      HPI: 79 y/o M  with melanoma (L arm skin resection 2023), basal cell carcinoma, non small cell lung cancer - adenocarcinoma (s/p radiation chemo, completed 10/2023), and BPH presents with loss of balance, HA, fatigue, generalized weakness, diplopia and b/l foot tingling x 2 weeks and nausea/vomiting x 3-4 days. Patient also admits to mild memory loss x 3-4 months. Pt found to have 5 areas of brain metastases, largest in the  L cerebellum, on outpatient MRI today. Patient sent to ER for neurosurgery admission. (10 Familia 2024 15:57)      ICU Vital Signs Last 24 Hrs  T(C): 36.1 (11 Jan 2024 17:59), Max: 36.7 (11 Jan 2024 09:22)  T(F): 97 (11 Jan 2024 17:59), Max: 98.1 (11 Jan 2024 09:22)  HR: 62 (11 Jan 2024 18:45) (56 - 81)  BP: 119/71 (11 Jan 2024 18:15) (119/71 - 159/70)  BP(mean): 90 (11 Jan 2024 18:15) (85 - 107)  ABP: 128/63 (11 Jan 2024 18:45) (124/60 - 137/61)  ABP(mean): 89 (11 Jan 2024 18:45) (85 - 91)  RR: 21 (11 Jan 2024 18:45) (16 - 38)  SpO2: 95% (11 Jan 2024 18:45) (93% - 98%)      01-10-24 @ 07:01 - 01-11-24 @ 07:00  --------------------------------------------------------  IN: 425 mL / OUT: 1050 mL / NET: -625 mL    01-11-24 @ 07:01 - 01-11-24 @ 18:50  --------------------------------------------------------  IN: 100 mL / OUT: 0 mL / NET: 100 mL      PHYSICAL EXAMINATION  NEUROLOGIC EXAMINATION:  Patient is lethargic, waking up from anesthesia  GENERAL:  Calm  EENT: Anicteric  CARDIOVASCULAR: (+) S1 S2, bradycardic rate and regular rhythm  PULMONARY:Clear to auscultation bilaterally  ABDOMEN: Soft, nontender with normoactive bowel sounds  EXTREMITIES: No edema  SKIN: No rash      MEDICATIONS:   acetaminophen     Tablet .. 1000 milliGRAM(s) Oral every 6 hours  acetaminophen   IVPB .. 750 milliGRAM(s) IV Intermittent once  bisacodyl 5 milliGRAM(s) Oral daily PRN  budesonide  80 MICROgram(s)/formoterol 4.5 MICROgram(s) Inhaler 1 Puff(s) Inhalation once  ceFAZolin   IVPB 2000 milliGRAM(s) IV Intermittent every 8 hours  chlorhexidine 2% Cloths 1 Application(s) Topical <User Schedule>  dexAMETHasone  Injectable   IV Push   dexAMETHasone  Injectable 4 milliGRAM(s) IV Push every 6 hours  glucagon  Injectable 1 milliGRAM(s) IntraMuscular once  influenza  Vaccine (HIGH DOSE) 0.7 milliLiter(s) IntraMuscular once  insulin lispro (ADMELOG) corrective regimen sliding scale   SubCutaneous Before meals and at bedtime  magnesium sulfate  IVPB 1 Gram(s) IV Intermittent once  ondansetron Injectable 4 milliGRAM(s) IV Push every 6 hours  oxyCODONE    IR 5 milliGRAM(s) Oral every 4 hours PRN  pantoprazole  Injectable 40 milliGRAM(s) IV Push daily  polyethylene glycol 3350 17 Gram(s) Oral daily  senna 2 Tablet(s) Oral at bedtime  sodium chloride 0.9%. 1000 milliLiter(s) (75 mL/Hr) IV Continuous <Continuous>  tamsulosin 0.4 milliGRAM(s) Oral at bedtime      LABS:                         11.7   13.39 )-----------( 258      ( 11 Jan 2024 17:14 )             35.6     01-11    134<L>  |  100  |  18  ----------------------------<  134<H>  4.1   |  26  |  0.62    Ca    9.0      11 Jan 2024 17:14  Phos  4.3     01-11  Mg     1.7     01-11      ASSESSMENT:   79 y/o M with:  Lung cancer metastatic to brain - nonsmall cell + adeno; brain compression, cerebral edema  BPH (benign prostatic hyperplasia)  Prediabetes  Skin melanoma and Basal cell carcinoma    PLAN:   Neurochecks Q1h  MRI within 72 hours, steroids over 1 week taper  Seizure ppx:  PRN O2 support to keep sats >/=92%, incentive spirometry  SBP goal 100-140mm Hg  Goal euglycemia (140-180 mg/dL). A1c: Continue insulin sliding scale  PPI for GI prophylaxis  NPO advance as tolerated  Mild post op hyponatremia:   Judicious fluids. Continue tamsulosin  Martinez  H/H- mild decrease. 13.3-> 11. 7. Monitor  VTE Prophylaxis: SCDs, no VTE chemoprophylaxis for now as patient fresh post op  Afebrile, mild leukocytosis. Continue periop ancef    Continue care per daytime intensivist Kiet GEE     Patient remains critically ill.    Additional 45 minutes of critical care time.   NSCU ATTENDING -- ADDITIONAL PROGRESS NOTE    Nighttime rounds were performed      HPI: 77 y/o M  with melanoma (L arm skin resection 2023), basal cell carcinoma, non small cell lung cancer - adenocarcinoma (s/p radiation chemo, completed 10/2023), and BPH presents with loss of balance, HA, fatigue, generalized weakness, diplopia and b/l foot tingling x 2 weeks and nausea/vomiting x 3-4 days. Patient also admits to mild memory loss x 3-4 months. Pt found to have 5 areas of brain metastases, largest in the  L cerebellum, on outpatient MRI today. Patient sent to ER for neurosurgery admission. (10 Familia 2024 15:57)      ICU Vital Signs Last 24 Hrs  T(C): 36.1 (11 Jan 2024 17:59), Max: 36.7 (11 Jan 2024 09:22)  T(F): 97 (11 Jan 2024 17:59), Max: 98.1 (11 Jan 2024 09:22)  HR: 62 (11 Jan 2024 18:45) (56 - 81)  BP: 119/71 (11 Jan 2024 18:15) (119/71 - 159/70)  BP(mean): 90 (11 Jan 2024 18:15) (85 - 107)  ABP: 128/63 (11 Jan 2024 18:45) (124/60 - 137/61)  ABP(mean): 89 (11 Jan 2024 18:45) (85 - 91)  RR: 21 (11 Jan 2024 18:45) (16 - 38)  SpO2: 95% (11 Jan 2024 18:45) (93% - 98%)      01-10-24 @ 07:01 - 01-11-24 @ 07:00  --------------------------------------------------------  IN: 425 mL / OUT: 1050 mL / NET: -625 mL    01-11-24 @ 07:01 - 01-11-24 @ 18:50  --------------------------------------------------------  IN: 100 mL / OUT: 0 mL / NET: 100 mL      PHYSICAL EXAMINATION  NEUROLOGIC EXAMINATION: Awake, alert x 3, pupils 3mm and reactive, EOMI  Power LUE 4+/5 with drift, LLE 5/5  RUE and RLE 5/5. Dysmetria L>R  GENERAL: Calm  EENT: Anicteric  CARDIOVASCULAR: (+) S1 S2, bradycardic rate and regular rhythm  PULMONARY: Clear to auscultation bilaterally  ABDOMEN: Soft, nontender with normoactive bowel sounds  EXTREMITIES: No edema  SKIN: No rash. Headwrap C/D/I      MEDICATIONS:   acetaminophen     Tablet .. 1000 milliGRAM(s) Oral every 6 hours  acetaminophen   IVPB .. 750 milliGRAM(s) IV Intermittent once  bisacodyl 5 milliGRAM(s) Oral daily PRN  budesonide  80 MICROgram(s)/formoterol 4.5 MICROgram(s) Inhaler 1 Puff(s) Inhalation once  ceFAZolin   IVPB 2000 milliGRAM(s) IV Intermittent every 8 hours  chlorhexidine 2% Cloths 1 Application(s) Topical <User Schedule>  dexAMETHasone  Injectable   IV Push   dexAMETHasone  Injectable 4 milliGRAM(s) IV Push every 6 hours  glucagon  Injectable 1 milliGRAM(s) IntraMuscular once  influenza  Vaccine (HIGH DOSE) 0.7 milliLiter(s) IntraMuscular once  insulin lispro (ADMELOG) corrective regimen sliding scale   SubCutaneous Before meals and at bedtime  magnesium sulfate  IVPB 1 Gram(s) IV Intermittent once  ondansetron Injectable 4 milliGRAM(s) IV Push every 6 hours  oxyCODONE    IR 5 milliGRAM(s) Oral every 4 hours PRN  pantoprazole  Injectable 40 milliGRAM(s) IV Push daily  polyethylene glycol 3350 17 Gram(s) Oral daily  senna 2 Tablet(s) Oral at bedtime  sodium chloride 0.9%. 1000 milliLiter(s) (75 mL/Hr) IV Continuous <Continuous>  tamsulosin 0.4 milliGRAM(s) Oral at bedtime      LABS:                         11.7   13.39 )-----------( 258      ( 11 Jan 2024 17:14 )             35.6     01-11    134<L>  |  100  |  18  ----------------------------<  134<H>  4.1   |  26  |  0.62    Ca    9.0      11 Jan 2024 17:14  Phos  4.3     01-11  Mg     1.7     01-11      ASSESSMENT:   77 y/o M with:  Lung cancer metastatic to brain - nonsmall cell + adeno; brain compression, cerebral edema  BPH (benign prostatic hyperplasia)  Prediabetes  Skin melanoma and Basal cell carcinoma    PLAN:   Neurochecks Q1h  MRI within 72 hours, steroids over 1 week taper  Seizure ppx: Non indicated as posterior fossa  PRN O2 support to keep sats >/=92%, incentive spirometry  SBP goal 100-140mm Hg. DC Fanny in am  Goal euglycemia (140-180 mg/dL). A1c: 5.9. Continue insulin sliding scale  PPI for GI prophylaxis  NPO advance as tolerated (speech/swallow eval)  Mild post op hyponatremia: 3% 250cc bolus x1. Monitor BMP  Judicious fluids.  Replete lytes. Continue tamsulosin  Martinez; DC in AM  H/H- mild decrease. 13.3-> 11. 7. Monitor  VTE Prophylaxis: SCDs, no VTE chemoprophylaxis for now as patient fresh post op  Afebrile, mild leukocytosis. Continue periop ancef    Continue care per daytime intensivist Kiet GEE     Patient remains critically ill.    Additional 45 minutes of critical care time.   NSCU ATTENDING -- ADDITIONAL PROGRESS NOTE    Nighttime rounds were performed      HPI: 79 y/o M  with melanoma (L arm skin resection 2023), basal cell carcinoma, non small cell lung cancer - adenocarcinoma (s/p radiation chemo, completed 10/2023), and BPH presents with loss of balance, HA, fatigue, generalized weakness, diplopia and b/l foot tingling x 2 weeks and nausea/vomiting x 3-4 days. Patient also admits to mild memory loss x 3-4 months. Pt found to have 5 areas of brain metastases, largest in the  L cerebellum, on outpatient MRI today. Patient sent to ER for neurosurgery admission. (10 Familia 2024 15:57)      ICU Vital Signs Last 24 Hrs  T(C): 36.1 (11 Jan 2024 17:59), Max: 36.7 (11 Jan 2024 09:22)  T(F): 97 (11 Jan 2024 17:59), Max: 98.1 (11 Jan 2024 09:22)  HR: 62 (11 Jan 2024 18:45) (56 - 81)  BP: 119/71 (11 Jan 2024 18:15) (119/71 - 159/70)  BP(mean): 90 (11 Jan 2024 18:15) (85 - 107)  ABP: 128/63 (11 Jan 2024 18:45) (124/60 - 137/61)  ABP(mean): 89 (11 Jan 2024 18:45) (85 - 91)  RR: 21 (11 Jan 2024 18:45) (16 - 38)  SpO2: 95% (11 Jan 2024 18:45) (93% - 98%)      01-10-24 @ 07:01 - 01-11-24 @ 07:00  --------------------------------------------------------  IN: 425 mL / OUT: 1050 mL / NET: -625 mL    01-11-24 @ 07:01 - 01-11-24 @ 18:50  --------------------------------------------------------  IN: 100 mL / OUT: 0 mL / NET: 100 mL      PHYSICAL EXAMINATION  NEUROLOGIC EXAMINATION: Awake, alert x 3, pupils 3mm and reactive, EOMI  Power LUE 4+/5 with drift, LLE 5/5  RUE and RLE 5/5. Dysmetria L>R  GENERAL: Calm  EENT: Anicteric  CARDIOVASCULAR: (+) S1 S2, bradycardic rate and regular rhythm  PULMONARY: Clear to auscultation bilaterally  ABDOMEN: Soft, nontender with normoactive bowel sounds  EXTREMITIES: No edema  SKIN: No rash. Headwrap C/D/I      MEDICATIONS:   acetaminophen     Tablet .. 1000 milliGRAM(s) Oral every 6 hours  acetaminophen   IVPB .. 750 milliGRAM(s) IV Intermittent once  bisacodyl 5 milliGRAM(s) Oral daily PRN  budesonide  80 MICROgram(s)/formoterol 4.5 MICROgram(s) Inhaler 1 Puff(s) Inhalation once  ceFAZolin   IVPB 2000 milliGRAM(s) IV Intermittent every 8 hours  chlorhexidine 2% Cloths 1 Application(s) Topical <User Schedule>  dexAMETHasone  Injectable   IV Push   dexAMETHasone  Injectable 4 milliGRAM(s) IV Push every 6 hours  glucagon  Injectable 1 milliGRAM(s) IntraMuscular once  influenza  Vaccine (HIGH DOSE) 0.7 milliLiter(s) IntraMuscular once  insulin lispro (ADMELOG) corrective regimen sliding scale   SubCutaneous Before meals and at bedtime  magnesium sulfate  IVPB 1 Gram(s) IV Intermittent once  ondansetron Injectable 4 milliGRAM(s) IV Push every 6 hours  oxyCODONE    IR 5 milliGRAM(s) Oral every 4 hours PRN  pantoprazole  Injectable 40 milliGRAM(s) IV Push daily  polyethylene glycol 3350 17 Gram(s) Oral daily  senna 2 Tablet(s) Oral at bedtime  sodium chloride 0.9%. 1000 milliLiter(s) (75 mL/Hr) IV Continuous <Continuous>  tamsulosin 0.4 milliGRAM(s) Oral at bedtime      LABS:                         11.7   13.39 )-----------( 258      ( 11 Jan 2024 17:14 )             35.6     01-11    134<L>  |  100  |  18  ----------------------------<  134<H>  4.1   |  26  |  0.62    Ca    9.0      11 Jan 2024 17:14  Phos  4.3     01-11  Mg     1.7     01-11      ASSESSMENT:   79 y/o M with:  Lung cancer metastatic to brain - nonsmall cell + adeno; brain compression, cerebral edema  BPH (benign prostatic hyperplasia)  Prediabetes  Skin melanoma and Basal cell carcinoma    PLAN:   Neurochecks Q1h  MRI within 72 hours, steroids over 1 week taper  Seizure ppx: Non indicated as posterior fossa  PRN O2 support to keep sats >/=92%, incentive spirometry  SBP goal 100-140mm Hg. DC Fanny in am  Goal euglycemia (140-180 mg/dL). A1c: 5.9. Continue insulin sliding scale  PPI for GI prophylaxis  NPO advance as tolerated (speech/swallow eval)  Mild post op hyponatremia: 3% 250cc bolus x1. Monitor BMP  Judicious fluids.  Replete lytes. Continue tamsulosin  Martinez; DC in AM  H/H- mild decrease. 13.3-> 11. 7. Monitor  VTE Prophylaxis: SCDs, no VTE chemoprophylaxis for now as patient fresh post op  Afebrile, mild leukocytosis. Continue periop ancef    Continue care per daytime intensivist Kiet GEE     Patient remains critically ill.    Additional 45 minutes of critical care time.

## 2024-01-11 NOTE — CONSULT NOTE ADULT - ASSESSMENT
ERMA HER is a a 78 year old male with PMH unresectable T3N3, stage IIIb non-small cell lung cancer, with features of adenocarcinoma on chemoRT (carboplatin/paclitaxel finished 10/27/2023), though did not start durvalumab due to patient refusal, who presents to the hospital after being sent by NSGY OP after newly found brain metastasis on recent MRI performed for double vision, ataxia and unsteady gait. Hematology consulted given history of lung cancer.    #Non-small cell lung cancer with features of adenocarcinoma  - onc history as above  - s/p chemoRT (carboplatin/paclitaxel) finishing 10/27/23; patient had not yet started durvalumab due to patient reluctancy to consent   -  MRI Brain 1/10/24 pending formal read, though concerning for multiple (5) brain tumors of different sizes and locations, with some cystic components and surrounding swelling causing compression on the brain in the left cerebellar hemisphere with associated hydrocephalus per NSGY.   - [facility] 1 testing pending  - surgical plan and steroids per NSGY   - will discuss chemotherapy options as outpatient - no plans for chemotherapy for at least 4 weeks after surgery    To be seen and discussed with Dr. Chapman ERMA HER is a a 78 year old male with PMH unresectable T3N3, stage IIIb non-small cell lung cancer, with features of adenocarcinoma on chemoRT (carboplatin/paclitaxel finished 10/27/2023), though did not start durvalumab due to patient refusal, who presents to the hospital after being sent by NSGY OP after newly found brain metastasis on recent MRI performed for double vision, ataxia and unsteady gait. Hematology consulted given history of lung cancer.    #Non-small cell lung cancer with features of adenocarcinoma  - onc history as above  - s/p chemoRT (carboplatin/paclitaxel) finishing 10/27/23; patient had not yet started durvalumab due to patient reluctancy to consent   -  MRI Brain 1/10/24 pending formal read, though concerning for multiple (5) brain tumors of different sizes and locations, with some cystic components and surrounding swelling causing compression on the brain in the left cerebellar hemisphere with associated hydrocephalus per NSGY.   - surgical plan and steroids per NSGY   - will discuss chemotherapy options as outpatient - no plans for chemotherapy for at least 4 weeks after surgery

## 2024-01-11 NOTE — CONSULT NOTE ADULT - SUBJECTIVE AND OBJECTIVE BOX
Hematology Oncology Consult Note     ERMA HER is a a 78 year old male with PMH unresectable T3N3, stage IIIb non-small cell lung cancer, with features of adenocarcinoma on chemoRT (carboplatin/paclitaxel finished 10/27/2023), though did not start durvalumab due to patient refusal, who presents to the hospital after being sent by [facility] OP after newly found brain metastasis on recent MRI performed for double vision, ataxia and unsteady gait.     Patient seen and examined at bedside, in no acute distress. Reports he still has double vision in distance view and difficulty with ambulation. Denies any fevers, chest pain, shortness of breath, nausea, vomiting, or other systemic symptoms at this time.    Oncologic history   6/7/2023: The patient was referred by Dr. Judge for dyspnea with negative cardiac evaluation. Rash developed in February 2023 with concurrent cough and dyspnea. 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  7/10/2023: Large irregular mass in the right upper lobe with stranding to the pleural surface. This mass is highly suspicious for malignancy. There is subcarinal lymphadenopathy which is likely metastatic.  7/18/2023: PET scan-hypermetabolic right upper lobe pulmonary mass, which is most consistent with a primary bronchogenic malignancy. Hypermetabolic mediastinal lymphadenopathy which is most consistent with metastatic disease. Otherwise no abnormal hypermetabolic activity to suggest malignancy at this time. Liver cysts and additional subcentimeter low-attenuation lesions in the liver which are too small to further characterize and may represent cysts as well. Enlarged prostate.  7/19/2023: Follow-up with Dr. Eason  7/21/2023: EBUS with : 4L atypical, 3P NSCLC with adenocarcinoma features  7/27/2023: Thoracic tumor board: T3N2 final stage pending MRI brain. Patient is not surgical candidate. Tumor board recommends chemoradiation therapy. Pathology tissue is not sufficient for molecular testing, needs repeat biopsy.  8/2/23: Repeat IR biopsy:  Pathology:  Specimen(s) Submitted  1 Right upper lobe lung core bx + touch prep  Final Diagnosis  Right upper lobe lung, core biopsy and touch prep  - Non-small cell carcinoma, with necrosis (see note).  8/16/2023: The patient canceled his simulation with [email or web address].  11/27/23: CT CHEST: 1. Since July 7, 2023, slightly decreased left upper lobe malignancy with new treatment induced changes and decreased thoracic melly metastases.  2. Postradiation pneumonitis and foci of large and small airway disease in right lung.  MRI Brain 1/10/24 pending formal read, though concerning for multiple (5) brain tumors of different sizes and locations, with some cystic components and surrounding swelling causing compression on the brain in the left cerebellar hemisphere with associated hydrocephalus per NSGY.         PAST MEDICAL & SURGICAL HISTORY:  BPH (benign prostatic hyperplasia)      Prediabetes      Lung cancer metastatic to brain      Skin melanoma      Basal cell carcinoma      History of dental surgery      S/P skin cancer resection          Allergies:  No Known Allergies      Medications:        Social History:    FAMILY HISTORY:      PHYSICAL EXAM:    T(F): 98.1 (01-11-24 @ 09:22), Max: 98.1 (01-11-24 @ 09:22)  HR: 56 (01-11-24 @ 08:19) (56 - 99)  BP: 120/63 (01-11-24 @ 08:19) (120/63 - 168/80)  RR: 18 (01-11-24 @ 08:19) (16 - 18)  SpO2: 98% (01-11-24 @ 08:19) (96% - 99%)  Wt(kg): --    Daily Height in cm: 170.18 (10 Familia 2024 13:33)    Daily     Gen: well developed, well nourished, comfortable  Neck: supple, no masses, no JVD  Cardiovascular: RR, nl S1S2, no murmurs/rubs/gallops  Respiratory: clear air entry b/l  Gastrointestinal: BS+, soft, NT/ND, no masses, no splenomegaly, no hepatomegaly, no evidence for ascites  Extremities: no clubbing/cyanosis, no edema, no calf tenderness  Neurological:  + dysmetria; no focal deficits   Lymph Nodes:  no cervical/supraclavicular LAD, no axillary/groin LAD  Musculoskeletal:  full ROM  Psychiatric:  mood stable            Labs:                          13.3   4.50  )-----------( 269      ( 11 Jan 2024 05:30 )             42.8     CBC Full  -  ( 11 Jan 2024 05:30 )  WBC Count : 4.50 K/uL  RBC Count : 4.57 M/uL  Hemoglobin : 13.3 g/dL  Hematocrit : 42.8 %  Platelet Count - Automated : 269 K/uL  Mean Cell Volume : 93.7 fl  Mean Cell Hemoglobin : 29.1 pg  Mean Cell Hemoglobin Concentration : 31.1 gm/dL  Auto Neutrophil # : x  Auto Lymphocyte # : x  Auto Monocyte # : x  Auto Eosinophil # : x  Auto Basophil # : x  Auto Neutrophil % : x  Auto Lymphocyte % : x  Auto Monocyte % : x  Auto Eosinophil % : x  Auto Basophil % : x    PT/INR - ( 10 Familia 2024 15:18 )   PT: 11.6 sec;   INR: 1.02          PTT - ( 10 Familia 2024 15:18 )  PTT:24.2 sec    01-11    141  |  100  |  14  ----------------------------<  117<H>  4.3   |  28  |  0.62    Ca    9.8      11 Jan 2024 05:30  Phos  4.5     01-11  Mg     2.1     01-11        Urinalysis Basic - ( 11 Jan 2024 05:30 )    Color: x / Appearance: x / SG: x / pH: x  Gluc: 117 mg/dL / Ketone: x  / Bili: x / Urobili: x   Blood: x / Protein: x / Nitrite: x   Leuk Esterase: x / RBC: x / WBC x   Sq Epi: x / Non Sq Epi: x / Bacteria: x Hematology Oncology Consult Note     ERMA HER is a a 78 year old male with PMH unresectable T3N3, stage IIIb non-small cell lung cancer, with features of adenocarcinoma on chemoRT (carboplatin/paclitaxel finished 10/27/2023), though did not start durvalumab due to patient refusal, who presents to the hospital after being sent by [facility] OP after newly found brain metastasis on recent MRI performed for double vision, ataxia and unsteady gait.     Patient seen and examined at bedside, in no acute distress. Reports he still has double vision in distance view and difficulty with ambulation. Denies any fevers, chest pain, shortness of breath, nausea, vomiting, or other systemic symptoms at this time.    Oncologic history   6/7/2023: The patient was referred by Dr. Judge for dyspnea with negative cardiac evaluation. Rash developed in February 2023 with concurrent cough and dyspnea. 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  7/10/2023: Large irregular mass in the right upper lobe with stranding to the pleural surface. This mass is highly suspicious for malignancy. There is subcarinal lymphadenopathy which is likely metastatic.  7/18/2023: PET scan-hypermetabolic right upper lobe pulmonary mass, which is most consistent with a primary bronchogenic malignancy. Hypermetabolic mediastinal lymphadenopathy which is most consistent with metastatic disease. Otherwise no abnormal hypermetabolic activity to suggest malignancy at this time. Liver cysts and additional subcentimeter low-attenuation lesions in the liver which are too small to further characterize and may represent cysts as well. Enlarged prostate.  7/19/2023: Follow-up with Dr. Eason  7/21/2023: EBUS with : 4L atypical, 3P NSCLC with adenocarcinoma features  7/27/2023: Thoracic tumor board: T3N2 final stage pending MRI brain. Patient is not surgical candidate. Tumor board recommends chemoradiation therapy. Pathology tissue is not sufficient for molecular testing, needs repeat biopsy.  8/2/23: Repeat IR biopsy:  Pathology:  Specimen(s) Submitted  1 Right upper lobe lung core bx + touch prep  Final Diagnosis  Right upper lobe lung, core biopsy and touch prep  - Non-small cell carcinoma, with necrosis (see note).  8/16/2023: The patient canceled his simulation with [employer].  11/27/23: CT CHEST: 1. Since July 7, 2023, slightly decreased left upper lobe malignancy with new treatment induced changes and decreased thoracic melly metastases.  2. Postradiation pneumonitis and foci of large and small airway disease in right lung.  MRI Brain 1/10/24 pending formal read, though concerning for multiple (5) brain tumors of different sizes and locations, with some cystic components and surrounding swelling causing compression on the brain in the left cerebellar hemisphere with associated hydrocephalus per NSGY.         PAST MEDICAL & SURGICAL HISTORY:  BPH (benign prostatic hyperplasia)      Prediabetes      Lung cancer metastatic to brain      Skin melanoma      Basal cell carcinoma      History of dental surgery      S/P skin cancer resection          Allergies:  No Known Allergies      Medications:        Social History:    FAMILY HISTORY:      PHYSICAL EXAM:    T(F): 98.1 (01-11-24 @ 09:22), Max: 98.1 (01-11-24 @ 09:22)  HR: 56 (01-11-24 @ 08:19) (56 - 99)  BP: 120/63 (01-11-24 @ 08:19) (120/63 - 168/80)  RR: 18 (01-11-24 @ 08:19) (16 - 18)  SpO2: 98% (01-11-24 @ 08:19) (96% - 99%)  Wt(kg): --    Daily Height in cm: 170.18 (10 Familia 2024 13:33)    Daily     Gen: well developed, well nourished, comfortable  Neck: supple, no masses, no JVD  Cardiovascular: RR, nl S1S2, no murmurs/rubs/gallops  Respiratory: clear air entry b/l  Gastrointestinal: BS+, soft, NT/ND, no masses, no splenomegaly, no hepatomegaly, no evidence for ascites  Extremities: no clubbing/cyanosis, no edema, no calf tenderness  Neurological:  + dysmetria; no focal deficits   Lymph Nodes:  no cervical/supraclavicular LAD, no axillary/groin LAD  Musculoskeletal:  full ROM  Psychiatric:  mood stable            Labs:                          13.3   4.50  )-----------( 269      ( 11 Jan 2024 05:30 )             42.8     CBC Full  -  ( 11 Jan 2024 05:30 )  WBC Count : 4.50 K/uL  RBC Count : 4.57 M/uL  Hemoglobin : 13.3 g/dL  Hematocrit : 42.8 %  Platelet Count - Automated : 269 K/uL  Mean Cell Volume : 93.7 fl  Mean Cell Hemoglobin : 29.1 pg  Mean Cell Hemoglobin Concentration : 31.1 gm/dL  Auto Neutrophil # : x  Auto Lymphocyte # : x  Auto Monocyte # : x  Auto Eosinophil # : x  Auto Basophil # : x  Auto Neutrophil % : x  Auto Lymphocyte % : x  Auto Monocyte % : x  Auto Eosinophil % : x  Auto Basophil % : x    PT/INR - ( 10 Familia 2024 15:18 )   PT: 11.6 sec;   INR: 1.02          PTT - ( 10 Familia 2024 15:18 )  PTT:24.2 sec    01-11    141  |  100  |  14  ----------------------------<  117<H>  4.3   |  28  |  0.62    Ca    9.8      11 Jan 2024 05:30  Phos  4.5     01-11  Mg     2.1     01-11        Urinalysis Basic - ( 11 Jan 2024 05:30 )    Color: x / Appearance: x / SG: x / pH: x  Gluc: 117 mg/dL / Ketone: x  / Bili: x / Urobili: x   Blood: x / Protein: x / Nitrite: x   Leuk Esterase: x / RBC: x / WBC x   Sq Epi: x / Non Sq Epi: x / Bacteria: x Hematology Oncology Consult Note     ERMA HER is a a 78 year old male with PMH unresectable T3N3, stage IIIb non-small cell lung cancer, with features of adenocarcinoma on chemoRT (carboplatin/paclitaxel finished 10/27/2023), though did not start durvalumab due to patient refusal, who presents to the hospital after being sent by [facility] OP after newly found brain metastasis on recent MRI performed for double vision, ataxia and unsteady gait.     Patient seen and examined at bedside, in no acute distress. Reports he still has double vision in distance view and difficulty with ambulation. Denies any fevers, chest pain, shortness of breath, nausea, vomiting, or other systemic symptoms at this time.    Oncologic history   6/7/2023: The patient was referred by Dr. Judge for dyspnea with negative cardiac evaluation. Rash developed in February 2023 with concurrent cough and dyspnea. 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  7/10/2023: Large irregular mass in the right upper lobe with stranding to the pleural surface. This mass is highly suspicious for malignancy. There is subcarinal lymphadenopathy which is likely metastatic.  7/18/2023: PET scan-hypermetabolic right upper lobe pulmonary mass, which is most consistent with a primary bronchogenic malignancy. Hypermetabolic mediastinal lymphadenopathy which is most consistent with metastatic disease. Otherwise no abnormal hypermetabolic activity to suggest malignancy at this time. Liver cysts and additional subcentimeter low-attenuation lesions in the liver which are too small to further characterize and may represent cysts as well. Enlarged prostate.  7/19/2023: Follow-up with Dr. Eason  7/21/2023: EBUS with : 4L atypical, 3P NSCLC with adenocarcinoma features  7/27/2023: Thoracic tumor board: T3N2 final stage pending MRI brain. Patient is not surgical candidate. Tumor board recommends chemoradiation therapy. Pathology tissue is not sufficient for molecular testing, needs repeat biopsy.  8/2/23: Repeat IR biopsy:  Pathology:  Specimen(s) Submitted  1 Right upper lobe lung core bx + touch prep  Final Diagnosis  Right upper lobe lung, core biopsy and touch prep  - Non-small cell carcinoma, with necrosis (see note).  8/16/2023: The patient canceled his simulation with [email or web address].  11/27/23: CT CHEST: 1. Since July 7, 2023, slightly decreased left upper lobe malignancy with new treatment induced changes and decreased thoracic melly metastases.  2. Postradiation pneumonitis and foci of large and small airway disease in right lung.  MRI Brain 1/10/24 pending formal read, though concerning for multiple (5) brain tumors of different sizes and locations, with some cystic components and surrounding swelling causing compression on the brain in the left cerebellar hemisphere with associated hydrocephalus per NSGY.         PAST MEDICAL & SURGICAL HISTORY:  BPH (benign prostatic hyperplasia)      Prediabetes      Lung cancer metastatic to brain      Skin melanoma      Basal cell carcinoma      History of dental surgery      S/P skin cancer resection          Allergies:  No Known Allergies      Medications:        Social History:    FAMILY HISTORY:      PHYSICAL EXAM:    T(F): 98.1 (01-11-24 @ 09:22), Max: 98.1 (01-11-24 @ 09:22)  HR: 56 (01-11-24 @ 08:19) (56 - 99)  BP: 120/63 (01-11-24 @ 08:19) (120/63 - 168/80)  RR: 18 (01-11-24 @ 08:19) (16 - 18)  SpO2: 98% (01-11-24 @ 08:19) (96% - 99%)  Wt(kg): --    Daily Height in cm: 170.18 (10 Familia 2024 13:33)    Daily     Gen: well developed, well nourished, comfortable  Neck: supple, no masses, no JVD  Cardiovascular: RR, nl S1S2, no murmurs/rubs/gallops  Respiratory: clear air entry b/l  Gastrointestinal: BS+, soft, NT/ND, no masses, no splenomegaly, no hepatomegaly, no evidence for ascites  Extremities: no clubbing/cyanosis, no edema, no calf tenderness  Neurological:  + dysmetria; LUE5-/5 rest 5/5  Lymph Nodes:  no cervical/supraclavicular LAD, no axillary/groin LAD  Musculoskeletal:  full ROM  Psychiatric:  mood stable            Labs:                          13.3   4.50  )-----------( 269      ( 11 Jan 2024 05:30 )             42.8     CBC Full  -  ( 11 Jan 2024 05:30 )  WBC Count : 4.50 K/uL  RBC Count : 4.57 M/uL  Hemoglobin : 13.3 g/dL  Hematocrit : 42.8 %  Platelet Count - Automated : 269 K/uL  Mean Cell Volume : 93.7 fl  Mean Cell Hemoglobin : 29.1 pg  Mean Cell Hemoglobin Concentration : 31.1 gm/dL  Auto Neutrophil # : x  Auto Lymphocyte # : x  Auto Monocyte # : x  Auto Eosinophil # : x  Auto Basophil # : x  Auto Neutrophil % : x  Auto Lymphocyte % : x  Auto Monocyte % : x  Auto Eosinophil % : x  Auto Basophil % : x    PT/INR - ( 10 Familia 2024 15:18 )   PT: 11.6 sec;   INR: 1.02          PTT - ( 10 Familia 2024 15:18 )  PTT:24.2 sec    01-11    141  |  100  |  14  ----------------------------<  117<H>  4.3   |  28  |  0.62    Ca    9.8      11 Jan 2024 05:30  Phos  4.5     01-11  Mg     2.1     01-11        Urinalysis Basic - ( 11 Jan 2024 05:30 )    Color: x / Appearance: x / SG: x / pH: x  Gluc: 117 mg/dL / Ketone: x  / Bili: x / Urobili: x   Blood: x / Protein: x / Nitrite: x   Leuk Esterase: x / RBC: x / WBC x   Sq Epi: x / Non Sq Epi: x / Bacteria: x Hematology Oncology Consult Note     ERMA HER is a a 78 year old male with PMH unresectable T3N3, stage IIIb non-small cell lung cancer, with features of adenocarcinoma on chemoRT (carboplatin/paclitaxel finished 10/27/2023), though did not start durvalumab due to patient refusal, who presents to the hospital after being sent by [facility] OP after newly found brain metastasis on recent MRI performed for double vision, ataxia and unsteady gait.     Patient seen and examined at bedside, in no acute distress. Reports he still has double vision in distance view and difficulty with ambulation. Denies any fevers, chest pain, shortness of breath, nausea, vomiting, or other systemic symptoms at this time.    Oncologic history   6/7/2023: The patient was referred by Dr. Judge for dyspnea with negative cardiac evaluation. Rash developed in February 2023 with concurrent cough and dyspnea. 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  7/10/2023: Large irregular mass in the right upper lobe with stranding to the pleural surface. This mass is highly suspicious for malignancy. There is subcarinal lymphadenopathy which is likely metastatic.  7/18/2023: PET scan-hypermetabolic right upper lobe pulmonary mass, which is most consistent with a primary bronchogenic malignancy. Hypermetabolic mediastinal lymphadenopathy which is most consistent with metastatic disease. Otherwise no abnormal hypermetabolic activity to suggest malignancy at this time. Liver cysts and additional subcentimeter low-attenuation lesions in the liver which are too small to further characterize and may represent cysts as well. Enlarged prostate.  7/19/2023: Follow-up with Dr. Eason  7/21/2023: EBUS with : 4L atypical, 3P NSCLC with adenocarcinoma features  7/27/2023: Thoracic tumor board: T3N2 final stage pending MRI brain. Patient is not surgical candidate. Tumor board recommends chemoradiation therapy. Pathology tissue is not sufficient for molecular testing, needs repeat biopsy.  8/2/23: Repeat IR biopsy:  Pathology:  Specimen(s) Submitted  1 Right upper lobe lung core bx + touch prep  Final Diagnosis  Right upper lobe lung, core biopsy and touch prep  - Non-small cell carcinoma, with necrosis (see note).  8/16/2023: The patient canceled his simulation with [employer].  11/27/23: CT CHEST: 1. Since July 7, 2023, slightly decreased left upper lobe malignancy with new treatment induced changes and decreased thoracic melly metastases.  2. Postradiation pneumonitis and foci of large and small airway disease in right lung.  MRI Brain 1/10/24 pending formal read, though concerning for multiple (5) brain tumors of different sizes and locations, with some cystic components and surrounding swelling causing compression on the brain in the left cerebellar hemisphere with associated hydrocephalus per NSGY.         PAST MEDICAL & SURGICAL HISTORY:  BPH (benign prostatic hyperplasia)      Prediabetes      Lung cancer metastatic to brain      Skin melanoma      Basal cell carcinoma      History of dental surgery      S/P skin cancer resection          Allergies:  No Known Allergies      Medications:        Social History:    FAMILY HISTORY:      PHYSICAL EXAM:    T(F): 98.1 (01-11-24 @ 09:22), Max: 98.1 (01-11-24 @ 09:22)  HR: 56 (01-11-24 @ 08:19) (56 - 99)  BP: 120/63 (01-11-24 @ 08:19) (120/63 - 168/80)  RR: 18 (01-11-24 @ 08:19) (16 - 18)  SpO2: 98% (01-11-24 @ 08:19) (96% - 99%)  Wt(kg): --    Daily Height in cm: 170.18 (10 Familia 2024 13:33)    Daily     Gen: well developed, well nourished, comfortable  Neck: supple, no masses, no JVD  Cardiovascular: RR, nl S1S2, no murmurs/rubs/gallops  Respiratory: clear air entry b/l  Gastrointestinal: BS+, soft, NT/ND, no masses, no splenomegaly, no hepatomegaly, no evidence for ascites  Extremities: no clubbing/cyanosis, no edema, no calf tenderness  Neurological:  + dysmetria; LUE5-/5 rest 5/5  Lymph Nodes:  no cervical/supraclavicular LAD, no axillary/groin LAD  Musculoskeletal:  full ROM  Psychiatric:  mood stable            Labs:                          13.3   4.50  )-----------( 269      ( 11 Jan 2024 05:30 )             42.8     CBC Full  -  ( 11 Jan 2024 05:30 )  WBC Count : 4.50 K/uL  RBC Count : 4.57 M/uL  Hemoglobin : 13.3 g/dL  Hematocrit : 42.8 %  Platelet Count - Automated : 269 K/uL  Mean Cell Volume : 93.7 fl  Mean Cell Hemoglobin : 29.1 pg  Mean Cell Hemoglobin Concentration : 31.1 gm/dL  Auto Neutrophil # : x  Auto Lymphocyte # : x  Auto Monocyte # : x  Auto Eosinophil # : x  Auto Basophil # : x  Auto Neutrophil % : x  Auto Lymphocyte % : x  Auto Monocyte % : x  Auto Eosinophil % : x  Auto Basophil % : x    PT/INR - ( 10 Familia 2024 15:18 )   PT: 11.6 sec;   INR: 1.02          PTT - ( 10 Familia 2024 15:18 )  PTT:24.2 sec    01-11    141  |  100  |  14  ----------------------------<  117<H>  4.3   |  28  |  0.62    Ca    9.8      11 Jan 2024 05:30  Phos  4.5     01-11  Mg     2.1     01-11        Urinalysis Basic - ( 11 Jan 2024 05:30 )    Color: x / Appearance: x / SG: x / pH: x  Gluc: 117 mg/dL / Ketone: x  / Bili: x / Urobili: x   Blood: x / Protein: x / Nitrite: x   Leuk Esterase: x / RBC: x / WBC x   Sq Epi: x / Non Sq Epi: x / Bacteria: x

## 2024-01-11 NOTE — PROGRESS NOTE ADULT - SUBJECTIVE AND OBJECTIVE BOX
79 y/o M with pmh melanoma (L arm skin resection 2023), basal cell carcinoma, non small cell lung cancer - adenocarcinoma (s/p radiation chemo, completed 10/2023), and BPH presents with loss of balance, HA, fatigue, generalized weakness, diplopia and b/l foot tingling x 2 weeks and nausea/vomiting x 2-4 days. Pt found to have 5 areas of brain metastases, largest in the  L cerebellum, on outpatient MRI. s/p restrosig crani resection of tumor (1/11-) frozen NSCC     Hospital Course:   POD# 0  s/p restrosig crani resection of tumor (1/11-) frozen NSCC, no drains, dex taper over 1 week to 2 bid, MRI post-op pending, SBP<140         Vital Signs Last 24 Hrs  T(C): 34.5 (11 Jan 2024 17:00), Max: 36.7 (11 Jan 2024 09:22)  T(F): 94.1 (11 Jan 2024 17:00), Max: 98.1 (11 Jan 2024 09:22)  HR: 65 (11 Jan 2024 17:30) (56 - 99)  BP: 128/61 (11 Jan 2024 17:30) (120/63 - 168/80)  BP(mean): 88 (11 Jan 2024 17:30) (85 - 107)  RR: 19 (11 Jan 2024 17:30) (16 - 38)  SpO2: 97% (11 Jan 2024 17:30) (95% - 99%)    Parameters below as of 11 Jan 2024 17:30  Patient On (Oxygen Delivery Method): room air        I&O's Detail    10 Familia 2024 07:01  -  11 Jan 2024 07:00  --------------------------------------------------------  IN:    Oral Fluid: 200 mL    sodium chloride 0.9%: 225 mL  Total IN: 425 mL    OUT:    Voided (mL): 1050 mL  Total OUT: 1050 mL    Total NET: -625 mL      11 Jan 2024 07:01  -  11 Jan 2024 17:43  --------------------------------------------------------  IN:    Oral Fluid: 100 mL  Total IN: 100 mL    OUT:  Total OUT: 0 mL    Total NET: 100 mL        I&O's Summary    10 Familia 2024 07:01  -  11 Jan 2024 07:00  --------------------------------------------------------  IN: 425 mL / OUT: 1050 mL / NET: -625 mL    11 Jan 2024 07:01  -  11 Jan 2024 17:43  --------------------------------------------------------  IN: 100 mL / OUT: 0 mL / NET: 100 mL        PHYSICAL EXAM:  Neurological:  somnolent, arousable to verb stim, opens eye to verb stim, tracks, + dysarthric speech, PERRL, EOM intact b/l, face symmetric, tongue protr midline,  RUE and RLE 5/5, + no dysmetria,  LUE 4/5    Motor exam:         [] Upper extremity              Bi(c5)  WE(c6)  EE(c7)   FF(c8)                                                R         5/5        5/5        5/5       5/5                                               L          5/5        5/5        5/5       5/5         [] Lower extremeity          HF(l2)   KE(l3)    TA(l4)   EHL(l5)  GS(s1)                                                 R        5/5        5/5        5/5       5/5         5/5                                               L         5/5        5/5       5/5       5/5          5/5                                                        [] warm well perfused; capillary refill <3 seconds     Sensation: [] intact to light touch  [] decreased:       Cardiovascular:  Respiratory:  Gastrointestinal:  Genitourinary:  Extremities:    Incision/Wound:    DEVICE/DRAIN DRESSING:    TUBES/LINES:  [] CVC  [] A-line  [] Lumbar Drain  [] Ventriculostomy  [] Other    DIET:  [] NPO  [] Mechanical  [] Tube feeds    LABS:                        11.7   13.39 )-----------( 258      ( 11 Jan 2024 17:14 )             35.6     01-11    x   |  100  |  18  ----------------------------<  134<H>  4.1   |  26  |  0.62    Ca    9.0      11 Jan 2024 17:14  Phos  4.3     01-11  Mg     1.7     01-11      PT/INR - ( 11 Jan 2024 17:14 )   PT: 12.1 sec;   INR: 1.06          PTT - ( 11 Jan 2024 17:14 )  PTT:23.0 sec  Urinalysis Basic - ( 11 Jan 2024 17:14 )    Color: x / Appearance: x / SG: x / pH: x  Gluc: 134 mg/dL / Ketone: x  / Bili: x / Urobili: x   Blood: x / Protein: x / Nitrite: x   Leuk Esterase: x / RBC: x / WBC x   Sq Epi: x / Non Sq Epi: x / Bacteria: x          CAPILLARY BLOOD GLUCOSE      POCT Blood Glucose.: 114 mg/dL (11 Jan 2024 10:53)  POCT Blood Glucose.: 110 mg/dL (11 Jan 2024 06:17)  POCT Blood Glucose.: 99 mg/dL (10 Familia 2024 18:16)      Drug Levels: [] N/A    CSF Analysis: [] N/A      Allergies    No Known Allergies    Intolerances      MEDICATIONS:  Antibiotics:  ceFAZolin   IVPB 2000 milliGRAM(s) IV Intermittent every 8 hours    Neuro:  acetaminophen     Tablet .. 1000 milliGRAM(s) Oral every 6 hours  ondansetron Injectable 4 milliGRAM(s) IV Push every 6 hours  oxyCODONE    IR 5 milliGRAM(s) Oral every 4 hours PRN    Anticoagulation:    OTHER:  bisacodyl 5 milliGRAM(s) Oral daily PRN  budesonide  80 MICROgram(s)/formoterol 4.5 MICROgram(s) Inhaler 1 Puff(s) Inhalation once  chlorhexidine 2% Cloths 1 Application(s) Topical <User Schedule>  dexAMETHasone  Injectable 4 milliGRAM(s) IV Push every 6 hours  dexAMETHasone  Injectable   IV Push   glucagon  Injectable 1 milliGRAM(s) IntraMuscular once  influenza  Vaccine (HIGH DOSE) 0.7 milliLiter(s) IntraMuscular once  insulin lispro (ADMELOG) corrective regimen sliding scale   SubCutaneous Before meals and at bedtime  pantoprazole  Injectable 40 milliGRAM(s) IV Push daily  polyethylene glycol 3350 17 Gram(s) Oral daily  senna 2 Tablet(s) Oral at bedtime  tamsulosin 0.4 milliGRAM(s) Oral at bedtime    IVF:  magnesium sulfate  IVPB 1 Gram(s) IV Intermittent once  sodium chloride 0.9%. 1000 milliLiter(s) IV Continuous <Continuous>    CULTURES:  Culture Results:   No growth at 3 days. (01-07 @ 22:48)  Culture Results:   No growth at 3 days. (01-07 @ 22:48)    RADIOLOGY & ADDITIONAL TESTS:      ASSESSMENT:  78y Male s/p    LUNG CANCER    Handoff    MEWS Score    BPH (benign prostatic hyperplasia)    Lung mass    Prediabetes    Lung cancer metastatic to brain    Skin melanoma    Basal cell carcinoma    Lung cancer metastatic to brain    Craniotomy for suboccipital neoplasm    History of dental surgery    S/P skin cancer resection    PRE OP    2    [email or web address]        PLAN:  NEURO:    CARDIOVASCULAR:    PULMONARY:    RENAL:    GI:    HEME:    ID:    ENDO:    DVT PROPHYLAXIS:  [] Venodynes                                [] Heparin/Lovenox    FALL RISK:  [] Low Risk                                    [] Impulsive    DISPOSITION:  79 y/o M with pmh melanoma (L arm skin resection 2023), basal cell carcinoma, non small cell lung cancer - adenocarcinoma (s/p radiation chemo, completed 10/2023), and BPH presents with loss of balance, HA, fatigue, generalized weakness, diplopia and b/l foot tingling x 2 weeks and nausea/vomiting x 2-4 days. Pt found to have 5 areas of brain metastases, largest in the  L cerebellum, on outpatient MRI. s/p restrosig crani resection of tumor (1/11-) frozen NSCC     Hospital Course:   POD# 0  s/p restrosig crani resection of tumor (1/11-) frozen NSCC, no drains, dex taper over 1 week to 2 bid, MRI post-op pending, SBP<140         Vital Signs Last 24 Hrs  T(C): 34.5 (11 Jan 2024 17:00), Max: 36.7 (11 Jan 2024 09:22)  T(F): 94.1 (11 Jan 2024 17:00), Max: 98.1 (11 Jan 2024 09:22)  HR: 65 (11 Jan 2024 17:30) (56 - 99)  BP: 128/61 (11 Jan 2024 17:30) (120/63 - 168/80)  BP(mean): 88 (11 Jan 2024 17:30) (85 - 107)  RR: 19 (11 Jan 2024 17:30) (16 - 38)  SpO2: 97% (11 Jan 2024 17:30) (95% - 99%)    Parameters below as of 11 Jan 2024 17:30  Patient On (Oxygen Delivery Method): room air        I&O's Detail    10 Familia 2024 07:01  -  11 Jan 2024 07:00  --------------------------------------------------------  IN:    Oral Fluid: 200 mL    sodium chloride 0.9%: 225 mL  Total IN: 425 mL    OUT:    Voided (mL): 1050 mL  Total OUT: 1050 mL    Total NET: -625 mL      11 Jan 2024 07:01  -  11 Jan 2024 17:43  --------------------------------------------------------  IN:    Oral Fluid: 100 mL  Total IN: 100 mL    OUT:  Total OUT: 0 mL    Total NET: 100 mL        I&O's Summary    10 Familia 2024 07:01  -  11 Jan 2024 07:00  --------------------------------------------------------  IN: 425 mL / OUT: 1050 mL / NET: -625 mL    11 Jan 2024 07:01  -  11 Jan 2024 17:43  --------------------------------------------------------  IN: 100 mL / OUT: 0 mL / NET: 100 mL        PHYSICAL EXAM:  Neurological:  somnolent, arousable to verb stim, opens eye to verb stim, tracks, + dysarthric speech, PERRL, EOM intact b/l, face symmetric, tongue protr midline,  RUE and RLE 5/5, + no dysmetria, - drift  LUE 4/5 + drift, + dysmetria, LLE 4/5, - dysmetria,   Lungs: CTA b/l, no wheezing  Abd: soft, non-tender, + BS  : + river        DEVICE/DRAIN DRESSING: none    TUBES/LINES:  [] CVC  [x] A-line  [] Lumbar Drain  [] Ventriculostomy  [] Other    DIET:  [x] NPO  [] Mechanical  [] Tube feeds    LABS:                        11.7   13.39 )-----------( 258      ( 11 Jan 2024 17:14 )             35.6     01-11    x   |  100  |  18  ----------------------------<  134<H>  4.1   |  26  |  0.62    Ca    9.0      11 Jan 2024 17:14  Phos  4.3     01-11  Mg     1.7     01-11      PT/INR - ( 11 Jan 2024 17:14 )   PT: 12.1 sec;   INR: 1.06          PTT - ( 11 Jan 2024 17:14 )  PTT:23.0 sec  Urinalysis Basic - ( 11 Jan 2024 17:14 )    Color: x / Appearance: x / SG: x / pH: x  Gluc: 134 mg/dL / Ketone: x  / Bili: x / Urobili: x   Blood: x / Protein: x / Nitrite: x   Leuk Esterase: x / RBC: x / WBC x   Sq Epi: x / Non Sq Epi: x / Bacteria: x          CAPILLARY BLOOD GLUCOSE      POCT Blood Glucose.: 114 mg/dL (11 Jan 2024 10:53)  POCT Blood Glucose.: 110 mg/dL (11 Jan 2024 06:17)  POCT Blood Glucose.: 99 mg/dL (10 Familia 2024 18:16)      Drug Levels: [] N/A    CSF Analysis: [] N/A      Allergies    No Known Allergies    Intolerances      MEDICATIONS:  Antibiotics:  ceFAZolin   IVPB 2000 milliGRAM(s) IV Intermittent every 8 hours    Neuro:  acetaminophen     Tablet .. 1000 milliGRAM(s) Oral every 6 hours  ondansetron Injectable 4 milliGRAM(s) IV Push every 6 hours  oxyCODONE    IR 5 milliGRAM(s) Oral every 4 hours PRN    Anticoagulation:    OTHER:  bisacodyl 5 milliGRAM(s) Oral daily PRN  budesonide  80 MICROgram(s)/formoterol 4.5 MICROgram(s) Inhaler 1 Puff(s) Inhalation once  chlorhexidine 2% Cloths 1 Application(s) Topical <User Schedule>  dexAMETHasone  Injectable 4 milliGRAM(s) IV Push every 6 hours  dexAMETHasone  Injectable   IV Push   glucagon  Injectable 1 milliGRAM(s) IntraMuscular once  influenza  Vaccine (HIGH DOSE) 0.7 milliLiter(s) IntraMuscular once  insulin lispro (ADMELOG) corrective regimen sliding scale   SubCutaneous Before meals and at bedtime  pantoprazole  Injectable 40 milliGRAM(s) IV Push daily  polyethylene glycol 3350 17 Gram(s) Oral daily  senna 2 Tablet(s) Oral at bedtime  tamsulosin 0.4 milliGRAM(s) Oral at bedtime    IVF:  magnesium sulfate  IVPB 1 Gram(s) IV Intermittent once  sodium chloride 0.9%. 1000 milliLiter(s) IV Continuous <Continuous>    CULTURES:  Culture Results:   No growth at 3 days. (01-07 @ 22:48)  Culture Results:   No growth at 3 days. (01-07 @ 22:48)    RADIOLOGY & ADDITIONAL TESTS:      ASSESSMENT:  79 y/o M with pmh melanoma (L arm skin resection 2023), basal cell carcinoma, non small cell lung cancer - adenocarcinoma (s/p radiation chemo, completed 10/2023), and BPH presents with loss of balance, HA, fatigue, generalized weakness, diplopia and b/l foot tingling x 2 weeks and nausea/vomiting x 2-4 days. Pt found to have 5 areas of brain metastases, largest in the  L cerebellum, on outpatient MRI. s/p restrosig crani resection of tumor (1/11-) frozen [facility]    LUNG CANCER    Handoff    MEWS Score    BPH (benign prostatic hyperplasia)    Lung mass    Prediabetes    Lung cancer metastatic to brain    Skin melanoma    Basal cell carcinoma    Lung cancer metastatic to brain    Craniotomy for suboccipital neoplasm    History of dental surgery    S/P skin cancer resection    PRE OP    2    [email or web address]        PLAN:  Neuro:  - neuro/vital checks q1  - pain control w/ cranial ERAS  - Dex taper over 1 week to 2 BID  - MRI postop pending  - No HCT post-op unless changes in neuro exam    Cardiac:  - SBP goal 100-140    Pulmonary:  - on RA    GI:  - advance as tolerated  - bowel regimen  - protonix while on steroids  - zofran prn nausea  - CT A/P- Negative for mets  - KUB- nonobstructive bowel gas pattern    Renal:  - NS at 75 while NPO  - +river  - cont home flomax    Heme:  - SCDs for DVT prophylaxis  - CT A/P 1/10 for metastatic workup: negative    Endo:  - ISS, A1C 5.9    ID:  -postop ancef  -afebrile    Dispo:  ICU status  Full code  Family and Dr CORREA/Amico updated 79 y/o M with pmh melanoma (L arm skin resection 2023), basal cell carcinoma, non small cell lung cancer - adenocarcinoma (s/p radiation chemo, completed 10/2023), and BPH presents with loss of balance, HA, fatigue, generalized weakness, diplopia and b/l foot tingling x 2 weeks and nausea/vomiting x 2-4 days. Pt found to have 5 areas of brain metastases, largest in the  L cerebellum, on outpatient MRI. s/p restrosig crani resection of tumor (1/11-) frozen NSCC     Hospital Course:   POD# 0  s/p restrosig crani resection of tumor (1/11-) frozen NSCC, no drains, dex taper over 1 week to 2 bid, MRI post-op pending, SBP<140         Vital Signs Last 24 Hrs  T(C): 34.5 (11 Jan 2024 17:00), Max: 36.7 (11 Jan 2024 09:22)  T(F): 94.1 (11 Jan 2024 17:00), Max: 98.1 (11 Jan 2024 09:22)  HR: 65 (11 Jan 2024 17:30) (56 - 99)  BP: 128/61 (11 Jan 2024 17:30) (120/63 - 168/80)  BP(mean): 88 (11 Jan 2024 17:30) (85 - 107)  RR: 19 (11 Jan 2024 17:30) (16 - 38)  SpO2: 97% (11 Jan 2024 17:30) (95% - 99%)    Parameters below as of 11 Jan 2024 17:30  Patient On (Oxygen Delivery Method): room air        I&O's Detail    10 Familia 2024 07:01  -  11 Jan 2024 07:00  --------------------------------------------------------  IN:    Oral Fluid: 200 mL    sodium chloride 0.9%: 225 mL  Total IN: 425 mL    OUT:    Voided (mL): 1050 mL  Total OUT: 1050 mL    Total NET: -625 mL      11 Jan 2024 07:01  -  11 Jan 2024 17:43  --------------------------------------------------------  IN:    Oral Fluid: 100 mL  Total IN: 100 mL    OUT:  Total OUT: 0 mL    Total NET: 100 mL        I&O's Summary    10 Familia 2024 07:01  -  11 Jan 2024 07:00  --------------------------------------------------------  IN: 425 mL / OUT: 1050 mL / NET: -625 mL    11 Jan 2024 07:01  -  11 Jan 2024 17:43  --------------------------------------------------------  IN: 100 mL / OUT: 0 mL / NET: 100 mL        PHYSICAL EXAM:  Neurological:  somnolent, arousable to verb stim, opens eye to verb stim, tracks, + dysarthric speech, PERRL, EOM intact b/l, face symmetric, tongue protr midline,  RUE and RLE 5/5, + no dysmetria, - drift  LUE 4/5 + drift, + dysmetria, LLE 4/5, - dysmetria,   Lungs: CTA b/l, no wheezing  Abd: soft, non-tender, + BS  : + river        DEVICE/DRAIN DRESSING: none    TUBES/LINES:  [] CVC  [x] A-line  [] Lumbar Drain  [] Ventriculostomy  [] Other    DIET:  [x] NPO  [] Mechanical  [] Tube feeds    LABS:                        11.7   13.39 )-----------( 258      ( 11 Jan 2024 17:14 )             35.6     01-11    x   |  100  |  18  ----------------------------<  134<H>  4.1   |  26  |  0.62    Ca    9.0      11 Jan 2024 17:14  Phos  4.3     01-11  Mg     1.7     01-11      PT/INR - ( 11 Jan 2024 17:14 )   PT: 12.1 sec;   INR: 1.06          PTT - ( 11 Jan 2024 17:14 )  PTT:23.0 sec  Urinalysis Basic - ( 11 Jan 2024 17:14 )    Color: x / Appearance: x / SG: x / pH: x  Gluc: 134 mg/dL / Ketone: x  / Bili: x / Urobili: x   Blood: x / Protein: x / Nitrite: x   Leuk Esterase: x / RBC: x / WBC x   Sq Epi: x / Non Sq Epi: x / Bacteria: x          CAPILLARY BLOOD GLUCOSE      POCT Blood Glucose.: 114 mg/dL (11 Jan 2024 10:53)  POCT Blood Glucose.: 110 mg/dL (11 Jan 2024 06:17)  POCT Blood Glucose.: 99 mg/dL (10 Familia 2024 18:16)      Drug Levels: [] N/A    CSF Analysis: [] N/A      Allergies    No Known Allergies    Intolerances      MEDICATIONS:  Antibiotics:  ceFAZolin   IVPB 2000 milliGRAM(s) IV Intermittent every 8 hours    Neuro:  acetaminophen     Tablet .. 1000 milliGRAM(s) Oral every 6 hours  ondansetron Injectable 4 milliGRAM(s) IV Push every 6 hours  oxyCODONE    IR 5 milliGRAM(s) Oral every 4 hours PRN    Anticoagulation:    OTHER:  bisacodyl 5 milliGRAM(s) Oral daily PRN  budesonide  80 MICROgram(s)/formoterol 4.5 MICROgram(s) Inhaler 1 Puff(s) Inhalation once  chlorhexidine 2% Cloths 1 Application(s) Topical <User Schedule>  dexAMETHasone  Injectable 4 milliGRAM(s) IV Push every 6 hours  dexAMETHasone  Injectable   IV Push   glucagon  Injectable 1 milliGRAM(s) IntraMuscular once  influenza  Vaccine (HIGH DOSE) 0.7 milliLiter(s) IntraMuscular once  insulin lispro (ADMELOG) corrective regimen sliding scale   SubCutaneous Before meals and at bedtime  pantoprazole  Injectable 40 milliGRAM(s) IV Push daily  polyethylene glycol 3350 17 Gram(s) Oral daily  senna 2 Tablet(s) Oral at bedtime  tamsulosin 0.4 milliGRAM(s) Oral at bedtime    IVF:  magnesium sulfate  IVPB 1 Gram(s) IV Intermittent once  sodium chloride 0.9%. 1000 milliLiter(s) IV Continuous <Continuous>    CULTURES:  Culture Results:   No growth at 3 days. (01-07 @ 22:48)  Culture Results:   No growth at 3 days. (01-07 @ 22:48)    RADIOLOGY & ADDITIONAL TESTS:      ASSESSMENT:  79 y/o M with pmh melanoma (L arm skin resection 2023), basal cell carcinoma, non small cell lung cancer - adenocarcinoma (s/p radiation chemo, completed 10/2023), and BPH presents with loss of balance, HA, fatigue, generalized weakness, diplopia and b/l foot tingling x 2 weeks and nausea/vomiting x 2-4 days. Pt found to have 5 areas of brain metastases, largest in the  L cerebellum, on outpatient MRI. s/p restrosig crani resection of tumor (1/11-) frozen [facility]    LUNG CANCER    Handoff    MEWS Score    BPH (benign prostatic hyperplasia)    Lung mass    Prediabetes    Lung cancer metastatic to brain    Skin melanoma    Basal cell carcinoma    Lung cancer metastatic to brain    Craniotomy for suboccipital neoplasm    History of dental surgery    S/P skin cancer resection    PRE OP    2    SysAdmin_VstLnk        PLAN:  Neuro:  - neuro/vital checks q1  - pain control w/ cranial ERAS  - Dex taper over 1 week to 2 BID  - MRI postop pending  - No HCT post-op unless changes in neuro exam    Cardiac:  - SBP goal 100-140    Pulmonary:  - on RA    GI:  - advance as tolerated  - bowel regimen  - protonix while on steroids  - zofran prn nausea  - CT A/P- Negative for mets  - KUB- nonobstructive bowel gas pattern    Renal:  - NS at 74 while NPO  - +river  - cont home flomax    Heme:  - SCDs for DVT prophylaxis  - CT A/P 1/10 for metastatic workup: negative    Endo:  - ISS, A1C 5.9    ID:  -postop ancef  -afebrile    Dispo:  ICU status  Full code  Family and Dr CORREA/Amico updated 79 y/o M with pmh melanoma (L arm skin resection 2023), basal cell carcinoma, non small cell lung cancer - adenocarcinoma (s/p radiation chemo, completed 10/2023), and BPH presents with loss of balance, HA, fatigue, generalized weakness, diplopia and b/l foot tingling x 2 weeks and nausea/vomiting x 2-4 days. Pt found to have 5 areas of brain metastases, largest in the  L cerebellum, on outpatient MRI. s/p restrosig crani resection of tumor (1/11-) frozen NSCC     Hospital Course:   POD# 0  s/p restrosig crani resection of tumor (1/11-) frozen NSCC, no drains, dex taper over 1 week to 2 bid, MRI post-op pending, SBP<140         Vital Signs Last 24 Hrs  T(C): 34.5 (11 Jan 2024 17:00), Max: 36.7 (11 Jan 2024 09:22)  T(F): 94.1 (11 Jan 2024 17:00), Max: 98.1 (11 Jan 2024 09:22)  HR: 65 (11 Jan 2024 17:30) (56 - 99)  BP: 128/61 (11 Jan 2024 17:30) (120/63 - 168/80)  BP(mean): 88 (11 Jan 2024 17:30) (85 - 107)  RR: 19 (11 Jan 2024 17:30) (16 - 38)  SpO2: 97% (11 Jan 2024 17:30) (95% - 99%)    Parameters below as of 11 Jan 2024 17:30  Patient On (Oxygen Delivery Method): room air        I&O's Detail    10 Familia 2024 07:01  -  11 Jan 2024 07:00  --------------------------------------------------------  IN:    Oral Fluid: 200 mL    sodium chloride 0.9%: 225 mL  Total IN: 425 mL    OUT:    Voided (mL): 1050 mL  Total OUT: 1050 mL    Total NET: -625 mL      11 Jan 2024 07:01  -  11 Jan 2024 17:43  --------------------------------------------------------  IN:    Oral Fluid: 100 mL  Total IN: 100 mL    OUT:  Total OUT: 0 mL    Total NET: 100 mL        I&O's Summary    10 Familia 2024 07:01  -  11 Jan 2024 07:00  --------------------------------------------------------  IN: 425 mL / OUT: 1050 mL / NET: -625 mL    11 Jan 2024 07:01  -  11 Jan 2024 17:43  --------------------------------------------------------  IN: 100 mL / OUT: 0 mL / NET: 100 mL        PHYSICAL EXAM:  Neurological:  somnolent, arousable to verb stim, opens eye to verb stim, tracks, + dysarthric speech, PERRL, EOM intact b/l, face symmetric, tongue protr midline,  RUE and RLE 5/5, neg dysmetria, neg drift  LUE 4/5 + drift, + dysmetria, LLE 4/5,,   Lungs: CTA b/l, no wheezing  Abd: soft, non-tender, + BS  : + river    Wound: Head dressing clean and dry , no drains        DEVICE/DRAIN DRESSING: none    TUBES/LINES:  [] CVC  [x] A-line  [] Lumbar Drain  [] Ventriculostomy  [] Other    DIET:  [x] NPO  [] Mechanical  [] Tube feeds    LABS:                        11.7   13.39 )-----------( 258      ( 11 Jan 2024 17:14 )             35.6     01-11    x   |  100  |  18  ----------------------------<  134<H>  4.1   |  26  |  0.62    Ca    9.0      11 Jan 2024 17:14  Phos  4.3     01-11  Mg     1.7     01-11      PT/INR - ( 11 Jan 2024 17:14 )   PT: 12.1 sec;   INR: 1.06          PTT - ( 11 Jan 2024 17:14 )  PTT:23.0 sec  Urinalysis Basic - ( 11 Jan 2024 17:14 )    Color: x / Appearance: x / SG: x / pH: x  Gluc: 134 mg/dL / Ketone: x  / Bili: x / Urobili: x   Blood: x / Protein: x / Nitrite: x   Leuk Esterase: x / RBC: x / WBC x   Sq Epi: x / Non Sq Epi: x / Bacteria: x          CAPILLARY BLOOD GLUCOSE      POCT Blood Glucose.: 114 mg/dL (11 Jan 2024 10:53)  POCT Blood Glucose.: 110 mg/dL (11 Jan 2024 06:17)  POCT Blood Glucose.: 99 mg/dL (10 Familia 2024 18:16)      Drug Levels: [] N/A    CSF Analysis: [] N/A      Allergies    No Known Allergies    Intolerances      MEDICATIONS:  Antibiotics:  ceFAZolin   IVPB 2000 milliGRAM(s) IV Intermittent every 8 hours    Neuro:  acetaminophen     Tablet .. 1000 milliGRAM(s) Oral every 6 hours  ondansetron Injectable 4 milliGRAM(s) IV Push every 6 hours  oxyCODONE    IR 5 milliGRAM(s) Oral every 4 hours PRN    Anticoagulation:    OTHER:  bisacodyl 5 milliGRAM(s) Oral daily PRN  budesonide  80 MICROgram(s)/formoterol 4.5 MICROgram(s) Inhaler 1 Puff(s) Inhalation once  chlorhexidine 2% Cloths 1 Application(s) Topical <User Schedule>  dexAMETHasone  Injectable 4 milliGRAM(s) IV Push every 6 hours  dexAMETHasone  Injectable   IV Push   glucagon  Injectable 1 milliGRAM(s) IntraMuscular once  influenza  Vaccine (HIGH DOSE) 0.7 milliLiter(s) IntraMuscular once  insulin lispro (ADMELOG) corrective regimen sliding scale   SubCutaneous Before meals and at bedtime  pantoprazole  Injectable 40 milliGRAM(s) IV Push daily  polyethylene glycol 3350 17 Gram(s) Oral daily  senna 2 Tablet(s) Oral at bedtime  tamsulosin 0.4 milliGRAM(s) Oral at bedtime    IVF:  magnesium sulfate  IVPB 1 Gram(s) IV Intermittent once  sodium chloride 0.9%. 1000 milliLiter(s) IV Continuous <Continuous>    CULTURES:  Culture Results:   No growth at 3 days. (01-07 @ 22:48)  Culture Results:   No growth at 3 days. (01-07 @ 22:48)    RADIOLOGY & ADDITIONAL TESTS:      ASSESSMENT:  79 y/o M with pmh melanoma (L arm skin resection 2023), basal cell carcinoma, non small cell lung cancer - adenocarcinoma (s/p radiation chemo, completed 10/2023), and BPH presents with loss of balance, HA, fatigue, generalized weakness, diplopia and b/l foot tingling x 2 weeks and nausea/vomiting x 2-4 days. Pt found to have 5 areas of brain metastases, largest in the  L cerebellum, on outpatient MRI. s/p restrosig crani resection of tumor (1/11-) frozen [facility]    LUNG CANCER    Handoff    MEWS Score    BPH (benign prostatic hyperplasia)    Lung mass    Prediabetes    Lung cancer metastatic to brain    Skin melanoma    Basal cell carcinoma    Lung cancer metastatic to brain    Craniotomy for suboccipital neoplasm    History of dental surgery    S/P skin cancer resection    PRE OP    2    [email or web address]        PLAN:  Neuro:  - neuro/vital checks q1  - pain control w/ cranial ERAS  - Dex taper over 1 week to 2 BID  - MRI postop pending  - No HCT post-op unless changes in neuro exam    Cardiac:  - SBP goal 100-140    Pulmonary:  - on RA    GI:  - advance as tolerated  - bowel regimen  - protonix while on steroids  - zofran prn nausea  - CT A/P- Negative for mets  - KUB- nonobstructive bowel gas pattern    Renal:  - NS at 75 while NPO  - +river  - cont home flomax    Heme:  - CT chest pending as per Onc  - SCDs for DVT prophylaxis  - CT A/P 1/10 for metastatic workup: negative    Endo:  - ISS, A1C 5.9    ID:  -postop ancef  -afebrile    Dispo:  ICU status  Full code  Family and Dr CORREA/Amico updated 79 y/o M with pmh melanoma (L arm skin resection 2023), basal cell carcinoma, non small cell lung cancer - adenocarcinoma (s/p radiation chemo, completed 10/2023), and BPH presents with loss of balance, HA, fatigue, generalized weakness, diplopia and b/l foot tingling x 2 weeks and nausea/vomiting x 2-4 days. Pt found to have 5 areas of brain metastases, largest in the  L cerebellum, on outpatient MRI. s/p restrosig crani resection of tumor (1/11-) frozen NSCC     Hospital Course:   POD# 0  s/p restrosig crani resection of tumor (1/11-) frozen NSCC, no drains, dex taper over 1 week to 2 bid, MRI post-op pending, SBP<140         Vital Signs Last 24 Hrs  T(C): 34.5 (11 Jan 2024 17:00), Max: 36.7 (11 Jan 2024 09:22)  T(F): 94.1 (11 Jan 2024 17:00), Max: 98.1 (11 Jan 2024 09:22)  HR: 65 (11 Jan 2024 17:30) (56 - 99)  BP: 128/61 (11 Jan 2024 17:30) (120/63 - 168/80)  BP(mean): 88 (11 Jan 2024 17:30) (85 - 107)  RR: 19 (11 Jan 2024 17:30) (16 - 38)  SpO2: 97% (11 Jan 2024 17:30) (95% - 99%)    Parameters below as of 11 Jan 2024 17:30  Patient On (Oxygen Delivery Method): room air        I&O's Detail    10 Familia 2024 07:01  -  11 Jan 2024 07:00  --------------------------------------------------------  IN:    Oral Fluid: 200 mL    sodium chloride 0.9%: 225 mL  Total IN: 425 mL    OUT:    Voided (mL): 1050 mL  Total OUT: 1050 mL    Total NET: -625 mL      11 Jan 2024 07:01  -  11 Jan 2024 17:43  --------------------------------------------------------  IN:    Oral Fluid: 100 mL  Total IN: 100 mL    OUT:  Total OUT: 0 mL    Total NET: 100 mL        I&O's Summary    10 Familia 2024 07:01  -  11 Jan 2024 07:00  --------------------------------------------------------  IN: 425 mL / OUT: 1050 mL / NET: -625 mL    11 Jan 2024 07:01  -  11 Jan 2024 17:43  --------------------------------------------------------  IN: 100 mL / OUT: 0 mL / NET: 100 mL        PHYSICAL EXAM:  Neurological:  somnolent, arousable to verb stim, opens eye to verb stim, tracks, + dysarthric speech, PERRL, EOM intact b/l, face symmetric, tongue protr midline,  RUE and RLE 5/5, neg dysmetria, neg drift  LUE 4/5 + drift, + dysmetria, LLE 4/5,,   Lungs: CTA b/l, no wheezing  Abd: soft, non-tender, + BS  : + river    Wound: Head dressing clean and dry , no drains        DEVICE/DRAIN DRESSING: none    TUBES/LINES:  [] CVC  [x] A-line  [] Lumbar Drain  [] Ventriculostomy  [] Other    DIET:  [x] NPO  [] Mechanical  [] Tube feeds    LABS:                        11.7   13.39 )-----------( 258      ( 11 Jan 2024 17:14 )             35.6     01-11    x   |  100  |  18  ----------------------------<  134<H>  4.1   |  26  |  0.62    Ca    9.0      11 Jan 2024 17:14  Phos  4.3     01-11  Mg     1.7     01-11      PT/INR - ( 11 Jan 2024 17:14 )   PT: 12.1 sec;   INR: 1.06          PTT - ( 11 Jan 2024 17:14 )  PTT:23.0 sec  Urinalysis Basic - ( 11 Jan 2024 17:14 )    Color: x / Appearance: x / SG: x / pH: x  Gluc: 134 mg/dL / Ketone: x  / Bili: x / Urobili: x   Blood: x / Protein: x / Nitrite: x   Leuk Esterase: x / RBC: x / WBC x   Sq Epi: x / Non Sq Epi: x / Bacteria: x          CAPILLARY BLOOD GLUCOSE      POCT Blood Glucose.: 114 mg/dL (11 Jan 2024 10:53)  POCT Blood Glucose.: 110 mg/dL (11 Jan 2024 06:17)  POCT Blood Glucose.: 99 mg/dL (10 Familia 2024 18:16)      Drug Levels: [] N/A    CSF Analysis: [] N/A      Allergies    No Known Allergies    Intolerances      MEDICATIONS:  Antibiotics:  ceFAZolin   IVPB 2000 milliGRAM(s) IV Intermittent every 8 hours    Neuro:  acetaminophen     Tablet .. 1000 milliGRAM(s) Oral every 6 hours  ondansetron Injectable 4 milliGRAM(s) IV Push every 6 hours  oxyCODONE    IR 5 milliGRAM(s) Oral every 4 hours PRN    Anticoagulation:    OTHER:  bisacodyl 5 milliGRAM(s) Oral daily PRN  budesonide  80 MICROgram(s)/formoterol 4.5 MICROgram(s) Inhaler 1 Puff(s) Inhalation once  chlorhexidine 2% Cloths 1 Application(s) Topical <User Schedule>  dexAMETHasone  Injectable 4 milliGRAM(s) IV Push every 6 hours  dexAMETHasone  Injectable   IV Push   glucagon  Injectable 1 milliGRAM(s) IntraMuscular once  influenza  Vaccine (HIGH DOSE) 0.7 milliLiter(s) IntraMuscular once  insulin lispro (ADMELOG) corrective regimen sliding scale   SubCutaneous Before meals and at bedtime  pantoprazole  Injectable 40 milliGRAM(s) IV Push daily  polyethylene glycol 3350 17 Gram(s) Oral daily  senna 2 Tablet(s) Oral at bedtime  tamsulosin 0.4 milliGRAM(s) Oral at bedtime    IVF:  magnesium sulfate  IVPB 1 Gram(s) IV Intermittent once  sodium chloride 0.9%. 1000 milliLiter(s) IV Continuous <Continuous>    CULTURES:  Culture Results:   No growth at 3 days. (01-07 @ 22:48)  Culture Results:   No growth at 3 days. (01-07 @ 22:48)    RADIOLOGY & ADDITIONAL TESTS:      ASSESSMENT:  79 y/o M with pmh melanoma (L arm skin resection 2023), basal cell carcinoma, non small cell lung cancer - adenocarcinoma (s/p radiation chemo, completed 10/2023), and BPH presents with loss of balance, HA, fatigue, generalized weakness, diplopia and b/l foot tingling x 2 weeks and nausea/vomiting x 2-4 days. Pt found to have 5 areas of brain metastases, largest in the  L cerebellum, on outpatient MRI. s/p restrosig crani resection of tumor (1/11-) frozen [facility]    LUNG CANCER    Handoff    MEWS Score    BPH (benign prostatic hyperplasia)    Lung mass    Prediabetes    Lung cancer metastatic to brain    Skin melanoma    Basal cell carcinoma    Lung cancer metastatic to brain    Craniotomy for suboccipital neoplasm    History of dental surgery    S/P skin cancer resection    PRE OP    2    [employer]        PLAN:  Neuro:  - neuro/vital checks q1  - pain control w/ cranial ERAS  - Dex taper over 1 week to 2 BID  - MRI postop pending  - No HCT post-op unless changes in neuro exam    Cardiac:  - SBP goal 100-140    Pulmonary:  - on RA    GI:  - advance as tolerated  - bowel regimen  - protonix while on steroids  - zofran prn nausea  - CT A/P- Negative for mets  - KUB- nonobstructive bowel gas pattern    Renal:  - NS at 74 while NPO  - +river  - cont home flomax    Heme:  - CT chest pending as per Onc  - SCDs for DVT prophylaxis  - CT A/P 1/10 for metastatic workup: negative    Endo:  - ISS, A1C 5.9    ID:  -postop ancef  -afebrile    Dispo:  ICU status  Full code  Family and Dr CORREA/Amico updated 77 y/o M with pmh melanoma (L arm skin resection 2023), basal cell carcinoma, non small cell lung cancer - adenocarcinoma (s/p radiation chemo, completed 10/2023), and BPH presents with loss of balance, HA, fatigue, generalized weakness, diplopia and b/l foot tingling x 2 weeks and nausea/vomiting x 2-4 days. Pt found to have 5 areas of brain metastases, largest in the  L cerebellum, on outpatient MRI. s/p restrosig crani resection of tumor (1/11-) frozen NSCC     Hospital Course:   POD# 0  s/p restrosig crani resection of tumor (1/11-) frozen NSCC, no drains, dex taper over 1 week to 2 bid, MRI post-op pending, SBP<140         Vital Signs Last 24 Hrs  T(C): 34.5 (11 Jan 2024 17:00), Max: 36.7 (11 Jan 2024 09:22)  T(F): 94.1 (11 Jan 2024 17:00), Max: 98.1 (11 Jan 2024 09:22)  HR: 65 (11 Jan 2024 17:30) (56 - 99)  BP: 128/61 (11 Jan 2024 17:30) (120/63 - 168/80)  BP(mean): 88 (11 Jan 2024 17:30) (85 - 107)  RR: 19 (11 Jan 2024 17:30) (16 - 38)  SpO2: 97% (11 Jan 2024 17:30) (95% - 99%)    Parameters below as of 11 Jan 2024 17:30  Patient On (Oxygen Delivery Method): room air        I&O's Detail    10 Familia 2024 07:01  -  11 Jan 2024 07:00  --------------------------------------------------------  IN:    Oral Fluid: 200 mL    sodium chloride 0.9%: 225 mL  Total IN: 425 mL    OUT:    Voided (mL): 1050 mL  Total OUT: 1050 mL    Total NET: -625 mL      11 Jan 2024 07:01  -  11 Jan 2024 17:43  --------------------------------------------------------  IN:    Oral Fluid: 100 mL  Total IN: 100 mL    OUT:  Total OUT: 0 mL    Total NET: 100 mL        I&O's Summary    10 Familia 2024 07:01  -  11 Jan 2024 07:00  --------------------------------------------------------  IN: 425 mL / OUT: 1050 mL / NET: -625 mL    11 Jan 2024 07:01  -  11 Jan 2024 17:43  --------------------------------------------------------  IN: 100 mL / OUT: 0 mL / NET: 100 mL        PHYSICAL EXAM:  Neurological:  somnolent, arousable to verb stim, Ox 3 with choices, opens eye to verb stim, tracks, + dysarthric speech, PERRL, EOM intact b/l, face symmetric, tongue protr midline,  RUE and RLE 5/5, neg dysmetria, neg drift  LUE 4/5 + drift, + dysmetria, LLE 4/5,,   Lungs: CTA b/l, no wheezing  Abd: soft, non-tender, + BS  : + river    Wound: Head dressing clean and dry , no drains        DEVICE/DRAIN DRESSING: none    TUBES/LINES:  [] CVC  [x] A-line  [] Lumbar Drain  [] Ventriculostomy  [] Other    DIET:  [x] NPO  [] Mechanical  [] Tube feeds    LABS:                        11.7   13.39 )-----------( 258      ( 11 Jan 2024 17:14 )             35.6     01-11    x   |  100  |  18  ----------------------------<  134<H>  4.1   |  26  |  0.62    Ca    9.0      11 Jan 2024 17:14  Phos  4.3     01-11  Mg     1.7     01-11      PT/INR - ( 11 Jan 2024 17:14 )   PT: 12.1 sec;   INR: 1.06          PTT - ( 11 Jan 2024 17:14 )  PTT:23.0 sec  Urinalysis Basic - ( 11 Jan 2024 17:14 )    Color: x / Appearance: x / SG: x / pH: x  Gluc: 134 mg/dL / Ketone: x  / Bili: x / Urobili: x   Blood: x / Protein: x / Nitrite: x   Leuk Esterase: x / RBC: x / WBC x   Sq Epi: x / Non Sq Epi: x / Bacteria: x          CAPILLARY BLOOD GLUCOSE      POCT Blood Glucose.: 114 mg/dL (11 Jan 2024 10:53)  POCT Blood Glucose.: 110 mg/dL (11 Jan 2024 06:17)  POCT Blood Glucose.: 99 mg/dL (10 Familia 2024 18:16)      Drug Levels: [] N/A    CSF Analysis: [] N/A      Allergies    No Known Allergies    Intolerances      MEDICATIONS:  Antibiotics:  ceFAZolin   IVPB 2000 milliGRAM(s) IV Intermittent every 8 hours    Neuro:  acetaminophen     Tablet .. 1000 milliGRAM(s) Oral every 6 hours  ondansetron Injectable 4 milliGRAM(s) IV Push every 6 hours  oxyCODONE    IR 5 milliGRAM(s) Oral every 4 hours PRN    Anticoagulation:    OTHER:  bisacodyl 5 milliGRAM(s) Oral daily PRN  budesonide  80 MICROgram(s)/formoterol 4.5 MICROgram(s) Inhaler 1 Puff(s) Inhalation once  chlorhexidine 2% Cloths 1 Application(s) Topical <User Schedule>  dexAMETHasone  Injectable 4 milliGRAM(s) IV Push every 6 hours  dexAMETHasone  Injectable   IV Push   glucagon  Injectable 1 milliGRAM(s) IntraMuscular once  influenza  Vaccine (HIGH DOSE) 0.7 milliLiter(s) IntraMuscular once  insulin lispro (ADMELOG) corrective regimen sliding scale   SubCutaneous Before meals and at bedtime  pantoprazole  Injectable 40 milliGRAM(s) IV Push daily  polyethylene glycol 3350 17 Gram(s) Oral daily  senna 2 Tablet(s) Oral at bedtime  tamsulosin 0.4 milliGRAM(s) Oral at bedtime    IVF:  magnesium sulfate  IVPB 1 Gram(s) IV Intermittent once  sodium chloride 0.9%. 1000 milliLiter(s) IV Continuous <Continuous>    CULTURES:  Culture Results:   No growth at 3 days. (01-07 @ 22:48)  Culture Results:   No growth at 3 days. (01-07 @ 22:48)    RADIOLOGY & ADDITIONAL TESTS:      ASSESSMENT:  77 y/o M with pmh melanoma (L arm skin resection 2023), basal cell carcinoma, non small cell lung cancer - adenocarcinoma (s/p radiation chemo, completed 10/2023), and BPH presents with loss of balance, HA, fatigue, generalized weakness, diplopia and b/l foot tingling x 2 weeks and nausea/vomiting x 2-4 days. Pt found to have 5 areas of brain metastases, largest in the  L cerebellum, on outpatient MRI. s/p restrosig crani resection of tumor (1/11-) frozen [facility]    LUNG CANCER    Handoff    MEWS Score    BPH (benign prostatic hyperplasia)    Lung mass    Prediabetes    Lung cancer metastatic to brain    Skin melanoma    Basal cell carcinoma    Lung cancer metastatic to brain    Craniotomy for suboccipital neoplasm    History of dental surgery    S/P skin cancer resection    PRE OP    2    [employer]        PLAN:  Neuro:  - neuro/vital checks q1  - pain control w/ cranial ERAS  - Dex taper over 1 week to 2 BID  - MRI postop pending  - No HCT post-op unless changes in neuro exam    Cardiac:  - SBP goal 100-140    Pulmonary:  - on RA    GI:  - advance as tolerated  - bowel regimen  - protonix while on steroids  - zofran prn nausea  - CT A/P- Negative for mets  - KUB- nonobstructive bowel gas pattern    Renal:  - NS at 74 while NPO  - +river  - cont home flomax    Heme:  - CT chest pending as per Onc  - SCDs for DVT prophylaxis  - CT A/P 1/10 for metastatic workup: negative    Endo:  - ISS, A1C 5.9    ID:  -postop ancef  -afebrile    Dispo:  ICU status  Full code  Family and Dr CORREA/Amico updated 79 y/o M with pmh melanoma (L arm skin resection 2023), basal cell carcinoma, non small cell lung cancer - adenocarcinoma (s/p radiation chemo, completed 10/2023), and BPH presents with loss of balance, HA, fatigue, generalized weakness, diplopia and b/l foot tingling x 2 weeks and nausea/vomiting x 2-4 days. Pt found to have 5 areas of brain metastases, largest in the  L cerebellum, on outpatient MRI. s/p restrosig crani resection of tumor (1/11-) frozen NSCC     Hospital Course:   POD# 0  s/p restrosig crani resection of tumor (1/11-) frozen NSCC, no drains, dex taper over 1 week to 2 bid, MRI post-op pending, SBP<140         Vital Signs Last 24 Hrs  T(C): 34.5 (11 Jan 2024 17:00), Max: 36.7 (11 Jan 2024 09:22)  T(F): 94.1 (11 Jan 2024 17:00), Max: 98.1 (11 Jan 2024 09:22)  HR: 65 (11 Jan 2024 17:30) (56 - 99)  BP: 128/61 (11 Jan 2024 17:30) (120/63 - 168/80)  BP(mean): 88 (11 Jan 2024 17:30) (85 - 107)  RR: 19 (11 Jan 2024 17:30) (16 - 38)  SpO2: 97% (11 Jan 2024 17:30) (95% - 99%)    Parameters below as of 11 Jan 2024 17:30  Patient On (Oxygen Delivery Method): room air        I&O's Detail    10 Familia 2024 07:01  -  11 Jan 2024 07:00  --------------------------------------------------------  IN:    Oral Fluid: 200 mL    sodium chloride 0.9%: 225 mL  Total IN: 425 mL    OUT:    Voided (mL): 1050 mL  Total OUT: 1050 mL    Total NET: -625 mL      11 Jan 2024 07:01  -  11 Jan 2024 17:43  --------------------------------------------------------  IN:    Oral Fluid: 100 mL  Total IN: 100 mL    OUT:  Total OUT: 0 mL    Total NET: 100 mL        I&O's Summary    10 Familia 2024 07:01  -  11 Jan 2024 07:00  --------------------------------------------------------  IN: 425 mL / OUT: 1050 mL / NET: -625 mL    11 Jan 2024 07:01  -  11 Jan 2024 17:43  --------------------------------------------------------  IN: 100 mL / OUT: 0 mL / NET: 100 mL        PHYSICAL EXAM:  Neurological:  somnolent, arousable to verb stim, Ox 3 with choices, opens eye to verb stim, tracks, + dysarthric speech, PERRL, EOM intact b/l, face symmetric, tongue protr midline,  RUE and RLE 5/5, neg dysmetria, neg drift  LUE 4/5 + drift, + dysmetria, LLE 4/5,,   Lungs: CTA b/l, no wheezing  Abd: soft, non-tender, + BS  : + river    Wound: Head dressing clean and dry , no drains        DEVICE/DRAIN DRESSING: none    TUBES/LINES:  [] CVC  [x] A-line  [] Lumbar Drain  [] Ventriculostomy  [] Other    DIET:  [x] NPO  [] Mechanical  [] Tube feeds    LABS:                        11.7   13.39 )-----------( 258      ( 11 Jan 2024 17:14 )             35.6     01-11    x   |  100  |  18  ----------------------------<  134<H>  4.1   |  26  |  0.62    Ca    9.0      11 Jan 2024 17:14  Phos  4.3     01-11  Mg     1.7     01-11      PT/INR - ( 11 Jan 2024 17:14 )   PT: 12.1 sec;   INR: 1.06          PTT - ( 11 Jan 2024 17:14 )  PTT:23.0 sec  Urinalysis Basic - ( 11 Jan 2024 17:14 )    Color: x / Appearance: x / SG: x / pH: x  Gluc: 134 mg/dL / Ketone: x  / Bili: x / Urobili: x   Blood: x / Protein: x / Nitrite: x   Leuk Esterase: x / RBC: x / WBC x   Sq Epi: x / Non Sq Epi: x / Bacteria: x          CAPILLARY BLOOD GLUCOSE      POCT Blood Glucose.: 114 mg/dL (11 Jan 2024 10:53)  POCT Blood Glucose.: 110 mg/dL (11 Jan 2024 06:17)  POCT Blood Glucose.: 99 mg/dL (10 Familia 2024 18:16)      Drug Levels: [] N/A    CSF Analysis: [] N/A      Allergies    No Known Allergies    Intolerances      MEDICATIONS:  Antibiotics:  ceFAZolin   IVPB 2000 milliGRAM(s) IV Intermittent every 8 hours    Neuro:  acetaminophen     Tablet .. 1000 milliGRAM(s) Oral every 6 hours  ondansetron Injectable 4 milliGRAM(s) IV Push every 6 hours  oxyCODONE    IR 5 milliGRAM(s) Oral every 4 hours PRN    Anticoagulation:    OTHER:  bisacodyl 5 milliGRAM(s) Oral daily PRN  budesonide  80 MICROgram(s)/formoterol 4.5 MICROgram(s) Inhaler 1 Puff(s) Inhalation once  chlorhexidine 2% Cloths 1 Application(s) Topical <User Schedule>  dexAMETHasone  Injectable 4 milliGRAM(s) IV Push every 6 hours  dexAMETHasone  Injectable   IV Push   glucagon  Injectable 1 milliGRAM(s) IntraMuscular once  influenza  Vaccine (HIGH DOSE) 0.7 milliLiter(s) IntraMuscular once  insulin lispro (ADMELOG) corrective regimen sliding scale   SubCutaneous Before meals and at bedtime  pantoprazole  Injectable 40 milliGRAM(s) IV Push daily  polyethylene glycol 3350 17 Gram(s) Oral daily  senna 2 Tablet(s) Oral at bedtime  tamsulosin 0.4 milliGRAM(s) Oral at bedtime    IVF:  magnesium sulfate  IVPB 1 Gram(s) IV Intermittent once  sodium chloride 0.9%. 1000 milliLiter(s) IV Continuous <Continuous>    CULTURES:  Culture Results:   No growth at 3 days. (01-07 @ 22:48)  Culture Results:   No growth at 3 days. (01-07 @ 22:48)    RADIOLOGY & ADDITIONAL TESTS:      ASSESSMENT:  79 y/o M with pmh melanoma (L arm skin resection 2023), basal cell carcinoma, non small cell lung cancer - adenocarcinoma (s/p radiation chemo, completed 10/2023), and BPH presents with loss of balance, HA, fatigue, generalized weakness, diplopia and b/l foot tingling x 2 weeks and nausea/vomiting x 2-4 days. Pt found to have 5 areas of brain metastases, largest in the  L cerebellum, on outpatient MRI. s/p restrosig crani resection of tumor (1/11-) frozen [facility]    LUNG CANCER    Handoff    MEWS Score    BPH (benign prostatic hyperplasia)    Lung mass    Prediabetes    Lung cancer metastatic to brain    Skin melanoma    Basal cell carcinoma    Lung cancer metastatic to brain    Craniotomy for suboccipital neoplasm    History of dental surgery    S/P skin cancer resection    PRE OP    2    SysAdmin_VstLnk        PLAN:  Neuro:  - neuro/vital checks q1  - pain control w/ cranial ERAS  - Dex taper over 1 week to 2 BID  - MRI postop pending  - No HCT post-op unless changes in neuro exam    Cardiac:  - SBP goal 100-140    Pulmonary:  - on RA    GI:  - advance as tolerated  - bowel regimen  - protonix while on steroids  - zofran prn nausea  - CT A/P- Negative for mets  - KUB- nonobstructive bowel gas pattern    Renal:  - NS at 74 while NPO  - +river  - cont home flomax    Heme:  - CT chest pending as per Onc  - SCDs for DVT prophylaxis  - CT A/P 1/10 for metastatic workup: negative    Endo:  - ISS, A1C 5.9    ID:  -postop ancef  -afebrile    Dispo:  ICU status  Full code  Family and Dr CORREA/Amico updated

## 2024-01-12 NOTE — PROGRESS NOTE ADULT - SUBJECTIVE AND OBJECTIVE BOX
Hematology Oncology Progress Note      Interval History:    SUBJECTIVE:   Patient seen and examined at bedside. Doing well, no acute distress.   Eating soup    OBJECTIVE:    VITAL SIGNS:  ICU Vital Signs Last 24 Hrs  T(C): 36.5 (12 Jan 2024 14:14), Max: 36.8 (12 Jan 2024 05:40)  T(F): 97.7 (12 Jan 2024 14:14), Max: 98.3 (12 Jan 2024 08:53)  HR: 60 (12 Jan 2024 12:00) (50 - 81)  BP: 119/69 (11 Jan 2024 19:00) (117/65 - 130/60)  BP(mean): 89 (11 Jan 2024 19:00) (86 - 92)  ABP: 115/64 (12 Jan 2024 12:00) (111/70 - 137/61)  ABP(mean): 86 (12 Jan 2024 12:00) (75 - 98)  RR: 20 (12 Jan 2024 12:00) (17 - 38)  SpO2: 99% (12 Jan 2024 12:00) (93% - 99%)    O2 Parameters below as of 12 Jan 2024 12:00  Patient On (Oxygen Delivery Method): room air              01-11 @ 07:01 - 01-12 @ 07:00  --------------------------------------------------------  IN: 1729.9 mL / OUT: 910 mL / NET: 819.9 mL    01-12 @ 07:01 - 01-12 @ 14:30  --------------------------------------------------------  IN: 225 mL / OUT: 300 mL / NET: -75 mL      CAPILLARY BLOOD GLUCOSE      POCT Blood Glucose.: 126 mg/dL (12 Jan 2024 11:14)      PHYSICAL EXAM:  General: NAD, answering questions, pleasantly conversant  HEENT: NC/AT; PERRL, clear conjunctiva  Neck: supple  Respiratory: CTA b/l  Cardiovascular: +S1/S2; RRR  Abdomen: soft, NT/ND; +BS x4  Extremities: WWP, 2+ peripheral pulses b/l; no LE edema  Skin: normal color and turgor; no rash  Neurological: AAOX3, 5/5 LLE, 5/5 RLE strength, dysmetria improved, finger to nose test negative, no didiachonesia     MEDICATIONS:  MEDICATIONS  (STANDING):  acetaminophen     Tablet .. 1000 milliGRAM(s) Oral every 8 hours  chlorhexidine 2% Cloths 1 Application(s) Topical <User Schedule>  dexAMETHasone  Injectable 4 milliGRAM(s) IV Push every 6 hours  dexAMETHasone  Injectable   IV Push   enoxaparin Injectable 40 milliGRAM(s) SubCutaneous <User Schedule>  folic acid 1 milliGRAM(s) Oral daily  glucagon  Injectable 1 milliGRAM(s) IntraMuscular once  influenza  Vaccine (HIGH DOSE) 0.7 milliLiter(s) IntraMuscular once  insulin lispro (ADMELOG) corrective regimen sliding scale   SubCutaneous Before meals and at bedtime  multivitamin 1 Tablet(s) Oral daily  ondansetron Injectable 4 milliGRAM(s) IV Push every 6 hours  pantoprazole  Injectable 40 milliGRAM(s) IV Push daily  polyethylene glycol 3350 17 Gram(s) Oral daily  senna 2 Tablet(s) Oral at bedtime  tamsulosin 0.4 milliGRAM(s) Oral at bedtime  thiamine 100 milliGRAM(s) Oral daily    MEDICATIONS  (PRN):  bisacodyl 5 milliGRAM(s) Oral daily PRN Constipation  oxyCODONE    IR 5 milliGRAM(s) Oral every 4 hours PRN Severe Pain (7 - 10)      ALLERGIES:  Allergies    No Known Allergies    Intolerances        LABS:                        11.1   11.82 )-----------( 242      ( 12 Jan 2024 05:04 )             34.7     01-12    138  |  104  |  18  ----------------------------<  135<H>  4.0   |  28  |  0.56    Ca    8.8      12 Jan 2024 05:04  Phos  3.9     01-12  Mg     2.2     01-12      PT/INR - ( 11 Jan 2024 17:14 )   PT: 12.1 sec;   INR: 1.06          PTT - ( 11 Jan 2024 17:14 )  PTT:23.0 sec  Urinalysis Basic - ( 12 Jan 2024 05:04 )    Color: x / Appearance: x / SG: x / pH: x  Gluc: 135 mg/dL / Ketone: x  / Bili: x / Urobili: x   Blood: x / Protein: x / Nitrite: x   Leuk Esterase: x / RBC: x / WBC x   Sq Epi: x / Non Sq Epi: x / Bacteria: x                  RADIOLOGY & ADDITIONAL TESTS: Reviewed.   Hematology Oncology Progress Note      Interval History:    SUBJECTIVE:   Patient seen and examined at bedside. Doing well, no acute distress.   Eating soup    OBJECTIVE:    VITAL SIGNS:  ICU Vital Signs Last 24 Hrs  T(C): 36.5 (12 Jan 2024 14:14), Max: 36.8 (12 Jan 2024 05:40)  T(F): 97.7 (12 Jan 2024 14:14), Max: 98.3 (12 Jan 2024 08:53)  HR: 60 (12 Jan 2024 12:00) (50 - 81)  BP: 119/69 (11 Jan 2024 19:00) (117/65 - 130/60)  BP(mean): 89 (11 Jan 2024 19:00) (86 - 92)  ABP: 115/64 (12 Jan 2024 12:00) (111/70 - 137/61)  ABP(mean): 86 (12 Jan 2024 12:00) (75 - 98)  RR: 20 (12 Jan 2024 12:00) (17 - 38)  SpO2: 99% (12 Jan 2024 12:00) (93% - 99%)    O2 Parameters below as of 12 Jan 2024 12:00  Patient On (Oxygen Delivery Method): room air              01-11 @ 07:01 - 01-12 @ 07:00  --------------------------------------------------------  IN: 1729.9 mL / OUT: 910 mL / NET: 819.9 mL    01-12 @ 07:01 - 01-12 @ 14:30  --------------------------------------------------------  IN: 225 mL / OUT: 300 mL / NET: -75 mL      CAPILLARY BLOOD GLUCOSE      POCT Blood Glucose.: 126 mg/dL (12 Jan 2024 11:14)      PHYSICAL EXAM:  General: NAD, answering questions, pleasantly conversant  HEENT: NC/AT; PERRL, clear conjunctiva  Neck: supple  Respiratory: CTA b/l  Cardiovascular: +S1/S2; RRR  Abdomen: soft, NT/ND; +BS x4  Extremities: WWP, 2+ peripheral pulses b/l; no LE edema  Skin: normal color and turgor; no rash  Neurological: AAOX3, 5/5 LLE, 5/5 RLE strength, dysmetria improved, finger to nose test negative, no dysdiadochokinesia present      MEDICATIONS:  MEDICATIONS  (STANDING):  acetaminophen     Tablet .. 1000 milliGRAM(s) Oral every 8 hours  chlorhexidine 2% Cloths 1 Application(s) Topical <User Schedule>  dexAMETHasone  Injectable 4 milliGRAM(s) IV Push every 6 hours  dexAMETHasone  Injectable   IV Push   enoxaparin Injectable 40 milliGRAM(s) SubCutaneous <User Schedule>  folic acid 1 milliGRAM(s) Oral daily  glucagon  Injectable 1 milliGRAM(s) IntraMuscular once  influenza  Vaccine (HIGH DOSE) 0.7 milliLiter(s) IntraMuscular once  insulin lispro (ADMELOG) corrective regimen sliding scale   SubCutaneous Before meals and at bedtime  multivitamin 1 Tablet(s) Oral daily  ondansetron Injectable 4 milliGRAM(s) IV Push every 6 hours  pantoprazole  Injectable 40 milliGRAM(s) IV Push daily  polyethylene glycol 3350 17 Gram(s) Oral daily  senna 2 Tablet(s) Oral at bedtime  tamsulosin 0.4 milliGRAM(s) Oral at bedtime  thiamine 100 milliGRAM(s) Oral daily    MEDICATIONS  (PRN):  bisacodyl 5 milliGRAM(s) Oral daily PRN Constipation  oxyCODONE    IR 5 milliGRAM(s) Oral every 4 hours PRN Severe Pain (7 - 10)      ALLERGIES:  Allergies    No Known Allergies    Intolerances        LABS:                        11.1   11.82 )-----------( 242      ( 12 Jan 2024 05:04 )             34.7     01-12    138  |  104  |  18  ----------------------------<  135<H>  4.0   |  28  |  0.56    Ca    8.8      12 Jan 2024 05:04  Phos  3.9     01-12  Mg     2.2     01-12      PT/INR - ( 11 Jan 2024 17:14 )   PT: 12.1 sec;   INR: 1.06          PTT - ( 11 Jan 2024 17:14 )  PTT:23.0 sec  Urinalysis Basic - ( 12 Jan 2024 05:04 )    Color: x / Appearance: x / SG: x / pH: x  Gluc: 135 mg/dL / Ketone: x  / Bili: x / Urobili: x   Blood: x / Protein: x / Nitrite: x   Leuk Esterase: x / RBC: x / WBC x   Sq Epi: x / Non Sq Epi: x / Bacteria: x                  RADIOLOGY & ADDITIONAL TESTS: Reviewed.   Hematology Oncology Progress Note      Interval History:    SUBJECTIVE:   Patient seen and examined at bedside. Doing well, no acute distress.   Eating soup    OBJECTIVE:    VITAL SIGNS:  ICU Vital Signs Last 24 Hrs  T(C): 36.5 (12 Jan 2024 14:14), Max: 36.8 (12 Jan 2024 05:40)  T(F): 97.7 (12 Jan 2024 14:14), Max: 98.3 (12 Jan 2024 08:53)  HR: 60 (12 Jan 2024 12:00) (50 - 81)  BP: 119/69 (11 Jan 2024 19:00) (117/65 - 130/60)  BP(mean): 89 (11 Jan 2024 19:00) (86 - 92)  ABP: 115/64 (12 Jan 2024 12:00) (111/70 - 137/61)  ABP(mean): 86 (12 Jan 2024 12:00) (75 - 98)  RR: 20 (12 Jan 2024 12:00) (17 - 38)  SpO2: 99% (12 Jan 2024 12:00) (93% - 99%)    O2 Parameters below as of 12 Jan 2024 12:00  Patient On (Oxygen Delivery Method): room air              01-11 @ 07:01 - 01-12 @ 07:00  --------------------------------------------------------  IN: 1729.9 mL / OUT: 910 mL / NET: 819.9 mL    01-12 @ 07:01 - 01-12 @ 14:30  --------------------------------------------------------  IN: 225 mL / OUT: 300 mL / NET: -75 mL      CAPILLARY BLOOD GLUCOSE      POCT Blood Glucose.: 126 mg/dL (12 Jan 2024 11:14)      PHYSICAL EXAM:  General: NAD, answering questions, pleasantly conversant  HEENT: head wrapped in protective dressing  Respiratory: CTA b/l  Cardiovascular: +S1/S2; RRR  Abdomen: soft, NT/ND; +BS x4  Extremities: WWP, 2+ peripheral pulses b/l; no LE edema  Skin: normal color and turgor; no rash  Neurological: AAOX3, 5/5 LLE, 5/5 RLE strength, dysmetria improved, finger to nose test negative, no dysdiadochokinesia present      MEDICATIONS:  MEDICATIONS  (STANDING):  acetaminophen     Tablet .. 1000 milliGRAM(s) Oral every 8 hours  chlorhexidine 2% Cloths 1 Application(s) Topical <User Schedule>  dexAMETHasone  Injectable 4 milliGRAM(s) IV Push every 6 hours  dexAMETHasone  Injectable   IV Push   enoxaparin Injectable 40 milliGRAM(s) SubCutaneous <User Schedule>  folic acid 1 milliGRAM(s) Oral daily  glucagon  Injectable 1 milliGRAM(s) IntraMuscular once  influenza  Vaccine (HIGH DOSE) 0.7 milliLiter(s) IntraMuscular once  insulin lispro (ADMELOG) corrective regimen sliding scale   SubCutaneous Before meals and at bedtime  multivitamin 1 Tablet(s) Oral daily  ondansetron Injectable 4 milliGRAM(s) IV Push every 6 hours  pantoprazole  Injectable 40 milliGRAM(s) IV Push daily  polyethylene glycol 3350 17 Gram(s) Oral daily  senna 2 Tablet(s) Oral at bedtime  tamsulosin 0.4 milliGRAM(s) Oral at bedtime  thiamine 100 milliGRAM(s) Oral daily    MEDICATIONS  (PRN):  bisacodyl 5 milliGRAM(s) Oral daily PRN Constipation  oxyCODONE    IR 5 milliGRAM(s) Oral every 4 hours PRN Severe Pain (7 - 10)      ALLERGIES:  Allergies    No Known Allergies    Intolerances        LABS:                        11.1   11.82 )-----------( 242      ( 12 Jan 2024 05:04 )             34.7     01-12    138  |  104  |  18  ----------------------------<  135<H>  4.0   |  28  |  0.56    Ca    8.8      12 Jan 2024 05:04  Phos  3.9     01-12  Mg     2.2     01-12      PT/INR - ( 11 Jan 2024 17:14 )   PT: 12.1 sec;   INR: 1.06          PTT - ( 11 Jan 2024 17:14 )  PTT:23.0 sec  Urinalysis Basic - ( 12 Jan 2024 05:04 )    Color: x / Appearance: x / SG: x / pH: x  Gluc: 135 mg/dL / Ketone: x  / Bili: x / Urobili: x   Blood: x / Protein: x / Nitrite: x   Leuk Esterase: x / RBC: x / WBC x   Sq Epi: x / Non Sq Epi: x / Bacteria: x                  RADIOLOGY & ADDITIONAL TESTS: Reviewed.   Hematology Oncology Progress Note      Interval History:    SUBJECTIVE:   Patient seen and examined at bedside. Doing well, no acute distress.   Eating soup    OBJECTIVE:    VITAL SIGNS:  ICU Vital Signs Last 24 Hrs  T(C): 36.5 (12 Jan 2024 14:14), Max: 36.8 (12 Jan 2024 05:40)  T(F): 97.7 (12 Jan 2024 14:14), Max: 98.3 (12 Jan 2024 08:53)  HR: 60 (12 Jan 2024 12:00) (50 - 81)  BP: 119/69 (11 Jan 2024 19:00) (117/65 - 130/60)  BP(mean): 89 (11 Jan 2024 19:00) (86 - 92)  ABP: 115/64 (12 Jan 2024 12:00) (111/70 - 137/61)  ABP(mean): 86 (12 Jan 2024 12:00) (75 - 98)  RR: 20 (12 Jan 2024 12:00) (17 - 38)  SpO2: 99% (12 Jan 2024 12:00) (93% - 99%)    O2 Parameters below as of 12 Jan 2024 12:00  Patient On (Oxygen Delivery Method): room air              01-11 @ 07:01 - 01-12 @ 07:00  --------------------------------------------------------  IN: 1729.9 mL / OUT: 910 mL / NET: 819.9 mL    01-12 @ 07:01 - 01-12 @ 14:30  --------------------------------------------------------  IN: 225 mL / OUT: 300 mL / NET: -75 mL      CAPILLARY BLOOD GLUCOSE      POCT Blood Glucose.: 126 mg/dL (12 Jan 2024 11:14)      PHYSICAL EXAM:  General: NAD, answering questions, pleasantly conversant  HEENT: head wrapped in protective dressing  Respiratory: CTA b/l  Cardiovascular: +S1/S2; RRR  Abdomen: soft, NT/ND; +BS x4  Extremities: WWP, 2+ peripheral pulses b/l; no LE edema  Skin: normal color and turgor; no rash  Neurological: AAOX3, LUE 5/5, RUE 5/5, 5/5 LLE, 5/5 RLE strength, dysmetria improved, finger to nose test negative, no dysdiadochokinesia present, cranial nerves intact    MEDICATIONS:  MEDICATIONS  (STANDING):  acetaminophen     Tablet .. 1000 milliGRAM(s) Oral every 8 hours  chlorhexidine 2% Cloths 1 Application(s) Topical <User Schedule>  dexAMETHasone  Injectable 4 milliGRAM(s) IV Push every 6 hours  dexAMETHasone  Injectable   IV Push   enoxaparin Injectable 40 milliGRAM(s) SubCutaneous <User Schedule>  folic acid 1 milliGRAM(s) Oral daily  glucagon  Injectable 1 milliGRAM(s) IntraMuscular once  influenza  Vaccine (HIGH DOSE) 0.7 milliLiter(s) IntraMuscular once  insulin lispro (ADMELOG) corrective regimen sliding scale   SubCutaneous Before meals and at bedtime  multivitamin 1 Tablet(s) Oral daily  ondansetron Injectable 4 milliGRAM(s) IV Push every 6 hours  pantoprazole  Injectable 40 milliGRAM(s) IV Push daily  polyethylene glycol 3350 17 Gram(s) Oral daily  senna 2 Tablet(s) Oral at bedtime  tamsulosin 0.4 milliGRAM(s) Oral at bedtime  thiamine 100 milliGRAM(s) Oral daily    MEDICATIONS  (PRN):  bisacodyl 5 milliGRAM(s) Oral daily PRN Constipation  oxyCODONE    IR 5 milliGRAM(s) Oral every 4 hours PRN Severe Pain (7 - 10)      ALLERGIES:  Allergies    No Known Allergies    Intolerances        LABS:                        11.1   11.82 )-----------( 242      ( 12 Jan 2024 05:04 )             34.7     01-12    138  |  104  |  18  ----------------------------<  135<H>  4.0   |  28  |  0.56    Ca    8.8      12 Jan 2024 05:04  Phos  3.9     01-12  Mg     2.2     01-12      PT/INR - ( 11 Jan 2024 17:14 )   PT: 12.1 sec;   INR: 1.06          PTT - ( 11 Jan 2024 17:14 )  PTT:23.0 sec  Urinalysis Basic - ( 12 Jan 2024 05:04 )    Color: x / Appearance: x / SG: x / pH: x  Gluc: 135 mg/dL / Ketone: x  / Bili: x / Urobili: x   Blood: x / Protein: x / Nitrite: x   Leuk Esterase: x / RBC: x / WBC x   Sq Epi: x / Non Sq Epi: x / Bacteria: x                  RADIOLOGY & ADDITIONAL TESTS: Reviewed.

## 2024-01-12 NOTE — DIETITIAN NUTRITION RISK NOTIFICATION - TREATMENT: THE FOLLOWING DIET HAS BEEN RECOMMENDED
Diet, Regular:   Supplement Feeding Modality:  Oral  Ensure Max Cans or Servings Per Day:  1       Frequency:  Two Times a day (01-12-24 @ 11:33) [Active]    ANTHROPOMETRICS:  HT (inches): 67         WT (pounds): 108.9 (1/11)         BMI (kg/m^2): 17.1         IBW (pounds): 148 +/- 10%         %IBW: 73.6 %    NUTRITION DIAGNOSIS:   Meets the criteria for SEVERE malnutrition in the context of CHRONIC ILLNESS based on the following:    -- Energy =75% in =1  month   -- WT Loss of >5% in 1 month, >7.5% in 3 months, >10% in 6 months, >20% in 1 year   -- Severe muscle wasting    GOAL:   Minimize signs/symptoms or malnutrition

## 2024-01-12 NOTE — DIETITIAN INITIAL EVALUATION ADULT - ADD RECOMMEND
- Start 100 mg thiamin 1x/day for 3 days and 1 mg folic acid 1x/day for 3 days; start multivitamin 1x/day going forward in the setting of prolonged poor PO intake  - Recommend continue current diet order with texture/consistency as per team/S+S  - Recommend discontinue Ensure Max which is low in calories and designed for bariatric surgery patients  - Recommend Ensure Plus High Protein (350 kcals, 20 g prot each, 20 g sugar) BID to help meet protein/energy needs (strawberry or vanilla flavors)  - Monitor PO intake/appetite, diet tolerance, GI distress, labs (electrolytes, CMP)   - Requesting to trend weights (weekly) as able   - Encourage PO intake and honor Pt food preferences as able   - Pain and bowel regimen as per team   - Team notified of the above recommendations

## 2024-01-12 NOTE — DIETITIAN INITIAL EVALUATION ADULT - NS FNS DIET ORDER
Diet, Regular:   Supplement Feeding Modality:  Oral  Ensure Max Cans or Servings Per Day:  1       Frequency:  Two Times a day (01-12-24 @ 11:33)

## 2024-01-12 NOTE — PROGRESS NOTE ADULT - ASSESSMENT
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; brain compression, cerebral edema  BPH (benign prostatic hyperplasia)  Prediabetes  Skin melanoma and Basal cell carcinoma    PLAN:   NEURO: neurochecks q1h, PRN pain meds with acetaminophen  MRI stepdown, steroids over 1 week taper  REHAB:  physical therapy evaluation and management    EARLY MOB:      PULM:  PRN O2 support to keep sats >/=92%, incentive spirometry  CARDIO:  SBP goal 100-140mm Hg  ENDO:  Blood sugar goals 140-180 mg/dL, continue insulin sliding scale  GI:  PPI for GI prophylaxis  DIET: NPO advance as tolerated  RENAL:  judicious fluids, cont tamsulosin  HEM/ONC: check post-op Hb  VTE Prophylaxis: SCDs, no DVT chemoprophylaxis for now as patient is high risk for bleed (fresh post-op)  ID: afebrile, no leukocytosis, periop ancef  Social: will update family    Active issues:  What's keeping patient in the ICU? close neurochecks   What is this patient's dispo plan? stepdown once stasble    ATTENDING ATTESTATION:  I was physically present for the key portions of the evaluation and management (E/M) service provided.  I agree with the above history, physical and plan, which I have reviewed and edited where appropriate.    Patient at high risk for neurological deterioration or death due to:  ICU delirium, aspiration PNA, DVT / PE.  Critical care time:  I have personally provided 60 minutes of critical care time, excluding time spent on separate procedures.      Plan discussed with RN, house staff. 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; brain compression, cerebral edema  BPH (benign prostatic hyperplasia)  Prediabetes  Skin melanoma and Basal cell carcinoma    PLAN:   NEURO: neurochecks q4h, PRN pain meds with acetaminophen  MRI stepdown, steroids over 1 week taper  REHAB:  physical therapy evaluation and management    EARLY MOB:      PULM:  room air, incentive spirometry, CT chest  CARDIO:  SBP goal 100-140mm Hg  ENDO:  Blood sugar goals 140-180 mg/dL, continue insulin sliding scale  GI:  PPI for GI prophylaxis  DIET: NPO advance as tolerated  RENAL:  judicious fluids, cont tamsulosin, recheck BMP this afternoon  HEM/ONC: Hb stable  VTE Prophylaxis: SCDs, SQL tonight  ID: afebrile, no leukocytosis  Social: will update family    Active issues:  What's keeping patient in the ICU? nothing  What is this patient's dispo plan? stepdown         ICU stepdown Checklist:    Completed: 01-12 @ 11:24    [X] hemodynamically stable – VS WNL and stable x 24hours, UO adequate  [n/a ] if  previously on HDA - off pressors x 24h with stable neuro exam    [X] no new symptoms x 24h (i.e. new fever, new-onset nausea/vomiting)  [X] stable labs: (i.e. WBC not rising, sodium not dropping)  [X] patient not at high risk for aspiration, if high risk then:                  [ ] should have definitive plans for trach/PEG (alternative option is to discharge from ICU to facilty)                  [ ] stepdown to bed close to nurse’s station  [n/a] low suctioning requirements (i.e. q4h or less)  [X] sign-off from primary RN* Cristian  [X] drains do not require ICU level of care  [X] if patient previously agitated or with behavioral issues – controlled   [X] pain controlled      ATTENDING ATTESTATION:  I was physically present for the key portions of the evaluation and management (E/M) service provided.  I agree with the above history, physical and plan, which I have reviewed and edited where appropriate.    Patient at high risk for neurological deterioration or death due to:  ICU delirium, aspiration PNA, DVT / PE.  Critical care time:  I have personally provided 60 minutes of critical care time, excluding time spent on separate procedures.      Plan discussed with RN, house staff.

## 2024-01-12 NOTE — PROGRESS NOTE ADULT - SUBJECTIVE AND OBJECTIVE BOX
NSCU ATTENDING -- ADDITIONAL PROGRESS NOTE    Nighttime rounds were performed      HPI: 79 y/o M  with melanoma (L arm skin resection 2023), basal cell carcinoma, non small cell lung cancer - adenocarcinoma (s/p radiation chemo, completed 10/2023), and BPH presents with loss of balance, HA, fatigue, generalized weakness, diplopia and b/l foot tingling x 2 weeks and nausea/vomiting x 3-4 days. Patient also admits to mild memory loss x 3-4 months. Pt found to have 5 areas of brain metastases, largest in the  L cerebellum, on outpatient MRI today. Patient sent to ER for neurosurgery admission. (10 Familia 2024 15:57)      ICU Vital Signs Last 24 Hrs  T(C): 36.7 (12 Jan 2024 18:19), Max: 36.8 (12 Jan 2024 05:40)  T(F): 98 (12 Jan 2024 18:19), Max: 98.3 (12 Jan 2024 08:53)  HR: 71 (12 Jan 2024 16:00) (50 - 71)  BP: 120/61 (12 Jan 2024 16:00) (120/61 - 120/61)  BP(mean): 85 (12 Jan 2024 16:00) (85 - 85)  ABP: 115/64 (12 Jan 2024 12:00) (111/70 - 137/60)  ABP(mean): 86 (12 Jan 2024 12:00) (75 - 98)  RR: 22 (12 Jan 2024 16:00) (17 - 22)  SpO2: 96% (12 Jan 2024 16:00) (96% - 99%)      01-11-24 @ 07:01 - 01-12-24 @ 07:00  --------------------------------------------------------  IN: 1729.9 mL / OUT: 910 mL / NET: 819.9 mL    01-12-24 @ 07:01  -  01-12-24 @ 19:45  --------------------------------------------------------  IN: 780 mL / OUT: 700 mL / NET: 80 mL      PHYSICAL EXAMINATION  NEUROLOGIC EXAMINATION: Awake, alert x 3, pupils 3mm and reactive, EOMI  Power LUE 4+/5 with drift, LLE 5/5  RUE and RLE 5/5. Dysmetria L>R  GENERAL: Calm  EENT: Anicteric  CARDIOVASCULAR: (+) S1 S2, bradycardic rate and regular rhythm  PULMONARY: Clear to auscultation bilaterally  ABDOMEN: Soft, nontender with normoactive bowel sounds  EXTREMITIES: No edema  SKIN: No rash. Headwrap C/D/I      MEDICATIONS:   acetaminophen     Tablet .. 1000 milliGRAM(s) Oral every 8 hours  bisacodyl 5 milliGRAM(s) Oral daily PRN  chlorhexidine 2% Cloths 1 Application(s) Topical <User Schedule>  dexAMETHasone  Injectable 4 milliGRAM(s) IV Push every 6 hours  dexAMETHasone  Injectable   IV Push   enoxaparin Injectable 40 milliGRAM(s) SubCutaneous <User Schedule>  folic acid 1 milliGRAM(s) Oral daily  glucagon  Injectable 1 milliGRAM(s) IntraMuscular once  influenza  Vaccine (HIGH DOSE) 0.7 milliLiter(s) IntraMuscular once  insulin lispro (ADMELOG) corrective regimen sliding scale   SubCutaneous Before meals and at bedtime  multivitamin 1 Tablet(s) Oral daily  ondansetron Injectable 4 milliGRAM(s) IV Push every 6 hours  oxyCODONE    IR 5 milliGRAM(s) Oral every 4 hours PRN  pantoprazole  Injectable 40 milliGRAM(s) IV Push daily  polyethylene glycol 3350 17 Gram(s) Oral daily  senna 2 Tablet(s) Oral at bedtime  tamsulosin 0.4 milliGRAM(s) Oral at bedtime  thiamine 100 milliGRAM(s) Oral daily      LABS:                       11.1   11.82 )-----------( 242      ( 12 Jan 2024 05:04 )             34.7     01-12    139  |  103  |  19  ----------------------------<  180<H>  4.2   |  27  |  0.60    Ca    9.2      12 Jan 2024 14:55  Phos  3.9     01-12  Mg     2.2     01-12        IMAGING:   CT check  1. Since 11/27/2023, there are new pulmonary emboli bilaterally.    2. Worsening of mediastinal lymphadenopathy.    3. 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 The treated tumor   has decreased in size.    4. Small bilateral pleural effusions.    Findings were discussed with CHRYSTAL Ansari on 1/12/2024 3:07 PM by Dr. Mendoza with read back confirmation.        ASSESSMENT:   79 y/o M with:  Lung cancer metastatic to brain - nonsmall cell + adeno; brain compression, cerebral edema  BPH (benign prostatic hyperplasia)  Prediabetes  Skin melanoma and Basal cell carcinoma  Acute PE    PLAN:   Neurochecks Q1h  MRI within 72 hours, steroids over 1 week taper  Seizure ppx: Non indicated as posterior fossa  PRN O2 support to keep sats >/=92%, incentive spirometry  Acute PE:  SBP goal 100-140mm Hg  Goal euglycemia (140-180 mg/dL). A1c: 5.9. Continue insulin sliding scale  PPI for GI prophylaxis  Diet:   Na goal 135-145  Replete lytes. Continue tamsulosin  Martinez; DC in AM  H/H- mild decrease. 13.3-> 11. 7. Monitor  VTE Prophylaxis: SCDs, no VTE chemoprophylaxis for now as patient fresh post op  Afebrile, mild leukocytosis. Continue periop ancef    Continue care per daytime intensivist Kiet GEE     Patient remains critically ill.    Additional 45 minutes of critical care time.   NSCU ATTENDING -- ADDITIONAL PROGRESS NOTE    Nighttime rounds were performed      HPI: 77 y/o M  with melanoma (L arm skin resection 2023), basal cell carcinoma, non small cell lung cancer - adenocarcinoma (s/p radiation chemo, completed 10/2023), and BPH presents with loss of balance, HA, fatigue, generalized weakness, diplopia and b/l foot tingling x 2 weeks and nausea/vomiting x 3-4 days. Patient also admits to mild memory loss x 3-4 months. Pt found to have 5 areas of brain metastases, largest in the  L cerebellum, on outpatient MRI today. Patient sent to ER for neurosurgery admission. (10 Familia 2024 15:57)      ICU Vital Signs Last 24 Hrs  T(C): 36.7 (12 Jan 2024 18:19), Max: 36.8 (12 Jan 2024 05:40)  T(F): 98 (12 Jan 2024 18:19), Max: 98.3 (12 Jan 2024 08:53)  HR: 71 (12 Jan 2024 16:00) (50 - 71)  BP: 120/61 (12 Jan 2024 16:00) (120/61 - 120/61)  BP(mean): 85 (12 Jan 2024 16:00) (85 - 85)  ABP: 115/64 (12 Jan 2024 12:00) (111/70 - 137/60)  ABP(mean): 86 (12 Jan 2024 12:00) (75 - 98)  RR: 22 (12 Jan 2024 16:00) (17 - 22)  SpO2: 96% (12 Jan 2024 16:00) (96% - 99%)      01-11-24 @ 07:01 - 01-12-24 @ 07:00  --------------------------------------------------------  IN: 1729.9 mL / OUT: 910 mL / NET: 819.9 mL    01-12-24 @ 07:01  -  01-12-24 @ 19:45  --------------------------------------------------------  IN: 780 mL / OUT: 700 mL / NET: 80 mL      PHYSICAL EXAMINATION  NEUROLOGIC EXAMINATION: Awake, alert x 3, pupils 3mm and reactive, EOMI  Power LUE 4+/5 with drift, LLE 5/5  RUE and RLE 5/5. Dysmetria L>R  GENERAL: Calm  EENT: Anicteric  CARDIOVASCULAR: (+) S1 S2, bradycardic rate and regular rhythm  PULMONARY: Clear to auscultation bilaterally  ABDOMEN: Soft, nontender with normoactive bowel sounds  EXTREMITIES: No edema  SKIN: No rash. Headwrap C/D/I      MEDICATIONS:   acetaminophen     Tablet .. 1000 milliGRAM(s) Oral every 8 hours  bisacodyl 5 milliGRAM(s) Oral daily PRN  chlorhexidine 2% Cloths 1 Application(s) Topical <User Schedule>  dexAMETHasone  Injectable 4 milliGRAM(s) IV Push every 6 hours  dexAMETHasone  Injectable   IV Push   enoxaparin Injectable 40 milliGRAM(s) SubCutaneous <User Schedule>  folic acid 1 milliGRAM(s) Oral daily  glucagon  Injectable 1 milliGRAM(s) IntraMuscular once  influenza  Vaccine (HIGH DOSE) 0.7 milliLiter(s) IntraMuscular once  insulin lispro (ADMELOG) corrective regimen sliding scale   SubCutaneous Before meals and at bedtime  multivitamin 1 Tablet(s) Oral daily  ondansetron Injectable 4 milliGRAM(s) IV Push every 6 hours  oxyCODONE    IR 5 milliGRAM(s) Oral every 4 hours PRN  pantoprazole  Injectable 40 milliGRAM(s) IV Push daily  polyethylene glycol 3350 17 Gram(s) Oral daily  senna 2 Tablet(s) Oral at bedtime  tamsulosin 0.4 milliGRAM(s) Oral at bedtime  thiamine 100 milliGRAM(s) Oral daily      LABS:                       11.1   11.82 )-----------( 242      ( 12 Jan 2024 05:04 )             34.7     01-12    139  |  103  |  19  ----------------------------<  180<H>  4.2   |  27  |  0.60    Ca    9.2      12 Jan 2024 14:55  Phos  3.9     01-12  Mg     2.2     01-12        IMAGING:   CT check  1. Since 11/27/2023, there are new pulmonary emboli bilaterally.    2. Worsening of mediastinal lymphadenopathy.    3. 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 The treated tumor   has decreased in size.    4. Small bilateral pleural effusions.    Findings were discussed with CHRYSTAL Ansari on 1/12/2024 3:07 PM by Dr. Mendoza with read back confirmation.        ASSESSMENT:   77 y/o M with:  Lung cancer metastatic to brain - nonsmall cell + adeno; brain compression, cerebral edema  BPH (benign prostatic hyperplasia)  Prediabetes  Skin melanoma and Basal cell carcinoma  Acute PE    PLAN:   Neurochecks Q1h  MRI within 72 hours, steroids over 1 week taper  Seizure ppx: Non indicated as posterior fossa  PRN O2 support to keep sats >/=92%, incentive spirometry  Acute PE:  SBP goal 100-140mm Hg  Goal euglycemia (140-180 mg/dL). A1c: 5.9. Continue insulin sliding scale  PPI for GI prophylaxis  Diet:   Na goal 135-145  Replete lytes. Continue tamsulosin  Martinez; DC in AM  H/H- mild decrease. 13.3-> 11. 7. Monitor  VTE Prophylaxis: SCDs, no VTE chemoprophylaxis for now as patient fresh post op  Afebrile, mild leukocytosis. Continue periop ancef    Continue care per daytime intensivist Kiet GEE     Patient remains critically ill.    Additional 45 minutes of critical care time.

## 2024-01-12 NOTE — SWALLOW BEDSIDE ASSESSMENT ADULT - SLP PERTINENT HISTORY OF CURRENT PROBLEM
PMHx significant for melanoma (L arm skin resection 2023), basal cell carcinoma, non small cell lung ca- adenocarcinoma (s/p XRT-completed 10/23). Presented with loss of balance, HA, fatigue, generalized weakness, diplopia and b/l foot tingling x 2 weeks and nausea/vomiting x 2-4 days. Pt found to have 5 areas of brain metastases, largest in the  L cerebellum, on outpatient MRI. Now s/p left restrosigmoid crani resection of tumor

## 2024-01-12 NOTE — DIETITIAN INITIAL EVALUATION ADULT - PERTINENT LABORATORY DATA
01-12    138  |  104  |  18  ----------------------------<  135<H>  4.0   |  28  |  0.56    Ca    8.8      12 Jan 2024 05:04  Phos  3.9     01-12  Mg     2.2     01-12    POCT Blood Glucose.: 126 mg/dL (01-12-24 @ 11:14)  A1C with Estimated Average Glucose Result: 5.9 % (01-11-24 @ 05:30)

## 2024-01-12 NOTE — DIETITIAN INITIAL EVALUATION ADULT - OTHER CALCULATIONS
HT (inches): 67       WT (pounds): 108.9 (1/11)       BMI (kg/m^2): 17.1       IBW (pounds): 148 +/- 10%       %IBW: 73.6 %  Current body weight being used as Pt is critically ill and <100% of ideal body weight (IBW) per [facility] Standards of Nutrition Care. Needs adjusted for age and current clinical status. Fluids as above or at team's discretion. HT (inches): 67       WT (pounds): 108.9 (1/11)       BMI (kg/m^2): 17.1       IBW (pounds): 148 +/- 10%       %IBW: 73.6 %  Current body weight being used as Pt is critically ill and <100% of ideal body weight (IBW) per [facility] Standards of Nutrition Care. Needs adjusted for age and current clinical status. Fluids as above or at team's discretion.

## 2024-01-12 NOTE — PROGRESS NOTE ADULT - SUBJECTIVE AND OBJECTIVE BOX
HPI:  79 y/o M with pmh melanoma (L arm skin resection 2023), basal cell carcinoma, non small cell lung cancer - adenocarcinoma (s/p radiation chemo, completed 10/2023), and BPH presents with loss of balance, HA, fatigue, generalized weakness, diplopia and b/l foot tingling x 2 weeks and nausea/vomiting x 3-4 days. Patient also admits to mild memory loss x 3-4 months. Pt found to have 5 areas of brain metastases, largest in the  L cerebellum, on outpatient MRI today. Patient sent to ER for neurosurgery admission. (10 Familia 2024 15:57)    INTERVAL EVENTS:  Reports mild HA    HOSPITAL COURSE:  1/10: admitted to [facility], preop for OR tomorrow. Pending CT A/P and KUB. Recieved decadron 10 IV in ER and started on decadron 4q6.   1/11: POD#0 s/p restrosig crani resection of tumor (1/11-) frozen NSCC , Dex taper x 1 week to 2 BID, SBP<140, pending MRI brain, 250cc 3% bolus for Na 134.   1/12: POD 1. Failed dysphagia eval, pending SLP.     Vital Signs Last 24 Hrs  T(C): 36.7 (12 Jan 2024 00:47), Max: 36.7 (11 Jan 2024 09:22)  T(F): 98 (12 Jan 2024 00:47), Max: 98.1 (11 Jan 2024 09:22)  HR: 54 (12 Jan 2024 01:00) (50 - 81)  BP: 119/69 (11 Jan 2024 19:00) (117/65 - 130/60)  BP(mean): 89 (11 Jan 2024 19:00) (85 - 92)  RR: 20 (12 Jan 2024 01:00) (17 - 38)  SpO2: 98% (12 Jan 2024 01:00) (93% - 99%)    Parameters below as of 12 Jan 2024 01:00  Patient On (Oxygen Delivery Method): room air        I&O's Summary    10 Familia 2024 07:01  -  11 Jan 2024 07:00  --------------------------------------------------------  IN: 425 mL / OUT: 1050 mL / NET: -625 mL    11 Jan 2024 07:01  -  12 Jan 2024 01:28  --------------------------------------------------------  IN: 1229.9 mL / OUT: 635 mL / NET: 594.9 mL        PHYSICAL EXAM:  General: NAD  HEENT: PERRL 3mm briskly reactive, EOMI b/l, face symmetric, tongue midline, neck FROM  Cardiovascular: RRR, normal S1 and S2   Respiratory: lungs CTAB, no wheezing, rhonchi, or crackles   GI: normoactive BS to auscultation, abd soft, NTND   Neuro: A&Ox3, mildly dysarthric, Follows commands.  RUE, RLE 5/5  LUE 4/5 with trace drift, LLE 4+/5  LUE dysmetria  Sensation intact throughout.   Extremities: distal pulses 2+ x4  Wound/incision: headwrap c/d/i      LABS:                        11.7   13.39 )-----------( 258      ( 11 Jan 2024 17:14 )             35.6     01-11    134<L>  |  100  |  18  ----------------------------<  134<H>  4.1   |  26  |  0.62    Ca    9.0      11 Jan 2024 17:14  Phos  4.3     01-11  Mg     1.7     01-11      PT/INR - ( 11 Jan 2024 17:14 )   PT: 12.1 sec;   INR: 1.06          PTT - ( 11 Jan 2024 17:14 )  PTT:23.0 sec  Urinalysis Basic - ( 11 Jan 2024 17:14 )    Color: x / Appearance: x / SG: x / pH: x  Gluc: 134 mg/dL / Ketone: x  / Bili: x / Urobili: x   Blood: x / Protein: x / Nitrite: x   Leuk Esterase: x / RBC: x / WBC x   Sq Epi: x / Non Sq Epi: x / Bacteria: x          CAPILLARY BLOOD GLUCOSE      POCT Blood Glucose.: 131 mg/dL (11 Jan 2024 21:29)  POCT Blood Glucose.: 114 mg/dL (11 Jan 2024 10:53)  POCT Blood Glucose.: 110 mg/dL (11 Jan 2024 06:17)      Drug Levels: [] N/A    CSF Analysis: [] N/A      Allergies    No Known Allergies    Intolerances      MEDICATIONS:  Antibiotics:  ceFAZolin   IVPB 2000 milliGRAM(s) IV Intermittent every 8 hours    Neuro:  acetaminophen     Tablet .. 1000 milliGRAM(s) Oral every 6 hours  ondansetron Injectable 4 milliGRAM(s) IV Push every 6 hours  oxyCODONE    IR 5 milliGRAM(s) Oral every 4 hours PRN    Anticoagulation:    OTHER:  bisacodyl 5 milliGRAM(s) Oral daily PRN  budesonide  80 MICROgram(s)/formoterol 4.5 MICROgram(s) Inhaler 1 Puff(s) Inhalation once  chlorhexidine 2% Cloths 1 Application(s) Topical <User Schedule>  dexAMETHasone  Injectable 4 milliGRAM(s) IV Push every 6 hours  dexAMETHasone  Injectable   IV Push   glucagon  Injectable 1 milliGRAM(s) IntraMuscular once  influenza  Vaccine (HIGH DOSE) 0.7 milliLiter(s) IntraMuscular once  insulin lispro (ADMELOG) corrective regimen sliding scale   SubCutaneous Before meals and at bedtime  pantoprazole  Injectable 40 milliGRAM(s) IV Push daily  polyethylene glycol 3350 17 Gram(s) Oral daily  senna 2 Tablet(s) Oral at bedtime  tamsulosin 0.4 milliGRAM(s) Oral at bedtime    IVF:  sodium chloride 0.9%. 1000 milliLiter(s) IV Continuous <Continuous>    CULTURES:  Culture Results:   No growth at 3 days. (01-07 @ 22:48)  Culture Results:   No growth at 3 days. (01-07 @ 22:48)    RADIOLOGY & ADDITIONAL TESTS:      ASSESSMENT:  Assessment:   79 y/o M with pmh melanoma (L arm skin resection 2023), basal cell carcinoma, non small cell lung cancer - adenocarcinoma (s/p radiation chemo, completed 10/2023), and BPH presents with loss of balance, HA, fatigue, generalized weakness, diplopia and b/l foot tingling x 2 weeks and nausea/vomiting x 2-4 days. Pt found to have 5 areas of brain metastases, largest in the  L cerebellum, on outpatient MRI. Now s/p left restrosigmoid crani resection of tumor (1/11), frozen: mets,    Neuro:  - neuro/vital checks q1  - pain control w/ cranial ERAS  - Dex taper over 1 week to 2 BID  - MRI postop pending  - No HCT post-op unless changes in neuro exam    Cardiac:  - SBP goal 100-140    Pulmonary:  - on RA    GI:  - advance as tolerated  - bowel regimen  - protonix while on steroids  - zofran prn nausea  - CT A/P- Negative for mets  - KUB- nonobstructive bowel gas pattern    Renal:  - NS at 75 while NPO, 250 cc 3% bolus given 1/11  - +river  - cont home flomax    Heme:  - SCDs for DVT prophylaxis  - CT A/P 1/10 for metastatic workup: negative  - CT Chest pending per onc rec    Endo:  - ISS, A1C 5.9    ID:  -postop ancef  -afebrile    Disposition: ICU status, full code, dispo pending    D/w Dr. D'Amico and Dr Kruger HPI:  79 y/o M with pmh melanoma (L arm skin resection 2023), basal cell carcinoma, non small cell lung cancer - adenocarcinoma (s/p radiation chemo, completed 10/2023), and BPH presents with loss of balance, HA, fatigue, generalized weakness, diplopia and b/l foot tingling x 2 weeks and nausea/vomiting x 3-4 days. Patient also admits to mild memory loss x 3-4 months. Pt found to have 5 areas of brain metastases, largest in the  L cerebellum, on outpatient MRI today. Patient sent to ER for neurosurgery admission. (10 Familia 2024 15:57)    INTERVAL EVENTS:  Reports mild HA    HOSPITAL COURSE:  1/10: admitted to [facility], preop for OR tomorrow. Pending CT A/P and KUB. Recieved decadron 10 IV in ER and started on decadron 4q6.   1/11: POD#0 s/p restrosig crani resection of tumor (1/11-) frozen NSCC , Dex taper x 1 week to 2 BID, SBP<140, pending MRI brain, 250cc 3% bolus for Na 134.   1/12: POD 1. Failed dysphagia eval, pending SLP.     Vital Signs Last 24 Hrs  T(C): 36.7 (12 Jan 2024 00:47), Max: 36.7 (11 Jan 2024 09:22)  T(F): 98 (12 Jan 2024 00:47), Max: 98.1 (11 Jan 2024 09:22)  HR: 54 (12 Jan 2024 01:00) (50 - 81)  BP: 119/69 (11 Jan 2024 19:00) (117/65 - 130/60)  BP(mean): 89 (11 Jan 2024 19:00) (85 - 92)  RR: 20 (12 Jan 2024 01:00) (17 - 38)  SpO2: 98% (12 Jan 2024 01:00) (93% - 99%)    Parameters below as of 12 Jan 2024 01:00  Patient On (Oxygen Delivery Method): room air        I&O's Summary    10 Familia 2024 07:01  -  11 Jan 2024 07:00  --------------------------------------------------------  IN: 425 mL / OUT: 1050 mL / NET: -625 mL    11 Jan 2024 07:01  -  12 Jan 2024 01:28  --------------------------------------------------------  IN: 1229.9 mL / OUT: 635 mL / NET: 594.9 mL        PHYSICAL EXAM:  General: NAD  HEENT: PERRL 3mm briskly reactive, EOMI b/l, face symmetric, tongue midline, neck FROM  Cardiovascular: RRR, normal S1 and S2   Respiratory: lungs CTAB, no wheezing, rhonchi, or crackles   GI: normoactive BS to auscultation, abd soft, NTND   Neuro: A&Ox3, mildly dysarthric, Follows commands.  RUE, RLE 5/5  LUE 4/5 with trace drift, LLE 4+/5  LUE dysmetria  Sensation intact throughout.   Extremities: distal pulses 2+ x4  Wound/incision: headwrap c/d/i      LABS:                        11.7   13.39 )-----------( 258      ( 11 Jan 2024 17:14 )             35.6     01-11    134<L>  |  100  |  18  ----------------------------<  134<H>  4.1   |  26  |  0.62    Ca    9.0      11 Jan 2024 17:14  Phos  4.3     01-11  Mg     1.7     01-11      PT/INR - ( 11 Jan 2024 17:14 )   PT: 12.1 sec;   INR: 1.06          PTT - ( 11 Jan 2024 17:14 )  PTT:23.0 sec  Urinalysis Basic - ( 11 Jan 2024 17:14 )    Color: x / Appearance: x / SG: x / pH: x  Gluc: 134 mg/dL / Ketone: x  / Bili: x / Urobili: x   Blood: x / Protein: x / Nitrite: x   Leuk Esterase: x / RBC: x / WBC x   Sq Epi: x / Non Sq Epi: x / Bacteria: x          CAPILLARY BLOOD GLUCOSE      POCT Blood Glucose.: 131 mg/dL (11 Jan 2024 21:29)  POCT Blood Glucose.: 114 mg/dL (11 Jan 2024 10:53)  POCT Blood Glucose.: 110 mg/dL (11 Jan 2024 06:17)      Drug Levels: [] N/A    CSF Analysis: [] N/A      Allergies    No Known Allergies    Intolerances      MEDICATIONS:  Antibiotics:  ceFAZolin   IVPB 2000 milliGRAM(s) IV Intermittent every 8 hours    Neuro:  acetaminophen     Tablet .. 1000 milliGRAM(s) Oral every 6 hours  ondansetron Injectable 4 milliGRAM(s) IV Push every 6 hours  oxyCODONE    IR 5 milliGRAM(s) Oral every 4 hours PRN    Anticoagulation:    OTHER:  bisacodyl 5 milliGRAM(s) Oral daily PRN  budesonide  80 MICROgram(s)/formoterol 4.5 MICROgram(s) Inhaler 1 Puff(s) Inhalation once  chlorhexidine 2% Cloths 1 Application(s) Topical <User Schedule>  dexAMETHasone  Injectable 4 milliGRAM(s) IV Push every 6 hours  dexAMETHasone  Injectable   IV Push   glucagon  Injectable 1 milliGRAM(s) IntraMuscular once  influenza  Vaccine (HIGH DOSE) 0.7 milliLiter(s) IntraMuscular once  insulin lispro (ADMELOG) corrective regimen sliding scale   SubCutaneous Before meals and at bedtime  pantoprazole  Injectable 40 milliGRAM(s) IV Push daily  polyethylene glycol 3350 17 Gram(s) Oral daily  senna 2 Tablet(s) Oral at bedtime  tamsulosin 0.4 milliGRAM(s) Oral at bedtime    IVF:  sodium chloride 0.9%. 1000 milliLiter(s) IV Continuous <Continuous>    CULTURES:  Culture Results:   No growth at 3 days. (01-07 @ 22:48)  Culture Results:   No growth at 3 days. (01-07 @ 22:48)    RADIOLOGY & ADDITIONAL TESTS:      ASSESSMENT:  Assessment:   79 y/o M with pmh melanoma (L arm skin resection 2023), basal cell carcinoma, non small cell lung cancer - adenocarcinoma (s/p radiation chemo, completed 10/2023), and BPH presents with loss of balance, HA, fatigue, generalized weakness, diplopia and b/l foot tingling x 2 weeks and nausea/vomiting x 2-4 days. Pt found to have 5 areas of brain metastases, largest in the  L cerebellum, on outpatient MRI. Now s/p left restrosigmoid crani resection of tumor (1/11), frozen: mets,    Neuro:  - neuro/vital checks q1  - pain control w/ cranial ERAS  - Dex taper over 1 week to 2 BID  - MRI postop pending  - No HCT post-op unless changes in neuro exam    Cardiac:  - SBP goal 100-140    Pulmonary:  - on RA    GI:  - advance as tolerated  - bowel regimen  - protonix while on steroids  - zofran prn nausea  - CT A/P- Negative for mets  - KUB- nonobstructive bowel gas pattern    Renal:  - NS at 75 while NPO, 250 cc 3% bolus given 1/11  - +river  - cont home flomax    Heme:  - SCDs for DVT prophylaxis  - CT A/P 1/10 for metastatic workup: negative  - CT Chest pending per onc rec    Endo:  - ISS, A1C 5.9    ID:  -postop ancef  -afebrile    Disposition: ICU status, full code, dispo pending    D/w Dr. D'Amico and Dr Kruger

## 2024-01-12 NOTE — SWALLOW BEDSIDE ASSESSMENT ADULT - SLP GENERAL OBSERVATIONS
Awake, A&Ox4, speech and language skills were clinically judged to be WFL at the semi-complex conversation level. No dysarthria appreciated. Denied dysphagic symptoms. However, did report recent hx of a "sore throat" s/p RT, which he feels is improving.

## 2024-01-12 NOTE — DIETITIAN NUTRITION RISK NOTIFICATION - ADDITIONAL COMMENTS/DIETITIAN RECOMMENDATIONS
RECOMMENDATIONS:   - Start 100 mg thiamin 1x/day for 3 days and 1 mg folic acid 1x/day for 3 days; start multivitamin 1x/day going forward in the setting of prolonged poor PO intake  - Recommend continue current diet order with texture/consistency as per team/S+S  - Recommend discontinue Ensure Max which is low in calories and designed for bariatric surgery patients  - Recommend Ensure Plus High Protein (350 kcals, 20 g prot each, 20 g sugar) BID to help meet protein/energy needs (strawberry or vanilla flavors)  - Monitor PO intake/appetite, diet tolerance, GI distress, labs (electrolytes, CMP)   - Requesting to trend weights (weekly) as able   - Encourage PO intake and honor Pt food preferences as able   - Pain and bowel regimen as per team   - Team notified of the above recommendations         *High Risk

## 2024-01-12 NOTE — SWALLOW BEDSIDE ASSESSMENT ADULT - ADDITIONAL RECOMMENDATIONS
No further f/u is warranted at this time. Please reconsult should there be a change in tolerance of diet.

## 2024-01-12 NOTE — PROGRESS NOTE ADULT - ASSESSMENT
ERMA HER is a a 78 year old male with PMH unresectable T3N3, stage IIIb non-small cell lung cancer, with features of adenocarcinoma on chemoRT (carboplatin/paclitaxel finished 10/27/2023), though did not start durvalumab due to patient refusal, who presents to the hospital after being sent by [facility] OP after newly found brain metastasis on recent MRI performed for double vision, ataxia and unsteady gait. Patient is now s/p craniotomy for suboccipital neoplasm. Hematology consulted for management of lung cancer.    #Non-small cell lung cancer with features of adenocarcinoma  - onc history as above  - s/p chemoRT (carboplatin/paclitaxel) finishing 10/27/23; Based on PACIFIC trial, patient would have been candidate for durvalumab, though refused at shirin time  -  MRI Brain 1/10/24 pending formal read, though concerning for multiple (5) brain tumors of different sizes and locations, with some cystic components and surrounding swelling causing compression on the brain in the left cerebellar hemisphere  with associated hydrocephalus per NSGY.   - s/p OR craniotomy 1/11/24 by Dr. Damico    Based on KEYNOTE-024, open label phase 3 trial of patients with untreated advanced NSCLC with PD-L1 expression on at least 50% of tumor cells and no sensitizing mutation of epidermal growth factor receptor gene or translocation of the anaplastic lymphoma kinase gene, adjuvant pembrolizumab was found to have significantly longer progression free and overall survival and with fewer adverse events than platinum-based chemotherapy. Median progression free survival was 10.3 months vs 6.0 months in the chemotherapy group.     KEYNOTE - 042 extended this benefit to patients with locally advanced or metastatic non-small-cell lung cancer without EGFR/ALK alteration and with low PD-L1 TPS (1-49%). Overall survival was significant longer in the pembrolizumab group than in the chemotherapy group in all three TPS populations (>50%, > 20% and > 1%). median survival values were 20 months for pembrolizumab vs 12.2 months for chemotherapy. Treatment related adverse events of grade 3 or worse occured in 18% of pembrolizumab group and 41% of chemotherapy group    Plan   - patient neurologic exam improving, though has not been able to ambulate to assess for dysfunction. Neurologic function otherwise intact at this visit  - surgical plan and steroids per NSGY   - based above trials, given PD-L1 TPS score 100%, plan will be to start pembrolizumab (immunotherapy) as soon as possible outpatient IV 30 min every 3 weeks. Expect robust response given his elevated PD-L1 status   - recommend SRS to the other brain metastases (more importantly brain stem lesion); dc planning should not delay this plan as delay in treatment will worsen morbidity/mortality   - PT when able     Discussed with Dr. Chapman.   ERMA HER is a a 78 year old male with PMH unresectable T3N3, stage IIIb non-small cell lung cancer, with features of adenocarcinoma on chemoRT (carboplatin/paclitaxel finished 10/27/2023), though did not start durvalumab due to patient refusal, who presents to the hospital after being sent by NSGY OP after newly found brain metastasis on recent MRI performed for double vision, ataxia and unsteady gait. Patient is now s/p craniotomy for suboccipital neoplasm. Hematology consulted for management of lung cancer.    #Non-small cell lung cancer with features of adenocarcinoma  - onc history as above  - s/p chemoRT (carboplatin/paclitaxel) finishing 10/27/23; Based on PACIFIC trial, patient would have been candidate for durvalumab, though refused at shirin time  - MRI Brain 1/10/24 pending formal read, though concerning for multiple (5) brain tumors of different sizes and locations, with some cystic components and surrounding swelling causing compression on the brain in the left cerebellar hemisphere  with associated hydrocephalus per NSGY.   - s/p OR craniotomy 1/11/24 by Dr. Damico    Based on KEYNOTE-024, open label phase 3 trial of patients with untreated advanced NSCLC with PD-L1 expression on at least 50% of tumor cells and no sensitizing mutation of epidermal growth factor receptor gene or translocation of the anaplastic lymphoma kinase gene, adjuvant pembrolizumab was found to have significantly longer progression free and overall survival and with fewer adverse events than platinum-based chemotherapy. Median progression free survival was 10.3 months vs 6.0 months in the chemotherapy group.     KEYNOTE - 042 extended this benefit to patients with locally advanced or metastatic non-small-cell lung cancer without EGFR/ALK alteration and with low PD-L1 TPS (1-49%). Overall survival was significant longer in the pembrolizumab group than in the chemotherapy group in all three TPS populations (>50%, > 20% and > 1%). median survival values were 20 months for pembrolizumab vs 12.2 months for chemotherapy. Treatment related adverse events of grade 3 or worse occured in 18% of pembrolizumab group and 41% of chemotherapy group    Plan   - patient neurologic exam improving, though has not been able to ambulate to assess for dysfunction. Neurologic function otherwise intact at this visit  - surgical plan and steroids per NSGY   - based above trials, given PD-L1 TPS score 100%, plan will be to start pembrolizumab (immunotherapy) as soon as possible outpatient IV 30 min every 3 weeks. Expect robust response given his elevated PD-L1 status   - will need SRS to the other brain metastases (more importantly brain stem lesion); dc planning should not delay this plan as delay in treatment will worsen morbidity/mortality   - PT when able     Discussed with Dr. Chapman.   ERMA HER is a a 78 year old male with PMH unresectable T3N3, stage IIIb non-small cell lung cancer, with features of adenocarcinoma on chemoRT (carboplatin/paclitaxel finished 10/27/2023), though did not start durvalumab due to patient refusal, who presents to the hospital after being sent by NSGY OP after newly found brain metastasis on recent MRI performed for double vision, ataxia and unsteady gait. Patient is now s/p craniotomy for suboccipital neoplasm. Hematology consulted for management of lung cancer.    #Non-small cell lung cancer with features of adenocarcinoma  - onc history as above  - s/p chemoRT (carboplatin/paclitaxel) finishing 10/27/23; Based on PACIFIC trial, patient would have been candidate for durvalumab, though refused at shirin time  - MRI Brain 1/10/24 pending formal read, though concerning for multiple (5) brain tumors of different sizes and locations, with some cystic components and surrounding swelling causing compression on the brain in the left cerebellar hemisphere  with associated hydrocephalus per NSGY.   - s/p OR craniotomy 1/11/24 by Dr. Damico    Based on KEYNOTE-024, open label phase 3 trial of patients with untreated advanced NSCLC with PD-L1 expression on at least 50% of tumor cells and no sensitizing mutation of epidermal growth factor receptor gene or translocation of the anaplastic lymphoma kinase gene, adjuvant pembrolizumab was found to have significantly longer progression free and overall survival and with fewer adverse events than platinum-based chemotherapy. Median progression free survival was 10.3 months vs 6.0 months in the chemotherapy group.     KEYNOTE - 042 extended this benefit to patients with locally advanced or metastatic non-small-cell lung cancer without EGFR/ALK alteration and with low PD-L1 TPS (1-49%). Overall survival was significant longer in the pembrolizumab group than in the chemotherapy group in all three TPS populations (>50%, > 20% and > 1%). median survival values were 20 months for pembrolizumab vs 12.2 months for chemotherapy. Treatment related adverse events of grade 3 or worse occured in 18% of pembrolizumab group and 41% of chemotherapy group    Plan   - patient neurologic exam improving, though has not been able to ambulate to assess for dysfunction. Neurologic function otherwise intact at this visit  - surgical plan and steroids per NSGY   - based above trials, given PD-L1 TPS score 100%, plan will be to start pembrolizumab (immunotherapy) as soon as possible outpatient IV 30 min every 3 weeks. Expect robust response given his elevated PD-L1 status   - will need SRS to the other brain metastases (more importantly brain stem lesion); dc planning should not delay this plan as delay in treatment will worsen morbidity/mortality   - PT when able     #New Pulmonary emboli   - provoked in the setting of malignancy   - recommend therapeutic anticoagulation - DOAC appears appropriate when cleared from further procedures    Discussed with Dr. Chapman.

## 2024-01-12 NOTE — DIETITIAN INITIAL EVALUATION ADULT - PERTINENT MEDS FT
MEDICATIONS  (STANDING):  acetaminophen     Tablet .. 1000 milliGRAM(s) Oral every 8 hours  chlorhexidine 2% Cloths 1 Application(s) Topical <User Schedule>  dexAMETHasone  Injectable 4 milliGRAM(s) IV Push every 6 hours  dexAMETHasone  Injectable   IV Push   enoxaparin Injectable 40 milliGRAM(s) SubCutaneous <User Schedule>  glucagon  Injectable 1 milliGRAM(s) IntraMuscular once  influenza  Vaccine (HIGH DOSE) 0.7 milliLiter(s) IntraMuscular once  insulin lispro (ADMELOG) corrective regimen sliding scale   SubCutaneous Before meals and at bedtime  ondansetron Injectable 4 milliGRAM(s) IV Push every 6 hours  pantoprazole  Injectable 40 milliGRAM(s) IV Push daily  polyethylene glycol 3350 17 Gram(s) Oral daily  senna 2 Tablet(s) Oral at bedtime  tamsulosin 0.4 milliGRAM(s) Oral at bedtime    MEDICATIONS  (PRN):  bisacodyl 5 milliGRAM(s) Oral daily PRN Constipation  oxyCODONE    IR 5 milliGRAM(s) Oral every 4 hours PRN Severe Pain (7 - 10)

## 2024-01-12 NOTE — DIETITIAN INITIAL EVALUATION ADULT - OTHER INFO
77 y/o M with pmh melanoma (L arm skin resection 2023), basal cell carcinoma, non-small cell lung cancer - adenocarcinoma (s/p radiation chemo, completed 10/2023), and BPH presents with loss of balance, HA, fatigue, generalized weakness, diplopia and bilateral foot tingling x 2 weeks and nausea/vomiting x 3-4 days. Patient also admits to mild memory loss x 3-4 months. Pt found to have 5 areas of brain metastases, largest in the  L cerebellum, on outpatient MRI today. Patient sent to ER for neurosurgery admission.    Discussed Pt with team during IDT rounds. Chart, meds, and labs reviewed. Tmax 36.8 C. MAPs 86-92 mm Hg. Meds significant for insulin sliding scale, ondansetron (Zofran), pantoprazole (protonix), polyethylene glycol (MiraLAX), senna. Labs significant for POCT ; glucose 135; HgbA1c 5.9H. Denies pain or discomfort. Skin: 1+ trace edema bilateral arms; no pressure injuries documented; +surgical incisions. GI: + nausea/vomiting; denies GI pain/discomfort; no bowel movement documented.     Met with patient on [address]. Patient reports a height of 67 inches; usual body weight (UBW) of 140 pounds per chart. Patient reports recent unintentional weight loss secondary to poor PO intake > 1 month. Upon chart review, significant weight loss x3 months (-13 pounds or -10.7%), x6 months (-28 pounds or -20.5%) and x12 months (-31 pounds or -22.2%) documented. Nutrition-focused physical exam limited by medical equipment. Severe clavicle wasting observed. Denies any food allergies or intolerances. Denies any cultural/Evangelical dietary preferences or restrictions. Takes the following at home: vitamin C, D, E, K. Per EMR, failed dysphagia screen; pended S+S consult. Dentures currently missing. Typically eats 3 meals daily; breakfast might consist of oatmeal with blueberries or eggs with cheese, coffee or water; lunch might consist of vegetable soup with bread; dinner might consist of almond butter sandwich or a piece of chicken. Patient provided with medical nutrition therapy (MNT) specific to his diagnosis. Based on ASPEN guidelines, pt meets criteria for malnutrition; see below 79 y/o M with pmh melanoma (L arm skin resection 2023), basal cell carcinoma, non-small cell lung cancer - adenocarcinoma (s/p radiation chemo, completed 10/2023), and BPH presents with loss of balance, HA, fatigue, generalized weakness, diplopia and bilateral foot tingling x 2 weeks and nausea/vomiting x 3-4 days. Patient also admits to mild memory loss x 3-4 months. Pt found to have 5 areas of brain metastases, largest in the  L cerebellum, on outpatient MRI today. Patient sent to ER for neurosurgery admission.    Discussed Pt with team during IDT rounds. Chart, meds, and labs reviewed. Tmax 36.8 C. MAPs 86-92 mm Hg. Meds significant for insulin sliding scale, ondansetron (Zofran), pantoprazole (protonix), polyethylene glycol (MiraLAX), senna. Labs significant for POCT ; glucose 135; HgbA1c 5.9H. Denies pain or discomfort. Skin: 1+ trace edema bilateral arms; no pressure injuries documented; +surgical incisions. GI: + nausea/vomiting; denies GI pain/discomfort; no bowel movement documented.     Met with patient on [address]. Patient reports a height of 67 inches; usual body weight (UBW) of 140 pounds per chart. Patient reports recent unintentional weight loss secondary to poor PO intake > 1 month. Upon chart review, significant weight loss x3 months (-13 pounds or -10.7%), x6 months (-28 pounds or -20.5%) and x12 months (-31 pounds or -22.2%) documented. Nutrition-focused physical exam limited by medical equipment. Severe clavicle wasting observed. Denies any food allergies or intolerances. Denies any cultural/Muslim dietary preferences or restrictions. Takes the following at home: vitamin C, D, E, K. Per EMR, failed dysphagia screen; pended S+S consult. Dentures currently missing. Typically eats 3 meals daily; breakfast might consist of oatmeal with blueberries or eggs with cheese, coffee or water; lunch might consist of vegetable soup with bread; dinner might consist of almond butter sandwich or a piece of chicken. Patient provided with medical nutrition therapy (MNT) specific to his diagnosis. Based on ASPEN guidelines, pt meets criteria for malnutrition; see below

## 2024-01-12 NOTE — SWALLOW BEDSIDE ASSESSMENT ADULT - NS SPL SWALLOW CLINIC TRIAL FT
Adequate bolus containment. Prolonged and reduced mastication of regular solids r/t edentulous status. Laryngeal movement palpated; appeared brisk and timely. No clinical overt s/s of penetration/aspiraiton appreciated. Vocal quality remained dry.

## 2024-01-12 NOTE — PROGRESS NOTE ADULT - SUBJECTIVE AND OBJECTIVE BOX
=================================  NEUROCRITICAL CARE ATTENDING NOTE  =================================    ERMA HER   MRN-[id number]  Summary:  78y/M  with melanoma (L arm skin resection 2023), basal cell carcinoma, non small cell lung cancer - adenocarcinoma (s/p radiation chemo, completed 10/2023), and BPH presents with loss of balance, HA, fatigue, generalized weakness, diplopia and b/l foot tingling x 2 weeks and nausea/vomiting x 3-4 days. Patient also admits to mild memory loss x 3-4 months. Pt found to have 5 areas of brain metastases, largest in the  L cerebellum, on outpatient MRI today. Patient sent to ER for neurosurgery admission. (10 Familia 2024 15:57)    COURSE IN THE HOSPITAL:  01/10 admitted to [facility]  01/11 s/p retrosig resection (F[place]zen metastatic carcinoma)  01/12     Past Medical History: BPH (benign prostatic hyperplasia) Lung mass Prediabetes Lung cancer metastatic to brain Skin melanoma Basal cell carcinoma  Allergies:  No Known Allergies  Home meds:   ·	tamsulosin 0.4 mg oral capsule: 1 cap(s) orally once a day    PHYSICAL EXAMINATION  T(C): 36.8 (01-12 @ 05:40), Max: 36.8 (01-12 @ 05:40) HR: 53 (01-12 @ 07:00) (50 - 81) BP: 119/69 (01-11 @ 19:00) (117/65 - 130/60) RR: 17 (01-12 @ 07:00) (17 - 38) SpO2: 97% (01-12 @ 07:00) (93% - 99%)   NEUROLOGIC EXAMINATION:  Patient is lethargic, waking up from anesthesia  GENERAL: not intubated, not in cardiorespiratory distress  EENT:  anicteric  CARDIOVASCULAR: (+) S1 S2, bradycardic rate and regular rhythm  PULMONARY: clear to auscultation bilaterally  ABDOMEN: soft, nontender with normoactive bowel sounds  EXTREMITIES: no edema  SKIN: no rash    LABS: 01-12  CAPILLARY BLOOD GLUCOSE 116 131 114     (13.39)  11.1 (11.7)  11.82 )-----------( 242      ( 12 Jan 2024 05:04 )             34.7      138  |  104  |  18  ----------------------------<  135<H>  4.0   |  28  |  0.56    Ca    8.8      12 Jan 2024 05:04  Phos  3.9     01-12  Mg     2.2     01-12 01-11 @ 07:01 - 01-12 @ 07:00  --------------------------------------------------------  IN: 1729.9 mL / OUT: 910 mL / NET: 819.9 mL    Bacteriology:  CSF studies:  EEG:  Neuroimaging:  Other imaging:    MEDICATIONS: 01-12    ·	acetaminophen     Tablet .. 1000 Oral every 8 hours  ·	ondansetron Injectable 4 IV Push every 6 hours  ·	budesonide  80 MICROgram(s)/formoterol 4.5 MICROgram(s) Inhaler 1 Inhalation once  ·	pantoprazole  Injectable 40 IV Push daily  ·	polyethylene glycol 3350 17 Oral daily  ·	senna 2 Oral at bedtime  ·	dexAMETHasone  Injectable 4 IV Push every 6 hours  ·	insulin lispro (ADMELOG) corrective regimen sliding scale  SubCutaneous Before meals and at bedtime  ·	tamsulosin 0.4 Oral at bedtime  ·	bisacodyl 5 Oral daily PRN  ·	oxyCODONE    IR 5 Oral every 4 hours PRN    MEDICATIONS: 01-11    ·	acetaminophen     Tablet .. 1000 Oral every 6 hours  ·	ondansetron Injectable 4 IV Push every 6 hours    IV FLUIDS:  DRIPS:  DIET:  Lines:  Drains:    Wounds:    CODE STATUS:  Full Code                       GOALS OF CARE:  aggressive                      DISPOSITION:  ICU =================================  NEUROCRITICAL CARE ATTENDING NOTE  =================================    ERMA HER   MRN-[id number]  Summary:  78y/M  with melanoma (L arm skin resection 2023), basal cell carcinoma, non small cell lung cancer - adenocarcinoma (s/p radiation chemo, completed 10/2023), and BPH presents with loss of balance, HA, fatigue, generalized weakness, diplopia and b/l foot tingling x 2 weeks and nausea/vomiting x 3-4 days. Patient also admits to mild memory loss x 3-4 months. Pt found to have 5 areas of brain metastases, largest in the  L cerebellum, on outpatient MRI today. Patient sent to ER for neurosurgery admission. (10 Familia 2024 15:57)    COURSE IN THE HOSPITAL:  01/10 admitted to [facility]  01/11 s/p retrosig resection (F[place]zen metastatic carcinoma)  01/12     Past Medical History: BPH (benign prostatic hyperplasia) Lung mass Prediabetes Lung cancer metastatic to brain Skin melanoma Basal cell carcinoma  Allergies:  No Known Allergies  Home meds:   ·	tamsulosin 0.4 mg oral capsule: 1 cap(s) orally once a day    PHYSICAL EXAMINATION  T(C): 36.8 (01-12 @ 05:40), Max: 36.8 (01-12 @ 05:40) HR: 53 (01-12 @ 07:00) (50 - 81) BP: 119/69 (01-11 @ 19:00) (117/65 - 130/60) RR: 17 (01-12 @ 07:00) (17 - 38) SpO2: 97% (01-12 @ 07:00) (93% - 99%)   NEUROLOGIC EXAMINATION:  Patient is lethargic, waking up from anesthesia  GENERAL: not intubated, not in cardiorespiratory distress  EENT:  anicteric  CARDIOVASCULAR: (+) S1 S2, bradycardic rate and regular rhythm  PULMONARY: clear to auscultation bilaterally  ABDOMEN: soft, nontender with normoactive bowel sounds  EXTREMITIES: no edema  SKIN: no rash    LABS: 01-12  CAPILLARY BLOOD GLUCOSE 116 131 114     (13.39)  11.1 (11.7)  11.82 )-----------( 242      ( 12 Jan 2024 05:04 )             34.7      138  |  104  |  18  ----------------------------<  135<H>  4.0   |  28  |  0.56    Ca    8.8      12 Jan 2024 05:04  Phos  3.9     01-12  Mg     2.2     01-12 01-11 @ 07:01 - 01-12 @ 07:00  --------------------------------------------------------  IN: 1729.9 mL / OUT: 910 mL / NET: 819.9 mL    Bacteriology:  CSF studies:  EEG:  Neuroimaging:  Other imaging:    MEDICATIONS: 01-12    ·	acetaminophen     Tablet .. 1000 Oral every 8 hours  ·	ondansetron Injectable 4 IV Push every 6 hours  ·	budesonide  80 MICROgram(s)/formoterol 4.5 MICROgram(s) Inhaler 1 Inhalation once  ·	pantoprazole  Injectable 40 IV Push daily  ·	polyethylene glycol 3350 17 Oral daily  ·	senna 2 Oral at bedtime  ·	dexAMETHasone  Injectable 4 IV Push every 6 hours  ·	insulin lispro (ADMELOG) corrective regimen sliding scale  SubCutaneous Before meals and at bedtime  ·	tamsulosin 0.4 Oral at bedtime  ·	bisacodyl 5 Oral daily PRN  ·	oxyCODONE    IR 5 Oral every 4 hours PRN    MEDICATIONS: 01-11    ·	acetaminophen     Tablet .. 1000 Oral every 6 hours  ·	ondansetron Injectable 4 IV Push every 6 hours    IV FLUIDS:  DRIPS:  DIET:  Lines:  Drains:    Wounds:    CODE STATUS:  Full Code                       GOALS OF CARE:  aggressive                      DISPOSITION:  ICU =================================  NEUROCRITICAL CARE ATTENDING NOTE  =================================    ERMA HER   MRN-[id number]  Summary:  78y/M  with melanoma (L arm skin resection 2023), basal cell carcinoma, non small cell lung cancer - adenocarcinoma (s/p radiation chemo, completed 10/2023), and BPH presents with loss of balance, HA, fatigue, generalized weakness, diplopia and b/l foot tingling x 2 weeks and nausea/vomiting x 3-4 days. Patient also admits to mild memory loss x 3-4 months. Pt found to have 5 areas of brain metastases, largest in the  L cerebellum, on outpatient MRI today. Patient sent to ER for neurosurgery admission. (10 Familia 2024 15:57)    COURSE IN THE HOSPITAL:  01/10 admitted to [facility]  01/11 s/p retrosig resection (F[place]zen metastatic carcinoma)  01/12 bolus IVF for sodium    Past Medical History: BPH (benign prostatic hyperplasia) Lung mass Prediabetes Lung cancer metastatic to brain Skin melanoma Basal cell carcinoma  Allergies:  No Known Allergies  Home meds:   ·	tamsulosin 0.4 mg oral capsule: 1 cap(s) orally once a day    PHYSICAL EXAMINATION  T(C): 36.8 (01-12 @ 05:40), Max: 36.8 (01-12 @ 05:40) HR: 53 (01-12 @ 07:00) (50 - 81) BP: 119/69 (01-11 @ 19:00) (117/65 - 130/60) RR: 17 (01-12 @ 07:00) (17 - 38) SpO2: 97% (01-12 @ 07:00) (93% - 99%)   NEUROLOGIC EXAMINATION:  Patient is awake alert oriented x3, FC JIA, moving all 4s with good strength  GENERAL: not intubated, not in cardiorespiratory distress  EENT:  anicteric  CARDIOVASCULAR: (+) S1 S2, bradycardic rate and regular rhythm  PULMONARY: clear to auscultation bilaterally  ABDOMEN: soft, nontender with normoactive bowel sounds  EXTREMITIES: no edema  SKIN: no rash    LABS: 01-12  CAPILLARY BLOOD GLUCOSE 116 131 114     (13.39)  11.1 (11.7)  11.82 )-----------( 242      ( 12 Jan 2024 05:04 )             34.7      138  |  104  |  18  ----------------------------<  135<H>  4.0   |  28  |  0.56    Ca    8.8      12 Jan 2024 05:04  Phos  3.9     01-12  Mg     2.2     01-12 01-11 @ 07:01 - 01-12 @ 07:00  --------------------------------------------------------  IN: 1729.9 mL / OUT: 910 mL / NET: 819.9 mL    Bacteriology:  CSF studies:  EEG:  Neuroimaging:  Other imaging:    MEDICATIONS: 01-12    ·	acetaminophen     Tablet .. 1000 Oral every 8 hours  ·	ondansetron Injectable 4 IV Push every 6 hours  ·	pantoprazole  Injectable 40 IV Push daily  ·	polyethylene glycol 3350 17 Oral daily  ·	senna 2 Oral at bedtime  ·	dexAMETHasone  Injectable 4 IV Push every 6 hours  ·	insulin lispro (ADMELOG) corrective regimen sliding scale  SubCutaneous Before meals and at bedtime  ·	tamsulosin 0.4 Oral at bedtime  ·	bisacodyl 5 Oral daily PRN  ·	oxyCODONE    IR 5 Oral every 4 hours PRN    IV FLUIDS: NS@75cc/hr  DRIPS:  DIET: ADAT  Lines: Fanny   Drains:    Wounds:    CODE STATUS:  Full Code                       GOALS OF CARE:  aggressive                      DISPOSITION:  ICU =================================  NEUROCRITICAL CARE ATTENDING NOTE  =================================    ERMA HER   MRN-[id number]  Summary:  78y/M  with melanoma (L arm skin resection 2023), basal cell carcinoma, non small cell lung cancer - adenocarcinoma (s/p radiation chemo, completed 10/2023), and BPH presents with loss of balance, HA, fatigue, generalized weakness, diplopia and b/l foot tingling x 2 weeks and nausea/vomiting x 3-4 days. Patient also admits to mild memory loss x 3-4 months. Pt found to have 5 areas of brain metastases, largest in the  L cerebellum, on outpatient MRI today. Patient sent to ER for neurosurgery admission. (10 Familia 2024 15:57)    COURSE IN THE HOSPITAL:  01/10 admitted to [facility]  01/11 s/p retrosig resection (F[place]zen metastatic carcinoma)  01/12 bolus IVF for sodium    Past Medical History: BPH (benign prostatic hyperplasia) Lung mass Prediabetes Lung cancer metastatic to brain Skin melanoma Basal cell carcinoma  Allergies:  No Known Allergies  Home meds:   ·	tamsulosin 0.4 mg oral capsule: 1 cap(s) orally once a day    PHYSICAL EXAMINATION  T(C): 36.8 (01-12 @ 05:40), Max: 36.8 (01-12 @ 05:40) HR: 53 (01-12 @ 07:00) (50 - 81) BP: 119/69 (01-11 @ 19:00) (117/65 - 130/60) RR: 17 (01-12 @ 07:00) (17 - 38) SpO2: 97% (01-12 @ 07:00) (93% - 99%)   NEUROLOGIC EXAMINATION:  Patient is awake alert oriented x3, FC JIA, moving all 4s with good strength  GENERAL: not intubated, not in cardiorespiratory distress  EENT:  anicteric  CARDIOVASCULAR: (+) S1 S2, bradycardic rate and regular rhythm  PULMONARY: clear to auscultation bilaterally  ABDOMEN: soft, nontender with normoactive bowel sounds  EXTREMITIES: no edema  SKIN: no rash    LABS: 01-12  CAPILLARY BLOOD GLUCOSE 116 131 114     (13.39)  11.1 (11.7)  11.82 )-----------( 242      ( 12 Jan 2024 05:04 )             34.7      138  |  104  |  18  ----------------------------<  135<H>  4.0   |  28  |  0.56    Ca    8.8      12 Jan 2024 05:04  Phos  3.9     01-12  Mg     2.2     01-12 01-11 @ 07:01 - 01-12 @ 07:00  --------------------------------------------------------  IN: 1729.9 mL / OUT: 910 mL / NET: 819.9 mL    Bacteriology:  CSF studies:  EEG:  Neuroimaging:  Other imaging:    MEDICATIONS: 01-12    ·	acetaminophen     Tablet .. 1000 Oral every 8 hours  ·	ondansetron Injectable 4 IV Push every 6 hours  ·	pantoprazole  Injectable 40 IV Push daily  ·	polyethylene glycol 3350 17 Oral daily  ·	senna 2 Oral at bedtime  ·	dexAMETHasone  Injectable 4 IV Push every 6 hours  ·	insulin lispro (ADMELOG) corrective regimen sliding scale  SubCutaneous Before meals and at bedtime  ·	tamsulosin 0.4 Oral at bedtime  ·	bisacodyl 5 Oral daily PRN  ·	oxyCODONE    IR 5 Oral every 4 hours PRN    IV FLUIDS: NS@75cc/hr  DRIPS:  DIET: ADAT  Lines: Fanny   Drains:    Wounds:    CODE STATUS:  Full Code                       GOALS OF CARE:  aggressive                      DISPOSITION:  ICU

## 2024-01-12 NOTE — SWALLOW BEDSIDE ASSESSMENT ADULT - SWALLOW EVAL: DIAGNOSIS
Oropharyngeal swallow function was clinically judged to be WFL with no concern for airway protection deficits.

## 2024-01-12 NOTE — DIETITIAN INITIAL EVALUATION ADULT - PERSON TAUGHT/METHOD
Pt educated on medical nutrition therapy specific to the patient's diagnosis/condition; he expressed understanding; all questions and concerns addressed to his satisfaction

## 2024-01-13 NOTE — PHYSICAL THERAPY INITIAL EVALUATION ADULT - GENERAL OBSERVATIONS, REHAB EVAL
PT IE completed. Patient received semi supine in bed +tele, +L posterior incision C/D/I, +IV, +(B) SCDs, +daughter present at bedside, patient NAD, willing to work with PT.

## 2024-01-13 NOTE — PHYSICAL THERAPY INITIAL EVALUATION ADULT - ADDITIONAL COMMENTS
Active community ambulator who lives with his sister in 6th floor walk up apartment. Independent with all ADLs prior and denies use of AD when ambulating. Of note, patient sister unable to assist in care of patient. Patient's daughter will be staying with patient to assist as needed.

## 2024-01-13 NOTE — PHYSICAL THERAPY INITIAL EVALUATION ADULT - MODALITIES TREATMENT COMMENTS
R hand dominant; (L) hand  5/5, (R) hand  5/5. CN Testing: B/L Frontalis intact; B/L buccinator intact; smile symmetrical; tongue protrusion at midline; B/L eyes open/close intact; Shoulder elevation: intact bilaterally; Vision H-Test: bilateral tracking and smooth pursuit intact (patient reporting double vision out of R eye when looking to the R); Convergence/Divergence: intact; Vision Quadrant Test: intact bilaterally, however, impaired R upper temporal quadrant of R eye (double vision)

## 2024-01-13 NOTE — PHYSICAL THERAPY INITIAL EVALUATION ADULT - GAIT DEVIATIONS NOTED, PT EVAL
Patient reporting double vision resulting in slightly unsteady gait. Improve vision with R eye closed./decreased beckie/increased time in double stance/decreased velocity of limb motion/decreased step length/decreased stride length/decreased weight-shifting ability

## 2024-01-13 NOTE — PROGRESS NOTE ADULT - SUBJECTIVE AND OBJECTIVE BOX
INTERVAL EVENTS: no acute events    PAST MEDICAL & SURGICAL HISTORY:  BPH (benign prostatic hyperplasia)    Lung mass    Prediabetes    Lung cancer metastatic to brain    Skin melanoma    Basal cell carcinoma    History of dental surgery    S/P skin cancer resection        MEDICATIONS  (STANDING):  acetaminophen     Tablet .. 1000 milliGRAM(s) Oral every 8 hours  chlorhexidine 2% Cloths 1 Application(s) Topical <User Schedule>  dexAMETHasone  Injectable   IV Push   dexAMETHasone  Injectable 4 milliGRAM(s) IV Push every 8 hours  enoxaparin Injectable 40 milliGRAM(s) SubCutaneous <User Schedule>  folic acid 1 milliGRAM(s) Oral daily  glucagon  Injectable 1 milliGRAM(s) IntraMuscular once  influenza  Vaccine (HIGH DOSE) 0.7 milliLiter(s) IntraMuscular once  insulin lispro (ADMELOG) corrective regimen sliding scale   SubCutaneous Before meals and at bedtime  multivitamin 1 Tablet(s) Oral daily  ondansetron Injectable 4 milliGRAM(s) IV Push every 6 hours  pantoprazole  Injectable 40 milliGRAM(s) IV Push daily  polyethylene glycol 3350 17 Gram(s) Oral daily  senna 2 Tablet(s) Oral at bedtime  tamsulosin 0.4 milliGRAM(s) Oral at bedtime  thiamine 100 milliGRAM(s) Oral daily    MEDICATIONS  (PRN):  bisacodyl 5 milliGRAM(s) Oral daily PRN Constipation  oxyCODONE    IR 5 milliGRAM(s) Oral every 4 hours PRN Severe Pain (7 - 10)    T(F): 98.3 (01-13-24 @ 10:00), Max: 98.3 (01-13-24 @ 10:00)  HR: 54 (01-13-24 @ 08:32) (54 - 73)  BP: 112/60 (01-13-24 @ 08:32) (112/60 - 127/62)  BP(mean): 80 (01-13-24 @ 08:32) (80 - 88)  ABP: --  ABP(mean): --  RR: 20 (01-13-24 @ 08:32) (18 - 22)  SpO2: 96% (01-13-24 @ 08:32) (95% - 96%)    I/O Detail 24H    12 Jan 2024 07:01  -  13 Jan 2024 07:00  --------------------------------------------------------  IN:    Oral Fluid: 480 mL    sodium chloride 0.9%: 300 mL  Total IN: 780 mL    OUT:    Voided (mL): 1200 mL  Total OUT: 1200 mL    Total NET: -420 mL      13 Jan 2024 07:01  -  13 Jan 2024 12:28  --------------------------------------------------------  IN:    Oral Fluid: 480 mL  Total IN: 480 mL    OUT:    Voided (mL): 200 mL  Total OUT: 200 mL    Total NET: 280 mL          PHYSICAL EXAM:  General: NAD, answering questions, pleasantly conversant  HEENT: head wrapped in protective dressing  Respiratory: CTA b/l  Cardiovascular: +S1/S2; RRR  Abdomen: soft, NT/ND; +BS x4  Extremities: WWP, 2+ peripheral pulses b/l; no LE edema  Skin: normal color and turgor; no rash  Neurological: AAOX3, LUE 5/5, RUE 5/5, 5/5 LLE, 5/5 RLE strength, dysmetria improved, finger to nose test negative, no dysdiadochokinesia present, cranial nerves intact    LABS:  CBC 01-13-24 @ 05:30                        10.6   14.23 )-----------( 189                   34.2       Hgb trend: 10.6 <-- , 11.1 <-- , 11.7 <-- , 13.3 <-- , 13.5 <--   WBC trend: 14.23 <-- , 11.82 <-- , 13.39 <-- , 4.50 <-- , 6.87 <--       CMP 01-13-24 @ 05:30    137  |  102  |  24<H>  ----------------------------<  143<H>  4.1   |  27  |  0.53    Ca    8.7      01-13-24 @ 05:30  Phos  1.5     01-13  Mg     2.0     01-13        Serum Cr trend: 0.53 <-- , 0.60 <-- , 0.56 <-- , 0.62 <-- , 0.62 <-- , 0.56 <--     PT/INR - ( 11 Jan 2024 17:14 )   PT: 12.1 sec;   INR: 1.06          PTT - ( 11 Jan 2024 17:14 ):23.0 sec    Cardiac Markers           STUDIES:

## 2024-01-13 NOTE — PHYSICAL THERAPY INITIAL EVALUATION ADULT - PERTINENT HX OF CURRENT PROBLEM, REHAB EVAL
no radiation 79 y/o M with pmh melanoma (L arm skin resection 2023), basal cell carcinoma, non small cell lung cancer - adenocarcinoma (s/p radiation chemo, completed 10/2023), and BPH presents with loss of balance, HA, fatigue, generalized weakness, diplopia and b/l foot tingling x 2 weeks and nausea/vomiting x 2-4 days. Pt found to have 5 areas of brain metastases, largest in the  L cerebellum, on outpatient MRI. Now s/p left restrosigmoid crani resection of tumor (1/11), frozen: mets.

## 2024-01-13 NOTE — PHYSICAL THERAPY INITIAL EVALUATION ADULT - DIAGNOSIS, PT EVAL
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

## 2024-01-13 NOTE — PROGRESS NOTE ADULT - SUBJECTIVE AND OBJECTIVE BOX
SUBJECTIVE: Denies new weakness, numbness, tingling, nausea, vomiting, chest pain, palpitations, shortness of breath, vision changes.     HOSPITAL COURSE:  1/10: admitted to [facility], preop for OR tomorrow. Pending CT A/P and KUB. Recieved decadron 10 IV in ER and started on decadron 4q6.   1/11: POD#0 s/p restrosig crani resection of tumor (1/11-) frozen NSCC , Dex taper x 1 week to 2 BID, SBP<140, pending MRI brain, 250cc 3% bolus for Na 134.   1/12: POD 1. pending SLP.  pending MRI. SD status. CT chest completed w new PE b/l segmental RLL and subsegmental in LLL, worsening mediastinal LAD, evolution of radiation induced pulmonary fibrosis, small b/l pleural effusions. PM Na 139.  1/13: POD 2.     Vital Signs Last 24 Hrs  T(C): 36.7 (12 Jan 2024 23:00), Max: 36.8 (12 Jan 2024 05:40)  T(F): 98 (12 Jan 2024 23:00), Max: 98.3 (12 Jan 2024 08:53)  HR: 68 (13 Jan 2024 00:20) (50 - 73)  BP: 121/60 (13 Jan 2024 00:20) (113/58 - 127/62)  BP(mean): 85 (13 Jan 2024 00:20) (80 - 88)  RR: 18 (13 Jan 2024 00:20) (17 - 22)  SpO2: 96% (13 Jan 2024 00:20) (95% - 99%)    Parameters below as of 13 Jan 2024 00:20  Patient On (Oxygen Delivery Method): room air    I&O's Summary    11 Jan 2024 07:01  -  12 Jan 2024 07:00  --------------------------------------------------------  IN: 1729.9 mL / OUT: 910 mL / NET: 819.9 mL    12 Jan 2024 07:01  -  13 Jan 2024 02:37  --------------------------------------------------------  IN: 780 mL / OUT: 700 mL / NET: 80 mL    PHYSICAL EXAM:  General: NAD  HEENT: PERRL 3mm briskly reactive, EOMI b/l, face symmetric, tongue midline, neck FROM  Cardiovascular: RRR, normal S1 and S2   Respiratory: lungs CTAB, no wheezing, rhonchi, or crackles   GI: normoactive BS to auscultation, abd soft, NTND   Neuro: A&Ox3, mildly dysarthric, Follows commands.  RUE, RLE 5/5  LUE 4/5 with trace drift, LLE 4+/5  LUE dysmetria  Sensation intact throughout.   Extremities: distal pulses 2+ x 4  Wound/incision: headwrap c/d/i    LABS:                        11.1   11.82 )-----------( 242      ( 12 Jan 2024 05:04 )             34.7     01-12    139  |  103  |  19  ----------------------------<  180<H>  4.2   |  27  |  0.60    Ca    9.2      12 Jan 2024 14:55  Phos  3.9     01-12  Mg     2.2     01-12      PT/INR - ( 11 Jan 2024 17:14 )   PT: 12.1 sec;   INR: 1.06          PTT - ( 11 Jan 2024 17:14 )  PTT:23.0 sec  Urinalysis Basic - ( 12 Jan 2024 14:55 )    Color: x / Appearance: x / SG: x / pH: x  Gluc: 180 mg/dL / Ketone: x  / Bili: x / Urobili: x   Blood: x / Protein: x / Nitrite: x   Leuk Esterase: x / RBC: x / WBC x   Sq Epi: x / Non Sq Epi: x / Bacteria: x          CAPILLARY BLOOD GLUCOSE      POCT Blood Glucose.: 140 mg/dL (12 Jan 2024 22:44)  POCT Blood Glucose.: 199 mg/dL (12 Jan 2024 16:18)  POCT Blood Glucose.: 126 mg/dL (12 Jan 2024 11:14)  POCT Blood Glucose.: 116 mg/dL (12 Jan 2024 06:55)      Drug Levels: [] N/A    CSF Analysis: [] N/A      Allergies    No Known Allergies    Intolerances      MEDICATIONS:  Antibiotics:    Neuro:  acetaminophen     Tablet .. 1000 milliGRAM(s) Oral every 8 hours  ondansetron Injectable 4 milliGRAM(s) IV Push every 6 hours  oxyCODONE    IR 5 milliGRAM(s) Oral every 4 hours PRN    Anticoagulation:  enoxaparin Injectable 40 milliGRAM(s) SubCutaneous <User Schedule>    OTHER:  bisacodyl 5 milliGRAM(s) Oral daily PRN  chlorhexidine 2% Cloths 1 Application(s) Topical <User Schedule>  dexAMETHasone  Injectable 4 milliGRAM(s) IV Push every 6 hours  dexAMETHasone  Injectable 4 milliGRAM(s) IV Push every 8 hours  dexAMETHasone  Injectable   IV Push   glucagon  Injectable 1 milliGRAM(s) IntraMuscular once  influenza  Vaccine (HIGH DOSE) 0.7 milliLiter(s) IntraMuscular once  insulin lispro (ADMELOG) corrective regimen sliding scale   SubCutaneous Before meals and at bedtime  pantoprazole  Injectable 40 milliGRAM(s) IV Push daily  polyethylene glycol 3350 17 Gram(s) Oral daily  senna 2 Tablet(s) Oral at bedtime  tamsulosin 0.4 milliGRAM(s) Oral at bedtime    IVF:  folic acid 1 milliGRAM(s) Oral daily  multivitamin 1 Tablet(s) Oral daily  thiamine 100 milliGRAM(s) Oral daily    CULTURES:  Culture Results:   No growth at 5 days. (01-07 @ 22:48)  Culture Results:   No growth at 5 days. (01-07 @ 22:48)    RADIOLOGY & ADDITIONAL TESTS:      ASSESSMENT:  79 y/o M with pmh melanoma (L arm skin resection 2023), basal cell carcinoma, non small cell lung cancer - adenocarcinoma (s/p radiation chemo, completed 10/2023), and BPH presents with loss of balance, HA, fatigue, generalized weakness, diplopia and b/l foot tingling x 2 weeks and nausea/vomiting x 2-4 days. Pt found to have 5 areas of brain metastases, largest in the  L cerebellum, on outpatient MRI. Now s/p left restrosigmoid crani resection of tumor (1/11), frozen: mets.     Neuro:  - neuro/vital checks q4  - pain control w/ cranial ERAS  - Dex taper over 1 week to 2 BID  - MRI postop completed 1/12, pending read    Cardiac:  - SBP goal 100-160    Pulmonary:  - on RA  - encourage IS    GI:  - regular diet  - bowel regimen  - protonix while on steroids    Renal:  - IVL, voiding  - cont home flomax    Heme:  - SCDs, SQL for DVT prophylaxis  - CT A/P 1/10 for metastatic workup: negative  - CT chest 1/12: new PE b/l segmental RLL and subsegmental in LLL, worsening mediastinal LAD, evolution of radiation induced pulmonary fibrosis, small b/l pleural effusions  - hemeonc following  - pending rad onc consult    Endo:  - ISS, A1C 5.9    ID:  - afebrile    Disposition: SD status, full code, dispo pending PT/OT    D/w Dr. D'Amico  SUBJECTIVE: Denies new weakness, numbness, tingling, nausea, vomiting, chest pain, palpitations, shortness of breath, vision changes.     HOSPITAL COURSE:  1/10: admitted to [facility], preop for OR tomorrow. Pending CT A/P and KUB. Recieved decadron 10 IV in ER and started on decadron 4q6.   1/11: POD#0 s/p restrosig crani resection of tumor (1/11-) frozen NSCC , Dex taper x 1 week to 2 BID, SBP<140, pending MRI brain, 250cc 3% bolus for Na 134.   1/12: POD 1. pending SLP.  pending MRI. SD status. CT chest completed w new PE b/l segmental RLL and subsegmental in LLL, worsening mediastinal LAD, evolution of radiation induced pulmonary fibrosis, small b/l pleural effusions. PM Na 139.  1/13: POD 2.     Vital Signs Last 24 Hrs  T(C): 36.7 (12 Jan 2024 23:00), Max: 36.8 (12 Jan 2024 05:40)  T(F): 98 (12 Jan 2024 23:00), Max: 98.3 (12 Jan 2024 08:53)  HR: 68 (13 Jan 2024 00:20) (50 - 73)  BP: 121/60 (13 Jan 2024 00:20) (113/58 - 127/62)  BP(mean): 85 (13 Jan 2024 00:20) (80 - 88)  RR: 18 (13 Jan 2024 00:20) (17 - 22)  SpO2: 96% (13 Jan 2024 00:20) (95% - 99%)    Parameters below as of 13 Jan 2024 00:20  Patient On (Oxygen Delivery Method): room air    I&O's Summary    11 Jan 2024 07:01  -  12 Jan 2024 07:00  --------------------------------------------------------  IN: 1729.9 mL / OUT: 910 mL / NET: 819.9 mL    12 Jan 2024 07:01  -  13 Jan 2024 02:37  --------------------------------------------------------  IN: 780 mL / OUT: 700 mL / NET: 80 mL    PHYSICAL EXAM:  General: NAD  HEENT: PERRL 3mm briskly reactive, EOMI b/l, face symmetric, tongue midline, neck FROM  Cardiovascular: RRR, normal S1 and S2   Respiratory: lungs CTAB, no wheezing, rhonchi, or crackles   GI: normoactive BS to auscultation, abd soft, NTND   Neuro: A&Ox3, mildly dysarthric, Follows commands.  RUE, RLE 5/5  LUE 4/5 with trace drift, LLE 4+/5  LUE dysmetria  Sensation intact throughout.   Extremities: distal pulses 2+ x 4  Wound/incision: headwrap c/d/i    LABS:                        11.1   11.82 )-----------( 242      ( 12 Jan 2024 05:04 )             34.7     01-12    139  |  103  |  19  ----------------------------<  180<H>  4.2   |  27  |  0.60    Ca    9.2      12 Jan 2024 14:55  Phos  3.9     01-12  Mg     2.2     01-12      PT/INR - ( 11 Jan 2024 17:14 )   PT: 12.1 sec;   INR: 1.06          PTT - ( 11 Jan 2024 17:14 )  PTT:23.0 sec  Urinalysis Basic - ( 12 Jan 2024 14:55 )    Color: x / Appearance: x / SG: x / pH: x  Gluc: 180 mg/dL / Ketone: x  / Bili: x / Urobili: x   Blood: x / Protein: x / Nitrite: x   Leuk Esterase: x / RBC: x / WBC x   Sq Epi: x / Non Sq Epi: x / Bacteria: x          CAPILLARY BLOOD GLUCOSE      POCT Blood Glucose.: 140 mg/dL (12 Jan 2024 22:44)  POCT Blood Glucose.: 199 mg/dL (12 Jan 2024 16:18)  POCT Blood Glucose.: 126 mg/dL (12 Jan 2024 11:14)  POCT Blood Glucose.: 116 mg/dL (12 Jan 2024 06:55)      Drug Levels: [] N/A    CSF Analysis: [] N/A      Allergies    No Known Allergies    Intolerances      MEDICATIONS:  Antibiotics:    Neuro:  acetaminophen     Tablet .. 1000 milliGRAM(s) Oral every 8 hours  ondansetron Injectable 4 milliGRAM(s) IV Push every 6 hours  oxyCODONE    IR 5 milliGRAM(s) Oral every 4 hours PRN    Anticoagulation:  enoxaparin Injectable 40 milliGRAM(s) SubCutaneous <User Schedule>    OTHER:  bisacodyl 5 milliGRAM(s) Oral daily PRN  chlorhexidine 2% Cloths 1 Application(s) Topical <User Schedule>  dexAMETHasone  Injectable 4 milliGRAM(s) IV Push every 6 hours  dexAMETHasone  Injectable 4 milliGRAM(s) IV Push every 8 hours  dexAMETHasone  Injectable   IV Push   glucagon  Injectable 1 milliGRAM(s) IntraMuscular once  influenza  Vaccine (HIGH DOSE) 0.7 milliLiter(s) IntraMuscular once  insulin lispro (ADMELOG) corrective regimen sliding scale   SubCutaneous Before meals and at bedtime  pantoprazole  Injectable 40 milliGRAM(s) IV Push daily  polyethylene glycol 3350 17 Gram(s) Oral daily  senna 2 Tablet(s) Oral at bedtime  tamsulosin 0.4 milliGRAM(s) Oral at bedtime    IVF:  folic acid 1 milliGRAM(s) Oral daily  multivitamin 1 Tablet(s) Oral daily  thiamine 100 milliGRAM(s) Oral daily    CULTURES:  Culture Results:   No growth at 5 days. (01-07 @ 22:48)  Culture Results:   No growth at 5 days. (01-07 @ 22:48)    RADIOLOGY & ADDITIONAL TESTS:      ASSESSMENT:  77 y/o M with pmh melanoma (L arm skin resection 2023), basal cell carcinoma, non small cell lung cancer - adenocarcinoma (s/p radiation chemo, completed 10/2023), and BPH presents with loss of balance, HA, fatigue, generalized weakness, diplopia and b/l foot tingling x 2 weeks and nausea/vomiting x 2-4 days. Pt found to have 5 areas of brain metastases, largest in the  L cerebellum, on outpatient MRI. Now s/p left restrosigmoid crani resection of tumor (1/11), frozen: mets.     Neuro:  - neuro/vital checks q4  - pain control w/ cranial ERAS  - Dex taper over 1 week to 2 BID  - MRI postop completed 1/12, pending read    Cardiac:  - SBP goal 100-160    Pulmonary:  - on RA  - encourage IS    GI:  - regular diet  - bowel regimen  - protonix while on steroids    Renal:  - IVL, voiding  - cont home flomax    Heme:  - SCDs, SQL for DVT prophylaxis  - CT A/P 1/10 for metastatic workup: negative  - CT chest 1/12: new PE b/l segmental RLL and subsegmental in LLL, worsening mediastinal LAD, evolution of radiation induced pulmonary fibrosis, small b/l pleural effusions  - hemeonc following  - pending rad onc consult    Endo:  - ISS, A1C 5.9    ID:  - afebrile    Disposition: SD status, full code, dispo pending PT/OT    D/w Dr. D'Amico

## 2024-01-13 NOTE — PROGRESS NOTE ADULT - NUTRITIONAL ASSESSMENT
This patient has been assessed with a concern for Malnutrition and has been determined to have a diagnosis/diagnoses of Severe protein-calorie malnutrition and Underweight (BMI < 19).    This patient is being managed with:   Diet Regular-  Supplement Feeding Modality:  Oral  Ensure Plus High Protein Cans or Servings Per Day:  1       Frequency:  Two Times a day  Entered: Jan 12 2024 12:18PM

## 2024-01-13 NOTE — PROGRESS NOTE ADULT - ASSESSMENT
78 YOM with PMH of TUD and metastatic NSCLC s/p chemoradiation presenting with FTT and double vision/gait instability, found to have metastatic brain lesions. He underwent craniotomy 1/11.     RUL NSCLC with metastatic brain lesions  - outpatient MRI with five brain tumors of different sizes and locations  - S/P craniotomo 1/11  - steroids per NSG  - plan to start pembrolizumab as outpatient per heme/onc      #Provoked PE  Patient with provoked PE in setting of malignancy   - Plan to start AC per NSG today, transition to DOAC when deemed appropriate surgically     Malnutrition  Nausea/vomiting, abdominal pain  - BMI 17 in setting of malignancy and anorexia, recent worsening of symptoms with N/V  - obtain KUB and bladder scan to r/o retention/constipation as contributing factors  - ondansetron PRN  - nutrition consult    Dispo: DC home once tolerates AC

## 2024-01-14 NOTE — PROGRESS NOTE ADULT - SUBJECTIVE AND OBJECTIVE BOX
INTERVAL EVENTS: no acute events or complaints     PAST MEDICAL & SURGICAL HISTORY:  BPH (benign prostatic hyperplasia)    Lung mass    Prediabetes    Lung cancer metastatic to brain    Skin melanoma    Basal cell carcinoma    History of dental surgery    S/P skin cancer resection        MEDICATIONS  (STANDING):  acetaminophen     Tablet .. 1000 milliGRAM(s) Oral every 8 hours  chlorhexidine 2% Cloths 1 Application(s) Topical <User Schedule>  dexAMETHasone  Injectable   IV Push   dexAMETHasone  Injectable 4 milliGRAM(s) IV Push every 8 hours  enoxaparin Injectable 40 milliGRAM(s) SubCutaneous <User Schedule>  folic acid 1 milliGRAM(s) Oral daily  glucagon  Injectable 1 milliGRAM(s) IntraMuscular once  influenza  Vaccine (HIGH DOSE) 0.7 milliLiter(s) IntraMuscular once  insulin lispro (ADMELOG) corrective regimen sliding scale   SubCutaneous Before meals and at bedtime  multivitamin 1 Tablet(s) Oral daily  pantoprazole  Injectable 40 milliGRAM(s) IV Push daily  polyethylene glycol 3350 17 Gram(s) Oral daily  senna 2 Tablet(s) Oral at bedtime  tamsulosin 0.4 milliGRAM(s) Oral at bedtime  thiamine 100 milliGRAM(s) Oral daily    MEDICATIONS  (PRN):  bisacodyl 5 milliGRAM(s) Oral daily PRN Constipation  oxyCODONE    IR 5 milliGRAM(s) Oral every 4 hours PRN Severe Pain (7 - 10)    T(F): 98.4 (01-14-24 @ 09:21), Max: 98.4 (01-13-24 @ 17:12)  HR: 60 (01-14-24 @ 08:07) (58 - 76)  BP: 121/65 (01-14-24 @ 08:07) (109/58 - 134/59)  BP(mean): 84 (01-14-24 @ 08:07) (78 - 88)  ABP: --  ABP(mean): --  RR: 17 (01-14-24 @ 08:07) (17 - 20)  SpO2: 94% (01-14-24 @ 08:07) (94% - 96%)    I/O Detail 24H    13 Jan 2024 07:01  -  14 Jan 2024 07:00  --------------------------------------------------------  IN:    Oral Fluid: 960 mL  Total IN: 960 mL    OUT:    Voided (mL): 875 mL  Total OUT: 875 mL    Total NET: 85 mL      14 Jan 2024 07:01  -  14 Jan 2024 11:44  --------------------------------------------------------  IN:  Total IN: 0 mL    OUT:    Voided (mL): 200 mL  Total OUT: 200 mL    Total NET: -200 mL          PHYSICAL EXAM:  General: NAD, answering questions, pleasantly conversant  HEENT: head wrapped in protective dressing  Respiratory: CTA b/l  Cardiovascular: +S1/S2; RRR  Abdomen: soft, NT/ND; +BS x4  Extremities: WWP, 2+ peripheral pulses b/l; no LE edema  Skin: normal color and turgor; no rash  Neurological: AAOX3, LUE 5/5, RUE 5/5, 5/5 LLE, 5/5 RLE strength, dysmetria improved, finger to nose test negative, no dysdiadochokinesia present, cranial nerves intact    LABS:  CBC 01-14-24 @ 05:30                        10.8   13.59 )-----------( 209                   35.1       Hgb trend: 10.8 <-- , 10.6 <-- , 11.1 <-- , 11.7 <--   WBC trend: 13.59 <-- , 14.23 <-- , 11.82 <-- , 13.39 <--       CMP 01-14-24 @ 05:30    137  |  101  |  27<H>  ----------------------------<  132<H>  4.0   |  30  |  0.49<L>    Ca    8.8      01-14-24 @ 05:30  Phos  1.3     01-14  Mg     1.9     01-14        Serum Cr trend: 0.49 <-- , 0.53 <-- , 0.60 <-- , 0.56 <-- , 0.62 <--         Cardiac Markers           STUDIES:

## 2024-01-14 NOTE — PROGRESS NOTE ADULT - SUBJECTIVE AND OBJECTIVE BOX
HPI:  77 y/o M with pmh melanoma (L arm skin resection 2023), basal cell carcinoma, non small cell lung cancer - adenocarcinoma (s/p radiation chemo, completed 10/2023), and BPH presents with loss of balance, HA, fatigue, generalized weakness, diplopia and b/l foot tingling x 2 weeks and nausea/vomiting x 3-4 days. Patient also admits to mild memory loss x 3-4 months. Pt found to have 5 areas of brain metastases, largest in the  L cerebellum, on outpatient MRI today. Patient sent to ER for neurosurgery admission. (10 Familia 2024 15:57)    INTERVAL EVENTS: POD 3 APRYL ovn, neuro stable    Hospital course:   1/10: admitted to [facility], preop for OR tomorrow. Pending CT A/P and KUB. Recieved decadron 10 IV in ER and started on decadron 4q6.   1/11: POD#0 s/p restrosig crani resection of tumor (1/11-) frozen NSCC , Dex taper x 1 week to 2 BID, SBP<140, pending MRI brain, 250cc 3% bolus for Na 134.   1/12: POD 1. pending SLP.  pending MRI. SD status. CT chest completed w new PE b/l segmental RLL and subsegmental in LLL, worsening mediastinal LAD, evolution of radiation induced pulmonary fibrosis, small b/l pleural effusions. PM Na 139.  1/13: POD 2. Headwrap removed. Cleared for home pt with rolling walker. LE dopplers negative for DVT. Rad/onc consulted.   1/14: POD 3. APRYL overnight, neuro stable    Vital Signs Last 24 Hrs  T(C): 36.9 (13 Jan 2024 17:12), Max: 36.9 (13 Jan 2024 17:12)  T(F): 98.4 (13 Jan 2024 17:12), Max: 98.4 (13 Jan 2024 17:12)  HR: 60 (13 Jan 2024 20:36) (54 - 76)  BP: 128/61 (13 Jan 2024 20:36) (109/58 - 131/60)  BP(mean): 88 (13 Jan 2024 20:36) (78 - 88)  RR: 18 (13 Jan 2024 20:36) (18 - 20)  SpO2: 95% (13 Jan 2024 20:36) (94% - 96%)    Parameters below as of 13 Jan 2024 20:36  Patient On (Oxygen Delivery Method): room air        I&O's Summary    12 Jan 2024 07:01  -  13 Jan 2024 07:00  --------------------------------------------------------  IN: 780 mL / OUT: 1200 mL / NET: -420 mL    13 Jan 2024 07:01  -  13 Jan 2024 21:45  --------------------------------------------------------  IN: 960 mL / OUT: 475 mL / NET: 485 mL        PHYSICAL EXAM:  General: NAD  HEENT: PERRL 3mm briskly reactive, EOMI b/l, face symmetric, tongue midline, neck FROM  Cardiovascular: RRR, normal S1 and S2   Respiratory: lungs CTAB, no wheezing, rhonchi, or crackles   GI: normoactive BS to auscultation, abd soft, NTND   Neuro: A&Ox3, mildly dysarthric, Follows commands.  RUE and RLE 5/5  LUE 4+/5 with trace drift, LLE 4+/5  LUE dysmetria  Sensation intact throughout.   Extremities: distal pulses 2+ x 4  Wound/incision: incision c/d/i       DIET:  [] NPO  [x] Mechanical  [] Tube feeds    LABS:                        10.6   14.23 )-----------( 189      ( 13 Jan 2024 05:30 )             34.2     01-13    137  |  102  |  24<H>  ----------------------------<  143<H>  4.1   |  27  |  0.53    Ca    8.7      13 Jan 2024 05:30  Phos  1.5     01-13  Mg     2.0     01-13        Urinalysis Basic - ( 13 Jan 2024 05:30 )    Color: x / Appearance: x / SG: x / pH: x  Gluc: 143 mg/dL / Ketone: x  / Bili: x / Urobili: x   Blood: x / Protein: x / Nitrite: x   Leuk Esterase: x / RBC: x / WBC x   Sq Epi: x / Non Sq Epi: x / Bacteria: x          CAPILLARY BLOOD GLUCOSE      POCT Blood Glucose.: 123 mg/dL (13 Jan 2024 21:24)  POCT Blood Glucose.: 160 mg/dL (13 Jan 2024 17:00)  POCT Blood Glucose.: 207 mg/dL (13 Jan 2024 11:57)  POCT Blood Glucose.: 154 mg/dL (13 Jan 2024 06:23)  POCT Blood Glucose.: 140 mg/dL (12 Jan 2024 22:44)      Drug Levels: [] N/A    CSF Analysis: [] N/A      Allergies    No Known Allergies    Intolerances        Home Medications:  tamsulosin 0.4 mg oral capsule: 1 cap(s) orally once a day (10 Familia 2024 16:14)      MEDICATIONS:  MEDICATIONS  (STANDING):  acetaminophen     Tablet .. 1000 milliGRAM(s) Oral every 8 hours  chlorhexidine 2% Cloths 1 Application(s) Topical <User Schedule>  dexAMETHasone  Injectable   IV Push   dexAMETHasone  Injectable 4 milliGRAM(s) IV Push every 8 hours  enoxaparin Injectable 40 milliGRAM(s) SubCutaneous <User Schedule>  folic acid 1 milliGRAM(s) Oral daily  glucagon  Injectable 1 milliGRAM(s) IntraMuscular once  influenza  Vaccine (HIGH DOSE) 0.7 milliLiter(s) IntraMuscular once  insulin lispro (ADMELOG) corrective regimen sliding scale   SubCutaneous Before meals and at bedtime  multivitamin 1 Tablet(s) Oral daily  pantoprazole  Injectable 40 milliGRAM(s) IV Push daily  polyethylene glycol 3350 17 Gram(s) Oral daily  senna 2 Tablet(s) Oral at bedtime  tamsulosin 0.4 milliGRAM(s) Oral at bedtime  thiamine 100 milliGRAM(s) Oral daily    MEDICATIONS  (PRN):  bisacodyl 5 milliGRAM(s) Oral daily PRN Constipation  oxyCODONE    IR 5 milliGRAM(s) Oral every 4 hours PRN Severe Pain (7 - 10)      CULTURES:  Culture Results:   No growth at 5 days. (01-07 @ 22:48)  Culture Results:   No growth at 5 days. (01-07 @ 22:48)      RADIOLOGY & ADDITIONAL TESTS:      ASSESSMENT:  77 y/o M with pmh melanoma (L arm skin resection 2023), basal cell carcinoma, non small cell lung cancer - adenocarcinoma (s/p radiation chemo, completed 10/2023), and BPH presents with loss of balance, HA, fatigue, generalized weakness, diplopia and b/l foot tingling x 2 weeks and nausea/vomiting x 2-4 days. Pt found to have 5 areas of brain metastases, largest in the  L cerebellum, on outpatient MRI. Now s/p left restrosigmoid crani resection of tumor (1/11), frozen: mets    Neuro:  - neuro/vital checks q4  - pain control w/ cranial ERAS  - Dex taper over 1 week to 2 BID  - MRI postop completed 1/12, pending read    Cardiac:  - SBP goal 100-160    Pulmonary:  - on RA  - encourage IS    GI:  - regular diet  - bowel regimen  - protonix while on steroids    Renal:  - IVL, voiding  - cont home flomax    Endo:  - ISS, A1C 5.9    Heme:  - SCDs, SQL for DVT prophylaxis  - CT A/P 1/10 for metastatic workup: negative  - CT chest 1/12: new PE b/l segmental RLL and subsegmental in LLL, worsening mediastinal LAD, evolution of radiation induced pulmonary fibrosis, small b/l pleural effusions  - Full AC for PE starting POD 3 1/14  - BL LE dopplers 1/13: negative for DVT   - hemeonc following  - Rad Onc consulted, f/u recs    ID:  -afebrile    Disposition: SD status, full code, PT/OT: home PT with rolling walker    D/w Dr. D'Amico     DVT PROPHYLAXIS:  [x] Venodynes                                [x] Heparin/Lovenox      Assessment: present when checked     [] GCS   E   V   M     Heart Failure: [] Acute, [] acute on chronic, [] chronic   Heart Failure: [] Diastolic (HFpEF), [] Systolic (HRrEF), [] Combined (HFpEF and HFrEF), [] RHF, [] Pulm HTN, [] Other     [] MEHREEN, [] ATN, [] AIN, [] other   [] CKD1, [] CKD2, [] CKD3, [] CKD4, [] CKD5, [] ESRD     Encephalopathy: [] Metabolic, [] Hepatic, [] Toxic, [] Neurological, [] Other     Abnormal Nutritional Status: [] malnutrition (see nutrition note), []underweight: BMI <19, [] morbid obesity: BMI >40, [] Cachexia     [] Sepsis   [] Hypovolemic shock, [] Cardiogenic shock, [] Hemorrhagic shock, [] Neurogenic shock   [] Acute respiratory failure   [] Cerebral edema, [] Brain compression / herniation   [] Functional quadriplegia   [] Acute blood loss anemia  HPI:  77 y/o M with pmh melanoma (L arm skin resection 2023), basal cell carcinoma, non small cell lung cancer - adenocarcinoma (s/p radiation chemo, completed 10/2023), and BPH presents with loss of balance, HA, fatigue, generalized weakness, diplopia and b/l foot tingling x 2 weeks and nausea/vomiting x 3-4 days. Patient also admits to mild memory loss x 3-4 months. Pt found to have 5 areas of brain metastases, largest in the  L cerebellum, on outpatient MRI today. Patient sent to ER for neurosurgery admission. (10 Familia 2024 15:57)    INTERVAL EVENTS: POD 3 APRYL ovn, neuro stable    Hospital course:   1/10: admitted to [facility], preop for OR tomorrow. Pending CT A/P and KUB. Recieved decadron 10 IV in ER and started on decadron 4q6.   1/11: POD#0 s/p restrosig crani resection of tumor (1/11-) frozen NSCC , Dex taper x 1 week to 2 BID, SBP<140, pending MRI brain, 250cc 3% bolus for Na 134.   1/12: POD 1. pending SLP.  pending MRI. SD status. CT chest completed w new PE b/l segmental RLL and subsegmental in LLL, worsening mediastinal LAD, evolution of radiation induced pulmonary fibrosis, small b/l pleural effusions. PM Na 139.  1/13: POD 2. Headwrap removed. Cleared for home pt with rolling walker. LE dopplers negative for DVT. Rad/onc consulted.   1/14: POD 3. APRYL overnight, neuro stable    Vital Signs Last 24 Hrs  T(C): 36.9 (13 Jan 2024 17:12), Max: 36.9 (13 Jan 2024 17:12)  T(F): 98.4 (13 Jan 2024 17:12), Max: 98.4 (13 Jan 2024 17:12)  HR: 60 (13 Jan 2024 20:36) (54 - 76)  BP: 128/61 (13 Jan 2024 20:36) (109/58 - 131/60)  BP(mean): 88 (13 Jan 2024 20:36) (78 - 88)  RR: 18 (13 Jan 2024 20:36) (18 - 20)  SpO2: 95% (13 Jan 2024 20:36) (94% - 96%)    Parameters below as of 13 Jan 2024 20:36  Patient On (Oxygen Delivery Method): room air        I&O's Summary    12 Jan 2024 07:01  -  13 Jan 2024 07:00  --------------------------------------------------------  IN: 780 mL / OUT: 1200 mL / NET: -420 mL    13 Jan 2024 07:01  -  13 Jan 2024 21:45  --------------------------------------------------------  IN: 960 mL / OUT: 475 mL / NET: 485 mL        PHYSICAL EXAM:  General: NAD  HEENT: PERRL 3mm briskly reactive, EOMI b/l, face symmetric, tongue midline, neck FROM  Cardiovascular: RRR, normal S1 and S2   Respiratory: lungs CTAB, no wheezing, rhonchi, or crackles   GI: normoactive BS to auscultation, abd soft, NTND   Neuro: A&Ox3, mildly dysarthric, Follows commands.  RUE and RLE 5/5  LUE 4+/5 with trace drift, LLE 4+/5  LUE dysmetria  Sensation intact throughout.   Extremities: distal pulses 2+ x 4  Wound/incision: incision c/d/i       DIET:  [] NPO  [x] Mechanical  [] Tube feeds    LABS:                        10.6   14.23 )-----------( 189      ( 13 Jan 2024 05:30 )             34.2     01-13    137  |  102  |  24<H>  ----------------------------<  143<H>  4.1   |  27  |  0.53    Ca    8.7      13 Jan 2024 05:30  Phos  1.5     01-13  Mg     2.0     01-13        Urinalysis Basic - ( 13 Jan 2024 05:30 )    Color: x / Appearance: x / SG: x / pH: x  Gluc: 143 mg/dL / Ketone: x  / Bili: x / Urobili: x   Blood: x / Protein: x / Nitrite: x   Leuk Esterase: x / RBC: x / WBC x   Sq Epi: x / Non Sq Epi: x / Bacteria: x          CAPILLARY BLOOD GLUCOSE      POCT Blood Glucose.: 123 mg/dL (13 Jan 2024 21:24)  POCT Blood Glucose.: 160 mg/dL (13 Jan 2024 17:00)  POCT Blood Glucose.: 207 mg/dL (13 Jan 2024 11:57)  POCT Blood Glucose.: 154 mg/dL (13 Jan 2024 06:23)  POCT Blood Glucose.: 140 mg/dL (12 Jan 2024 22:44)      Drug Levels: [] N/A    CSF Analysis: [] N/A      Allergies    No Known Allergies    Intolerances        Home Medications:  tamsulosin 0.4 mg oral capsule: 1 cap(s) orally once a day (10 Familia 2024 16:14)      MEDICATIONS:  MEDICATIONS  (STANDING):  acetaminophen     Tablet .. 1000 milliGRAM(s) Oral every 8 hours  chlorhexidine 2% Cloths 1 Application(s) Topical <User Schedule>  dexAMETHasone  Injectable   IV Push   dexAMETHasone  Injectable 4 milliGRAM(s) IV Push every 8 hours  enoxaparin Injectable 40 milliGRAM(s) SubCutaneous <User Schedule>  folic acid 1 milliGRAM(s) Oral daily  glucagon  Injectable 1 milliGRAM(s) IntraMuscular once  influenza  Vaccine (HIGH DOSE) 0.7 milliLiter(s) IntraMuscular once  insulin lispro (ADMELOG) corrective regimen sliding scale   SubCutaneous Before meals and at bedtime  multivitamin 1 Tablet(s) Oral daily  pantoprazole  Injectable 40 milliGRAM(s) IV Push daily  polyethylene glycol 3350 17 Gram(s) Oral daily  senna 2 Tablet(s) Oral at bedtime  tamsulosin 0.4 milliGRAM(s) Oral at bedtime  thiamine 100 milliGRAM(s) Oral daily    MEDICATIONS  (PRN):  bisacodyl 5 milliGRAM(s) Oral daily PRN Constipation  oxyCODONE    IR 5 milliGRAM(s) Oral every 4 hours PRN Severe Pain (7 - 10)      CULTURES:  Culture Results:   No growth at 5 days. (01-07 @ 22:48)  Culture Results:   No growth at 5 days. (01-07 @ 22:48)      RADIOLOGY & ADDITIONAL TESTS:      ASSESSMENT:  77 y/o M with pmh melanoma (L arm skin resection 2023), basal cell carcinoma, non small cell lung cancer - adenocarcinoma (s/p radiation chemo, completed 10/2023), and BPH presents with loss of balance, HA, fatigue, generalized weakness, diplopia and b/l foot tingling x 2 weeks and nausea/vomiting x 2-4 days. Pt found to have 5 areas of brain metastases, largest in the  L cerebellum, on outpatient MRI. Now s/p left restrosigmoid crani resection of tumor (1/11), frozen: mets    Neuro:  - neuro/vital checks q4  - pain control w/ cranial ERAS  - Dex taper over 1 week to 2 BID  - MRI postop completed 1/12, pending read    Cardiac:  - SBP goal 100-160    Pulmonary:  - on RA  - encourage IS    GI:  - regular diet  - bowel regimen  - protonix while on steroids    Renal:  - IVL, voiding  - cont home flomax    Endo:  - ISS, A1C 5.9    Heme:  - SCDs, SQL for DVT prophylaxis  - CT A/P 1/10 for metastatic workup: negative  - CT chest 1/12: new PE b/l segmental RLL and subsegmental in LLL, worsening mediastinal LAD, evolution of radiation induced pulmonary fibrosis, small b/l pleural effusions  - Full AC for PE starting POD 3 1/14  - BL LE dopplers 1/13: negative for DVT   - hemeonc following  - Rad Onc consulted, f/u recs    ID:  -afebrile    Disposition: SD status, full code, PT/OT: home PT with rolling walker    D/w Dr. D'Amico     DVT PROPHYLAXIS:  [x] Venodynes                                [x] Heparin/Lovenox      Assessment: present when checked     [] GCS   E   V   M     Heart Failure: [] Acute, [] acute on chronic, [] chronic   Heart Failure: [] Diastolic (HFpEF), [] Systolic (HRrEF), [] Combined (HFpEF and HFrEF), [] RHF, [] Pulm HTN, [] Other     [] MEHREEN, [] ATN, [] AIN, [] other   [] CKD1, [] CKD2, [] CKD3, [] CKD4, [] CKD5, [] ESRD     Encephalopathy: [] Metabolic, [] Hepatic, [] Toxic, [] Neurological, [] Other     Abnormal Nutritional Status: [] malnutrition (see nutrition note), []underweight: BMI <19, [] morbid obesity: BMI >40, [] Cachexia     [] Sepsis   [] Hypovolemic shock, [] Cardiogenic shock, [] Hemorrhagic shock, [] Neurogenic shock   [] Acute respiratory failure   [] Cerebral edema, [] Brain compression / herniation   [] Functional quadriplegia   [] Acute blood loss anemia

## 2024-01-14 NOTE — PROGRESS NOTE ADULT - ASSESSMENT
78 YOM with PMH of TUD and metastatic NSCLC s/p chemoradiation presenting with FTT and double vision/gait instability, found to have metastatic brain lesions. He underwent craniotomy 1/11.     RUL NSCLC with metastatic brain lesions  - outpatient MRI with five brain tumors of different sizes and locations  - S/P craniotomo 1/11  - steroids per NSG  - plan to start pembrolizumab as outpatient per heme/onc      #Provoked PE  Patient with provoked PE in setting of malignancy   - Plan to start heparin drip today; will transition to DOAC when deemed appropriate by primary team      Malnutrition  Nausea/vomiting, abdominal pain  - BMI 17 in setting of malignancy and anorexia, recent worsening of symptoms with N/V  - obtain KUB and bladder scan to r/o retention/constipation as contributing factors  - ondansetron PRN  - nutrition consult    Dispo: DC home once tolerates AC

## 2024-01-15 NOTE — DISCHARGE NOTE PROVIDER - CARE PROVIDER_API CALL
D'Amico, Randy Scott  Neurosurgery  [address] [address]  Phone: [phone]  Fax: [phone]  Follow Up Time:    D'Amico, Randy Scott  Neurosurgery  [address] [address]  Phone: [phone]  Fax: [phone]  Follow Up Time:

## 2024-01-15 NOTE — DISCHARGE NOTE PROVIDER - NSDCFUADDINST_GEN_ALL_CORE_FT
Neurosurgery follow up appointment date/time:  - are staples/sutures in place?  - what day should staples/sutures be removed (POD 10-14)?  - please call the office to confirm appointment:     Wound Care:  - can patient shower?  - does dressing need to be changed/removed?  - no picking at incision  - wears glasses?   - pressure ulcer?     Devices:  - does patient need collar or brace or helmet?   - does collar/brace need to be worn at all times or just when OOB?  - RW or cane for ambulation?    Drains/Lines:  - PICC in place? ID follow up? (Paper Rx for: antibiotics, heparin flush, weekly lab draws)  - DIONISIO in place? Management?  - river in place? Management/Urology follow up?  - Tracheostomy?  - PEG tube?      Activity:  - fatigue is common after surgery, rest if you feel tired   - no bending, lifting, twisting or heavy lifting   - walking is recommended, ambulate as tolerated  - you may shower when you get home, keep your incision dry  - no soaking in a tub/pool/hot tub   - no driving within 24 hours of anesthesia or while taking prescription pain medications   - keep hydrated, drink plenty of water   - skullbase precautions: no nose blowing, sneeze with mouth open, no drinking out of a straw, no straining      Inpatient consults:  - final recommendations  - you will need follow up with....    Please also follow up with your primary care doctor.     Pain Expectations:  - pain after surgery is expected  - please take pain meds as prescribed     Medications:  - changes to home meds (ex. AED's)?  - new meds?  - pain meds?  - when can antiplatelets or anticoagulants be restarted?  - were adverse affects of meds discussed with patients?   - pain medications can cause constipation, you should eat a high fiber diet and may take a stool softener while on pain meds   - Avoid taking Advil (ibuprofen), Motrin (naproxen), or Aspirin for pain as they can cause bleeding     Call the office or come to ED if:  - wound has drainage or bleeding, increased redness or pain at incision site, neurological change, fever (>101), chills, night sweats, syncope, nausea/vomiting, chest pain, shortness of breath      Playback:  - see playback health for a copy of your discharge paperwork     WITHIN 24 HOURS OF DISCHARGE, PLEASE CONTACT NEURO PA  WITH ANY QUESTIONS OR CONCERNS: [phone]   OTHERWISE, PLEASE CALL THE OFFICE WITH ANY QUESTIONS OR CONCERNS: [phone] Neurosurgery follow up appointment date/time:  - are staples/sutures in place?  - what day should staples/sutures be removed (POD 10-14)?  - please call the office to confirm appointment:     Wound Care:  - can patient shower?  - does dressing need to be changed/removed?  - no picking at incision  - wears glasses?   - pressure ulcer?     Devices:  - does patient need collar or brace or helmet?   - does collar/brace need to be worn at all times or just when OOB?  - RW or cane for ambulation?    Drains/Lines:  - PICC in place? ID follow up? (Paper Rx for: antibiotics, heparin flush, weekly lab draws)  - DIONISIO in place? Management?  - river in place? Management/Urology follow up?  - Tracheostomy?  - PEG tube?      Activity:  - fatigue is common after surgery, rest if you feel tired   - no bending, lifting, twisting or heavy lifting   - walking is recommended, ambulate as tolerated  - you may shower when you get home, keep your incision dry  - no soaking in a tub/pool/hot tub   - no driving within 24 hours of anesthesia or while taking prescription pain medications   - keep hydrated, drink plenty of water   - skullbase precautions: no nose blowing, sneeze with mouth open, no drinking out of a straw, no straining      Inpatient consults:  - final recommendations  - you will need follow up with....    Please also follow up with your primary care doctor.     Pain Expectations:  - pain after surgery is expected  - please take pain meds as prescribed     Medications:  - changes to home meds (ex. AED's)?  - new meds?  - pain meds?  - when can antiplatelets or anticoagulants be restarted?  - were adverse affects of meds discussed with patients?   - pain medications can cause constipation, you should eat a high fiber diet and may take a stool softener while on pain meds   - Avoid taking Advil (ibuprofen), Motrin (naproxen), or Aspirin for pain as they can cause bleeding     Call the office or come to ED if:  - wound has drainage or bleeding, increased redness or pain at incision site, neurological change, fever (>101), chills, night sweats, syncope, nausea/vomiting, chest pain, shortness of breath      Playback:  - see playback health for a copy of your discharge paperwork     WITHIN 24 HOURS OF DISCHARGE, PLEASE CONTACT NEURO PA  WITH ANY QUESTIONS OR CONCERNS: [phone]   OTHERWISE, PLEASE CALL THE OFFICE WITH ANY QUESTIONS OR CONCERNS: [phone] Neurosurgery follow up appointment date/time:  - follow up in the office for a wound check   - please call the office to confirm appointment: [phone]    Wound Care:  - shower and wash hair daily with shampoo  - gently clean incision with soapy water   - pat dry incision with a clean towel after showering   - leave incision uncovered, open to air   - no picking at incision    Devices:  - rolling walker for ambulation     Activity:  - fatigue is common after surgery, rest if you feel tired   - no bending, lifting, twisting or heavy lifting   - walking is recommended, ambulate as tolerated  - you may shower when you get home, keep your incision dry  - no soaking in a tub/pool/hot tub   - no driving within 24 hours of anesthesia or while taking prescription pain medications   - keep hydrated, drink plenty of water     Inpatient consults:  - Medical Oncology  - Radiation Oncology     Please also follow up with your primary care doctor.     Pain Expectations:  - pain after surgery is expected  - please take pain meds as prescribed     Medications:  - changes to home meds (ex. AED's)?  - new meds?  - pain meds?  - adverse affects of meds discussed with patient  - pain medications can cause constipation, you should eat a high fiber diet and may take a stool softener while on pain meds   - Avoid taking Advil (ibuprofen), Motrin (naproxen), or Aspirin for pain as they can cause bleeding     Call the office or come to ED if:  - wound has drainage or bleeding, increased redness or pain at incision site, neurological change, fever (>101), chills, night sweats, syncope, nausea/vomiting, chest pain, shortness of breath      Playback:  - see playback health for a copy of your discharge paperwork     WITHIN 24 HOURS OF DISCHARGE, PLEASE CONTACT NEURO PA  WITH ANY QUESTIONS OR CONCERNS: [phone]   OTHERWISE, PLEASE CALL THE OFFICE WITH ANY QUESTIONS OR CONCERNS: [phone] Neurosurgery follow up appointment date/time:  - follow up in the office for a wound check   - please call the office to confirm appointment: [phone]    Wound Care:  - shower and wash hair daily with shampoo  - gently clean incision with soapy water   - pat dry incision with a clean towel after showering   - leave incision uncovered, open to air   - no picking at incision    Devices:  - rolling walker for ambulation     Activity:  - fatigue is common after surgery, rest if you feel tired   - no bending, lifting, twisting or heavy lifting   - walking is recommended, ambulate as tolerated  - you may shower when you get home, keep your incision dry  - no soaking in a tub/pool/hot tub   - no driving within 24 hours of anesthesia or while taking prescription pain medications   - keep hydrated, drink plenty of water     Inpatient consults:  - Medical Oncology  - Radiation Oncology     Please also follow up with your primary care doctor.     Pain Expectations:  - pain after surgery is expected  - please take pain meds as prescribed     Medications:  - changes to home meds (ex. AED's)?  - new meds?  - pain meds?  - adverse affects of meds discussed with patient  - pain medications can cause constipation, you should eat a high fiber diet and may take a stool softener while on pain meds   - Avoid taking Advil (ibuprofen), Motrin (naproxen), or Aspirin for pain as they can cause bleeding     Call the office or come to ED if:  - wound has drainage or bleeding, increased redness or pain at incision site, neurological change, fever (>101), chills, night sweats, syncope, nausea/vomiting, chest pain, shortness of breath      Playback:  - see playback health for a copy of your discharge paperwork     WITHIN 24 HOURS OF DISCHARGE, PLEASE CONTACT NEURO PA  WITH ANY QUESTIONS OR CONCERNS: [phone]   OTHERWISE, PLEASE CALL THE OFFICE WITH ANY QUESTIONS OR CONCERNS: [phone] Neurosurgery follow up appointment date/time:  - follow up in the office for a wound check   - please call the office to confirm appointment: [phone]    Wound Care:  - shower and wash hair daily with shampoo  - gently clean incision with soapy water   - pat dry incision with a clean towel after showering   - leave incision uncovered, open to air   - no picking at incision    Devices:  - rolling walker for ambulation     Activity:  - fatigue is common after surgery, rest if you feel tired   - no bending, lifting, twisting or heavy lifting   - walking is recommended, ambulate as tolerated  - you may shower when you get home, keep your incision dry  - no soaking in a tub/pool/hot tub   - no driving within 24 hours of anesthesia or while taking prescription pain medications   - keep hydrated, drink plenty of water     Inpatient consults:  - Medical Oncology - Dr. Chapman  - Radiation Oncology - Dr. Wernicke    Please also follow up with your primary care doctor.     Pain Expectations:  - pain after surgery is expected  - please take pain meds as prescribed     Medications:  - Eliquis (blood thinner) to treat your pulmonary embolism: Take 10mg twice a day through 1/22; on 1/23: start 5mg twice a day; may cause bleeding, careful to avoid falls  - Dexamethasone (steroid) for swelling in brain: Take 2mg twice a day; may irritate stomach  - Pantoprazole (protects stomach): Take 40mg once a day while on steroids  - Tylenol as needed for pain (max dose 4000mg/day)  - sodium tablets 2g three times a day for one week, follow up with your primary care doctor for sodium check  - over the counter stool softeners as needed for constipation  - adverse affects of meds discussed with patient  - pain medications can cause constipation, you should eat a high fiber diet and may take a stool softener while on pain meds   - Avoid taking Advil (ibuprofen), Motrin (naproxen), or Aspirin for pain as they can cause bleeding     Call the office or come to ED if:  - wound has drainage or bleeding, increased redness or pain at incision site, neurological change, fever (>101), chills, night sweats, syncope, nausea/vomiting, chest pain, shortness of breath      Playback:  - see playback health for a copy of your discharge paperwork     WITHIN 24 HOURS OF DISCHARGE, PLEASE CONTACT NEURO PA  WITH ANY QUESTIONS OR CONCERNS: [phone]   OTHERWISE, PLEASE CALL THE OFFICE WITH ANY QUESTIONS OR CONCERNS: [phone] Neurosurgery follow up appointment date/time:  - follow up in the office for a wound check   - please call the office to confirm appointment: [phone]    Wound Care:  - shower and wash hair daily with shampoo  - gently clean incision with soapy water   - pat dry incision with a clean towel after showering   - leave incision uncovered, open to air   - no picking at incision    Devices:  - rolling walker for ambulation     Activity:  - fatigue is common after surgery, rest if you feel tired   - no bending, lifting, twisting or heavy lifting   - walking is recommended, ambulate as tolerated  - you may shower when you get home, keep your incision dry  - no soaking in a tub/pool/hot tub   - no driving within 24 hours of anesthesia or while taking prescription pain medications   - keep hydrated, drink plenty of water     Inpatient consults:  - Medical Oncology - Dr. Chapman  - Radiation Oncology - Dr. Wernicke    Please also follow up with your primary care doctor.     Pain Expectations:  - pain after surgery is expected  - please take pain meds as prescribed     Medications:  - Eliquis (blood thinner) to treat your pulmonary embolism: Take 10mg twice a day through 1/22; on 1/23: start 5mg twice a day; may cause bleeding, careful to avoid falls  - Dexamethasone (steroid) for swelling in brain: Take 2mg twice a day; may irritate stomach  - Pantoprazole (protects stomach): Take 40mg once a day while on steroids  - Tylenol as needed for pain (max dose 4000mg/day)  - sodium tablets 2g three times a day for one week, follow up with your primary care doctor for sodium check  - over the counter stool softeners as needed for constipation  - adverse affects of meds discussed with patient  - pain medications can cause constipation, you should eat a high fiber diet and may take a stool softener while on pain meds   - Avoid taking Advil (ibuprofen), Motrin (naproxen), or Aspirin for pain as they can cause bleeding     Call the office or come to ED if:  - wound has drainage or bleeding, increased redness or pain at incision site, neurological change, fever (>101), chills, night sweats, syncope, nausea/vomiting, chest pain, shortness of breath      Playback:  - see playback health for a copy of your discharge paperwork     WITHIN 24 HOURS OF DISCHARGE, PLEASE CONTACT NEURO PA  WITH ANY QUESTIONS OR CONCERNS: [phone]   OTHERWISE, PLEASE CALL THE OFFICE WITH ANY QUESTIONS OR CONCERNS: [phone]

## 2024-01-15 NOTE — OCCUPATIONAL THERAPY INITIAL EVALUATION ADULT - PLANNED THERAPY INTERVENTIONS, OT EVAL
compensatory strategies for vision/ADL retraining/balance training/bed mobility training/transfer training

## 2024-01-15 NOTE — PROGRESS NOTE ADULT - ASSESSMENT
78 YOM with PMH of TUD and metastatic NSCLC s/p chemoradiation presenting with FTT and double vision/gait instability, found to have metastatic brain lesions. He underwent craniotomy 1/11.     RUL NSCLC with metastatic brain lesions  - outpatient MRI with five brain tumors of different sizes and locations  - S/P craniotomo 1/11  - steroids per NSG  - plan to start pembrolizumab as outpatient per heme/onc      #Provoked PE  Patient with provoked PE in setting of malignancy   - Continue heparin, pending repeat CTH; will transition to DOAC when deemed appropriate by primary team      Malnutrition  Nausea/vomiting, abdominal pain  - BMI 17 in setting of malignancy and anorexia, recent worsening of symptoms with N/V  - obtain KUB and bladder scan to r/o retention/constipation as contributing factors  - ondansetron PRN  - nutrition consult    Dispo: DC home once tolerates AC 78 YOM with PMH of TUD and metastatic NSCLC s/p chemoradiation presenting with FTT and double vision/gait instability, found to have metastatic brain lesions. He underwent craniotomy 1/11.     RUL NSCLC with metastatic brain lesions  - outpatient MRI with five brain tumors of different sizes and locations  - S/P craniotomo 1/11  - steroids per NSG  - plan to start pembrolizumab as outpatient per heme/onc      #Provoked PE  Patient with provoked PE in setting of malignancy   - Continue heparin, pending repeat CTH; will transition to DOAC when deemed appropriate by primary team      Malnutrition  Nausea/vomiting, abdominal pain  - BMI 17 in setting of malignancy and anorexia, recent worsening of symptoms with N/V  - obtain KUB and bladder scan to r/o retention/constipation as contributing factors  - ondansetron PRN  - nutrition consult    Hyponatremia  , urine lytes c/w SIADH   - Start 1.5L fluid restriction, will f/u NA    Dispo: DC home once tolerates AC

## 2024-01-15 NOTE — DISCHARGE NOTE PROVIDER - NSDCFUADDAPPT_GEN_ALL_CORE_FT
Please follow up with Dr. D'Amico    Please follow up with Dr. Chapman    Please follow up With Dr. Wernicke    Please follow up with your primary care doctor  Please follow up with Dr. D'Amico    Please follow up with Dr. Chapman    Please follow up With Dr. Wernicke    Please follow up with your primary care doctor for a sodium level check in 1 week

## 2024-01-15 NOTE — DISCHARGE NOTE PROVIDER - CARE PROVIDERS DIRECT ADDRESSES
[email or web address]

## 2024-01-15 NOTE — OCCUPATIONAL THERAPY INITIAL EVALUATION ADULT - ADDITIONAL COMMENTS
Patient reports living on a 6th floor walk up apartment building with sister. Patient was independent with all ADL's, IADL's and functional mobility with no AD prior to admission however the week before admission requiring additional assistance from sister and daughter for IADL's. Patient's daughter will be staying at his apartment upon d/c to assist as needed. Patient is R hand dominant, wears glasses and has a bathtub shower.

## 2024-01-15 NOTE — DISCHARGE NOTE PROVIDER - DETAILS OF MALNUTRITION DIAGNOSIS/DIAGNOSES
This patient has been assessed with a concern for Malnutrition and was treated during this hospitalization for the following Nutrition diagnosis/diagnoses:     -  01/12/2024: Severe protein-calorie malnutrition   -  01/12/2024: Underweight (BMI < 19)

## 2024-01-15 NOTE — CHART NOTE - NSCHARTNOTEFT_GEN_A_CORE
Patient requires a rolling walker at home due to a diagnosis of brain metastasis to help complete MRADLs.

## 2024-01-15 NOTE — OCCUPATIONAL THERAPY INITIAL EVALUATION ADULT - SENSORY TESTS
Double vision with R upper nasal quad deficits, horizontal nystagmus, R eye lag towards R during tracking, Pupils equally round and reactive to light, facial sensation V1-V3 equal and intact, face is symmetric with normal eye closure and smile, tongue protrudes midlines, shoulder shrugs intact, puffing cheeks intact,  b/l eyebrow shrugs intact, hearing bilaterally with rubs intact

## 2024-01-15 NOTE — PROGRESS NOTE ADULT - SUBJECTIVE AND OBJECTIVE BOX
HPI: 77 y/o M with pmh melanoma (L arm skin resection 2023), basal cell carcinoma, non small cell lung cancer - adenocarcinoma (s/p radiation chemo, completed 10/2023), and BPH presents with loss of balance, HA, fatigue, generalized weakness, diplopia and b/l foot tingling x 2 weeks and nausea/vomiting x 3-4 days. Patient also admits to mild memory loss x 3-4 months. Pt found to have 5 areas of brain metastases, largest in the  L cerebellum, on outpatient MRI today. Patient sent to ER for neurosurgery admission. (10 Familia 2024 15:57)    OVERNIGHT EVENTS: Seen and examined in 8LA. Denies HA, N/V, chest pain, shortness of breath, new weakness or numbness.     HOSPITAL COURSE:  1/10: admitted to [facility], preop for OR tomorrow. Pending CT A/P and KUB. Recieved decadron 10 IV in ER and started on decadron 4q6.   1/11: POD#0 s/p restrosig crani resection of tumor (1/11-) frozen NSCC , Dex taper x 1 week to 2 BID, SBP<140, pending MRI brain, 250cc 3% bolus for Na 134.   1/12: POD 1. pending SLP.  pending MRI. SD status. CT chest completed w new PE b/l segmental RLL and subsegmental in LLL, worsening mediastinal LAD, evolution of radiation induced pulmonary fibrosis, small b/l pleural effusions. PM Na 139.  1/13: POD 2. Headwrap removed. Cleared for home pt with rolling walker. LE dopplers negative for DVT. Rad/onc consulted.   1/14: POD 3. APRYL overnight, neuro stable. Heparin gtt started for PE.  1/15: Hep gtt inc to 10ml/hr. Repeat PTT @ 2AM. Lactulose 10 in AM, pending BM.     Vital Signs Last 24 Hrs  T(C): 36.7 (14 Jan 2024 21:49), Max: 36.9 (14 Jan 2024 09:21)  T(F): 98 (14 Jan 2024 21:49), Max: 98.4 (14 Jan 2024 09:21)  HR: 72 (14 Jan 2024 20:10) (58 - 72)  BP: 139/69 (14 Jan 2024 20:10) (119/71 - 139/69)  BP(mean): 98 (14 Jan 2024 20:10) (83 - 98)  RR: 18 (14 Jan 2024 20:10) (17 - 18)  SpO2: 93% (14 Jan 2024 20:10) (93% - 97%)    Parameters below as of 14 Jan 2024 20:10  Patient On (Oxygen Delivery Method): room air      I&O's Summary    13 Jan 2024 07:01  -  14 Jan 2024 07:00  --------------------------------------------------------  IN: 960 mL / OUT: 875 mL / NET: 85 mL    14 Jan 2024 07:01  -  15 Familia 2024 00:13  --------------------------------------------------------  IN: 386 mL / OUT: 1300 mL / NET: -914 mL        PHYSICAL EXAM:  General: NAD, pt is comfortably sitting up in bed, on room air  HEENT: CN II-XII intact, slight right CN VI palsy. PERRL 3mm briskly reactive, face symmetric, tongue midline, neck FROM  Cardiovascular: RRR, S1, S2  Respiratory: breathing non-labored on RA, chest rise symmetric  GI: abd soft, NTND  Neuro: A&Ox3, No aphasia, speech clear, (+) LUE dysmetria, (+) slight LUE pronator drift. Follows commands.  BANDA x4 spontaneously, 5/5 strength in all extremities throughout. Sensation intact  Extremities: distal pulses 2+ x4  Wound/incision:  Drain: n/a    TUBES/LINES:  [] Martinez  [] Wound Drains  [] Others      DIET:  [] NPO  [x] Mechanical  [] Tube feeds    LABS:                        11.2   14.79 )-----------( 220      ( 14 Jan 2024 18:20 )             36.3     01-14    137  |  101  |  27<H>  ----------------------------<  132<H>  4.0   |  30  |  0.49<L>    Ca    8.8      14 Jan 2024 05:30  Phos  1.3     01-14  Mg     1.9     01-14      PT/INR - ( 14 Jan 2024 10:00 )   PT: 10.4 sec;   INR: 0.91          PTT - ( 14 Jan 2024 18:00 )  PTT:28.1 sec  Urinalysis Basic - ( 14 Jan 2024 05:30 )    Color: x / Appearance: x / SG: x / pH: x  Gluc: 132 mg/dL / Ketone: x  / Bili: x / Urobili: x   Blood: x / Protein: x / Nitrite: x   Leuk Esterase: x / RBC: x / WBC x   Sq Epi: x / Non Sq Epi: x / Bacteria: x      CAPILLARY BLOOD GLUCOSE      POCT Blood Glucose.: 155 mg/dL (14 Jan 2024 21:20)  POCT Blood Glucose.: 146 mg/dL (14 Jan 2024 17:13)  POCT Blood Glucose.: 145 mg/dL (14 Jan 2024 11:20)  POCT Blood Glucose.: 122 mg/dL (14 Jan 2024 06:34)      Drug Levels: [] N/A    CSF Analysis: [] N/A      Allergies    No Known Allergies    Intolerances      MEDICATIONS:  Antibiotics:    Neuro:  oxyCODONE    IR 5 milliGRAM(s) Oral every 4 hours PRN    Anticoagulation:  heparin  Infusion 1000 Unit(s)/Hr IV Continuous <Continuous>    OTHER:  bisacodyl 5 milliGRAM(s) Oral daily PRN  chlorhexidine 2% Cloths 1 Application(s) Topical <User Schedule>  dexAMETHasone  Injectable 4 milliGRAM(s) IV Push every 8 hours  dexAMETHasone  Injectable   IV Push   dexAMETHasone  Injectable 3 milliGRAM(s) IV Push every 8 hours  glucagon  Injectable 1 milliGRAM(s) IntraMuscular once  influenza  Vaccine (HIGH DOSE) 0.7 milliLiter(s) IntraMuscular once  insulin lispro (ADMELOG) corrective regimen sliding scale   SubCutaneous Before meals and at bedtime  lactulose Syrup 10 Gram(s) Oral once PRN  pantoprazole  Injectable 40 milliGRAM(s) IV Push daily  polyethylene glycol 3350 17 Gram(s) Oral two times a day  senna 2 Tablet(s) Oral at bedtime  tamsulosin 0.4 milliGRAM(s) Oral at bedtime    IVF:  folic acid 1 milliGRAM(s) Oral daily  multivitamin 1 Tablet(s) Oral daily  thiamine 100 milliGRAM(s) Oral daily    CULTURES:  Culture Results:   No growth at 5 days. (01-07 @ 22:48)  Culture Results:   No growth at 5 days. (01-07 @ 22:48)    RADIOLOGY & ADDITIONAL TESTS:  MR HEAD 1/12 pending read  US LE VENOUS DUPLEX 1/13: No evidence of deep venous thrombosis in either lower extremity.    ASSESSMENT:  77 y/o M with pmh melanoma (L arm skin resection 2023), basal cell carcinoma, non small cell lung cancer - adenocarcinoma (s/p radiation chemo, completed 10/2023), and BPH presents with loss of balance, HA, fatigue, generalized weakness, diplopia and b/l foot tingling x 2 weeks and nausea/vomiting x 2-4 days. Pt found to have 5 areas of brain metastases, largest in the  L cerebellum, on outpatient MRI. Now s/p left restrosigmoid crani resection of tumor (1/11), frozen: mets    LUNG CANCER    Handoff    MEWS Score    BPH (benign prostatic hyperplasia)    Lung mass    Prediabetes    Lung cancer metastatic to brain    Skin melanoma    Basal cell carcinoma    Lung cancer metastatic to brain    Craniotomy for suboccipital neoplasm    History of dental surgery    S/P skin cancer resection    PRE OP    2    SysAdmin_VstLnk    PLAN:  Neuro:  - neuro/vital checks q4  - pain control w/ cranial ERAS  - Dex taper over 1 week to 2 BID  - MRI postop completed 1/12, pending read    Cardiac:  - SBP goal 100-160    Pulmonary:  - on RA  - encourage IS    GI:  - regular diet  - bowel regimen, pending BM  - protonix while on steroids    Renal:  - IVL, voiding  - cont home flomax    Endo:  - ISS, A1C 5.9    Heme:  - SCDs,   - CT A/P 1/10 for metastatic workup: negative  - CT chest 1/12: new PE b/l segmental RLL and subsegmental in LLL, worsening mediastinal LAD, evolution of radiation induced pulmonary fibrosis, small b/l pleural effusions  - BL LE dopplers 1/13: negative for DVT   - hemeonc following  - Rad Onc consulted, f/u recs  - hep gtt started for PE 1/14, in to10 ml/hr, PTT pending @2AM    ID:  -afebrile    Disposition: SD status, full code, PT/OT: home PT with rolling walker    D/w Dr. D'Amico     DVT PROPHYLAXIS:  [] Venodynes                                [x] Heparin/Lovenox    DISPOSITION:    Assessment:  Present when checked    []  GCS  E   V  M     Heart Failure: []Acute, [] acute on chronic , []chronic  Heart Failure:  [] Diastolic (HFpEF), [] Systolic (HFrEF), []Combined (HFpEF and HFrEF), [] RHF, [] Pulm HTN, [] Other    [] MEHREEN, [] ATN, [] AIN, [] other  [] CKD1, [] CKD2, [] CKD 3, [] CKD 4, [] CKD 5, []ESRD    Encephalopathy: [] Metabolic, [] Hepatic, [] toxic, [] Neurological, [] Other    Abnormal Nurtitional Status: [] malnurtition (see nutrition note), [ ]underweight: BMI < 19, [] morbid obesity: BMI >40, [] Cachexia    [] Sepsis  [] hypovolemic shock,[] cardiogenic shock, [] hemorrhagic shock, [] neuogenic shock  [] Acute Respiratory Failure  []Cerebral edema, [] Brain compression/ herniation,   [] Functional quadriplegia  [] Acute blood loss anemia   HPI: 77 y/o M with pmh melanoma (L arm skin resection 2023), basal cell carcinoma, non small cell lung cancer - adenocarcinoma (s/p radiation chemo, completed 10/2023), and BPH presents with loss of balance, HA, fatigue, generalized weakness, diplopia and b/l foot tingling x 2 weeks and nausea/vomiting x 3-4 days. Patient also admits to mild memory loss x 3-4 months. Pt found to have 5 areas of brain metastases, largest in the  L cerebellum, on outpatient MRI today. Patient sent to ER for neurosurgery admission. (10 Familia 2024 15:57)    OVERNIGHT EVENTS: Seen and examined in 8LA. Denies HA, N/V, chest pain, shortness of breath, new weakness or numbness.     HOSPITAL COURSE:  1/10: admitted to [facility], preop for OR tomorrow. Pending CT A/P and KUB. Recieved decadron 10 IV in ER and started on decadron 4q6.   1/11: POD#0 s/p restrosig crani resection of tumor (1/11-) frozen NSCC , Dex taper x 1 week to 2 BID, SBP<140, pending MRI brain, 250cc 3% bolus for Na 134.   1/12: POD 1. pending SLP.  pending MRI. SD status. CT chest completed w new PE b/l segmental RLL and subsegmental in LLL, worsening mediastinal LAD, evolution of radiation induced pulmonary fibrosis, small b/l pleural effusions. PM Na 139.  1/13: POD 2. Headwrap removed. Cleared for home pt with rolling walker. LE dopplers negative for DVT. Rad/onc consulted.   1/14: POD 3. APRYL overnight, neuro stable. Heparin gtt started for PE.  1/15: Hep gtt inc to 10ml/hr. Repeat PTT @ 2AM. Lactulose 10 in AM, pending BM.     Vital Signs Last 24 Hrs  T(C): 36.7 (14 Jan 2024 21:49), Max: 36.9 (14 Jan 2024 09:21)  T(F): 98 (14 Jan 2024 21:49), Max: 98.4 (14 Jan 2024 09:21)  HR: 72 (14 Jan 2024 20:10) (58 - 72)  BP: 139/69 (14 Jan 2024 20:10) (119/71 - 139/69)  BP(mean): 98 (14 Jan 2024 20:10) (83 - 98)  RR: 18 (14 Jan 2024 20:10) (17 - 18)  SpO2: 93% (14 Jan 2024 20:10) (93% - 97%)    Parameters below as of 14 Jan 2024 20:10  Patient On (Oxygen Delivery Method): room air      I&O's Summary    13 Jan 2024 07:01  -  14 Jan 2024 07:00  --------------------------------------------------------  IN: 960 mL / OUT: 875 mL / NET: 85 mL    14 Jan 2024 07:01  -  15 Familia 2024 00:13  --------------------------------------------------------  IN: 386 mL / OUT: 1300 mL / NET: -914 mL        PHYSICAL EXAM:  General: NAD, pt is comfortably sitting up in bed, on room air  HEENT: CN II-XII intact, slight right CN VI palsy. PERRL 3mm briskly reactive, face symmetric, tongue midline, neck FROM  Cardiovascular: RRR, S1, S2  Respiratory: breathing non-labored on RA, chest rise symmetric  GI: abd soft, NTND  Neuro: A&Ox3, No aphasia, speech clear, (+) LUE dysmetria, (+) slight LUE pronator drift. Follows commands.  BANDA x4 spontaneously, 5/5 strength in all extremities throughout. Sensation intact  Extremities: distal pulses 2+ x4  Wound/incision:  Drain: n/a    TUBES/LINES:  [] Martinez  [] Wound Drains  [] Others      DIET:  [] NPO  [x] Mechanical  [] Tube feeds    LABS:                        11.2   14.79 )-----------( 220      ( 14 Jan 2024 18:20 )             36.3     01-14    137  |  101  |  27<H>  ----------------------------<  132<H>  4.0   |  30  |  0.49<L>    Ca    8.8      14 Jan 2024 05:30  Phos  1.3     01-14  Mg     1.9     01-14      PT/INR - ( 14 Jan 2024 10:00 )   PT: 10.4 sec;   INR: 0.91          PTT - ( 14 Jan 2024 18:00 )  PTT:28.1 sec  Urinalysis Basic - ( 14 Jan 2024 05:30 )    Color: x / Appearance: x / SG: x / pH: x  Gluc: 132 mg/dL / Ketone: x  / Bili: x / Urobili: x   Blood: x / Protein: x / Nitrite: x   Leuk Esterase: x / RBC: x / WBC x   Sq Epi: x / Non Sq Epi: x / Bacteria: x      CAPILLARY BLOOD GLUCOSE      POCT Blood Glucose.: 155 mg/dL (14 Jan 2024 21:20)  POCT Blood Glucose.: 146 mg/dL (14 Jan 2024 17:13)  POCT Blood Glucose.: 145 mg/dL (14 Jan 2024 11:20)  POCT Blood Glucose.: 122 mg/dL (14 Jan 2024 06:34)      Drug Levels: [] N/A    CSF Analysis: [] N/A      Allergies    No Known Allergies    Intolerances      MEDICATIONS:  Antibiotics:    Neuro:  oxyCODONE    IR 5 milliGRAM(s) Oral every 4 hours PRN    Anticoagulation:  heparin  Infusion 1000 Unit(s)/Hr IV Continuous <Continuous>    OTHER:  bisacodyl 5 milliGRAM(s) Oral daily PRN  chlorhexidine 2% Cloths 1 Application(s) Topical <User Schedule>  dexAMETHasone  Injectable 4 milliGRAM(s) IV Push every 8 hours  dexAMETHasone  Injectable   IV Push   dexAMETHasone  Injectable 3 milliGRAM(s) IV Push every 8 hours  glucagon  Injectable 1 milliGRAM(s) IntraMuscular once  influenza  Vaccine (HIGH DOSE) 0.7 milliLiter(s) IntraMuscular once  insulin lispro (ADMELOG) corrective regimen sliding scale   SubCutaneous Before meals and at bedtime  lactulose Syrup 10 Gram(s) Oral once PRN  pantoprazole  Injectable 40 milliGRAM(s) IV Push daily  polyethylene glycol 3350 17 Gram(s) Oral two times a day  senna 2 Tablet(s) Oral at bedtime  tamsulosin 0.4 milliGRAM(s) Oral at bedtime    IVF:  folic acid 1 milliGRAM(s) Oral daily  multivitamin 1 Tablet(s) Oral daily  thiamine 100 milliGRAM(s) Oral daily    CULTURES:  Culture Results:   No growth at 5 days. (01-07 @ 22:48)  Culture Results:   No growth at 5 days. (01-07 @ 22:48)    RADIOLOGY & ADDITIONAL TESTS:  MR HEAD 1/12 pending read  US LE VENOUS DUPLEX 1/13: No evidence of deep venous thrombosis in either lower extremity.    ASSESSMENT:  77 y/o M with pmh melanoma (L arm skin resection 2023), basal cell carcinoma, non small cell lung cancer - adenocarcinoma (s/p radiation chemo, completed 10/2023), and BPH presents with loss of balance, HA, fatigue, generalized weakness, diplopia and b/l foot tingling x 2 weeks and nausea/vomiting x 2-4 days. Pt found to have 5 areas of brain metastases, largest in the  L cerebellum, on outpatient MRI. Now s/p left restrosigmoid crani resection of tumor (1/11), frozen: mets    LUNG CANCER    Handoff    MEWS Score    BPH (benign prostatic hyperplasia)    Lung mass    Prediabetes    Lung cancer metastatic to brain    Skin melanoma    Basal cell carcinoma    Lung cancer metastatic to brain    Craniotomy for suboccipital neoplasm    History of dental surgery    S/P skin cancer resection    PRE OP    2    SysAdmin_VstLnk    PLAN:  Neuro:  - neuro/vital checks q4  - pain control w/ cranial ERAS  - Dex taper over 1 week to 2 BID  - MRI postop completed 1/12, pending read    Cardiac:  - SBP goal 100-160    Pulmonary:  - on RA  - encourage IS    GI:  - regular diet  - bowel regimen, pending BM  - protonix while on steroids    Renal:  - IVL, voiding  - cont home flomax    Endo:  - ISS, A1C 5.9    Heme:  - SCDs,   - CT A/P 1/10 for metastatic workup: negative  - CT chest 1/12: new PE b/l segmental RLL and subsegmental in LLL, worsening mediastinal LAD, evolution of radiation induced pulmonary fibrosis, small b/l pleural effusions  - BL LE dopplers 1/13: negative for DVT   - hemeonc following  - Rad Onc consulted, f/u recs  - hep gtt started for PE 1/14, in to10 ml/hr, PTT pending @2AM    ID:  -afebrile    Disposition: SD status, full code, PT/OT: home PT with rolling walker    D/w Dr. D'Amico     DVT PROPHYLAXIS:  [] Venodynes                                [x] Heparin/Lovenox    DISPOSITION:    Assessment:  Present when checked    []  GCS  E   V  M     Heart Failure: []Acute, [] acute on chronic , []chronic  Heart Failure:  [] Diastolic (HFpEF), [] Systolic (HFrEF), []Combined (HFpEF and HFrEF), [] RHF, [] Pulm HTN, [] Other    [] MEHREEN, [] ATN, [] AIN, [] other  [] CKD1, [] CKD2, [] CKD 3, [] CKD 4, [] CKD 5, []ESRD    Encephalopathy: [] Metabolic, [] Hepatic, [] toxic, [] Neurological, [] Other    Abnormal Nurtitional Status: [] malnurtition (see nutrition note), [ ]underweight: BMI < 19, [] morbid obesity: BMI >40, [] Cachexia    [] Sepsis  [] hypovolemic shock,[] cardiogenic shock, [] hemorrhagic shock, [] neuogenic shock  [] Acute Respiratory Failure  []Cerebral edema, [] Brain compression/ herniation,   [] Functional quadriplegia  [] Acute blood loss anemia   HPI: 79 y/o M with pmh melanoma (L arm skin resection 2023), basal cell carcinoma, non small cell lung cancer - adenocarcinoma (s/p radiation chemo, completed 10/2023), and BPH presents with loss of balance, HA, fatigue, generalized weakness, diplopia and b/l foot tingling x 2 weeks and nausea/vomiting x 3-4 days. Patient also admits to mild memory loss x 3-4 months. Pt found to have 5 areas of brain metastases, largest in the  L cerebellum, on outpatient MRI today. Patient sent to ER for neurosurgery admission. (10 Familia 2024 15:57)    OVERNIGHT EVENTS: Seen and examined in 8LA. Denies HA, N/V, chest pain, shortness of breath, new weakness or numbness.     HOSPITAL COURSE:  1/10: admitted to [facility], preop for OR tomorrow. Pending CT A/P and KUB. Recieved decadron 10 IV in ER and started on decadron 4q6.   1/11: POD#0 s/p restrosig crani resection of tumor (1/11-) frozen NSCC , Dex taper x 1 week to 2 BID, SBP<140, pending MRI brain, 250cc 3% bolus for Na 134.   1/12: POD 1. pending SLP.  pending MRI. SD status. CT chest completed w new PE b/l segmental RLL and subsegmental in LLL, worsening mediastinal LAD, evolution of radiation induced pulmonary fibrosis, small b/l pleural effusions. PM Na 139.  1/13: POD 2. Headwrap removed. Cleared for home pt with rolling walker. LE dopplers negative for DVT. Rad/onc consulted.   1/14: POD 3. APRYL overnight, neuro stable. Heparin gtt started for PE.  1/15: Hep gtt inc to 10ml/hr. Repeat PTT @ 2AM. Lactulose 10 in AM, pending BM.     Vital Signs Last 24 Hrs  T(C): 36.7 (14 Jan 2024 21:49), Max: 36.9 (14 Jan 2024 09:21)  T(F): 98 (14 Jan 2024 21:49), Max: 98.4 (14 Jan 2024 09:21)  HR: 72 (14 Jan 2024 20:10) (58 - 72)  BP: 139/69 (14 Jan 2024 20:10) (119/71 - 139/69)  BP(mean): 98 (14 Jan 2024 20:10) (83 - 98)  RR: 18 (14 Jan 2024 20:10) (17 - 18)  SpO2: 93% (14 Jan 2024 20:10) (93% - 97%)    Parameters below as of 14 Jan 2024 20:10  Patient On (Oxygen Delivery Method): room air      I&O's Summary    13 Jan 2024 07:01  -  14 Jan 2024 07:00  --------------------------------------------------------  IN: 960 mL / OUT: 875 mL / NET: 85 mL    14 Jan 2024 07:01  -  15 Familia 2024 00:13  --------------------------------------------------------  IN: 386 mL / OUT: 1300 mL / NET: -914 mL        PHYSICAL EXAM:  General: NAD, pt is comfortably sitting up in bed, on room air  HEENT: CN II-XII intact, slight right CN VI palsy. PERRL 3mm briskly reactive, face symmetric, tongue midline, neck FROM  Cardiovascular: RRR, S1, S2  Respiratory: breathing non-labored on RA, chest rise symmetric  GI: abd soft, NTND  Neuro: A&Ox3, No aphasia, speech clear, (+) LUE dysmetria, (+) slight LUE pronator drift. Follows commands.  BANDA x4 spontaneously, 5/5 strength in all extremities throughout. Sensation intact  Extremities: distal pulses 2+ x4  Wound/incision:  Drain: n/a    TUBES/LINES:  [] Martinez  [] Wound Drains  [] Others      DIET:  [] NPO  [x] Mechanical  [] Tube feeds    LABS:                        11.2   14.79 )-----------( 220      ( 14 Jan 2024 18:20 )             36.3     01-14    137  |  101  |  27<H>  ----------------------------<  132<H>  4.0   |  30  |  0.49<L>    Ca    8.8      14 Jan 2024 05:30  Phos  1.3     01-14  Mg     1.9     01-14      PT/INR - ( 14 Jan 2024 10:00 )   PT: 10.4 sec;   INR: 0.91          PTT - ( 14 Jan 2024 18:00 )  PTT:28.1 sec  Urinalysis Basic - ( 14 Jan 2024 05:30 )    Color: x / Appearance: x / SG: x / pH: x  Gluc: 132 mg/dL / Ketone: x  / Bili: x / Urobili: x   Blood: x / Protein: x / Nitrite: x   Leuk Esterase: x / RBC: x / WBC x   Sq Epi: x / Non Sq Epi: x / Bacteria: x      CAPILLARY BLOOD GLUCOSE      POCT Blood Glucose.: 155 mg/dL (14 Jan 2024 21:20)  POCT Blood Glucose.: 146 mg/dL (14 Jan 2024 17:13)  POCT Blood Glucose.: 145 mg/dL (14 Jan 2024 11:20)  POCT Blood Glucose.: 122 mg/dL (14 Jan 2024 06:34)      Drug Levels: [] N/A    CSF Analysis: [] N/A      Allergies    No Known Allergies    Intolerances      MEDICATIONS:  Antibiotics:    Neuro:  oxyCODONE    IR 5 milliGRAM(s) Oral every 4 hours PRN    Anticoagulation:  heparin  Infusion 1000 Unit(s)/Hr IV Continuous <Continuous>    OTHER:  bisacodyl 5 milliGRAM(s) Oral daily PRN  chlorhexidine 2% Cloths 1 Application(s) Topical <User Schedule>  dexAMETHasone  Injectable 4 milliGRAM(s) IV Push every 8 hours  dexAMETHasone  Injectable   IV Push   dexAMETHasone  Injectable 3 milliGRAM(s) IV Push every 8 hours  glucagon  Injectable 1 milliGRAM(s) IntraMuscular once  influenza  Vaccine (HIGH DOSE) 0.7 milliLiter(s) IntraMuscular once  insulin lispro (ADMELOG) corrective regimen sliding scale   SubCutaneous Before meals and at bedtime  lactulose Syrup 10 Gram(s) Oral once PRN  pantoprazole  Injectable 40 milliGRAM(s) IV Push daily  polyethylene glycol 3350 17 Gram(s) Oral two times a day  senna 2 Tablet(s) Oral at bedtime  tamsulosin 0.4 milliGRAM(s) Oral at bedtime    IVF:  folic acid 1 milliGRAM(s) Oral daily  multivitamin 1 Tablet(s) Oral daily  thiamine 100 milliGRAM(s) Oral daily    CULTURES:  Culture Results:   No growth at 5 days. (01-07 @ 22:48)  Culture Results:   No growth at 5 days. (01-07 @ 22:48)    RADIOLOGY & ADDITIONAL TESTS:  MR HEAD 1/12 pending read  US LE VENOUS DUPLEX 1/13: No evidence of deep venous thrombosis in either lower extremity.    ASSESSMENT:  79 y/o M with pmh melanoma (L arm skin resection 2023), basal cell carcinoma, non small cell lung cancer - adenocarcinoma (s/p radiation chemo, completed 10/2023), and BPH presents with loss of balance, HA, fatigue, generalized weakness, diplopia and b/l foot tingling x 2 weeks and nausea/vomiting x 2-4 days. Pt found to have 5 areas of brain metastases, largest in the  L cerebellum, on outpatient MRI. Now s/p left restrosigmoid crani resection of tumor (1/11), frozen: mets    LUNG CANCER    Handoff    MEWS Score    BPH (benign prostatic hyperplasia)    Lung mass    Prediabetes    Lung cancer metastatic to brain    Skin melanoma    Basal cell carcinoma    Lung cancer metastatic to brain    Craniotomy for suboccipital neoplasm    History of dental surgery    S/P skin cancer resection    PRE OP    2    SysAdmin_VstLnk    PLAN:  Neuro:  - neuro/vital checks q4  - pain control w/ cranial ERAS  - Dex taper over 1 week to 2 BID  - MRI postop completed 1/12, pending read    Cardiac:  - SBP goal 100-160    Pulmonary:  - on RA  - encourage IS    GI:  - regular diet  - bowel regimen, pending BM  - protonix while on steroids    Renal:  - IVL, voiding  - cont home flomax    Endo:  - ISS, A1C 5.9    Heme:  - SCDs,   - CT A/P 1/10 for metastatic workup: negative  - CT chest 1/12: new PE b/l segmental RLL and subsegmental in LLL, worsening mediastinal LAD, evolution of radiation induced pulmonary fibrosis, small b/l pleural effusions  - BL LE dopplers 1/13: negative for DVT   - hemeonc following  - Rad Onc consulted, f/u recs  - hep gtt started for PE 1/14, in to10 ml/hr, PTT pending @2AM    ID:  -afebrile    Disposition: SD status, full code, PT/OT: home PT with rolling walker    D/w Dr. D'Amico     DVT PROPHYLAXIS:  [] Venodynes                                [x] Heparin/Lovenox    DISPOSITION:    Assessment:  Present when checked    []  GCS  E   V  M     Heart Failure: []Acute, [] acute on chronic , []chronic  Heart Failure:  [] Diastolic (HFpEF), [] Systolic (HFrEF), []Combined (HFpEF and HFrEF), [] RHF, [] Pulm HTN, [] Other    [] MEHREEN, [] ATN, [] AIN, [] other  [] CKD1, [] CKD2, [] CKD 3, [] CKD 4, [] CKD 5, []ESRD    Encephalopathy: [] Metabolic, [] Hepatic, [] toxic, [] Neurological, [] Other    Abnormal Nurtitional Status: [] malnurtition (see nutrition note), [ ]underweight: BMI < 19, [] morbid obesity: BMI >40, [] Cachexia    [] Sepsis  [] hypovolemic shock,[] cardiogenic shock, [] hemorrhagic shock, [] neuogenic shock  [] Acute Respiratory Failure  []Cerebral edema, [] Brain compression/ herniation,   [] Functional quadriplegia  [] Acute blood loss anemia

## 2024-01-15 NOTE — DISCHARGE NOTE PROVIDER - NSDCFUSCHEDAPPT_GEN_ALL_CORE_FT
Billy Chapman  [facility] PreAdmits  Scheduled Appointment: 01/18/2024    [facility]  HEARTVASC [address]  Scheduled Appointment: 01/19/2024    Billy Chapman  [facility]  HEMONC [address]  Scheduled Appointment: 02/15/2024    [facility] Physician [facility]  CATSCAN  [address]  Scheduled Appointment: 02/26/2024     Billy Chapman  [facility] PreAdmits  Scheduled Appointment: 01/18/2024    [facility]  HEARTVASC [address]  Scheduled Appointment: 01/19/2024    Billy Chapman  [facility]  HEMONC [address]  Scheduled Appointment: 02/15/2024    [facility] Physician [facility]  CATSCAN  [address]  Scheduled Appointment: 02/26/2024     Billy Chapman  [facility] PreAdmits  Scheduled Appointment: 01/18/2024    Abelino Hurtado  [facility]  HEARTVASC [address]  Scheduled Appointment: 01/19/2024    D'Amico, Randy  [facility]  NEUROSURG [address]  Scheduled Appointment: 01/30/2024    Billy Chapman  [facility] Physician [facility]  HEMONC [address]  Scheduled Appointment: 02/15/2024    [facility] Physician [facility]  CATSCAN  [address]  Scheduled Appointment: 02/26/2024     Billy Chapman  [facility] PreAdmits  Scheduled Appointment: 01/18/2024    Abelino Hurtado  [facility]  HEARTVASC [address]  Scheduled Appointment: 01/19/2024    D'Amico, Randy  [facility]  NEUROSURG [address]  Scheduled Appointment: 01/30/2024    Billy Chapman  [facility] Physician Counts include 234 beds at the [facility]  HEMONC [address]  Scheduled Appointment: 02/15/2024    [facility] include 234 beds at the [facility]  CATSCAN  [address]  Scheduled Appointment: 02/26/2024

## 2024-01-15 NOTE — OCCUPATIONAL THERAPY INITIAL EVALUATION ADULT - GENERAL OBSERVATIONS, REHAB EVAL
Patient A&Ox4, agreeable and tolerated session fairly. Patient received seated in bedside chair +tele, +crani incision C/D/I, +IV, room air, NAD.

## 2024-01-15 NOTE — OCCUPATIONAL THERAPY INITIAL EVALUATION ADULT - MODIFIED CLINICAL TEST OF SENSORY INTEGRATION IN BALANCE TEST
Patient functionally ambulated in the hallway with RW and CGA. Patient noted with L sided drift/listing, poor environmental navigation and obstacle avoidance 2/2 double vision and R upper nasal quad deficits, requiring min-mod verbal cues for safety, scanning and positioning.

## 2024-01-15 NOTE — OCCUPATIONAL THERAPY INITIAL EVALUATION ADULT - DIAGNOSIS, OT EVAL
Patient POD 4 s/p restrosig crani resection of tumor, presents with double vision and deficits with R upper nasal quadrant impacting balance, activity tolerance during functional mobility and tasks.

## 2024-01-15 NOTE — OCCUPATIONAL THERAPY INITIAL EVALUATION ADULT - VISUAL ASSESSMENT: VISUAL FIELD CUTS
Breathing spontaneous and unlabored. Breath sounds clear and equal bilaterally with regular rhythm.
none

## 2024-01-15 NOTE — PROGRESS NOTE ADULT - SUBJECTIVE AND OBJECTIVE BOX
INTERVAL EVENTS: no acute events or complaints    PAST MEDICAL & SURGICAL HISTORY:  BPH (benign prostatic hyperplasia)    Lung mass    Prediabetes    Lung cancer metastatic to brain    Skin melanoma    Basal cell carcinoma    History of dental surgery    S/P skin cancer resection        MEDICATIONS  (STANDING):  chlorhexidine 2% Cloths 1 Application(s) Topical <User Schedule>  dexAMETHasone  Injectable   IV Push   dexAMETHasone  Injectable 3 milliGRAM(s) IV Push every 8 hours  dexAMETHasone  Injectable 4 milliGRAM(s) IV Push every 8 hours  heparin  Infusion 1400 Unit(s)/Hr (14 mL/Hr) IV Continuous <Continuous>  influenza  Vaccine (HIGH DOSE) 0.7 milliLiter(s) IntraMuscular once  insulin lispro (ADMELOG) corrective regimen sliding scale   SubCutaneous Before meals and at bedtime  multivitamin 1 Tablet(s) Oral daily  pantoprazole  Injectable 40 milliGRAM(s) IV Push daily  polyethylene glycol 3350 17 Gram(s) Oral two times a day  senna 2 Tablet(s) Oral at bedtime  tamsulosin 0.4 milliGRAM(s) Oral at bedtime    MEDICATIONS  (PRN):  bisacodyl 5 milliGRAM(s) Oral daily PRN Constipation  oxyCODONE    IR 5 milliGRAM(s) Oral every 4 hours PRN Severe Pain (7 - 10)    T(F): 97.3 (01-15-24 @ 10:00), Max: 98.2 (01-14-24 @ 14:00)  HR: 58 (01-15-24 @ 11:05) (58 - 74)  BP: 137/68 (01-15-24 @ 11:05) (125/60 - 139/69)  BP(mean): 97 (01-15-24 @ 11:05) (86 - 98)  ABP: --  ABP(mean): --  RR: 18 (01-15-24 @ 11:05) (17 - 18)  SpO2: 97% (01-15-24 @ 11:05) (92% - 98%)    I/O Detail 24H    14 Jan 2024 07:01  -  15 Familia 2024 07:00  --------------------------------------------------------  IN:    Heparin: 48 mL    Heparin: 30 mL    Heparin: 14 mL    Heparin Infusion: 45 mL    Oral Fluid: 350 mL  Total IN: 487 mL    OUT:    Voided (mL): 2525 mL  Total OUT: 2525 mL    Total NET: -2038 mL      15 Familia 2024 07:01  -  15 Familia 2024 12:18  --------------------------------------------------------  IN:    Heparin: 70 mL    Oral Fluid: 240 mL  Total IN: 310 mL    OUT:    Voided (mL): 600 mL  Total OUT: 600 mL    Total NET: -290 mL          PHYSICAL EXAM:  General: NAD, answering questions, pleasantly conversant  HEENT: head wrapped in protective dressing  Respiratory: CTA b/l  Cardiovascular: +S1/S2; RRR  Abdomen: soft, NT/ND; +BS x4  Extremities: WWP, 2+ peripheral pulses b/l; no LE edema  Skin: normal color and turgor; no rash  Neurological: AAOX3, LUE 5/5, RUE 5/5, 5/5 LLE, 5/5 RLE strength, dysmetria improved, finger to nose test negative, no dysdiadochokinesia present, cranial nerves intact    LABS:  CBC 01-15-24 @ 05:30                        10.8   12.44 )-----------( 185                   34.1       Hgb trend: 10.8 <-- , 11.2 <-- , 10.8 <-- , 10.6 <--   WBC trend: 12.44 <-- , 14.79 <-- , 13.59 <-- , 14.23 <--       CMP 01-15-24 @ 05:30    132<L>  |  96  |  24<H>  ----------------------------<  125<H>  4.1   |  29  |  0.51    Ca    9.1      01-15-24 @ 05:30  Phos  2.7     01-15  Mg     1.8     01-15        Serum Cr trend: 0.51 <-- , 0.49 <-- , 0.53 <-- , 0.60 <--     PT/INR - ( 14 Jan 2024 10:00 )   PT: 10.4 sec;   INR: 0.91          PTT - ( 15 Familia 2024 12:06 ):45.7 sec    Cardiac Markers           STUDIES:

## 2024-01-15 NOTE — DISCHARGE NOTE PROVIDER - HOSPITAL COURSE
HPI:    Hospital Course:    Patient evaluated by PT/OT who recommended:  Patient is going home? rehab? hospice? Facility Name:     Hospital course c/b:     Exam on day of discharge:    Checklist:   - Obtained follow up appointment from NP  - Reviewed final recommendations of inpatient consults  - review discharge planning on provider handoff  - post op imaging completed  - Neurologically stable for discharge  - Vitals stable for discharge   - Afebrile for discharge  - WBC is stable  - Sodium level is normal  - Pain is adequately controlled  - Pt has PICC/walker/brace/collar   - LACE score (10 or > needs PCP apt)   - stroke patient? Discharge NIHSS score   HPI:  79 y/o M with [facility] melanoma (L arm skin resection 2023), basal cell carcinoma, non small cell lung cancer - adenocarcinoma (s/p radiation chemo, completed 10/2023), and BPH presents with loss of balance, HA, fatigue, generalized weakness, diplopia and b/l foot tingling x 2 weeks and nausea/vomiting x 3-4 days. Patient also admits to mild memory loss x 3-4 months. Pt found to have 5 areas of brain metastases, largest in the  L cerebellum, on outpatient MRI today. Patient sent to ER for neurosurgery admission.    Hospital Course:  1/10: admitted to [facility], preop for OR tomorrow. Pending CT A/P and KUB. Recieved decadron 10 IV in ER and started on decadron 4q6.   1/11: POD#0 s/p restrosig crani resection of tumor (1/11-) frozen NSCC , Dex taper x 1 week to 2 BID, SBP<140, pending MRI brain, 250cc 3% bolus for Na 134.   1/12: POD 1. pending SLP.  pending MRI. SD status. CT chest completed w new PE b/l segmental RLL and subsegmental in LLL, worsening mediastinal LAD, evolution of radiation induced pulmonary fibrosis, small b/l pleural effusions. PM Na 139.  1/13: POD 2. Headwrap removed. Cleared for home pt with rolling walker. LE dopplers negative for DVT. Rad/onc consulted.   1/14: POD 3. APRYL overnight, neuro stable. Heparin gtt started for PE.  1/15: POD 4. Hep gtt inc to 10ml/hr. Repeat PTT @ 2AM. Lactulose 10 in AM, pending BM. Heparin gtt increased to 12 mL/hr, repeat PTT 29.3. 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 Lactulose for BM. PTT subtherapeutic, increased hep gtt to 1500.   1/16: POD 5. Inc heparin gtt to 16 ml/hr i/s/o PTT 45.6. Repeat PTT @ 4AM. Suppository for BM. (+) BM.     Patient evaluated by PT/OT who recommended: home with home PT/OT  Patient is going home    Hospital course c/b: PE (continue Eliquis)     Exam on day of discharge:    Checklist:   - Obtained follow up appointment from NP  - Reviewed final recommendations of inpatient consults  - review discharge planning on provider handoff  - post op imaging completed  - Neurologically stable for discharge  - Vitals stable for discharge   - Afebrile for discharge  - WBC is stable  - Sodium level is normal  - Pain is adequately controlled  - Pt has PICC/walker/brace/collar   - LACE score (10 or > needs PCP apt)   - stroke patient? Discharge NIHSS score   HPI:  79 y/o M with [facility] melanoma (L arm skin resection 2023), basal cell carcinoma, non small cell lung cancer - adenocarcinoma (s/p radiation chemo, completed 10/2023), and BPH presents with loss of balance, HA, fatigue, generalized weakness, diplopia and b/l foot tingling x 2 weeks and nausea/vomiting x 3-4 days. Patient also admits to mild memory loss x 3-4 months. Pt found to have 5 areas of brain metastases, largest in the  L cerebellum, on outpatient MRI today. Patient sent to ER for neurosurgery admission.    Hospital Course:  1/10: admitted to [facility], preop for OR tomorrow. Pending CT A/P and KUB. Recieved decadron 10 IV in ER and started on decadron 4q6.   1/11: POD#0 s/p restrosig crani resection of tumor (1/11-) frozen NSCC , Dex taper x 1 week to 2 BID, SBP<140, pending MRI brain, 250cc 3% bolus for Na 134.   1/12: POD 1. pending SLP.  pending MRI. SD status. CT chest completed w new PE b/l segmental RLL and subsegmental in LLL, worsening mediastinal LAD, evolution of radiation induced pulmonary fibrosis, small b/l pleural effusions. PM Na 139.  1/13: POD 2. Headwrap removed. Cleared for home pt with rolling walker. LE dopplers negative for DVT. Rad/onc consulted.   1/14: POD 3. APRYL overnight, neuro stable. Heparin gtt started for PE.  1/15: POD 4. Hep gtt inc to 10ml/hr. Repeat PTT @ 2AM. Lactulose 10 in AM, pending BM. Heparin gtt increased to 12 mL/hr, repeat PTT 29.3. 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 Lactulose for BM. PTT subtherapeutic, increased hep gtt to 1500.   1/16: POD 5. Inc heparin gtt to 16 ml/hr i/s/o PTT 45.6. Repeat PTT @ 4AM. Suppository for BM. (+) BM.     Patient evaluated by PT/OT who recommended: home with home PT/OT  Patient is going home    Hospital course c/b: PE (continue Eliquis)     Exam on day of discharge:    Checklist:   - Obtained follow up appointment from NP  - Reviewed final recommendations of inpatient consults  - review discharge planning on provider handoff  - post op imaging completed  - Neurologically stable for discharge  - Vitals stable for discharge   - Afebrile for discharge  - WBC is stable  - Sodium level is normal  - Pain is adequately controlled  - Pt has PICC/walker/brace/collar   - LACE score (10 or > needs PCP apt)   - stroke patient? Discharge NIHSS score   HPI:  77 y/o M with [facility] melanoma (L arm skin resection 2023), basal cell carcinoma, non small cell lung cancer - adenocarcinoma (s/p radiation chemo, completed 10/2023), and BPH presents with loss of balance, HA, fatigue, generalized weakness, diplopia and b/l foot tingling x 2 weeks and nausea/vomiting x 3-4 days. Patient also admits to mild memory loss x 3-4 months. Pt found to have 5 areas of brain metastases, largest in the  L cerebellum, on outpatient MRI today. Patient sent to ER for neurosurgery admission.    Hospital Course:  1/10: admitted to [facility], preop for OR tomorrow. Pending CT A/P and KUB. Recieved decadron 10 IV in ER and started on decadron 4q6.   1/11: POD#0 s/p restrosig crani resection of tumor (1/11-) frozen NSCC , Dex taper x 1 week to 2 BID, SBP<140, pending MRI brain, 250cc 3% bolus for Na 134.   1/12: POD 1. pending SLP.  pending MRI. SD status. CT chest completed w new PE b/l segmental RLL and subsegmental in LLL, worsening mediastinal LAD, evolution of radiation induced pulmonary fibrosis, small b/l pleural effusions. PM Na 139.  1/13: POD 2. Headwrap removed. Cleared for home pt with rolling walker. LE dopplers negative for DVT. Rad/onc consulted.   1/14: POD 3. APRYL overnight, neuro stable. Heparin gtt started for PE.  1/15: POD 4. Hep gtt inc to 10ml/hr. Repeat PTT @ 2AM. Lactulose 10 in AM, pending BM. Heparin gtt increased to 12 mL/hr, repeat PTT 29.3. 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 Lactulose for BM. PTT subtherapeutic, increased hep gtt to 1500.   1/16: POD 5. Inc heparin gtt to 16 ml/hr i/s/o PTT 45.6. Repeat PTT @ 4AM. Suppository for BM. (+) BM.   1/17: POD 6. APRYL overnight, neuro stable. CT sim completed.    Patient evaluated by PT/OT who recommended: home with home PT/OT  Patient is going home    Hospital course c/b: PE (continue Eliquis)     Exam on day of discharge:  (+) Right eye CN VI palsy, (+) Horizontal diplopia o/w CN II-XII intact, PERRL 3mm briskly reactive, face symmetric, tongue midline,   A&Ox3, No aphasia, speech clear, b/l UE dysmetria, (+) slight LUE pronator drift. Follows commands.  BANDA x4 spontaneously, 5/5 strength in all extremities throughout. Sensation intact  Wound/incision: left retrosigmoid crani incision clean, dry, intact with absorbable sutures       HPI:  77 y/o M with [facility] melanoma (L arm skin resection 2023), basal cell carcinoma, non small cell lung cancer - adenocarcinoma (s/p radiation chemo, completed 10/2023), and BPH presents with loss of balance, HA, fatigue, generalized weakness, diplopia and b/l foot tingling x 2 weeks and nausea/vomiting x 3-4 days. Patient also admits to mild memory loss x 3-4 months. Pt found to have 5 areas of brain metastases, largest in the  L cerebellum, on outpatient MRI today. Patient sent to ER for neurosurgery admission.    Hospital Course:  1/10: admitted to [facility], preop for OR tomorrow. Pending CT A/P and KUB. Recieved decadron 10 IV in ER and started on decadron 4q6.   1/11: POD#0 s/p restrosig crani resection of tumor (1/11-) frozen NSCC , Dex taper x 1 week to 2 BID, SBP<140, pending MRI brain, 250cc 3% bolus for Na 134.   1/12: POD 1. pending SLP.  pending MRI. SD status. CT chest completed w new PE b/l segmental RLL and subsegmental in LLL, worsening mediastinal LAD, evolution of radiation induced pulmonary fibrosis, small b/l pleural effusions. PM Na 139.  1/13: POD 2. Headwrap removed. Cleared for home pt with rolling walker. LE dopplers negative for DVT. Rad/onc consulted.   1/14: POD 3. APRYL overnight, neuro stable. Heparin gtt started for PE.  1/15: POD 4. Hep gtt inc to 10ml/hr. Repeat PTT @ 2AM. Lactulose 10 in AM, pending BM. Heparin gtt increased to 12 mL/hr, repeat PTT 29.3. 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 Lactulose for BM. PTT subtherapeutic, increased hep gtt to 1500.   1/16: POD 5. Inc heparin gtt to 16 ml/hr i/s/o PTT 45.6. Repeat PTT @ 4AM. Suppository for BM. (+) BM.   1/17: POD 6. APRYL overnight, neuro stable. CT sim completed.    Patient evaluated by PT/OT who recommended: home with home PT/OT  Patient is going home    Hospital course c/b: PE (continue Eliquis)     Exam on day of discharge:  (+) Right eye CN VI palsy, (+) Horizontal diplopia o/w CN II-XII intact, PERRL 3mm briskly reactive, face symmetric, tongue midline,   A&Ox3, No aphasia, speech clear, b/l UE dysmetria, (+) slight LUE pronator drift. Follows commands.  BANDA x4 spontaneously, 5/5 strength in all extremities throughout. Sensation intact  Wound/incision: left retrosigmoid crani incision clean, dry, intact with absorbable sutures

## 2024-01-15 NOTE — DISCHARGE NOTE PROVIDER - NSDCMRMEDTOKEN_GEN_ALL_CORE_FT
tamsulosin 0.4 mg oral capsule: 1 cap(s) orally once a day   acetaminophen 500 mg oral tablet: 2 tab(s) orally every 8 hours As needed Mild Pain (1 - 3)  apixaban 5 mg oral tablet: 2 tab(s) orally every 12 hours 1/17 to 1/22: Take two tablets (10mg) in morning and evening.  1/30 onward: Take one tablet (5mg) in morning and evening  dexAMETHasone 2 mg oral tablet: 1 tab(s) orally every 12 hours  Multiple Vitamins oral tablet: 1 tab(s) orally once a day  pantoprazole 40 mg oral delayed release tablet: 1 tab(s) orally once a day (before a meal)  polyethylene glycol 3350 oral powder for reconstitution: 17 gram(s) orally 2 times a day as needed for  constipation  senna leaf extract oral tablet: 2 tab(s) orally once a day (at bedtime)  sodium chloride 1 g oral tablet: 2 tab(s) orally every 8 hours  tamsulosin 0.4 mg oral capsule: 1 cap(s) orally once a day   acetaminophen 500 mg oral tablet: 2 tab(s) orally every 8 hours As needed Mild Pain (1 - 3)  apixaban 5 mg oral tablet: 2 tab(s) orally every 12 hours 1/17 to 1/22: Take two tablets (10mg) in morning and evening.  1/23 onward: Take one tablet (5mg) in morning and evening  dexAMETHasone 2 mg oral tablet: 1 tab(s) orally every 12 hours  Multiple Vitamins oral tablet: 1 tab(s) orally once a day  pantoprazole 40 mg oral delayed release tablet: 1 tab(s) orally once a day (before a meal)  polyethylene glycol 3350 oral powder for reconstitution: 17 gram(s) orally 2 times a day as needed for  constipation  senna leaf extract oral tablet: 2 tab(s) orally once a day (at bedtime)  sodium chloride 1 g oral tablet: 2 tab(s) orally every 8 hours  tamsulosin 0.4 mg oral capsule: 1 cap(s) orally once a day

## 2024-01-15 NOTE — PROGRESS NOTE ADULT - NUTRITIONAL ASSESSMENT
This patient has been assessed with a concern for Malnutrition and has been determined to have a diagnosis/diagnoses of Severe protein-calorie malnutrition and Underweight (BMI < 19).    This patient is being managed with:   Diet Regular-  1500mL Fluid Restriction ([id number])  Supplement Feeding Modality:  Oral  Ensure Plus High Protein Cans or Servings Per Day:  1       Frequency:  Two Times a day  Entered: Familia 15 2024 10:53AM   This patient has been assessed with a concern for Malnutrition and has been determined to have a diagnosis/diagnoses of Severe protein-calorie malnutrition and Underweight (BMI < 19).    This patient is being managed with:   Diet Regular-  1500mL Fluid Restriction ([id number])  Supplement Feeding Modality:  Oral  Ensure Plus High Protein Cans or Servings Per Day:  1       Frequency:  Two Times a day  Entered: Familia 15 2024 10:53AM   This patient has been assessed with a concern for Malnutrition and has been determined to have a diagnosis/diagnoses of Severe protein-calorie malnutrition and Underweight (BMI < 19).    This patient is being managed with:   Diet Regular-  1500mL Fluid Restriction ([id number])  Supplement Feeding Modality:  Oral  Ensure Plus High Protein Cans or Servings Per Day:  1       Frequency:  Two Times a day  Entered: Familia 15 2024 10:53AM

## 2024-01-15 NOTE — DISCHARGE NOTE PROVIDER - NSDCCPCAREPLAN_GEN_ALL_CORE_FT
PRINCIPAL DISCHARGE DIAGNOSIS  Diagnosis: Metastasis to brain  Assessment and Plan of Treatment: s/p left suboccipital crani for resection of cerebellopontine angle metasasis on 1/11/24      SECONDARY DISCHARGE DIAGNOSES  Diagnosis: Pulmonary embolism  Assessment and Plan of Treatment:     Diagnosis: Hyponatremia  Assessment and Plan of Treatment:     Diagnosis: Lung cancer  Assessment and Plan of Treatment:     Diagnosis: H/O melanoma excision  Assessment and Plan of Treatment:     Diagnosis: Basal cell carcinoma  Assessment and Plan of Treatment:     Diagnosis: BPH (benign prostatic hyperplasia)  Assessment and Plan of Treatment:      PRINCIPAL DISCHARGE DIAGNOSIS  Diagnosis: Metastasis to brain  Assessment and Plan of Treatment: s/p left suboccipital crani for resection of cerebellopontine angle metasasis on 1/11/24      SECONDARY DISCHARGE DIAGNOSES  Diagnosis: Pulmonary embolism  Assessment and Plan of Treatment: Eliquis    Diagnosis: Hyponatremia  Assessment and Plan of Treatment: Salt tabs    Diagnosis: Lung cancer  Assessment and Plan of Treatment: Follow up with oncology    Diagnosis: H/O melanoma excision  Assessment and Plan of Treatment:     Diagnosis: Basal cell carcinoma  Assessment and Plan of Treatment:     Diagnosis: BPH (benign prostatic hyperplasia)  Assessment and Plan of Treatment:

## 2024-01-15 NOTE — OCCUPATIONAL THERAPY INITIAL EVALUATION ADULT - IMPAIRED TRANSFERS: SIT/STAND, REHAB EVAL
+vision R upper nasal quadrant and double/impaired balance/decreased flexibility/impaired postural control

## 2024-01-15 NOTE — DISCHARGE NOTE PROVIDER - DISCHARGE SERVICE FOR PATIENT
on the discharge service for the patient. I have reviewed and made amendments to the documentation where necessary. Thrombocytopenia

## 2024-01-15 NOTE — OCCUPATIONAL THERAPY INITIAL EVALUATION ADULT - PERTINENT HX OF CURRENT PROBLEM, REHAB EVAL
79 y/o M with [facility] (L arm skin resection 2023), basal cell carcinoma, non small cell lung cancer - adenocarcinoma (s/p radiation chemo, completed 10/2023), and BPH presents with loss of balance, HA, fatigue, generalized weakness, diplopia and b/l foot tingling x 2 weeks and nausea/vomiting x 3-4 days. Patient also admits to mild memory loss x 3-4 months. Pt found to have 5 areas of brain metastases, largest in the  L cerebellum, on outpatient MRI today. Patient sent to ER for neurosurgery admission. (10 Familia 2024 15:57) 77 y/o M with [facility] (L arm skin resection 2023), basal cell carcinoma, non small cell lung cancer - adenocarcinoma (s/p radiation chemo, completed 10/2023), and BPH presents with loss of balance, HA, fatigue, generalized weakness, diplopia and b/l foot tingling x 2 weeks and nausea/vomiting x 3-4 days. Patient also admits to mild memory loss x 3-4 months. Pt found to have 5 areas of brain metastases, largest in the  L cerebellum, on outpatient MRI today. Patient sent to ER for neurosurgery admission. (10 Familia 2024 15:57)

## 2024-01-16 NOTE — PROVIDER CONTACT NOTE (OTHER) - SITUATION
Pt CO of new onset of Near sighted double vision, states his double vision initially from afar.
Pt HR go down to 45, VSS, Pt asymptomatic, Pt denies chest pain and SOB, Neuro PA Argelia Galvan made aware, no interventions, will continue monitor.

## 2024-01-16 NOTE — PROGRESS NOTE ADULT - SUBJECTIVE AND OBJECTIVE BOX
HPI:  79 y/o M with pmh melanoma (L arm skin resection 2023), basal cell carcinoma, non small cell lung cancer - adenocarcinoma (s/p radiation chemo, completed 10/2023), and BPH presents with loss of balance, HA, fatigue, generalized weakness, diplopia and b/l foot tingling x 2 weeks and nausea/vomiting x 3-4 days. Patient also admits to mild memory loss x 3-4 months. Pt found to have 5 areas of brain metastases, largest in the  L cerebellum, on outpatient MRI today. Patient sent to ER for neurosurgery admission. (10 Familia 2024 15:57)    OVERNIGHT EVENTS: Seen and examined in 8LA. Denies HA, N/V, chest pain, shortness of breath, new weakness or numbness.     HOSPITAL COURSE:  1/10: admitted to [facility], preop for OR tomorrow. Pending CT A/P and KUB. Recieved decadron 10 IV in ER and started on decadron 4q6.   1/11: POD#0 s/p restrosig crani resection of tumor (1/11-) frozen NSCC , Dex taper x 1 week to 2 BID, SBP<140, pending MRI brain, 250cc 3% bolus for Na 134.   1/12: POD 1. pending SLP.  pending MRI. SD status. CT chest completed w new PE b/l segmental RLL and subsegmental in LLL, worsening mediastinal LAD, evolution of radiation induced pulmonary fibrosis, small b/l pleural effusions. PM Na 139.  1/13: POD 2. Headwrap removed. Cleared for home pt with rolling walker. LE dopplers negative for DVT. Rad/onc consulted.   1/14: POD 3. APRYL overnight, neuro stable. Heparin gtt started for PE.  1/15: POD 4. Hep gtt inc to 10ml/hr. Repeat PTT @ 2AM. Lactulose 10 in AM, pending BM. Heparin gtt increased to 12 mL/hr, repeat PTT 29.3. 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 Lactulose for BM. PTT subtherapeutic, increased hep gtt to 1500.   1/16: POD 5. Inc heparin gtt to 16 ml/hr i/s/o PTT 45.6. Repeat PTT @ 4AM. Suppository for BM. (+) BM.     Vital Signs Last 24 Hrs  T(C): 36.7 (15 Familia 2024 21:25), Max: 36.7 (15 Familia 2024 14:00)  T(F): 98.1 (15 Familia 2024 21:25), Max: 98.1 (15 Familia 2024 14:00)  HR: 76 (15 Familia 2024 20:20) (58 - 78)  BP: 126/64 (15 Familia 2024 20:20) (125/60 - 140/74)  BP(mean): 89 (15 Familia 2024 20:20) (86 - 100)  RR: 18 (15 Familia 2024 20:20) (17 - 18)  SpO2: 95% (15 Familia 2024 20:20) (92% - 98%)    Parameters below as of 15 Familia 2024 20:20  Patient On (Oxygen Delivery Method): room air        I&O's Summary    14 Jan 2024 07:01  -  15 Familia 2024 07:00  --------------------------------------------------------  IN: 487 mL / OUT: 2525 mL / NET: -2038 mL    15 Familia 2024 07:01  -  16 Jan 2024 00:07  --------------------------------------------------------  IN: 1032 mL / OUT: 1625 mL / NET: -593 mL        PHYSICAL EXAM:  General: NAD, pt is comfortably sitting up in bed, on room air  HEENT: (+) slight CN VI palsy, (+) horizontal diplopia, o/w CN II-XII grossly intact. PERRL 3mm briskly reactive, face symmetric, tongue midline, neck FROM  Cardiovascular: RRR, S1, S2  Respiratory: breathing non-labored on RA, chest rise symmetric  GI: abd soft, (+) distended, non-tender  Neuro: A&Ox3, No aphasia, speech clear, (+) b/l UE dysmetria, (+) FLORENTIN pronator drift. Follows commands.  BANDA x4 spontaneously, 5/5 strength in all extremities throughout. Sensation intact  Extremities: distal pulses 2+ x4  Wound/incision:  Drain:     TUBES/LINES:  [] Martinez  [] Wound Drains  [] Others      DIET:  [] NPO  [x] Mechanical  [] Tube feeds    LABS:                        10.8   12.44 )-----------( 185      ( 15 Familia 2024 05:30 )             34.1     01-15    132<L>  |  96  |  24<H>  ----------------------------<  125<H>  4.1   |  29  |  0.51    Ca    9.1      15 Familia 2024 05:30  Phos  2.7     01-15  Mg     1.8     01-15      PT/INR - ( 14 Jan 2024 10:00 )   PT: 10.4 sec;   INR: 0.91          PTT - ( 15 Familia 2024 20:38 )  PTT:45.6 sec  Urinalysis Basic - ( 15 Familia 2024 05:30 )    Color: x / Appearance: x / SG: x / pH: x  Gluc: 125 mg/dL / Ketone: x  / Bili: x / Urobili: x   Blood: x / Protein: x / Nitrite: x   Leuk Esterase: x / RBC: x / WBC x   Sq Epi: x / Non Sq Epi: x / Bacteria: x          CAPILLARY BLOOD GLUCOSE      POCT Blood Glucose.: 126 mg/dL (15 Familia 2024 21:48)  POCT Blood Glucose.: 126 mg/dL (15 Familia 2024 15:54)  POCT Blood Glucose.: 117 mg/dL (15 Familia 2024 11:22)  POCT Blood Glucose.: 119 mg/dL (15 Familia 2024 05:31)      Drug Levels: [] N/A    CSF Analysis: [] N/A      Allergies    No Known Allergies    Intolerances      MEDICATIONS:  Antibiotics:    Neuro:  oxyCODONE    IR 5 milliGRAM(s) Oral every 4 hours PRN    Anticoagulation:  heparin  Infusion 1600 Unit(s)/Hr IV Continuous <Continuous>    OTHER:  bisacodyl 5 milliGRAM(s) Oral daily PRN  bisacodyl Suppository 10 milliGRAM(s) Rectal daily PRN  dexAMETHasone  Injectable   IV Push   dexAMETHasone  Injectable 3 milliGRAM(s) IV Push every 8 hours  influenza  Vaccine (HIGH DOSE) 0.7 milliLiter(s) IntraMuscular once  pantoprazole  Injectable 40 milliGRAM(s) IV Push daily  polyethylene glycol 3350 17 Gram(s) Oral two times a day  saline laxative (FLEET) Rectal Enema 1 Enema Rectal once PRN  senna 2 Tablet(s) Oral at bedtime  tamsulosin 0.4 milliGRAM(s) Oral at bedtime    IVF:  multivitamin 1 Tablet(s) Oral daily    CULTURES:  Culture Results:   No growth at 5 days. (01-07 @ 22:48)  Culture Results:   No growth at 5 days. (01-07 @ 22:48)    RADIOLOGY & ADDITIONAL TESTS:      ASSESSMENT:  79 y/o M with pmh melanoma (L arm skin resection 2023), basal cell carcinoma, non small cell lung cancer - adenocarcinoma (s/p radiation chemo, completed 10/2023), and BPH presents with loss of balance, HA, fatigue, generalized weakness, diplopia and b/l foot tingling x 2 weeks and nausea/vomiting x 2-4 days. Pt found to have 5 areas of brain metastases, largest in the  L cerebellum, on outpatient MRI. Now s/p left restrosigmoid crani resection of tumor (1/11), frozen: mets.     LUNG CANCER    Handoff    MEWS Score    BPH (benign prostatic hyperplasia)    Lung mass    Prediabetes    Lung cancer metastatic to brain    Skin melanoma    Basal cell carcinoma    Lung cancer metastatic to brain    Craniotomy for suboccipital neoplasm    History of dental surgery    S/P skin cancer resection    PRE OP    2    SysAdmin_VstLnk        PLAN:  Neuro:  - neuro/vital checks q4  - pain control w/ cranial ERAS  - Dex taper over 1 week to 2 BID  - MRI postop completed 1/12, pending read    Cardiac:  - SBP goal 100-160    Pulmonary:  - on RA  - encourage IS    GI:  - regular diet  - bowel regimen, BM 1/15 s/p suppository  - protonix while on steroids    Renal:  - IVL, voiding  - cont home flomax    Endo:  - ISS, A1C 5.9    Heme:  - SCDs,   - CT A/P 1/10 for metastatic workup: negative  - CT chest 1/12: new PE b/l segmental RLL and subsegmental in LLL, worsening mediastinal LAD, evolution of radiation induced pulmonary fibrosis, small b/l pleural effusions  - BL LE dopplers 1/13: negative for DVT   - Heme/onc following  - Rad Onc consulted, f/u recs  - hep gtt started for PE 1/14    ID:  -afebrile    Disposition: SD status, full code, PT/OT: home PT with rolling walker    D/w Dr. D'Amico   DVT PROPHYLAXIS:  [] Venodynes                                [x] Heparin/Lovenox    DISPOSITION: SD status, full code, PT/OT: home PT with rolling walker    Assessment:  Present when checked    []  GCS  E   V  M     Heart Failure: []Acute, [] acute on chronic , []chronic  Heart Failure:  [] Diastolic (HFpEF), [] Systolic (HFrEF), []Combined (HFpEF and HFrEF), [] RHF, [] Pulm HTN, [] Other    [] MEHREEN, [] ATN, [] AIN, [] other  [] CKD1, [] CKD2, [] CKD 3, [] CKD 4, [] CKD 5, []ESRD    Encephalopathy: [] Metabolic, [] Hepatic, [] toxic, [] Neurological, [] Other    Abnormal Nurtitional Status: [] malnurtition (see nutrition note), [ ]underweight: BMI < 19, [] morbid obesity: BMI >40, [] Cachexia    [] Sepsis  [] hypovolemic shock,[] cardiogenic shock, [] hemorrhagic shock, [] neuogenic shock  [] Acute Respiratory Failure  []Cerebral edema, [] Brain compression/ herniation,   [] Functional quadriplegia  [] Acute blood loss anemia   HPI:  79 y/o M with pmh melanoma (L arm skin resection 2023), basal cell carcinoma, non small cell lung cancer - adenocarcinoma (s/p radiation chemo, completed 10/2023), and BPH presents with loss of balance, HA, fatigue, generalized weakness, diplopia and b/l foot tingling x 2 weeks and nausea/vomiting x 3-4 days. Patient also admits to mild memory loss x 3-4 months. Pt found to have 5 areas of brain metastases, largest in the  L cerebellum, on outpatient MRI today. Patient sent to ER for neurosurgery admission. (10 Familia 2024 15:57)    OVERNIGHT EVENTS: Seen and examined in 8LA. Denies HA, N/V, chest pain, shortness of breath, new weakness or numbness.     HOSPITAL COURSE:  1/10: admitted to [facility], preop for OR tomorrow. Pending CT A/P and KUB. Recieved decadron 10 IV in ER and started on decadron 4q6.   1/11: POD#0 s/p restrosig crani resection of tumor (1/11-) frozen NSCC , Dex taper x 1 week to 2 BID, SBP<140, pending MRI brain, 250cc 3% bolus for Na 134.   1/12: POD 1. pending SLP.  pending MRI. SD status. CT chest completed w new PE b/l segmental RLL and subsegmental in LLL, worsening mediastinal LAD, evolution of radiation induced pulmonary fibrosis, small b/l pleural effusions. PM Na 139.  1/13: POD 2. Headwrap removed. Cleared for home pt with rolling walker. LE dopplers negative for DVT. Rad/onc consulted.   1/14: POD 3. APRYL overnight, neuro stable. Heparin gtt started for PE.  1/15: POD 4. Hep gtt inc to 10ml/hr. Repeat PTT @ 2AM. Lactulose 10 in AM, pending BM. Heparin gtt increased to 12 mL/hr, repeat PTT 29.3. 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 Lactulose for BM. PTT subtherapeutic, increased hep gtt to 1500.   1/16: POD 5. Inc heparin gtt to 16 ml/hr i/s/o PTT 45.6. Repeat PTT @ 4AM. Suppository for BM. (+) BM.     Vital Signs Last 24 Hrs  T(C): 36.7 (15 Familia 2024 21:25), Max: 36.7 (15 Familia 2024 14:00)  T(F): 98.1 (15 Familia 2024 21:25), Max: 98.1 (15 Familia 2024 14:00)  HR: 76 (15 Familia 2024 20:20) (58 - 78)  BP: 126/64 (15 Familia 2024 20:20) (125/60 - 140/74)  BP(mean): 89 (15 Familia 2024 20:20) (86 - 100)  RR: 18 (15 Familia 2024 20:20) (17 - 18)  SpO2: 95% (15 Familia 2024 20:20) (92% - 98%)    Parameters below as of 15 Familia 2024 20:20  Patient On (Oxygen Delivery Method): room air        I&O's Summary    14 Jan 2024 07:01  -  15 Familia 2024 07:00  --------------------------------------------------------  IN: 487 mL / OUT: 2525 mL / NET: -2038 mL    15 Familia 2024 07:01  -  16 Jan 2024 00:07  --------------------------------------------------------  IN: 1032 mL / OUT: 1625 mL / NET: -593 mL        PHYSICAL EXAM:  General: NAD, pt is comfortably sitting up in bed, on room air  HEENT: (+) slight CN VI palsy, (+) horizontal diplopia, o/w CN II-XII grossly intact. PERRL 3mm briskly reactive, face symmetric, tongue midline, neck FROM  Cardiovascular: RRR, S1, S2  Respiratory: breathing non-labored on RA, chest rise symmetric  GI: abd soft, (+) distended, non-tender  Neuro: A&Ox3, No aphasia, speech clear, (+) b/l UE dysmetria, (+) FLORENTIN pronator drift. Follows commands.  BANDA x4 spontaneously, 5/5 strength in all extremities throughout. Sensation intact  Extremities: distal pulses 2+ x4  Wound/incision:  Drain:     TUBES/LINES:  [] Martinez  [] Wound Drains  [] Others      DIET:  [] NPO  [x] Mechanical  [] Tube feeds    LABS:                        10.8   12.44 )-----------( 185      ( 15 Familia 2024 05:30 )             34.1     01-15    132<L>  |  96  |  24<H>  ----------------------------<  125<H>  4.1   |  29  |  0.51    Ca    9.1      15 Familia 2024 05:30  Phos  2.7     01-15  Mg     1.8     01-15      PT/INR - ( 14 Jan 2024 10:00 )   PT: 10.4 sec;   INR: 0.91          PTT - ( 15 Familia 2024 20:38 )  PTT:45.6 sec  Urinalysis Basic - ( 15 Familia 2024 05:30 )    Color: x / Appearance: x / SG: x / pH: x  Gluc: 125 mg/dL / Ketone: x  / Bili: x / Urobili: x   Blood: x / Protein: x / Nitrite: x   Leuk Esterase: x / RBC: x / WBC x   Sq Epi: x / Non Sq Epi: x / Bacteria: x          CAPILLARY BLOOD GLUCOSE      POCT Blood Glucose.: 126 mg/dL (15 Familia 2024 21:48)  POCT Blood Glucose.: 126 mg/dL (15 Familia 2024 15:54)  POCT Blood Glucose.: 117 mg/dL (15 Familia 2024 11:22)  POCT Blood Glucose.: 119 mg/dL (15 Familia 2024 05:31)      Drug Levels: [] N/A    CSF Analysis: [] N/A      Allergies    No Known Allergies    Intolerances      MEDICATIONS:  Antibiotics:    Neuro:  oxyCODONE    IR 5 milliGRAM(s) Oral every 4 hours PRN    Anticoagulation:  heparin  Infusion 1600 Unit(s)/Hr IV Continuous <Continuous>    OTHER:  bisacodyl 5 milliGRAM(s) Oral daily PRN  bisacodyl Suppository 10 milliGRAM(s) Rectal daily PRN  dexAMETHasone  Injectable   IV Push   dexAMETHasone  Injectable 3 milliGRAM(s) IV Push every 8 hours  influenza  Vaccine (HIGH DOSE) 0.7 milliLiter(s) IntraMuscular once  pantoprazole  Injectable 40 milliGRAM(s) IV Push daily  polyethylene glycol 3350 17 Gram(s) Oral two times a day  saline laxative (FLEET) Rectal Enema 1 Enema Rectal once PRN  senna 2 Tablet(s) Oral at bedtime  tamsulosin 0.4 milliGRAM(s) Oral at bedtime    IVF:  multivitamin 1 Tablet(s) Oral daily    CULTURES:  Culture Results:   No growth at 5 days. (01-07 @ 22:48)  Culture Results:   No growth at 5 days. (01-07 @ 22:48)    RADIOLOGY & ADDITIONAL TESTS:      ASSESSMENT:  79 y/o M with pmh melanoma (L arm skin resection 2023), basal cell carcinoma, non small cell lung cancer - adenocarcinoma (s/p radiation chemo, completed 10/2023), and BPH presents with loss of balance, HA, fatigue, generalized weakness, diplopia and b/l foot tingling x 2 weeks and nausea/vomiting x 2-4 days. Pt found to have 5 areas of brain metastases, largest in the  L cerebellum, on outpatient MRI. Now s/p left restrosigmoid crani resection of tumor (1/11), frozen: mets.     LUNG CANCER    Handoff    MEWS Score    BPH (benign prostatic hyperplasia)    Lung mass    Prediabetes    Lung cancer metastatic to brain    Skin melanoma    Basal cell carcinoma    Lung cancer metastatic to brain    Craniotomy for suboccipital neoplasm    History of dental surgery    S/P skin cancer resection    PRE OP    2    SysAdmin_VstLnk        PLAN:  Neuro:  - neuro/vital checks q4  - pain control w/ cranial ERAS  - Dex taper over 1 week to 2 BID  - MRI postop completed 1/12, pending read    Cardiac:  - SBP goal 100-160    Pulmonary:  - on RA  - encourage IS    GI:  - regular diet  - bowel regimen, BM 1/15 s/p suppository  - protonix while on steroids    Renal:  - IVL, voiding  - cont home flomax    Endo:  - ISS, A1C 5.9    Heme:  - SCDs,   - CT A/P 1/10 for metastatic workup: negative  - CT chest 1/12: new PE b/l segmental RLL and subsegmental in LLL, worsening mediastinal LAD, evolution of radiation induced pulmonary fibrosis, small b/l pleural effusions  - BL LE dopplers 1/13: negative for DVT   - Heme/onc following  - Rad Onc consulted, f/u recs  - hep gtt started for PE 1/14    ID:  -afebrile    Disposition: SD status, full code, PT/OT: home PT with rolling walker    D/w Dr. D'Amico   DVT PROPHYLAXIS:  [] Venodynes                                [x] Heparin/Lovenox    DISPOSITION: SD status, full code, PT/OT: home PT with rolling walker    Assessment:  Present when checked    []  GCS  E   V  M     Heart Failure: []Acute, [] acute on chronic , []chronic  Heart Failure:  [] Diastolic (HFpEF), [] Systolic (HFrEF), []Combined (HFpEF and HFrEF), [] RHF, [] Pulm HTN, [] Other    [] MEHREEN, [] ATN, [] AIN, [] other  [] CKD1, [] CKD2, [] CKD 3, [] CKD 4, [] CKD 5, []ESRD    Encephalopathy: [] Metabolic, [] Hepatic, [] toxic, [] Neurological, [] Other    Abnormal Nurtitional Status: [] malnurtition (see nutrition note), [ ]underweight: BMI < 19, [] morbid obesity: BMI >40, [] Cachexia    [] Sepsis  [] hypovolemic shock,[] cardiogenic shock, [] hemorrhagic shock, [] neuogenic shock  [] Acute Respiratory Failure  []Cerebral edema, [] Brain compression/ herniation,   [] Functional quadriplegia  [] Acute blood loss anemia

## 2024-01-16 NOTE — CONSULT NOTE ADULT - SUBJECTIVE AND OBJECTIVE BOX
Radiation Oncology Consult Note    HPI:  79 y/o M with pmh melanoma (L arm skin resection 2023), basal cell carcinoma, non small cell lung cancer - adenocarcinoma (s/p radiation chemo, completed 10/2023), and BPH presents with loss of balance, HA, fatigue, generalized weakness, diplopia and b/l foot tingling x 2 weeks and nausea/vomiting x 3-4 days. Patient also admits to mild memory loss x 3-4 months. Pt found to have 5 areas of brain metastases, largest in the L cerebellum, on outpatient MRI. Patient sent to ER for neurosurgery admission 1/10/2024. Patient referred by Dr. D'Amico for the consideration of radiation therapy post craniotomy 1/11/24.     01/10/2024 MRI of Brain outpatient:  FINDINGS:  Since prior examination, there is interval development of multiple ring-enhancing lesions suspicious for intracranial metastases. There is a peripherally enhancing mass within the left cerebellar hemisphere which measures 3.5 cm transverse by 3.1 cm AP by 2.5 cm craniocaudal (series 13 image 48 and series 15 image 71). There is an exophytic mass arising from the right hemipons which measures 1.6 x 2.5 x 2.4 cm. There is significant vasogenic edema involving the left cerebellar hemisphere and brainstem. There is partial effacement of the fourth ventricle with interval dilatation of the lateral and third ventricles consistent with hydrocephalus.    There is a 1.2 x 1.2 x 1.2 cm ring-enhancing lesion in the left frontal lobe (series 13 image 104). There is a 0.8 cm enhancing lesion in the left occipital lobe (series 13 image 96). There is a 0.6 cm enhancing lesion in the inferior right cerebellar hemisphere (series 13 image 30).    There is vasogenic edema surrounding the left occipital left frontal lobe lesions without significant mass effect.    There is no evidence of recent infarction diffusion-weighted imaging. There is susceptibility related signal loss in the left cerebellar lesion consistent with hemorrhagic metastasis.    IMPRESSION:  Since prior MRI brain 10/5/2023, interval development of 5 enhancing lesion suspicious for intracranial metastasis. The largest lesions include a 3.5 cm left cerebellar mass and a 2.5 cm exophytic mass arising from the right hemipons. There is resultant mass effect with partial effacement of the fourth ventricle and dilatation of the lateral and third ventricles consistent with hydrocephalus. Per the electronic medical record, the neurosurgical team was aware of the findings at the time of dictation.    01/12/2024 MRI of Brain Post op:  FINDINGS:   MRI dated 01/10/2024 available for review.    The brain demonstrates interval LEFT retrosigmoid suboccipital   craniectomy with resection of previously noted cystic neoplasm.   Hemorrhage and fluid is seen with in the surgical cavity with   pneumocephalus throughout the brain. Interval decrease of edema with   decreased mass effect on the fourth ventricle. The 1.6 x 2.5 cm   heterogeneous mass in the RIGHT po is unchanged with moderate edema and   mass effect on the RIGHT aspect of the fourth ventricle. The 1.3 cm   cystic lesion in the LEFT frontal lobe and 7 mm cystic lesion in LEFT   occipital lobe are unchanged. Mild periventricular and mild to moderate   deep white matter ischemia is again noted. No acute cerebral cortical   infarct is found.   No intracranial hemorrhage is recognized.  No mass   effect is found in the brain.    The ventricles, sulci and basal cisterns appear unremarkable.    The vertebral and internal carotid arteries demonstrate expected flow   voids indicating their patency.    The orbits are unremarkable.  The paranasal sinuses are clear.  The nasal   cavity appears intact.  The nasopharynx is symmetric.  The central skull   base and petrous temporal bones are intact.    IMPRESSION:  Interval LEFT retrosigmoid suboccipital craniectomy with   resection of previously noted cystic neoplasm. Hemorrhage and fluid is   seen with in the surgical cavity with pneumocephalus throughout the   brain. Interval decrease of edema with decreased mass effect on the   fourth ventricle. The 1.6 x 2.5 cm heterogeneous mass in the RIGHT po   is unchanged with moderate edema and mass effect on the RIGHT aspect of   the fourth ventricle. The 1.3 cm cystic lesion in the LEFT frontal lobe   and 7 mm cystic lesion in LEFT occipital lobe are unchanged. Mild   periventricular and mild to moderate deep white matter ischemia is again   noted.    01/12/2024 CT Chest/AP:  FINDINGS:    LUNGS AND AIRWAYS: Right upper lobe lung cancer has decreased in size   from 31 x 28 mm to 26 x 24 mm.  There is again a small cavity containing   gas and fluid along the superior aspect of the nodule. Evolution of   radiation induced lung disease in right upper lobe and right middle lobe,   with decreased consolidation and new volume loss and linear scarring,   consistent with radiation induced pulmonary fibrosis. Radiation induced   pulmonary fibrosis has developed in the superior segment of the right   lower lobe. Small amount of passive atelectasis adjacent to small   bilateral pleural effusions. A few bulla are again present in each upper   lobe.  PLEURA: Small right pleural effusion again present. New small left   pleural effusion. Pleural thickening again present adjacent to the   treated tumor in the right upper lobe.  MEDIASTINUM AND JANES: Increase in size of a centrally necrotic lymph node   in the subcarinal region from 2.2 x 1.9 cm to 3.4 x 3.1 cm. This   lymphadenopathy extends into the right lower paratracheal region. New   centrally necrotic paraesophageal lymphadenopathy has developed,   measuring 3.2 x 2.3 cm.  VESSELS: New filling defects in segmental branches of the pulmonary   artery to the right lower lobe (3/45) and subsegmental branches in left   lower lobe (3/53), consistent with pulmonary emboli. Atherosclerotic   changes of the aorta and coronary arteries.  HEART: Heart size is normal. No pericardial effusion. The right ventricle   is larger than the left ventricle.  CHEST WALL AND LOWER NECK: A few thyroid nodules are again present, the   largest measuring 1.1 cm in the right lobe.  VISUALIZED UPPER ABDOMEN: A few cysts are again noted in the liver and   left kidney. Colonic diverticulosis.  BONES: Degenerative changes.    IMPRESSION:  1. Since 11/27/2023, there are new pulmonary emboli bilaterally.    2. Worsening of mediastinal lymphadenopathy.    3. 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 The treated tumor   has decreased in size.    4. Small bilateral pleural effusions.        Allergies    No Known Allergies      MEDICATIONS  (STANDING):  dexAMETHasone  Injectable   IV Push   dexAMETHasone  Injectable 3 milliGRAM(s) IV Push every 8 hours  heparin  Infusion 1600 Unit(s)/Hr (16 mL/Hr) IV Continuous <Continuous>  influenza  Vaccine (HIGH DOSE) 0.7 milliLiter(s) IntraMuscular once  multivitamin 1 Tablet(s) Oral daily  pantoprazole  Injectable 40 milliGRAM(s) IV Push daily  polyethylene glycol 3350 17 Gram(s) Oral two times a day  senna 2 Tablet(s) Oral at bedtime  tamsulosin 0.4 milliGRAM(s) Oral at bedtime    MEDICATIONS  (PRN):  bisacodyl 5 milliGRAM(s) Oral daily PRN Constipation  bisacodyl Suppository 10 milliGRAM(s) Rectal daily PRN Constipation  oxyCODONE    IR 5 milliGRAM(s) Oral every 4 hours PRN Severe Pain (7 - 10)  saline laxative (FLEET) Rectal Enema 1 Enema Rectal once PRN If no BM by AM 1/16      PAST MEDICAL & SURGICAL HISTORY:  BPH (benign prostatic hyperplasia)  Prediabetes  Lung cancer metastatic to brain  Skin melanoma  Basal cell carcinoma  History of dental surgery  S/P skin cancer resection  SOCIAL HISTORY: No EtOH, no tobacco    REVIEW OF SYSTEMS:    CONSTITUTIONAL: No weakness, fevers or chills  EYES/ENT: No visual changes;  No vertigo or throat pain   NECK: No pain or stiffness  RESPIRATORY: No cough, wheezing, hemoptysis; No shortness of breath  CARDIOVASCULAR: No chest pain or palpitations  GASTROINTESTINAL: No abdominal or epigastric pain. No nausea, vomiting, or hematemesis; No diarrhea or constipation. No melena or hematochezia.  GENITOURINARY: No dysuria, frequency or hematuria  NEUROLOGICAL: No numbness or weakness  SKIN: No itching, burning, rashes, or lesions   All other review of systems is negative unless indicated above.        T(F): 98.2 (01-16-24 @ 04:25), Max: 98.2 (01-16-24 @ 04:25)  HR: 60 (01-16-24 @ 04:00)  BP: 129/62 (01-16-24 @ 04:00)  RR: 16 (01-16-24 @ 04:00)  SpO2: 96% (01-16-24 @ 04:00)  Wt(kg): --    GENERAL: NAD, well-developed  HEAD:  Atraumatic, Normocephalic  EYES: EOMI, PERRLA, conjunctiva and sclera clear  NECK: Supple, No JVD  CHEST/LUNG: Clear to auscultation bilaterally; No wheeze  HEART: Regular rate and rhythm; No murmurs, rubs, or gallops  ABDOMEN: Soft, Nontender, Nondistended; Bowel sounds present  EXTREMITIES:  2+ Peripheral Pulses, No clubbing, cyanosis, or edema  NEUROLOGY: non-focal  SKIN: No rashes or lesions                          11.6   13.60 )-----------( 229      ( 16 Jan 2024 02:26 )             35.6       01-16    133<L>  |  97  |  27<H>  ----------------------------<  137<H>  4.4   |  28  |  0.49<L>    Ca    9.2      16 Jan 2024 02:26  Phos  2.5     01-16  Mg     1.8     01-16      Magnesium: 1.8 mg/dL (01-16 @ 02:28)  Phosphorus: 2.5 mg/dL (01-16 @ 02:26)     Radiation Oncology Consult Note    HPI:  77 y/o M with pmh melanoma (L arm skin resection 2023), basal cell carcinoma, non small cell lung cancer - adenocarcinoma (s/p radiation chemo, completed 10/2023), and BPH presents with loss of balance, HA, fatigue, generalized weakness, diplopia and b/l foot tingling x 2 weeks and nausea/vomiting x 3-4 days. Patient also admits to mild memory loss x 3-4 months. Pt found to have 5 areas of brain metastases, largest in the L cerebellum, on outpatient MRI. Patient sent to ER for neurosurgery admission 1/10/2024. Patient referred by Dr. D'Amico for the consideration of radiation therapy post craniotomy 1/11/24.     01/10/2024 MRI of Brain outpatient:  FINDINGS:  Since prior examination, there is interval development of multiple ring-enhancing lesions suspicious for intracranial metastases. There is a peripherally enhancing mass within the left cerebellar hemisphere which measures 3.5 cm transverse by 3.1 cm AP by 2.5 cm craniocaudal (series 13 image 48 and series 15 image 71). There is an exophytic mass arising from the right hemipons which measures 1.6 x 2.5 x 2.4 cm. There is significant vasogenic edema involving the left cerebellar hemisphere and brainstem. There is partial effacement of the fourth ventricle with interval dilatation of the lateral and third ventricles consistent with hydrocephalus.    There is a 1.2 x 1.2 x 1.2 cm ring-enhancing lesion in the left frontal lobe (series 13 image 104). There is a 0.8 cm enhancing lesion in the left occipital lobe (series 13 image 96). There is a 0.6 cm enhancing lesion in the inferior right cerebellar hemisphere (series 13 image 30).    There is vasogenic edema surrounding the left occipital left frontal lobe lesions without significant mass effect.    There is no evidence of recent infarction diffusion-weighted imaging. There is susceptibility related signal loss in the left cerebellar lesion consistent with hemorrhagic metastasis.    IMPRESSION:  Since prior MRI brain 10/5/2023, interval development of 5 enhancing lesion suspicious for intracranial metastasis. The largest lesions include a 3.5 cm left cerebellar mass and a 2.5 cm exophytic mass arising from the right hemipons. There is resultant mass effect with partial effacement of the fourth ventricle and dilatation of the lateral and third ventricles consistent with hydrocephalus. Per the electronic medical record, the neurosurgical team was aware of the findings at the time of dictation.    01/12/2024 MRI of Brain Post op:  FINDINGS:   MRI dated 01/10/2024 available for review.    The brain demonstrates interval LEFT retrosigmoid suboccipital   craniectomy with resection of previously noted cystic neoplasm.   Hemorrhage and fluid is seen with in the surgical cavity with   pneumocephalus throughout the brain. Interval decrease of edema with   decreased mass effect on the fourth ventricle. The 1.6 x 2.5 cm   heterogeneous mass in the RIGHT po is unchanged with moderate edema and   mass effect on the RIGHT aspect of the fourth ventricle. The 1.3 cm   cystic lesion in the LEFT frontal lobe and 7 mm cystic lesion in LEFT   occipital lobe are unchanged. Mild periventricular and mild to moderate   deep white matter ischemia is again noted. No acute cerebral cortical   infarct is found.   No intracranial hemorrhage is recognized.  No mass   effect is found in the brain.    The ventricles, sulci and basal cisterns appear unremarkable.    The vertebral and internal carotid arteries demonstrate expected flow   voids indicating their patency.    The orbits are unremarkable.  The paranasal sinuses are clear.  The nasal   cavity appears intact.  The nasopharynx is symmetric.  The central skull   base and petrous temporal bones are intact.    IMPRESSION:  Interval LEFT retrosigmoid suboccipital craniectomy with   resection of previously noted cystic neoplasm. Hemorrhage and fluid is   seen with in the surgical cavity with pneumocephalus throughout the   brain. Interval decrease of edema with decreased mass effect on the   fourth ventricle. The 1.6 x 2.5 cm heterogeneous mass in the RIGHT po   is unchanged with moderate edema and mass effect on the RIGHT aspect of   the fourth ventricle. The 1.3 cm cystic lesion in the LEFT frontal lobe   and 7 mm cystic lesion in LEFT occipital lobe are unchanged. Mild   periventricular and mild to moderate deep white matter ischemia is again   noted.    01/12/2024 CT Chest/AP:  FINDINGS:    LUNGS AND AIRWAYS: Right upper lobe lung cancer has decreased in size   from 31 x 28 mm to 26 x 24 mm.  There is again a small cavity containing   gas and fluid along the superior aspect of the nodule. Evolution of   radiation induced lung disease in right upper lobe and right middle lobe,   with decreased consolidation and new volume loss and linear scarring,   consistent with radiation induced pulmonary fibrosis. Radiation induced   pulmonary fibrosis has developed in the superior segment of the right   lower lobe. Small amount of passive atelectasis adjacent to small   bilateral pleural effusions. A few bulla are again present in each upper   lobe.  PLEURA: Small right pleural effusion again present. New small left   pleural effusion. Pleural thickening again present adjacent to the   treated tumor in the right upper lobe.  MEDIASTINUM AND JANES: Increase in size of a centrally necrotic lymph node   in the subcarinal region from 2.2 x 1.9 cm to 3.4 x 3.1 cm. This   lymphadenopathy extends into the right lower paratracheal region. New   centrally necrotic paraesophageal lymphadenopathy has developed,   measuring 3.2 x 2.3 cm.  VESSELS: New filling defects in segmental branches of the pulmonary   artery to the right lower lobe (3/45) and subsegmental branches in left   lower lobe (3/53), consistent with pulmonary emboli. Atherosclerotic   changes of the aorta and coronary arteries.  HEART: Heart size is normal. No pericardial effusion. The right ventricle   is larger than the left ventricle.  CHEST WALL AND LOWER NECK: A few thyroid nodules are again present, the   largest measuring 1.1 cm in the right lobe.  VISUALIZED UPPER ABDOMEN: A few cysts are again noted in the liver and   left kidney. Colonic diverticulosis.  BONES: Degenerative changes.    IMPRESSION:  1. Since 11/27/2023, there are new pulmonary emboli bilaterally.    2. Worsening of mediastinal lymphadenopathy.    3. 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 The treated tumor   has decreased in size.    4. Small bilateral pleural effusions.        Allergies    No Known Allergies      MEDICATIONS  (STANDING):  dexAMETHasone  Injectable   IV Push   dexAMETHasone  Injectable 3 milliGRAM(s) IV Push every 8 hours  heparin  Infusion 1600 Unit(s)/Hr (16 mL/Hr) IV Continuous <Continuous>  influenza  Vaccine (HIGH DOSE) 0.7 milliLiter(s) IntraMuscular once  multivitamin 1 Tablet(s) Oral daily  pantoprazole  Injectable 40 milliGRAM(s) IV Push daily  polyethylene glycol 3350 17 Gram(s) Oral two times a day  senna 2 Tablet(s) Oral at bedtime  tamsulosin 0.4 milliGRAM(s) Oral at bedtime    MEDICATIONS  (PRN):  bisacodyl 5 milliGRAM(s) Oral daily PRN Constipation  bisacodyl Suppository 10 milliGRAM(s) Rectal daily PRN Constipation  oxyCODONE    IR 5 milliGRAM(s) Oral every 4 hours PRN Severe Pain (7 - 10)  saline laxative (FLEET) Rectal Enema 1 Enema Rectal once PRN If no BM by AM 1/16      PAST MEDICAL & SURGICAL HISTORY:  BPH (benign prostatic hyperplasia)  Prediabetes  Lung cancer metastatic to brain  Skin melanoma  Basal cell carcinoma  History of dental surgery  S/P skin cancer resection  SOCIAL HISTORY: No EtOH, no tobacco    REVIEW OF SYSTEMS:    CONSTITUTIONAL: No weakness, fevers or chills  EYES/ENT: No visual changes;  No vertigo or throat pain   NECK: No pain or stiffness  RESPIRATORY: No cough, wheezing, hemoptysis; No shortness of breath  CARDIOVASCULAR: No chest pain or palpitations  GASTROINTESTINAL: No abdominal or epigastric pain. No nausea, vomiting, or hematemesis; No diarrhea or constipation. No melena or hematochezia.  GENITOURINARY: No dysuria, frequency or hematuria  NEUROLOGICAL: No numbness or weakness  SKIN: No itching, burning, rashes, or lesions   All other review of systems is negative unless indicated above.        T(F): 98.2 (01-16-24 @ 04:25), Max: 98.2 (01-16-24 @ 04:25)  HR: 60 (01-16-24 @ 04:00)  BP: 129/62 (01-16-24 @ 04:00)  RR: 16 (01-16-24 @ 04:00)  SpO2: 96% (01-16-24 @ 04:00)  Wt(kg): --    GENERAL: NAD, well-developed  HEAD:  Atraumatic, Normocephalic  EYES: EOMI, PERRLA, conjunctiva and sclera clear  NECK: Supple, No JVD  CHEST/LUNG: Clear to auscultation bilaterally; No wheeze  HEART: Regular rate and rhythm; No murmurs, rubs, or gallops  ABDOMEN: Soft, Nontender, Nondistended; Bowel sounds present  EXTREMITIES:  2+ Peripheral Pulses, No clubbing, cyanosis, or edema  NEUROLOGY: non-focal  SKIN: No rashes or lesions                          11.6   13.60 )-----------( 229      ( 16 Jan 2024 02:26 )             35.6       01-16    133<L>  |  97  |  27<H>  ----------------------------<  137<H>  4.4   |  28  |  0.49<L>    Ca    9.2      16 Jan 2024 02:26  Phos  2.5     01-16  Mg     1.8     01-16      Magnesium: 1.8 mg/dL (01-16 @ 02:28)  Phosphorus: 2.5 mg/dL (01-16 @ 02:26)

## 2024-01-16 NOTE — CONSULT NOTE ADULT - SUBJECTIVE AND OBJECTIVE BOX
HPI:  77 y/o M with pmh melanoma (L arm skin resection 2023), basal cell carcinoma, non small cell lung cancer - adenocarcinoma (s/p radiation chemo, completed 10/2023), and BPH presents with loss of balance, HA, fatigue, generalized weakness, diplopia and b/l foot tingling x 2 weeks and nausea/vomiting x 3-4 days, mild memory loss x 3-4 months. Pt found to have 5 areas of brain metastases, largest in the  L cerebellum, on outpatient MRI . Patient sent to ER for neurosurgery admission. (10 Familia 2024 15:57)  -----------------------------------------------------------------------  INTERVAL COURSE / SUBJECTIVE:  Patient underwent left retrosigmoid craniotomy for tumor resection on 1/11 with Dr. D'Amico; Frozen path consistent with mets.   Chest CT 1/12 with new PEs, started on heparin gtt  Hyponatremia consistent with SIADH, on fluid restriction.  Rad Onc consulted, pending recommendations.   Worked with PT/OT - recommended home with home therapy.  -----------------------------------------------------------------------  REVIEW OF SYSTEMS  Constitutional - No fever,  +fatigue  Neurological -   Pain -   Bowel - +BM 1/16  Bladder -   -----------------------------------------------------------------------  FUNCTIONAL HISTORY:  Lives in a 6th floor walk up apartment building with sister. Patient was independent with all ADL's, IADL's and functional mobility with no AD prior to admission however the week before admission requiring additional assistance from sister and daughter for IADL's. Patient's daughter will be staying at his apartment upon d/c to assist as needed. Patient is R hand dominant, wears glasses and has a bathtub shower    CURRENT FUNCTIONAL STATUS:    Physical Therapy: 1/13    Gait Skills:   · Level of [place]	contact guard  · Physical Assist/Nonphysical Assist	verbal cues  · Assistive Device	IV pole  · Gait Distance	75 feet    Occupational Therapy: 1/15  Transfer: Sit to Stand:     · Level of [place]	contact guard  · Physical Assist/Nonphysical Assist	verbal cues  · Weight-Bearing Restrictions	weight-bearing as tolerated  · Assistive Device	rolling walker    Transfer: Stand to Sit:     · Level of [place]	contact guard  · Physical Assist/Nonphysical Assist	verbal cues  · Weight-Bearing Restrictions	weight-bearing as tolerated  · Assistive Device	rolling walker    Upper Body Dressing Training:   · Level of [place]	supervision; donning back gown while seated upright    Lower Body Dressing Training:   · Level of [place]	supervision; doffing/donning b/l socks while seated in bedside chair utilizing cross legged method    -----------------------------------------------------------------------  PAST MEDICAL & SURGICAL HISTORY  BPH (benign prostatic hyperplasia)  Lung mass  Prediabetes  Lung cancer metastatic to brain  Skin melanoma  Basal cell carcinoma  History of dental surgery  S/P skin cancer resection    FAMILY HISTORY     SOCIAL HISTORY  as above  -----------------------------------------------------------------------  VITALS  T(C): 36.8 (01-16-24 @ 04:25), Max: 36.8 (01-16-24 @ 04:25)  HR: 60 (01-16-24 @ 04:00) (58 - 78)  BP: 129/62 (01-16-24 @ 04:00) (126/64 - 140/74)  RR: 16 (01-16-24 @ 04:00) (16 - 18)  SpO2: 96% (01-16-24 @ 04:00) (95% - 98%)  Wt(kg): --    PHYSICAL EXAM    -----------------------------------------------------------------------  RECENT LABS  CBC Full  -  ( 16 Jan 2024 02:26 )  WBC Count : 13.60 K/uL  RBC Count : 3.85 M/uL  Hemoglobin : 11.6 g/dL  Hematocrit : 35.6 %  Platelet Count - Automated : 229 K/uL  Mean Cell Volume : 92.5 fl  Mean Cell Hemoglobin : 30.1 pg  Mean Cell Hemoglobin Concentration : 32.6 gm/dL  Auto Neutrophil # : x  Auto Lymphocyte # : x  Auto Monocyte # : x  Auto Eosinophil # : x  Auto Basophil # : x  Auto Neutrophil % : x  Auto Lymphocyte % : x  Auto Monocyte % : x  Auto Eosinophil % : x  Auto Basophil % : x    01-16    133<L>  |  97  |  27<H>  ----------------------------<  137<H>  4.4   |  28  |  0.49<L>    Ca    9.2      16 Jan 2024 02:26  Phos  2.5     01-16  Mg     1.8     01-16      Urinalysis Basic - ( 16 Jan 2024 02:26 )    Color: x / Appearance: x / SG: x / pH: x  Gluc: 137 mg/dL / Ketone: x  / Bili: x / Urobili: x   Blood: x / Protein: x / Nitrite: x   Leuk Esterase: x / RBC: x / WBC x   Sq Epi: x / Non Sq Epi: x / Bacteria: x      -----------------------------------------------------------------------  IMAGING:    -----------------------------------------------------------------------  ALLERGIES  No Known Allergies      MEDICATIONS   bisacodyl 5 milliGRAM(s) Oral daily PRN  bisacodyl Suppository 10 milliGRAM(s) Rectal daily PRN  dexAMETHasone  Injectable   IV Push   dexAMETHasone  Injectable 3 milliGRAM(s) IV Push every 8 hours  heparin  Infusion 1600 Unit(s)/Hr IV Continuous <Continuous>  influenza  Vaccine (HIGH DOSE) 0.7 milliLiter(s) IntraMuscular once  multivitamin 1 Tablet(s) Oral daily  oxyCODONE    IR 5 milliGRAM(s) Oral every 4 hours PRN  pantoprazole  Injectable 40 milliGRAM(s) IV Push daily  polyethylene glycol 3350 17 Gram(s) Oral two times a day  saline laxative (FLEET) Rectal Enema 1 Enema Rectal once PRN  senna 2 Tablet(s) Oral at bedtime  tamsulosin 0.4 milliGRAM(s) Oral at bedtime    -----------------------------------------------------------------------   HPI:  79 y/o M with pmh melanoma (L arm skin resection 2023), basal cell carcinoma, non small cell lung cancer - adenocarcinoma (s/p radiation chemo, completed 10/2023), and BPH presents with loss of balance, HA, fatigue, generalized weakness, diplopia and b/l foot tingling x 2 weeks and nausea/vomiting x 3-4 days, mild memory loss x 3-4 months. Pt found to have 5 areas of brain metastases, largest in the  L cerebellum, on outpatient MRI . Patient sent to ER for neurosurgery admission. (10 Familia 2024 15:57)  -----------------------------------------------------------------------  INTERVAL COURSE / SUBJECTIVE:  Patient underwent left retrosigmoid craniotomy for tumor resection on 1/11 with Dr. D'Amico; Frozen path consistent with mets.   Chest CT 1/12 with new PEs, started on heparin gtt  Hyponatremia consistent with SIADH, on fluid restriction.  Rad Onc consulted, pending recommendations.   Worked with PT/OT - recommended home with home therapy.  -----------------------------------------------------------------------  REVIEW OF SYSTEMS  Constitutional - No fever,  +fatigue  Neurological -   Pain -   Bowel - +BM 1/16  Bladder -   -----------------------------------------------------------------------  FUNCTIONAL HISTORY:  Lives in a 6th floor walk up apartment building with sister. Patient was independent with all ADL's, IADL's and functional mobility with no AD prior to admission however the week before admission requiring additional assistance from sister and daughter for IADL's. Patient's daughter will be staying at his apartment upon d/c to assist as needed. Patient is R hand dominant, wears glasses and has a bathtub shower    CURRENT FUNCTIONAL STATUS:    Physical Therapy: 1/13    Gait Skills:   · Level of [place]	contact guard  · Physical Assist/Nonphysical Assist	verbal cues  · Assistive Device	IV pole  · Gait Distance	75 feet    Occupational Therapy: 1/15  Transfer: Sit to Stand:     · Level of [place]	contact guard  · Physical Assist/Nonphysical Assist	verbal cues  · Weight-Bearing Restrictions	weight-bearing as tolerated  · Assistive Device	rolling walker    Transfer: Stand to Sit:     · Level of [place]	contact guard  · Physical Assist/Nonphysical Assist	verbal cues  · Weight-Bearing Restrictions	weight-bearing as tolerated  · Assistive Device	rolling walker    Upper Body Dressing Training:   · Level of [place]	supervision; donning back gown while seated upright    Lower Body Dressing Training:   · Level of [place]	supervision; doffing/donning b/l socks while seated in bedside chair utilizing cross legged method    -----------------------------------------------------------------------  PAST MEDICAL & SURGICAL HISTORY  BPH (benign prostatic hyperplasia)  Lung mass  Prediabetes  Lung cancer metastatic to brain  Skin melanoma  Basal cell carcinoma  History of dental surgery  S/P skin cancer resection    FAMILY HISTORY     SOCIAL HISTORY  as above  -----------------------------------------------------------------------  VITALS  T(C): 36.8 (01-16-24 @ 04:25), Max: 36.8 (01-16-24 @ 04:25)  HR: 60 (01-16-24 @ 04:00) (58 - 78)  BP: 129/62 (01-16-24 @ 04:00) (126/64 - 140/74)  RR: 16 (01-16-24 @ 04:00) (16 - 18)  SpO2: 96% (01-16-24 @ 04:00) (95% - 98%)  Wt(kg): --    PHYSICAL EXAM    -----------------------------------------------------------------------  RECENT LABS  CBC Full  -  ( 16 Jan 2024 02:26 )  WBC Count : 13.60 K/uL  RBC Count : 3.85 M/uL  Hemoglobin : 11.6 g/dL  Hematocrit : 35.6 %  Platelet Count - Automated : 229 K/uL  Mean Cell Volume : 92.5 fl  Mean Cell Hemoglobin : 30.1 pg  Mean Cell Hemoglobin Concentration : 32.6 gm/dL  Auto Neutrophil # : x  Auto Lymphocyte # : x  Auto Monocyte # : x  Auto Eosinophil # : x  Auto Basophil # : x  Auto Neutrophil % : x  Auto Lymphocyte % : x  Auto Monocyte % : x  Auto Eosinophil % : x  Auto Basophil % : x    01-16    133<L>  |  97  |  27<H>  ----------------------------<  137<H>  4.4   |  28  |  0.49<L>    Ca    9.2      16 Jan 2024 02:26  Phos  2.5     01-16  Mg     1.8     01-16      Urinalysis Basic - ( 16 Jan 2024 02:26 )    Color: x / Appearance: x / SG: x / pH: x  Gluc: 137 mg/dL / Ketone: x  / Bili: x / Urobili: x   Blood: x / Protein: x / Nitrite: x   Leuk Esterase: x / RBC: x / WBC x   Sq Epi: x / Non Sq Epi: x / Bacteria: x      -----------------------------------------------------------------------  IMAGING:    -----------------------------------------------------------------------  ALLERGIES  No Known Allergies      MEDICATIONS   bisacodyl 5 milliGRAM(s) Oral daily PRN  bisacodyl Suppository 10 milliGRAM(s) Rectal daily PRN  dexAMETHasone  Injectable   IV Push   dexAMETHasone  Injectable 3 milliGRAM(s) IV Push every 8 hours  heparin  Infusion 1600 Unit(s)/Hr IV Continuous <Continuous>  influenza  Vaccine (HIGH DOSE) 0.7 milliLiter(s) IntraMuscular once  multivitamin 1 Tablet(s) Oral daily  oxyCODONE    IR 5 milliGRAM(s) Oral every 4 hours PRN  pantoprazole  Injectable 40 milliGRAM(s) IV Push daily  polyethylene glycol 3350 17 Gram(s) Oral two times a day  saline laxative (FLEET) Rectal Enema 1 Enema Rectal once PRN  senna 2 Tablet(s) Oral at bedtime  tamsulosin 0.4 milliGRAM(s) Oral at bedtime    -----------------------------------------------------------------------   HPI:  77 y/o M with pmh melanoma (L arm skin resection 2023), basal cell carcinoma, non small cell lung cancer - adenocarcinoma (s/p radiation chemo, completed 10/2023), and BPH presents with loss of balance, HA, fatigue, generalized weakness, diplopia and b/l foot tingling x 2 weeks and nausea/vomiting x 3-4 days, mild memory loss x 3-4 months. Pt found to have 5 areas of brain metastases, largest in the  L cerebellum, on outpatient MRI . Patient sent to ER for neurosurgery admission. (10 Familia 2024 15:57)  -----------------------------------------------------------------------  INTERVAL COURSE / SUBJECTIVE:  Patient underwent left retrosigmoid craniotomy for tumor resection on 1/11 with Dr. D'Amico; Frozen path consistent with mets.   Chest CT 1/12 with new PEs, started on heparin gtt  Hyponatremia consistent with SIADH, on fluid restriction.  Rad Onc consulted, pending recommendations.   Worked with PT/OT - recommended home with home therapy.  -----------------------------------------------------------------------  REVIEW OF SYSTEMS  Constitutional - No fever,  +fatigue  Neurological - double vision  Pain - comfortable  Bowel - +BM 1/16  Bladder - no issues  -----------------------------------------------------------------------  FUNCTIONAL HISTORY:  Lives in a 6th floor walk up apartment building with sister. Patient was independent with all ADL's, IADL's and functional mobility with no AD prior to admission however the week before admission requiring additional assistance from sister and daughter for IADL's. Patient's daughter will be staying at his apartment upon d/c to assist as needed. Patient is R hand dominant, wears glasses and has a bathtub shower    CURRENT FUNCTIONAL STATUS:    Physical Therapy: 1/13    Gait Skills:   · Level of [place]	contact guard  · Physical Assist/Nonphysical Assist	verbal cues  · Assistive Device	IV pole  · Gait Distance	75 feet    Occupational Therapy: 1/15  Transfer: Sit to Stand:     · Level of [place]	contact guard  · Physical Assist/Nonphysical Assist	verbal cues  · Weight-Bearing Restrictions	weight-bearing as tolerated  · Assistive Device	rolling walker    Transfer: Stand to Sit:     · Level of [place]	contact guard  · Physical Assist/Nonphysical Assist	verbal cues  · Weight-Bearing Restrictions	weight-bearing as tolerated  · Assistive Device	rolling walker    Upper Body Dressing Training:   · Level of [place]	supervision; donning back gown while seated upright    Lower Body Dressing Training:   · Level of [place]	supervision; doffing/donning b/l socks while seated in bedside chair utilizing cross legged method    -----------------------------------------------------------------------  PAST MEDICAL & SURGICAL HISTORY  BPH (benign prostatic hyperplasia)  Lung mass  Prediabetes  Lung cancer metastatic to brain  Skin melanoma  Basal cell carcinoma  History of dental surgery  S/P skin cancer resection    FAMILY HISTORY     SOCIAL HISTORY  as above  -----------------------------------------------------------------------  VITALS  T(C): 36.8 (01-16-24 @ 04:25), Max: 36.8 (01-16-24 @ 04:25)  HR: 60 (01-16-24 @ 04:00) (58 - 78)  BP: 129/62 (01-16-24 @ 04:00) (126/64 - 140/74)  RR: 16 (01-16-24 @ 04:00) (16 - 18)  SpO2: 96% (01-16-24 @ 04:00) (95% - 98%)  Wt(kg): --    PHYSICAL EXAM  Constitutional - NAD, Comfortable  HEENT - s/p craniotomy, Incision C,D,I  Neck - Supple, No limited ROM  Chest - Breathing comfortably, No Respiratory distress  Cardiovascular - Regular pulse  Abdomen - No visible abdominal distension  Extremities - No deformities of upper or lower limbs. No calf tenderness   Neurologic Exam -                    Cognitive - Awake, Alert, AAO to self, place, date, year, situation; follows commands     Communication - Fluent, No dysarthria, repetition intact, attention/concentration intact     Cranial Nerves -  impaired lateral movement of right eye, No facial asymmetry     Motor - No focal deficits                    LEFT    UE - ShAB 5/5, EF 5/5, EE 5/5, WE 5/5,  5/5                    LEFT    LE - HF 5/5, KE 5/5, DF 5/5, PF 5/5                    RIGHT UE - ShAB 5/5, EF 5/5, EE 5/5, WE 5/5,  5/5                    RIGHT LE - HF 5/5, KE 5/5, DF 5/5, PF 5/5                       No pronator drift     Sensory - Intact to LT     Reflexes - no clonus     Coordination - FTN sluggish bilaterally     OculoVestibular - No nystagmus     Balance - WNL Static  Psychiatric - Mood stable, Affect WNL   -----------------------------------------------------------------------  RECENT LABS  CBC Full  -  ( 16 Jan 2024 02:26 )  WBC Count : 13.60 K/uL  RBC Count : 3.85 M/uL  Hemoglobin : 11.6 g/dL  Hematocrit : 35.6 %  Platelet Count - Automated : 229 K/uL  Mean Cell Volume : 92.5 fl  Mean Cell Hemoglobin : 30.1 pg  Mean Cell Hemoglobin Concentration : 32.6 gm/dL  Auto Neutrophil # : x  Auto Lymphocyte # : x  Auto Monocyte # : x  Auto Eosinophil # : x  Auto Basophil # : x  Auto Neutrophil % : x  Auto Lymphocyte % : x  Auto Monocyte % : x  Auto Eosinophil % : x  Auto Basophil % : x    01-16    133<L>  |  97  |  27<H>  ----------------------------<  137<H>  4.4   |  28  |  0.49<L>    Ca    9.2      16 Jan 2024 02:26  Phos  2.5     01-16  Mg     1.8     01-16      Urinalysis Basic - ( 16 Jan 2024 02:26 )    Color: x / Appearance: x / SG: x / pH: x  Gluc: 137 mg/dL / Ketone: x  / Bili: x / Urobili: x   Blood: x / Protein: x / Nitrite: x   Leuk Esterase: x / RBC: x / WBC x   Sq Epi: x / Non Sq Epi: x / Bacteria: x      -----------------------------------------------------------------------  IMAGING:  < from: US Duplex Venous Lower Ext Complete, Bilateral (01.13.24 @ 16:26) >    IMPRESSION:  No evidence of deep venous thrombosis in either lower extremity.    < end of copied text >    < from: MR Head w/wo IV Cont (01.12.24 @ 22:04) >  IMPRESSION:  Interval LEFT retrosigmoid suboccipital craniectomy with   resection of previously noted cystic neoplasm. Hemorrhage and fluid is   seen with in the surgical cavity with pneumocephalus throughout the   brain. Interval decrease of edema with decreased mass effect on the   fourth ventricle. The 1.6 x 2.5 cm heterogeneous mass in the RIGHT po   is unchanged with moderate edema and mass effect on the RIGHT aspect of   the fourth ventricle. The 1.3 cm cystic lesion in the LEFT frontal lobe   and 7 mm cystic lesion in LEFT occipital lobe are unchanged. Mild   periventricular and mild to moderate deep white matter ischemia is again   noted.    < end of copied text >    < from: CT Chest w/ IV Cont (01.12.24 @ 14:00) >    IMPRESSION:  1. Since 11/27/2023, there are new pulmonary emboli bilaterally.    2. Worsening of mediastinal lymphadenopathy.    3. 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 The treated tumor   has decreased in size.    4. Small bilateral pleural effusions.    < end of copied text >      -----------------------------------------------------------------------  ALLERGIES  No Known Allergies      MEDICATIONS   bisacodyl 5 milliGRAM(s) Oral daily PRN  bisacodyl Suppository 10 milliGRAM(s) Rectal daily PRN  dexAMETHasone  Injectable   IV Push   dexAMETHasone  Injectable 3 milliGRAM(s) IV Push every 8 hours  heparin  Infusion 1600 Unit(s)/Hr IV Continuous <Continuous>  influenza  Vaccine (HIGH DOSE) 0.7 milliLiter(s) IntraMuscular once  multivitamin 1 Tablet(s) Oral daily  oxyCODONE    IR 5 milliGRAM(s) Oral every 4 hours PRN  pantoprazole  Injectable 40 milliGRAM(s) IV Push daily  polyethylene glycol 3350 17 Gram(s) Oral two times a day  saline laxative (FLEET) Rectal Enema 1 Enema Rectal once PRN  senna 2 Tablet(s) Oral at bedtime  tamsulosin 0.4 milliGRAM(s) Oral at bedtime    -----------------------------------------------------------------------   HPI:  77 y/o M with pmh melanoma (L arm skin resection 2023), basal cell carcinoma, non small cell lung cancer - adenocarcinoma (s/p radiation chemo, completed 10/2023), and BPH presents with loss of balance, HA, fatigue, generalized weakness, diplopia and b/l foot tingling x 2 weeks and nausea/vomiting x 3-4 days, mild memory loss x 3-4 months. Pt found to have 5 areas of brain metastases, largest in the  L cerebellum, on outpatient MRI . Patient sent to ER for neurosurgery admission. (10 Familia 2024 15:57)  -----------------------------------------------------------------------  INTERVAL COURSE / SUBJECTIVE:  Patient underwent left retrosigmoid craniotomy for tumor resection on 1/11 with Dr. D'Amico; Frozen path consistent with mets.   Chest CT 1/12 with new PEs, started on heparin gtt  Hyponatremia consistent with SIADH, on fluid restriction.  Rad Onc consulted, pending recommendations.   Worked with PT/OT - recommended home with home therapy.  -----------------------------------------------------------------------  REVIEW OF SYSTEMS  Constitutional - No fever,  +fatigue  Neurological - double vision  Pain - comfortable  Bowel - +BM 1/16  Bladder - no issues  -----------------------------------------------------------------------  FUNCTIONAL HISTORY:  Lives in a 6th floor walk up apartment building with sister. Patient was independent with all ADL's, IADL's and functional mobility with no AD prior to admission however the week before admission requiring additional assistance from sister and daughter for IADL's. Patient's daughter will be staying at his apartment upon d/c to assist as needed. Patient is R hand dominant, wears glasses and has a bathtub shower    CURRENT FUNCTIONAL STATUS:    Physical Therapy: 1/13    Gait Skills:   · Level of [place]	contact guard  · Physical Assist/Nonphysical Assist	verbal cues  · Assistive Device	IV pole  · Gait Distance	75 feet    Occupational Therapy: 1/15  Transfer: Sit to Stand:     · Level of [place]	contact guard  · Physical Assist/Nonphysical Assist	verbal cues  · Weight-Bearing Restrictions	weight-bearing as tolerated  · Assistive Device	rolling walker    Transfer: Stand to Sit:     · Level of [place]	contact guard  · Physical Assist/Nonphysical Assist	verbal cues  · Weight-Bearing Restrictions	weight-bearing as tolerated  · Assistive Device	rolling walker    Upper Body Dressing Training:   · Level of [place]	supervision; donning back gown while seated upright    Lower Body Dressing Training:   · Level of [place]	supervision; doffing/donning b/l socks while seated in bedside chair utilizing cross legged method    -----------------------------------------------------------------------  PAST MEDICAL & SURGICAL HISTORY  BPH (benign prostatic hyperplasia)  Lung mass  Prediabetes  Lung cancer metastatic to brain  Skin melanoma  Basal cell carcinoma  History of dental surgery  S/P skin cancer resection    FAMILY HISTORY     SOCIAL HISTORY  as above  -----------------------------------------------------------------------  VITALS  T(C): 36.8 (01-16-24 @ 04:25), Max: 36.8 (01-16-24 @ 04:25)  HR: 60 (01-16-24 @ 04:00) (58 - 78)  BP: 129/62 (01-16-24 @ 04:00) (126/64 - 140/74)  RR: 16 (01-16-24 @ 04:00) (16 - 18)  SpO2: 96% (01-16-24 @ 04:00) (95% - 98%)  Wt(kg): --    PHYSICAL EXAM  Constitutional - NAD, Comfortable  HEENT - s/p craniotomy, Incision C,D,I  Neck - Supple, No limited ROM  Chest - Breathing comfortably, No Respiratory distress  Cardiovascular - Regular pulse  Abdomen - No visible abdominal distension  Extremities - No deformities of upper or lower limbs. No calf tenderness   Neurologic Exam -                    Cognitive - Awake, Alert, AAO to self, place, date, year, situation; follows commands     Communication - Fluent, No dysarthria, repetition intact, attention/concentration intact     Cranial Nerves -  impaired lateral movement of right eye, No facial asymmetry     Motor - No focal deficits                    LEFT    UE - ShAB 5/5, EF 5/5, EE 5/5, WE 5/5,  5/5                    LEFT    LE - HF 5/5, KE 5/5, DF 5/5, PF 5/5                    RIGHT UE - ShAB 5/5, EF 5/5, EE 5/5, WE 5/5,  5/5                    RIGHT LE - HF 5/5, KE 5/5, DF 5/5, PF 5/5                       No pronator drift     Sensory - Intact to LT     Reflexes - no clonus     Coordination - FTN sluggish bilaterally     OculoVestibular - No nystagmus     Balance - WNL Static  Psychiatric - Mood stable, Affect WNL   -----------------------------------------------------------------------  RECENT LABS  CBC Full  -  ( 16 Jan 2024 02:26 )  WBC Count : 13.60 K/uL  RBC Count : 3.85 M/uL  Hemoglobin : 11.6 g/dL  Hematocrit : 35.6 %  Platelet Count - Automated : 229 K/uL  Mean Cell Volume : 92.5 fl  Mean Cell Hemoglobin : 30.1 pg  Mean Cell Hemoglobin Concentration : 32.6 gm/dL  Auto Neutrophil # : x  Auto Lymphocyte # : x  Auto Monocyte # : x  Auto Eosinophil # : x  Auto Basophil # : x  Auto Neutrophil % : x  Auto Lymphocyte % : x  Auto Monocyte % : x  Auto Eosinophil % : x  Auto Basophil % : x    01-16    133<L>  |  97  |  27<H>  ----------------------------<  137<H>  4.4   |  28  |  0.49<L>    Ca    9.2      16 Jan 2024 02:26  Phos  2.5     01-16  Mg     1.8     01-16      Urinalysis Basic - ( 16 Jan 2024 02:26 )    Color: x / Appearance: x / SG: x / pH: x  Gluc: 137 mg/dL / Ketone: x  / Bili: x / Urobili: x   Blood: x / Protein: x / Nitrite: x   Leuk Esterase: x / RBC: x / WBC x   Sq Epi: x / Non Sq Epi: x / Bacteria: x      -----------------------------------------------------------------------  IMAGING:  < from: US Duplex Venous Lower Ext Complete, Bilateral (01.13.24 @ 16:26) >    IMPRESSION:  No evidence of deep venous thrombosis in either lower extremity.    < end of copied text >    < from: MR Head w/wo IV Cont (01.12.24 @ 22:04) >  IMPRESSION:  Interval LEFT retrosigmoid suboccipital craniectomy with   resection of previously noted cystic neoplasm. Hemorrhage and fluid is   seen with in the surgical cavity with pneumocephalus throughout the   brain. Interval decrease of edema with decreased mass effect on the   fourth ventricle. The 1.6 x 2.5 cm heterogeneous mass in the RIGHT po   is unchanged with moderate edema and mass effect on the RIGHT aspect of   the fourth ventricle. The 1.3 cm cystic lesion in the LEFT frontal lobe   and 7 mm cystic lesion in LEFT occipital lobe are unchanged. Mild   periventricular and mild to moderate deep white matter ischemia is again   noted.    < end of copied text >    < from: CT Chest w/ IV Cont (01.12.24 @ 14:00) >    IMPRESSION:  1. Since 11/27/2023, there are new pulmonary emboli bilaterally.    2. Worsening of mediastinal lymphadenopathy.    3. 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 The treated tumor   has decreased in size.    4. Small bilateral pleural effusions.    < end of copied text >      -----------------------------------------------------------------------  ALLERGIES  No Known Allergies      MEDICATIONS   bisacodyl 5 milliGRAM(s) Oral daily PRN  bisacodyl Suppository 10 milliGRAM(s) Rectal daily PRN  dexAMETHasone  Injectable   IV Push   dexAMETHasone  Injectable 3 milliGRAM(s) IV Push every 8 hours  heparin  Infusion 1600 Unit(s)/Hr IV Continuous <Continuous>  influenza  Vaccine (HIGH DOSE) 0.7 milliLiter(s) IntraMuscular once  multivitamin 1 Tablet(s) Oral daily  oxyCODONE    IR 5 milliGRAM(s) Oral every 4 hours PRN  pantoprazole  Injectable 40 milliGRAM(s) IV Push daily  polyethylene glycol 3350 17 Gram(s) Oral two times a day  saline laxative (FLEET) Rectal Enema 1 Enema Rectal once PRN  senna 2 Tablet(s) Oral at bedtime  tamsulosin 0.4 milliGRAM(s) Oral at bedtime    -----------------------------------------------------------------------

## 2024-01-16 NOTE — PROGRESS NOTE ADULT - ASSESSMENT
78 YOM with PMH of TUD and metastatic NSCLC s/p chemoradiation presenting with N/V and double vision/gait instability, found to have metastatic brain lesions s/p OR with Dr. D'Amico on 1/11 for L suboccipital craniotomy for resection of cerebellopontine angle metastasis. Found to have acute pulmonary embolism now on AC.     RUL NSCLC with metastatic brain lesions  Post operative state  - s/p OR with D'Amico on 1/11 for L crani and resection  - management per NSGY, on steroid taper (+PPI)  - pending CT sim today with plan for SRS with rad onc  - pain control with bowel regimen  - frequent perioperative incentive spirometer use  - early ambulation and OOBTC as tolerated  - therapeutic AC as below    Acute bilateral PE  - provoked in setting of malignancy  - CTH while on therapeutic heparin gtt stable  - if no surgical contradictions recommend transitioning to DOAC (apixaban 10mg BID x7d follows by 5mg BID for at least 3mo duration)    Hypotonic hyponatremia  - likely SIADH in setting of recent surgery and pulmonary lesions  - cont fluid restriction 1.5L, start salt tabs 2g TID    Leukocytosis  - afebrile, no s/s infection  - likely reactive 2/2 surgery and/or steroids  - monitor off antibiotics    Malnutrition  Nausea/vomiting, abdominal pain  - BMI 17 in setting of malignancy and anorexia, recent worsening of symptoms with N/V  - N/V resolved, tolerating PO  - recommend nutrition consult     Dispo: home with HPT    Plan discussed with primary team.   78 YOM with PMH of TUD and metastatic NSCLC s/p chemoradiation presenting with N/V and double vision/gait instability, found to have metastatic brain lesions s/p OR with Dr. D'Amico on 1/11 for L suboccipital craniotomy for resection of cerebellopontine angle metastasis. Found to have acute pulmonary embolism now on AC.     RUL NSCLC with metastatic brain lesions  Post operative state  - s/p OR with D'Amico on 1/11 for L crani and resection  - management per NSGY, on steroid taper (+PPI)  - pending CT sim today with plan for SRS with rad onc  - pain control with bowel regimen  - frequent perioperative incentive spirometer use  - early ambulation and OOBTC as tolerated  - therapeutic AC as below    Acute bilateral PE  - provoked in setting of malignancy  - CTH while on therapeutic heparin gtt stable  - if no surgical contradictions recommend transitioning to DOAC (apixaban 10mg BID x7d follows by 5mg BID for at least 3mo duration)    Hypotonic hyponatremia  - likely SIADH in setting of recent surgery and pulmonary lesions  - cont fluid restriction 1.5L, start salt tabs 2g TID    Leukocytosis  - afebrile, no s/s infection  - likely reactive 2/2 surgery and/or steroids  - monitor off antibiotics    Severe protein calorie malnutrition  Nausea/vomiting, abdominal pain  - BMI 17 in setting of malignancy and anorexia, recent worsening of symptoms with N/V  - N/V resolved, tolerating PO  - recommend nutrition consult     Dispo: home with HPT    Plan discussed with primary team.

## 2024-01-16 NOTE — PROGRESS NOTE ADULT - NUTRITIONAL ASSESSMENT
This patient has been assessed with a concern for Malnutrition and has been determined to have a diagnosis/diagnoses of Severe protein-calorie malnutrition and Underweight (BMI < 19).    This patient is being managed with:   Diet Regular-  1500mL Fluid Restriction ([id number])  Supplement Feeding Modality:  Oral  Ensure Plus High Protein Cans or Servings Per Day:  1       Frequency:  Two Times a day  Entered: Familia 15 2024 10:53AM   This patient has been assessed with a concern for Malnutrition and has been determined to have a diagnosis/diagnoses of Severe protein-calorie malnutrition and Underweight (BMI < 19).    This patient is being managed with:   Diet Regular-  1500mL Fluid Restriction ([id number])  Supplement Feeding Modality:  Oral  Ensure Plus High Protein Cans or Servings Per Day:  1       Frequency:  Two Times a day  Entered: Familia 15 2024 10:53AM

## 2024-01-16 NOTE — PROGRESS NOTE ADULT - SUBJECTIVE AND OBJECTIVE BOX
SUBJECTIVE: NAEON. Patient states he started noticing double vision when looking/reading things up close (was experiencing double vision PTA but states a bit worse now). NO other acute complaints.     MEDICATIONS:  MEDICATIONS  (STANDING):  apixaban 10 milliGRAM(s) Oral every 12 hours  dexAMETHasone     Tablet 2 milliGRAM(s) Oral every 12 hours  dexAMETHasone  Injectable   IV Push   dexAMETHasone  Injectable 3 milliGRAM(s) IV Push every 8 hours  influenza  Vaccine (HIGH DOSE) 0.7 milliLiter(s) IntraMuscular once  multivitamin 1 Tablet(s) Oral daily  pantoprazole    Tablet 40 milliGRAM(s) Oral before breakfast  polyethylene glycol 3350 17 Gram(s) Oral two times a day  senna 2 Tablet(s) Oral at bedtime  sodium chloride 2 Gram(s) Oral every 8 hours  sodium phosphate 15 milliMole(s)/250 mL IVPB 15 milliMole(s) IV Intermittent once  tamsulosin 0.4 milliGRAM(s) Oral at bedtime    MEDICATIONS  (PRN):  bisacodyl 5 milliGRAM(s) Oral daily PRN Constipation  bisacodyl Suppository 10 milliGRAM(s) Rectal daily PRN Constipation  oxyCODONE    IR 5 milliGRAM(s) Oral every 4 hours PRN Severe Pain (7 - 10)  saline laxative (FLEET) Rectal Enema 1 Enema Rectal once PRN If no BM by AM 1/16      Allergies    No Known Allergies    Intolerances        OBJECTIVE:  Vital Signs Last 24 Hrs  T(C): 36.6 (16 Jan 2024 09:00), Max: 36.8 (16 Jan 2024 04:25)  T(F): 97.8 (16 Jan 2024 09:00), Max: 98.2 (16 Jan 2024 04:25)  HR: 64 (16 Jan 2024 08:15) (60 - 78)  BP: 122/70 (16 Jan 2024 08:15) (122/70 - 140/74)  BP(mean): 90 (16 Jan 2024 08:15) (89 - 100)  RR: 16 (16 Jan 2024 08:15) (16 - 18)  SpO2: 98% (16 Jan 2024 08:15) (95% - 98%)    Parameters below as of 16 Jan 2024 08:15  Patient On (Oxygen Delivery Method): room air      I&O's Summary    15 Familia 2024 07:01  -  16 Jan 2024 07:00  --------------------------------------------------------  IN: 1112 mL / OUT: 2525 mL / NET: -1413 mL    16 Jan 2024 07:01  -  16 Jan 2024 12:19  --------------------------------------------------------  IN: 64 mL / OUT: 325 mL / NET: -261 mL        PHYSICAL EXAM:  Gen: appears stated age, resting comfortably, NAD  HEENT: head wrapped, MMM  Neck: supple  CV: RRR, no m/r/g, peripheral pulses 2+  Pulm: CTAB, no increased work of breathing, no rales/rhonchi  Abd: soft, ND, NT, no rebound or guarding  Skin: warm and dry  Ext: non-tender, no edema  Neuro: AOx3, speaking in full sentences  Psych: affect and behavior appropriate, pleasant at time of interview    LABS:                        11.6   13.60 )-----------( 229      ( 16 Jan 2024 02:26 )             35.6     01-16    133<L>  |  97  |  27<H>  ----------------------------<  137<H>  4.4   |  28  |  0.49<L>    Ca    9.2      16 Jan 2024 02:26  Phos  2.5     01-16  Mg     1.8     01-16        PTT - ( 16 Jan 2024 11:14 )  PTT:79.6 sec  CAPILLARY BLOOD GLUCOSE      POCT Blood Glucose.: 125 mg/dL (16 Jan 2024 06:26)  POCT Blood Glucose.: 126 mg/dL (15 Familia 2024 21:48)  POCT Blood Glucose.: 126 mg/dL (15 Familia 2024 15:54)    Urinalysis Basic - ( 16 Jan 2024 02:26 )    Color: x / Appearance: x / SG: x / pH: x  Gluc: 137 mg/dL / Ketone: x  / Bili: x / Urobili: x   Blood: x / Protein: x / Nitrite: x   Leuk Esterase: x / RBC: x / WBC x   Sq Epi: x / Non Sq Epi: x / Bacteria: x        MICRODATA:      RADIOLOGY/OTHER STUDIES:

## 2024-01-16 NOTE — CONSULT NOTE ADULT - ASSESSMENT
Calm This is 77 y/o M with pmh melanoma (L arm skin resection 2023), basal cell carcinoma, non small cell lung cancer - adenocarcinoma (s/p radiation chemo, completed 10/2023), and with sx of loss of balance, HA, fatigue, generalized weakness, diplopia and b/l foot tingling x 2 weeks and nausea/vomiting x 3-4 days, and mild memory loss x 3-4 months with 5 areas of brain metastases (largest in the L cerebellum on MRI) s/p post craniotomy on 1/11/24.   Plan post-op SRS to the cavity and to intact lesion. Informed consent is obtain and the pt will be simed today.   [phone]   This is 79 y/o M with pmh melanoma (L arm skin resection 2023), basal cell carcinoma, non small cell lung cancer - adenocarcinoma (s/p radiation chemo, completed 10/2023), and with sx of loss of balance, HA, fatigue, generalized weakness, diplopia and b/l foot tingling x 2 weeks and nausea/vomiting x 3-4 days, and mild memory loss x 3-4 months with 5 areas of brain metastases (largest in the L cerebellum on MRI) s/p post craniotomy on 1/11/24.   Plan post-op SRS to the cavity and to intact lesion. Informed consent is obtain and the pt will be simed today.   [phone]

## 2024-01-16 NOTE — CONSULT NOTE ADULT - ASSESSMENT
69 year old male with history of NSCLC s/p chemo and RT who presented with worsening weakness, HA, and impaired balance, found to have new brain metastasis s/p left retrosigmoid craniotomy for tumor resection with gait, ADL, and functional deficits.      PROBLEMS / IMPAIRMENTS:  Brain metastasis s/p craniotomy - decadron  Gait/ balance impairment  Vision impairment, diplopia  BPH - flomax  Subsegmental PEs/ radiation induced pulm fibrosis - heparin gtt    PLAN / RECOMMENDATIONS:     Rehab / Mobilization:   - Initial therapy assessment: [X] PT  [X] OT   - Continue skilled therapy while admitted to prevent secondary complications of immobility focus on transfer training, bed mobility, progressive ambulation, and equipment evaluation.   - Educated patient and family members on post-acute rehabilitation. Discussed anticipated rehab course.  - Encourage OOB throughout the day with staff or OOB in chair with meals   - Encouraged HOB elevation to at least 30-40 deg to prevent orthostasis   - Therapy precautions: craniotomy    Bracing/Splinting:   - None indicated at this time      Pain Management:   - Currently well controlled on current regimen    - Avoid/ monitor use sedating medications that may interfere with cognitive recovery     Speech/ Swallow:   - Dysphagia screen [X]  - Diet:  Reg+thins    GI/ Bowel:  - Continue to monitor bowel patterns.   - Agree with current bowel regimen.     / Bladder: BPH  - Home flomax  - Continue to monitor bladder patterns, avoid constipation.       DVT Prophylaxis: +PEs  - Heparin gtt. Pending transition to PO AC    Disposition:  Home with PT/OT  - Expect patient to achieve functional goals for DC HOME with OUTPATIENT   - Patient would benefit from follow up with CANCER REHABILITATION program with Dr. Sherman. Please add to discharge follow up. 69 year old male with history of NSCLC s/p chemo and RT who presented with worsening weakness, HA, and impaired balance, found to have new brain metastasis s/p left retrosigmoid craniotomy for tumor resection with gait, ADL, and functional deficits.      PROBLEMS / IMPAIRMENTS:  Brain metastasis s/p craniotomy - decadron  Gait/ balance impairment  Vision impairment, diplopia, CN VI palsy right eye  BPH - flomax  Subsegmental PEs/ radiation induced pulm fibrosis - heparin gtt    PLAN / RECOMMENDATIONS:     Rehab / Mobilization:   - Initial therapy assessment: [X] PT  [X] OT   - Continue skilled therapy while admitted to prevent secondary complications of immobility focus on transfer training, bed mobility, progressive ambulation, and equipment evaluation.   - Educated patient and family members on post-acute rehabilitation. Discussed anticipated rehab course.  - Encourage OOB throughout the day with staff or OOB in chair with meals   - Encouraged HOB elevation to at least 30-40 deg to prevent orthostasis   - Therapy precautions: craniotomy    Bracing/Splinting:   - None indicated at this time      Pain Management:   - Currently well controlled on current regimen    - Avoid/ monitor use sedating medications that may interfere with cognitive recovery     Speech/ Swallow:   - Dysphagia screen [X]  - Diet:  Reg+thins    GI/ Bowel:  - Continue to monitor bowel patterns.   - Agree with current bowel regimen.     / Bladder: BPH  - Home flomax  - Continue to monitor bladder patterns, avoid constipation.       DVT Prophylaxis: +PEs  - Heparin gtt. Pending transition to PO AC    Disposition:  Home with PT/OT  - Expect patient to achieve functional goals for DC HOME with OUTPATIENT   - Patient would benefit from follow up with CANCER REHABILITATION program with Dr. Sherman. Please add to discharge follow up. 69 year old male with history of NSCLC s/p chemo and RT who presented with worsening weakness, HA, and impaired balance, found to have new brain metastasis s/p left retrosigmoid craniotomy for tumor resection with gait, ADL, and functional deficits.      PROBLEMS / IMPAIRMENTS:  Brain metastasis s/p craniotomy - decadron, rad Onc following  Gait/ balance impairment  Vision impairment, diplopia, CN VI palsy right eye  BPH - flomax  Subsegmental PEs/ radiation induced pulm fibrosis - heparin gtt    PLAN / RECOMMENDATIONS:     Rehab / Mobilization:   - Initial therapy assessment: [X] PT  [X] OT   - Continue skilled therapy while admitted to prevent secondary complications of immobility focus on transfer training, bed mobility, progressive ambulation, and equipment evaluation.   - Educated patient and family members on post-acute rehabilitation. Discussed anticipated rehab course.  - Encourage OOB throughout the day with staff or OOB in chair with meals   - Encouraged HOB elevation to at least 30-40 deg to prevent orthostasis   - consider eye patch for right eye when OOB  - Therapy precautions: craniotomy    Bracing/Splinting:   - None indicated at this time      Pain Management:   - Currently well controlled on current regimen    - Avoid/ monitor use sedating medications that may interfere with cognitive recovery     Speech/ Swallow:   - Dysphagia screen [X]  - Diet:  Reg+thins    GI/ Bowel:  - Continue to monitor bowel patterns.   - Agree with current bowel regimen.     / Bladder: BPH  - Home flomax  - Continue to monitor bladder patterns, avoid constipation.       DVT Prophylaxis: +PEs  - Heparin gtt. Pending transition to PO AC    Disposition:  Home with PT/OT  - Expect patient to achieve functional goals for DC HOME with OUTPATIENT   - Patient would benefit from follow up with CANCER REHABILITATION program with Dr. Sherman. Please add to discharge follow up.

## 2024-01-17 PROBLEM — C44.91 BASAL CELL CARCINOMA OF SKIN, UNSPECIFIED: Chronic | Status: ACTIVE | Noted: 2024-01-01

## 2024-01-17 PROBLEM — C43.9 MALIGNANT MELANOMA OF SKIN, UNSPECIFIED: Chronic | Status: ACTIVE | Noted: 2024-01-01

## 2024-01-17 NOTE — PROGRESS NOTE ADULT - PROVIDER SPECIALTY LIST ADULT
Heme/Onc
Internal Medicine
Neurosurgery
Heme/Onc
Neurosurgery
Neurosurgery
NSICU
Neurosurgery
Internal Medicine
Internal Medicine
NSICU
Neurosurgery
NSICU
Neurosurgery
Internal Medicine
NSICU
Internal Medicine

## 2024-01-17 NOTE — DISCHARGE NOTE NURSING/CASE MANAGEMENT/SOCIAL WORK - NSDCFUADDAPPT_GEN_ALL_CORE_FT
Please follow up with Dr. D'Amico    Please follow up with Dr. Chapman    Please follow up With Dr. Wernicke    Please follow up with your primary care doctor for a sodium level check in 1 week

## 2024-01-17 NOTE — PROGRESS NOTE ADULT - REASON FOR ADMISSION
brain tumor

## 2024-01-17 NOTE — PROGRESS NOTE ADULT - NUTRITIONAL ASSESSMENT
This patient has been assessed with a concern for Malnutrition and has been determined to have a diagnosis/diagnoses of Severe protein-calorie malnutrition and Underweight (BMI < 19).    This patient is being managed with:   Diet Regular-  1500mL Fluid Restriction ([id number])  Supplement Feeding Modality:  Oral  Ensure Plus High Protein Cans or Servings Per Day:  1       Frequency:  Two Times a day  Entered: Familia 15 2024 10:53AM

## 2024-01-17 NOTE — DISCHARGE NOTE NURSING/CASE MANAGEMENT/SOCIAL WORK - PATIENT PORTAL LINK FT
You can access the FollowMyHealth Patient Portal offered by [facility] by registering at the following website: [email or web address]. By joining [facility]’s FollowMyHealth portal, you will also be able to view your health information using other applications (apps) compatible with our system.

## 2024-01-17 NOTE — PROGRESS NOTE ADULT - SUBJECTIVE AND OBJECTIVE BOX
HPI:  77 y/o M with pmh melanoma (L arm skin resection 2023), basal cell carcinoma, non small cell lung cancer - adenocarcinoma (s/p radiation chemo, completed 10/2023), and BPH presents with loss of balance, HA, fatigue, generalized weakness, diplopia and b/l foot tingling x 2 weeks and nausea/vomiting x 3-4 days. Patient also admits to mild memory loss x 3-4 months. Pt found to have 5 areas of brain metastases, largest in the  L cerebellum, on outpatient MRI today. Patient sent to ER for neurosurgery admission. (10 Familia 2024 15:57)    OVERNIGHT EVENTS: Seen and examined in 8WO. Denies HA, N/V, chest pain, shortness of breath, new weakness or numbness.     HOSPITAL COURSE:  1/10: admitted to [facility], preop for OR tomorrow. Pending CT A/P and KUB. Recieved decadron 10 IV in ER and started on decadron 4q6.   1/11: POD#0 s/p restrosig crani resection of tumor (1/11-) frozen NSCC , Dex taper x 1 week to 2 BID, SBP<140, pending MRI brain, 250cc 3% bolus for Na 134.   1/12: POD 1. pending SLP.  pending MRI. SD status. CT chest completed w new PE b/l segmental RLL and subsegmental in LLL, worsening mediastinal LAD, evolution of radiation induced pulmonary fibrosis, small b/l pleural effusions. PM Na 139.  1/13: POD 2. Headwrap removed. Cleared for home pt with rolling walker. LE dopplers negative for DVT. Rad/onc consulted.   1/14: POD 3. APRYL overnight, neuro stable. Heparin gtt started for PE.  1/15: POD 4. Hep gtt inc to 10ml/hr. Repeat PTT @ 2AM. Lactulose 10 in AM, pending BM. Heparin gtt increased to 12 mL/hr, repeat PTT 29.3. 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 Lactulose for BM. PTT subtherapeutic, increased hep gtt to 1500.   1/16: POD 5. Inc heparin gtt to 16 ml/hr i/s/o PTT 45.6. Repeat PTT @ 4AM. Suppository for BM. (+) BM. PTT 64, therapeutic, on Eliquis 10mg BID, salt tabs dec to 2q8, plan for CT Sim tomorrow w/ rad/ onc prior to discharge   1/17: POD 6. APRYL overnight.     Vital Signs Last 24 Hrs  T(C): 36.8 (16 Jan 2024 21:12), Max: 36.8 (16 Jan 2024 04:25)  T(F): 98.3 (16 Jan 2024 21:12), Max: 98.3 (16 Jan 2024 21:12)  HR: 76 (16 Jan 2024 21:12) (60 - 76)  BP: 121/68 (16 Jan 2024 21:12) (121/68 - 131/70)  BP(mean): 91 (16 Jan 2024 11:45) (89 - 91)  RR: 16 (16 Jan 2024 21:12) (16 - 17)  SpO2: 95% (16 Jan 2024 21:12) (95% - 98%)    Parameters below as of 16 Jan 2024 21:12  Patient On (Oxygen Delivery Method): room air        I&O's Summary    15 Familia 2024 07:01  -  16 Jan 2024 07:00  --------------------------------------------------------  IN: 1112 mL / OUT: 2525 mL / NET: -1413 mL    16 Jan 2024 07:01  -  17 Jan 2024 00:38  --------------------------------------------------------  IN: 184 mL / OUT: 775 mL / NET: -591 mL        PHYSICAL EXAM:  General: NAD, pt is comfortably sitting up in bed, on room air  HEENT: (+) Right eye CN VI palsy, (+) Horizontal diplopia o/w CN II-XII intact, PERRL 3mm briskly reactive, face symmetric, tongue midline, neck FROM  Cardiovascular: RRR, S1, S2  Respiratory: breathing non-labored on RA, chest rise symmetric  GI: abd soft, (+) mild distention, non-tender  Neuro: A&Ox3, No aphasia, speech clear, b/l UE dysmetria, (+) slight LUE pronator drift. Follows commands.  BANDA x4 spontaneously, 5/5 strength in all extremities throughout. Sensation intact  Extremities: distal pulses 2+ x4  Wound/incision: clean, dry, intact    TUBES/LINES:  [] Martinez  [] Wound Drains  [] Others      DIET:  [] NPO  [x] Mechanical  [] Tube feeds    LABS:                        11.6   13.60 )-----------( 229      ( 16 Jan 2024 02:26 )             35.6     01-16    133<L>  |  97  |  27<H>  ----------------------------<  137<H>  4.4   |  28  |  0.49<L>    Ca    9.2      16 Jan 2024 02:26  Phos  2.5     01-16  Mg     1.8     01-16      PTT - ( 16 Jan 2024 11:14 )  PTT:79.6 sec  Urinalysis Basic - ( 16 Jan 2024 02:26 )    Color: x / Appearance: x / SG: x / pH: x  Gluc: 137 mg/dL / Ketone: x  / Bili: x / Urobili: x   Blood: x / Protein: x / Nitrite: x   Leuk Esterase: x / RBC: x / WBC x   Sq Epi: x / Non Sq Epi: x / Bacteria: x          CAPILLARY BLOOD GLUCOSE      POCT Blood Glucose.: 125 mg/dL (16 Jan 2024 06:26)      Drug Levels: [] N/A    CSF Analysis: [] N/A      Allergies    No Known Allergies    Intolerances      MEDICATIONS:  Antibiotics:    Neuro:  oxyCODONE    IR 5 milliGRAM(s) Oral every 4 hours PRN    Anticoagulation:  apixaban 10 milliGRAM(s) Oral every 12 hours    OTHER:  bisacodyl 5 milliGRAM(s) Oral daily PRN  bisacodyl Suppository 10 milliGRAM(s) Rectal daily PRN  dexAMETHasone     Tablet 2 milliGRAM(s) Oral every 12 hours  dexAMETHasone     Tablet 3 milliGRAM(s) Oral once  influenza  Vaccine (HIGH DOSE) 0.7 milliLiter(s) IntraMuscular once  pantoprazole    Tablet 40 milliGRAM(s) Oral before breakfast  polyethylene glycol 3350 17 Gram(s) Oral two times a day  saline laxative (FLEET) Rectal Enema 1 Enema Rectal once PRN  senna 2 Tablet(s) Oral at bedtime  tamsulosin 0.4 milliGRAM(s) Oral at bedtime    IVF:  multivitamin 1 Tablet(s) Oral daily  sodium chloride 2 Gram(s) Oral every 8 hours    CULTURES:  Culture Results:   No growth at 5 days. (01-07 @ 22:48)  Culture Results:   No growth at 5 days. (01-07 @ 22:48)    RADIOLOGY & ADDITIONAL TESTS:  CTH 1/16/24 IMPRESSION:    Small hemorrhage at left cerebellar resection site is stable from post-op MRI. No new hemorrhage or mass effect.    ASSESSMENT:  77 y/o M with pmh melanoma (L arm skin resection 2023), basal cell carcinoma, non small cell lung cancer - adenocarcinoma (s/p radiation chemo, completed 10/2023), and BPH presents with loss of balance, HA, fatigue, generalized weakness, diplopia and b/l foot tingling x 2 weeks and nausea/vomiting x 2-4 days. Pt found to have 5 areas of brain metastases, largest in the  L cerebellum, on outpatient MRI. Now s/p left restrosigmoid crani resection of tumor (1/11), frozen: mets.     LUNG CANCER    Handoff    MEWS Score    BPH (benign prostatic hyperplasia)    Lung mass    Prediabetes    Lung cancer metastatic to brain    Skin melanoma    Basal cell carcinoma    Craniotomy for suboccipital neoplasm    Lung cancer metastatic to brain    Metastasis to brain    Lung cancer metastatic to brain    Acute pulmonary embolism    Hyponatremia    Leukocytosis    Malnutrition    Severe protein-calorie malnutrition    Craniotomy for suboccipital neoplasm    History of dental surgery    S/P skin cancer resection    PRE OP    2    Pulmonary embolism    Hyponatremia    Lung cancer    H/O melanoma excision    Basal cell carcinoma    BPH (benign prostatic hyperplasia)    [id number]        PLAN:  Neuro:  - neuro/vital checks q4  - pain control w/ cranial ERAS  - Dex taper over 1 week to 2 BID  - MRI postop completed 1/12    Cardiac:  - SBP goal 100-160    Pulmonary:  - on RA  - encourage IS    GI:  - regular diet  - bowel regimen, BM 1/16  - protonix while on steroids    Renal:  - IVL, voiding  - cont home flomax    Endo:  - ISS, A1C 5.9    Heme:  - SCDs,   - CT A/P 1/10 for metastatic workup: negative  - CT chest 1/12: new PE b/l segmental RLL and subsegmental in LLL, worsening mediastinal LAD, evolution of radiation induced pulmonary fibrosis, small b/l pleural effusions  - BL LE dopplers 1/13: negative for DVT   - Heme/onc following  - Rad Onc consulted, f/u recs  - hep gtt started for PE (1/14-16) now on Eliquis 10mg BID     ID:  - afebrile    Disposition: SD status, full code, PT/OT: home PT with rolling walker    D/w Dr. D'Amico     Assessment:  Present when checked    []  GCS  E   V  M     Heart Failure: []Acute, [] acute on chronic , []chronic  Heart Failure:  [] Diastolic (HFpEF), [] Systolic (HFrEF), []Combined (HFpEF and HFrEF), [] RHF, [] Pulm HTN, [] Other    [] MEHREEN, [] ATN, [] AIN, [] other  [] CKD1, [] CKD2, [] CKD 3, [] CKD 4, [] CKD 5, []ESRD    Encephalopathy: [] Metabolic, [] Hepatic, [] toxic, [] Neurological, [] Other    Abnormal Nurtitional Status: [] malnurtition (see nutrition note), [ ]underweight: BMI < 19, [] morbid obesity: BMI >40, [] Cachexia    [] Sepsis  [] hypovolemic shock,[] cardiogenic shock, [] hemorrhagic shock, [] neuogenic shock  [] Acute Respiratory Failure  []Cerebral edema, [] Brain compression/ herniation,   [] Functional quadriplegia  [] Acute blood loss anemia

## 2024-01-17 NOTE — PROGRESS NOTE ADULT - ASSESSMENT
ERMA HER is a a 78 year old male with PMH unresectable T3N3, stage IIIb non-small cell lung cancer, with features of adenocarcinoma on chemoRT (carboplatin/paclitaxel finished 10/27/2023), though did not start durvalumab due to patient refusal, who presents to the hospital after being sent by NSGY OP after newly found brain metastasis on recent MRI performed for double vision, ataxia and unsteady gait. Patient is now s/p craniotomy for suboccipital neoplasm. Hematology consulted for management of lung cancer.    #Non-small cell lung cancer with features of adenocarcinoma  - onc history as above  - s/p chemoRT (carboplatin/paclitaxel) finishing 10/27/23; Based on PACIFIC trial, patient would have been candidate for durvalumab, though refused at shirin time  - MRI Brain 1/10/24 pending formal read, though concerning for multiple (5) brain tumors of different sizes and locations, with some cystic components and surrounding swelling causing compression on the brain in the left cerebellar hemisphere  with associated hydrocephalus per NSGY.   - s/p OR craniotomy 1/11/24 by Dr. Damico    Based on KEYNOTE-024, open label phase 3 trial of patients with untreated advanced NSCLC with PD-L1 expression on at least 50% of tumor cells and no sensitizing mutation of epidermal growth factor receptor gene or translocation of the anaplastic lymphoma kinase gene, adjuvant pembrolizumab was found to have significantly longer progression free and overall survival and with fewer adverse events than platinum-based chemotherapy. Median progression free survival was 10.3 months vs 6.0 months in the chemotherapy group.     KEYNOTE - 042 extended this benefit to patients with locally advanced or metastatic non-small-cell lung cancer without EGFR/ALK alteration and with low PD-L1 TPS (1-49%). Overall survival was significant longer in the pembrolizumab group than in the chemotherapy group in all three TPS populations (>50%, > 20% and > 1%). median survival values were 20 months for pembrolizumab vs 12.2 months for chemotherapy. Treatment related adverse events of grade 3 or worse occured in 18% of pembrolizumab group and 41% of chemotherapy group    Plan   - patient neurologic exam improving, though has not been able to ambulate to assess for dysfunction. Neurologic function otherwise intact at this visit  - surgical plan and steroids per NSGY   - based above trials, given PD-L1 TPS score 100%, plan will be to start pembrolizumab (immunotherapy) as soon as possible outpatient IV 30 min every 3 weeks. Expect robust response given his elevated PD-L1 status. Pembrolizumab to be administered after SRS  - appreciate radiation oncology recommendations - will need SRS to the other brain metastases (more importantly brain stem lesion); dc planning should not delay this plan as delay in treatment will worsen morbidity/mortality   - PT when able     #New Pulmonary emboli   - provoked in the setting of malignancy   - recommend therapeutic anticoagulation - DOAC appears appropriate when cleared from further procedures    Discussed and seen with Dr. Chapman.

## 2024-01-17 NOTE — PROGRESS NOTE ADULT - ASSESSMENT
78 YOM with PMH of TUD and metastatic NSCLC s/p chemoradiation presenting with N/V and double vision/gait instability, found to have metastatic brain lesions s/p OR with Dr. D'Amico on 1/11 for L suboccipital craniotomy for resection of cerebellopontine angle metastasis. Found to have acute pulmonary embolism now on AC.     RUL NSCLC with metastatic brain lesions  Post operative state  - s/p OR with D'Amico on 1/11 for L crani and resection  - management per NSGY, on steroids indefinitely (+PPI)  - CT sim today with plan for SRS with rad onc in in neat future  - pain control with bowel regimen  - frequent perioperative incentive spirometer use  - early ambulation and OOBTC as tolerated  - therapeutic AC as below    Acute bilateral PE  - provoked in setting of malignancy  - CTH while on therapeutic heparin gtt stable  - cont apixaban 10mg BID x7d (followed by 5mg BID for at least 3mo duration)    Hypotonic hyponatremia  - likely SIADH in setting of recent surgery and pulmonary lesions  - cont fluid restriction 1.5L, salt tabs 2g TID  - cont salt tabs on discharge for one wek  - will need repeat BMP and PCP f/u within one week    Leukocytosis  - afebrile, no s/s infection  - likely reactive 2/2 surgery and steroids  - monitor off antibiotics    Severe protein calorie malnutrition  Nausea/vomiting, abdominal pain  - BMI 17 in setting of malignancy and anorexia, recent worsening of symptoms with N/V  - N/V resolved, tolerating PO  - recommend nutrition consult     Dispo: home with HPT    Plan discussed with primary team.

## 2024-01-17 NOTE — PROGRESS NOTE ADULT - SUBJECTIVE AND OBJECTIVE BOX
SUBJECTIVE: NAEON. Reports worsening diplopia when looking to rate especially when trying to read/look at things up close. Otherwise no complaints.     MEDICATIONS:  MEDICATIONS  (STANDING):  apixaban 10 milliGRAM(s) Oral every 12 hours  dexAMETHasone     Tablet 2 milliGRAM(s) Oral every 12 hours  influenza  Vaccine (HIGH DOSE) 0.7 milliLiter(s) IntraMuscular once  multivitamin 1 Tablet(s) Oral daily  pantoprazole    Tablet 40 milliGRAM(s) Oral before breakfast  polyethylene glycol 3350 17 Gram(s) Oral two times a day  senna 2 Tablet(s) Oral at bedtime  sodium chloride 2 Gram(s) Oral every 8 hours  tamsulosin 0.4 milliGRAM(s) Oral at bedtime    MEDICATIONS  (PRN):  acetaminophen     Tablet .. 1000 milliGRAM(s) Oral every 8 hours PRN Mild Pain (1 - 3)  bisacodyl 5 milliGRAM(s) Oral daily PRN Constipation  bisacodyl Suppository 10 milliGRAM(s) Rectal daily PRN Constipation  oxyCODONE    IR 5 milliGRAM(s) Oral every 4 hours PRN Severe Pain (7 - 10)  saline laxative (FLEET) Rectal Enema 1 Enema Rectal once PRN If no BM by AM 1/16      Allergies    No Known Allergies    Intolerances        OBJECTIVE:  Vital Signs Last 24 Hrs  T(C): 36.5 (17 Jan 2024 08:55), Max: 36.8 (16 Jan 2024 21:12)  T(F): 97.7 (17 Jan 2024 08:55), Max: 98.3 (16 Jan 2024 21:12)  HR: 67 (17 Jan 2024 08:55) (65 - 76)  BP: 136/78 (17 Jan 2024 08:55) (121/68 - 143/71)  BP(mean): --  RR: 17 (17 Jan 2024 08:55) (16 - 17)  SpO2: 96% (17 Jan 2024 08:55) (95% - 96%)    Parameters below as of 17 Jan 2024 08:55  Patient On (Oxygen Delivery Method): room air      I&O's Summary    16 Jan 2024 07:01  -  17 Jan 2024 07:00  --------------------------------------------------------  IN: 184 mL / OUT: 1275 mL / NET: -1091 mL    17 Jan 2024 07:01  -  17 Jan 2024 14:25  --------------------------------------------------------  IN: 240 mL / OUT: 0 mL / NET: 240 mL        PHYSICAL EXAM:  Gen: appears stated age, resting comfortably, NAD  HEENT: head wrapped, MMM  Neck: supple  CV: RRR, no m/r/g, peripheral pulses 2+  Pulm: CTAB, no increased work of breathing, no rales/rhonchi  Abd: soft, ND, NT, no rebound or guarding  Skin: warm and dry  Ext: non-tender, no edema  Neuro: AOx3, speaking in full sentences, R CN VI palsy   Psych: affect and behavior appropriate, pleasant at time of interview    LABS:                        12.3   14.35 )-----------( 248      ( 17 Jan 2024 07:35 )             38.1     01-17    137  |  100  |  29<H>  ----------------------------<  96  4.5   |  26  |  0.52    Ca    8.8      17 Jan 2024 07:35  Phos  3.0     01-17  Mg     1.8     01-17        PTT - ( 16 Jan 2024 11:14 )  PTT:79.6 sec  CAPILLARY BLOOD GLUCOSE        Urinalysis Basic - ( 17 Jan 2024 07:35 )    Color: x / Appearance: x / SG: x / pH: x  Gluc: 96 mg/dL / Ketone: x  / Bili: x / Urobili: x   Blood: x / Protein: x / Nitrite: x   Leuk Esterase: x / RBC: x / WBC x   Sq Epi: x / Non Sq Epi: x / Bacteria: x        MICRODATA:      RADIOLOGY/OTHER STUDIES:

## 2024-01-17 NOTE — PROGRESS NOTE ADULT - SUBJECTIVE AND OBJECTIVE BOX
Hematology Oncology Progress Note      Interval History:    SUBJECTIVE:   Patient seen and examined at bedside. Doing well, discharge planning.   Reports he is able to walk short distances.    OBJECTIVE:    VITAL SIGNS:  ICU Vital Signs Last 24 Hrs  T(C): 36.5 (12 Jan 2024 14:14), Max: 36.8 (12 Jan 2024 05:40)  T(F): 97.7 (12 Jan 2024 14:14), Max: 98.3 (12 Jan 2024 08:53)  HR: 60 (12 Jan 2024 12:00) (50 - 81)  BP: 119/69 (11 Jan 2024 19:00) (117/65 - 130/60)  BP(mean): 89 (11 Jan 2024 19:00) (86 - 92)  ABP: 115/64 (12 Jan 2024 12:00) (111/70 - 137/61)  ABP(mean): 86 (12 Jan 2024 12:00) (75 - 98)  RR: 20 (12 Jan 2024 12:00) (17 - 38)  SpO2: 99% (12 Jan 2024 12:00) (93% - 99%)    O2 Parameters below as of 12 Jan 2024 12:00  Patient On (Oxygen Delivery Method): room air              01-11 @ 07:01 - 01-12 @ 07:00  --------------------------------------------------------  IN: 1729.9 mL / OUT: 910 mL / NET: 819.9 mL    01-12 @ 07:01 - 01-12 @ 14:30  --------------------------------------------------------  IN: 225 mL / OUT: 300 mL / NET: -75 mL      CAPILLARY BLOOD GLUCOSE      POCT Blood Glucose.: 126 mg/dL (12 Jan 2024 11:14)      PHYSICAL EXAM:  General: NAD, answering questions, pleasantly conversant  HEENT: head wrapped in protective dressing  Respiratory: CTA b/l  Cardiovascular: +S1/S2; RRR  Abdomen: soft, NT/ND; +BS x4  Extremities: WWP, 2+ peripheral pulses b/l; no LE edema  Skin: normal color and turgor; no rash  Neurological: AAOX3, LUE 5/5, RUE 5/5, 5/5 LLE, 5/5 RLE strength, dysmetria improved, finger to nose test negative, no dysdiadochokinesia present, 6th nerve palsy    MEDICATIONS:  MEDICATIONS  (STANDING):  acetaminophen     Tablet .. 1000 milliGRAM(s) Oral every 8 hours  chlorhexidine 2% Cloths 1 Application(s) Topical <User Schedule>  dexAMETHasone  Injectable 4 milliGRAM(s) IV Push every 6 hours  dexAMETHasone  Injectable   IV Push   enoxaparin Injectable 40 milliGRAM(s) SubCutaneous <User Schedule>  folic acid 1 milliGRAM(s) Oral daily  glucagon  Injectable 1 milliGRAM(s) IntraMuscular once  influenza  Vaccine (HIGH DOSE) 0.7 milliLiter(s) IntraMuscular once  insulin lispro (ADMELOG) corrective regimen sliding scale   SubCutaneous Before meals and at bedtime  multivitamin 1 Tablet(s) Oral daily  ondansetron Injectable 4 milliGRAM(s) IV Push every 6 hours  pantoprazole  Injectable 40 milliGRAM(s) IV Push daily  polyethylene glycol 3350 17 Gram(s) Oral daily  senna 2 Tablet(s) Oral at bedtime  tamsulosin 0.4 milliGRAM(s) Oral at bedtime  thiamine 100 milliGRAM(s) Oral daily    MEDICATIONS  (PRN):  bisacodyl 5 milliGRAM(s) Oral daily PRN Constipation  oxyCODONE    IR 5 milliGRAM(s) Oral every 4 hours PRN Severe Pain (7 - 10)      ALLERGIES:  Allergies    No Known Allergies    Intolerances        LABS:                        11.1   11.82 )-----------( 242      ( 12 Jan 2024 05:04 )             34.7     01-12    138  |  104  |  18  ----------------------------<  135<H>  4.0   |  28  |  0.56    Ca    8.8      12 Jan 2024 05:04  Phos  3.9     01-12  Mg     2.2     01-12      PT/INR - ( 11 Jan 2024 17:14 )   PT: 12.1 sec;   INR: 1.06          PTT - ( 11 Jan 2024 17:14 )  PTT:23.0 sec  Urinalysis Basic - ( 12 Jan 2024 05:04 )    Color: x / Appearance: x / SG: x / pH: x  Gluc: 135 mg/dL / Ketone: x  / Bili: x / Urobili: x   Blood: x / Protein: x / Nitrite: x   Leuk Esterase: x / RBC: x / WBC x   Sq Epi: x / Non Sq Epi: x / Bacteria: x                  RADIOLOGY & ADDITIONAL TESTS: Reviewed.

## 2024-01-26 PROBLEM — R53.0 NEOPLASTIC MALIGNANT RELATED FATIGUE: Status: ACTIVE | Noted: 2023-01-01

## 2024-01-26 PROBLEM — E87.1 HYPONATREMIA: Status: ACTIVE | Noted: 2024-01-01

## 2024-01-26 NOTE — DISCUSSION/SUMMARY
[FreeTextEntry1] : stable exam chart and events reviewed will repeat labs and sodium levels discussed diet to complete RT to discuss after oncology f/u

## 2024-01-26 NOTE — PHYSICAL EXAM
[No Acute Distress] : no acute distress [Frail] : frail [Cachectic] : cachexia was observed [Normal Conjunctiva] : normal conjunctiva [Normal Venous Pressure] : normal venous pressure [No Carotid Bruit] : no carotid bruit [Normal S1, S2] : normal S1, S2 [No Murmur] : no murmur [No Rub] : no rub [No Gallop] : no gallop [No Respiratory Distress] : no respiratory distress  [Good Air Entry] : good air entry [Sheba ____] : sheba [unfilled] [Soft] : abdomen soft [Non Tender] : non-tender [No Masses/organomegaly] : no masses/organomegaly [Normal Bowel Sounds] : normal bowel sounds [No Clubbing] : no clubbing [No Cyanosis] : no cyanosis [Moves all extremities] : moves all extremities [Normal Speech] : normal speech [Alert and Oriented] : alert and oriented [de-identified] : walker [de-identified] : 1+

## 2024-01-26 NOTE — HISTORY OF PRESENT ILLNESS
[FreeTextEntry1] : doing well post craniotomy  appetite improved, neuro issues persist started RT, some nausea since plan is to start immunotherapy

## 2024-01-29 PROBLEM — Z98.890 S/P CRANIOTOMY: Status: ACTIVE | Noted: 2024-01-01

## 2024-01-29 PROBLEM — Z51.89 VISIT FOR WOUND CHECK: Status: ACTIVE | Noted: 2024-01-01

## 2024-01-29 PROBLEM — Z09 POSTOP CHECK: Status: ACTIVE | Noted: 2024-01-01

## 2024-01-31 PROBLEM — L98.429 SACRAL ULCER: Status: ACTIVE | Noted: 2024-01-01

## 2024-01-31 PROBLEM — Z48.02 VISIT FOR SUTURE REMOVAL: Status: ACTIVE | Noted: 2024-01-01

## 2024-01-31 PROBLEM — L89.90 PRESSURE ULCER: Status: ACTIVE | Noted: 2024-01-01

## 2024-01-31 PROBLEM — C43.60 MELANOMA OF FOREARM: Status: ACTIVE | Noted: 2022-07-14

## 2024-01-31 NOTE — REASON FOR VISIT
[Family Member] : family member [de-identified] : left restrosigmoid craniotomy for resection of brain metastasis  [de-identified] : 1/11/24

## 2024-01-31 NOTE — PHYSICAL EXAM
[General Appearance - Alert] : alert [General Appearance - In No Acute Distress] : in no acute distress [Clean] : clean [Dry] : dry [Healing Well] : healing well [Sutures Intact] : closed with intact sutures [No Drainage] : without drainage [Oriented To Time, Place, And Person] : oriented to person, place, and time [Impaired Insight] : insight and judgment were intact [Affect] : the affect was normal [Memory Recent] : recent memory was not impaired [Person] : oriented to person [Place] : oriented to place [Time] : oriented to time [Sensation Tactile Decrease] : light touch was intact [Sclera] : the sclera and conjunctiva were normal [PERRL With Normal Accommodation] : pupils were equal in size, round, reactive to light, with normal accommodation [Extraocular Movements] : extraocular movements were intact [Neck Appearance] : the appearance of the neck was normal [] : no respiratory distress [Skin Color & Pigmentation] : normal skin color and pigmentation [Erythema] : not erythematous [Warm] : not warm [FreeTextEntry5] : CN II-XII grossly intact  [FreeTextEntry6] : LUE strength 4+/5, Otherwise 5/5 strength  [FreeTextEntry1] : right reddened aread to sacrum, nonblanchable.

## 2024-01-31 NOTE — DATA REVIEWED
[de-identified] : ACC: [id number] EXAM: MR BRAIN WAW IC ORDERED BY: SANDEEP KNIGHT  PROCEDURE DATE: 01/12/2024    INTERPRETATION: MR brain with and without gadolinium  CLINICAL INFORMATION: Postoperative evaluation  TECHNIQUE: Sagittal and axial T1-weighted images, axial FLAIR images, axial T2-weighted images, axial gradient echo images and axial diffusion weighted images of the brain were obtained. Following 7 cc of Gadavist administration, 3 cc discarded, isotropic volumetric and fast spin echo T1-weighted images were obtained; this data was reformatted using image post processing software in multiple imaging planes.  FINDINGS: MRI dated 01/10/2024 available for review.  The brain demonstrates interval LEFT retrosigmoid suboccipital craniectomy with resection of previously noted cystic neoplasm. Hemorrhage and fluid is seen with in the surgical cavity with pneumocephalus throughout the brain. Interval decrease of edema with decreased mass effect on the fourth ventricle. The 1.6 x 2.5 cm heterogeneous mass in the RIGHT po is unchanged with moderate edema and mass effect on the RIGHT aspect of the fourth ventricle. The 1.3 cm cystic lesion in the LEFT frontal lobe and 7 mm cystic lesion in LEFT occipital lobe are unchanged. Mild periventricular and mild to moderate deep white matter ischemia is again noted. No acute cerebral cortical infarct is found. No intracranial hemorrhage is recognized. No mass effect is found in the brain.  The ventricles, sulci and basal cisterns appear unremarkable.  The vertebral and internal carotid arteries demonstrate expected flow voids indicating their patency.  The orbits are unremarkable. The paranasal sinuses are clear. The nasal cavity appears intact. The nasopharynx is symmetric. The central skull base and petrous temporal bones are intact.  IMPRESSION: Interval LEFT retrosigmoid suboccipital craniectomy with resection of previously noted cystic neoplasm. Hemorrhage and fluid is seen with in the surgical cavity with pneumocephalus throughout the brain. Interval decrease of edema with decreased mass effect on the fourth ventricle. The 1.6 x 2.5 cm heterogeneous mass in the RIGHT po is unchanged with moderate edema and mass effect on the RIGHT aspect of the fourth ventricle. The 1.3 cm cystic lesion in the LEFT frontal lobe and 7 mm cystic lesion in LEFT occipital lobe are unchanged. Mild periventricular and mild to moderate deep white matter ischemia is again noted.  --- End of Report ---

## 2024-01-31 NOTE — ASSESSMENT
[FreeTextEntry1] : 78-year-old male with no significant past medical history who presents for f/u of T3N2, stage IIIb non-small cell lung cancer, with features of adenocarcinoma.  1. Non-small cell lung cancer with features of adenocarcinoma - MTAP loss, BRCA2 *, PD-L1 22c3 100%, PD-L1 28-8 95% - s/p chemoRT (carboplatin/paclitaxel) finishing 10/27/23; patient was planned to start durvalumab per [place] trial, though patient had refused/delayed decision at that time. - MRI brain 1/10/24 - 5 new brain lesions - s/p craniotomy 1/11/24 by Dr. Damico - 1/25/24 - received SRS   Based on KEYNOTE-024, open label phase 3 trial of patients with untreated advanced NSCLC with PD-L1 expression on at least 50% of tumor cells and no sensitizing mutation of epidermal growth factor receptor gene or translocation of the anaplastic lymphoma kinase gene, adjuvant pembrolizumab was found to have significantly longer progression free and overall survival and with fewer adverse events than platinum-based chemotherapy. Median progression free survival was 10.3 months vs 6.0 months in the chemotherapy group.   KEYNOTE - 042 extended this benefit to patients with locally advanced or metastatic non-small-cell lung cancer without EGFR/ALK alteration and with low PD-L1 TPS (1-49%). Overall survival was significant longer in the pembrolizumab group than in the chemotherapy group in all three TPS populations (>50%, > 20% and > 1%). median survival values were 20 months for pembrolizumab vs 12.2 months for chemotherapy. Treatment related adverse events of grade 3 or worse occured in 18% of pembrolizumab group and 41% of chemotherapy group  - patient neurologic exam improving, though still has delayed finger to nose testing on left side - surgical plan and steroids per NSGY - based above trials, given PD-L1 TPS score 80-85%, plan will be to start pembrolizumab (immunotherapy)  IV 30 min every 3 weeks. Expect robust response given his elevated PD-L1 status. Pembrolizumab to be administered after SRS - will follow up SRS outpatient - PT when able  2.Left Lower Lobe segmental/subsegmental pulmonary emboli - provoked by malignancy - continues eliquis

## 2024-01-31 NOTE — ASSESSMENT
[FreeTextEntry1] : 77 y/o Male with PMHx of melanoma (L arm skin resection 2023), basal cell carcinoma, non small cell lung cancer - adenocarcinoma (s/p radiation chemo, completed 10/2023), and BPH who was found to have 5 areas of brain metastases, largest in the Left cerebellum during workup for loss of balance, headache, fatigue, generalized weakness, diplopia, b/l foot tingling x 2 weeks + N/V x 4 days. Now s/p left restrosigmoid craniotomy resection of brain met 1/11/24 with Dr. D'Amico. Path consistent with metastatic adenocarcinoma of lung origin, PD-L1 80-85% positive. He is following with oncologist Dr. Chapman for treatment. Completed session 5/5 SRS with Dr. Wernicke today.  He is 4 weeks post op. Incision is clean, dry, intact with wound edges well approximated. Sutures removed, no drainage noted.   Noted to have reddened area to sacrum, nonblanchable.  Triad cream sent and educated to shift weight, pillow offloading, donut pillow. Will help coordinate wound care/homecare.   Will repeat MRI 1 month post RT, per Dr. Chapman team.   Educated to notify if new/worsening weakness, balance issues, other new/worsening focal neuro deficits.   Educated to continue to shower daily. Wash wound with mild or baby shampoo. Pat incision dry with separate dry towel. Report all S/S infection including drainage, redness, warmth to touch of incision, chills. Keep incision protected from the sun for 3-6 months from time of surgery.  Patient and family member verbalize understanding of todays discussion and next steps in treatment plan.

## 2024-01-31 NOTE — PHYSICAL EXAM
[Restricted in physically strenuous activity but ambulatory and able to carry out work of a light or sedentary nature] : Status 1- Restricted in physically strenuous activity but ambulatory and able to carry out work of a light or sedentary nature, e.g., light house work, office work [Normal] : affect appropriate [de-identified] : All rashes have resolved and are drying up.  [de-identified] : Still having delayed finger to nose on left side, no focal deficits - 5/5 strength in all four extremeties

## 2024-01-31 NOTE — HISTORY OF PRESENT ILLNESS
[FreeTextEntry1] : 79 y/o Male with PMHx of melanoma (L arm skin resection 2023), basal cell carcinoma, non small cell lung cancer - adenocarcinoma (s/p radiation chemo, completed 10/2023), and BPH who was found to have 5 areas of brain metastases, largest in the Left cerebellum during workup for loss of balance, headache, fatigue, generalized weakness, diplopia, b/l foot tingling x 2 weeks + N/V x 4 days. Now s/p left restrosigmoid craniotomy resection of brain met 1/11/24 with Dr. D'Amico. PATH: metastatic adenocarcinoma of lung origin, PD-L1 80-85% positive  1/17/24 pt dc home.  1/25/24 started SRS per Dr. Wernicke.   1/26/24 noted to have increased weakness, dizziness, nausea that started with radiation initiation. Dex dose increased from 2 mg BID to 4 mg BID.   Returns TODAY for post op visit. Completed session 5/5 of SRS today.  Patient endorses improvement since steroid increase. Double vision unchanged.  Denies signs of any postop wound infection which could include but not limited to redness, swelling, purulent drainage.  Does note new area of redness to his buttock that is bothersome when pressure on it. Worse at night when sleeping in a chair. Unable to sleep lying down s/t rib pain right >left when lying flat.   Oncologist: Billy Muir: Gabriella Wernicke Nurse Navigator: Kaye Torres

## 2024-01-31 NOTE — HISTORY OF PRESENT ILLNESS
[Disease: _____________________] : Disease: [unfilled] [T: ___] : T[unfilled] [N: ___] : N[unfilled] [M: ___] : M[unfilled] [AJCC Stage: ____] : AJCC Stage: [unfilled] [Treatment Protocol] : Treatment Protocol [80: Normal activity with effort; some signs or symptoms of disease.] : 80: Normal activity with effort; some signs or symptoms of disease.  [ECOG Performance Status: 2 - Ambulatory and capable of all self care but unable to carry out any work activities] : Performance Status: 2 - Ambulatory and capable of all self care but unable to carry out any work activities. Up and about more than 50% of waking hours [de-identified] : Domingo Galeana is a 78 year old male who presents to the clinic for follow up of T3N2, stage IIIB, Non-small cell lung cancer, adenocarcinoma. He comes today for follow-up with his daughter.  He is complaining of generalized weakness over the last 10 days, however also noticeable vision double and gait instability.  Oncologic history: 6/7/2023: The patient was referred by Dr. Judge for dyspnea with negative cardiac evaluation.  Rash developed in February 2023 with concurrent cough and dyspnea.  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 7/10/2023: Large irregular mass in the right upper lobe with stranding to the pleural surface.  This mass is highly suspicious for malignancy.  There is subcarinal lymphadenopathy which is likely metastatic. 7/18/2023: PET scan-hypermetabolic right upper lobe pulmonary mass, which is most consistent with a primary bronchogenic malignancy.  Hypermetabolic mediastinal lymphadenopathy which is most consistent with metastatic disease.  Otherwise no abnormal hypermetabolic activity to suggest malignancy at this time.  Liver cysts and additional subcentimeter low-attenuation lesions in the liver which are too small to further characterize and may represent cysts as well.  Enlarged prostate. 7/19/2023: Follow-up with Dr. Eason 7/21/2023: EBUS with : 4L atypical, 3P NSCLC with adenocarcinoma features 7/27/2023: Thoracic tumor board: T3N2 final stage pending MRI brain.  Patient is not surgical candidate.  Tumor board recommends chemoradiation therapy.  Pathology tissue is not sufficient for molecular testing, needs repeat biopsy. 8/2/23: Repeat IR biopsy: Pathology:  Specimen(s) Submitted 1  Right upper lobe lung core bx + touch prep Final Diagnosis Right upper lobe lung, core biopsy and touch prep -   Non-small cell carcinoma, with necrosis (see note). PD-L1 22c3 100% PD-L1 28-8 95% BRCA2 * MTAP loss BTG1 S170* CDKN2A/B loss DAXX Q177* TP53 E68* 8/16/2023: The patient canceled his simulation with Radonc. 11/27/23: CT CHEST: 1. Since July 7, 2023, slightly decreased left upper lobe malignancy with new treatment induced changes and decreased thoracic melly metastases. 2. Postradiation pneumonitis and foci of large and small airway disease in right lung.  MRI Brain 1/10/2024 Since prior MRI brain 10/5/2023, interval development of 5 enhancing lesion suspicious for intracranial metastasis. The largest lesions include a 3.5 cm left cerebellar mass and a 2.5 cm exophytic mass arising from the right hemipons. There is resultant mass effect with partial effacement of the fourth ventricle and dilatation of the lateral and third ventricles consistent with hydrocephalus. Per the electronic medical record, the neurosurgical team was aware of the findings at the time of dictation.  Hospitalized 1/10/24 - 1/17/2024 at [facility] - sent for NSGY evaluation as MRI Brain 5 new areas of brain metastases. s/p craniresection (1/11). Dexamethasone taper x 1 week to 2BID. Also found to have PE, initially started on heparin gtt, and transitioned to Eliquis  CT Chest 1/12/2024 1. Since 11/27/2023, there are new pulmonary emboli bilaterally. 2. Worsening of mediastinal lymphadenopathy. 3. Evolution of radiation induced lung disease in the right lung with development of radiation induced pulmonary fibrosis. The treated tumor has decreased in size. 4. Small bilateral pleural effusions.  CT A/P 1/12/2024 No metastatic lesions identified in the abdomen or pelvis. [de-identified] : Non-small cell lung cancer with attic adenocarcinoma features [de-identified] : Pulm:  [FreeTextEntry1] : RT - planned completion 10/27/2023 8/24/23 C1 carboplatin/paclitaxel 9/5/23  C2 carboplatin/paclitaxel 9/11/23    C3 carboplatin/paclitaxel  9/18/23   C4 carboplatin/paclitaxel  9/25/23   C5 carboplatin/paclitaxel  10/2/23   C6 carboplatin/paclitaxel 10/9/23: C7 carboplatin/paclitaxel 10/16/23: C8 carboplatin/paclitaxel 10/23/23: C9 carboplatin/paclitaxel   [de-identified] : Has some nausea, though hasn't taken zofran Has some more fatigue with radiation Still has double vision Doing home PT when he doesn't go out and walk around  Per daughter, ambulating better. Doesn't use walker at home, though uses walker when he leaves the apartment  Taking dexamethasone 4mg BID - was increased this past friday, had some eye numbness/headache (improved headache) Goes up 6 flights up every other day at home with assist

## 2024-01-31 NOTE — END OF VISIT
[Time Spent: ___ minutes] : I have spent [unfilled] minutes of time on the encounter. [] : Fellow [FreeTextEntry3] : Patient seen with , oncology fellow.  77 yo M with now metastatic NSCLC PDL1 high s/p brain met resection undergoing SRS to 5 BMs here for f/u. Given PDL1 high will schedule pembrolizumab Q 3 weeks. To start after SRS. Labs CBC, CMP, TSH

## 2024-02-09 NOTE — END OF VISIT
[] : Fellow [FreeTextEntry3] : Patient seen with , oncology fellow.  77 yo M with now metastatic NSCLC PDL1 high s/p brain met resection undergoing SRS to 5 BMs here for f/u. Given PDL1 high will schedule pembrolizumab Q 3 weeks. To start after SRS. Labs CBC, CMP, TSH [Time Spent: ___ minutes] : I have spent [unfilled] minutes of time on the encounter.

## 2024-02-13 PROBLEM — C34.90 NON-SMALL CELL LUNG CANCER METASTATIC TO BRAIN: Status: ACTIVE | Noted: 2024-01-01

## 2024-02-13 NOTE — ASSESSMENT
[FreeTextEntry1] : 78-year-old male with no significant past medical history who presents for f/u of T3N2, stage IIIb non-small cell lung cancer, with features of adenocarcinoma.  Non-small cell lung cancer with features of adenocarcinoma - MTAP loss, BRCA2 *, PD-L1 22c3 100%, PD-L1 28-8 95% SDC4-ROS1 gene fusion is DETECTED on brain tumor sample. 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 Noted esophageal dilation due to subcarinal LN pressure.  --rx reportrectinib 160 mg daily x 14 days, then 160 mg BID ongoing --labs every 2 wks x 2 months: CBC, CMP, CPK, uric acid --increase dex to 2 mg BID and stay on that pending improvement --will not perfomr LP at this time given patient has clear LMD on MRI and waiting for LP would delay therapy, as Eliquis would have to be held  Approval was based [facility], a global, multicenter, single-arm, open-label, multi-cohort clinical trial ([id number]) which included patients with ROS1-positive locally advanced or metastatic NSCLC. Efficacy was evaluated in 71 ROS1 TKI-naive patients who received up to 1 prior line of platinum-based chemotherapy and/or immunotherapy and 56 patients who received 1 prior ROS1 TKI with no prior platinum-based chemotherapy or immunotherapy.  The major efficacy outcome measures were overall response rate (MONTENEGRO) and duration of response (MATT) according to RECIST v1.1 as assessed by blinded independent central review. Confirmed MONTENEGRO in the ROS1 TKI naive group was 79% (95% CI: 68, 88) and 38% (95% CI: 25, 52) in those patients receiving prior treatment with a ROS1 inhibitor. Median MATT was 34.1 months (95% CI: 25.6, not evaluable) and 14.8 months (95% CI: 7.6, not evaluable) in the two respective groups. Responses were observed in intracranial lesions in patients with measurable CNS metastases, and in patients with resistance mutations following TKI therapy.  The most common (>20%) adverse reactions were dizziness, dysgeusia, peripheral neuropathy, constipation, dyspnea, ataxia, fatigue, cognitive disorders, and muscular weakness.  The recommended repotrectinib dose is 160 mg orally once daily with or without food for 14 days, then increased to 160 mg twice daily, until disease progression or unacceptable toxicity.   2.Left Lower Lobe segmental/subsegmental pulmonary emboli - provoked by malignancy - continues eliquis

## 2024-02-13 NOTE — HISTORY OF PRESENT ILLNESS
[Disease: _____________________] : Disease: [unfilled] [T: ___] : T[unfilled] [N: ___] : N[unfilled] [M: ___] : M[unfilled] [AJCC Stage: ____] : AJCC Stage: [unfilled] [Treatment Protocol] : Treatment Protocol [80: Normal activity with effort; some signs or symptoms of disease.] : 80: Normal activity with effort; some signs or symptoms of disease.  [ECOG Performance Status: 2 - Ambulatory and capable of all self care but unable to carry out any work activities] : Performance Status: 2 - Ambulatory and capable of all self care but unable to carry out any work activities. Up and about more than 50% of waking hours [de-identified] : Domingo Galeana is a 78 year old male who presents to the clinic for follow up of SDC4-ROS1 fusion NSCLC with brain metastases and now LMD.  Oncologic history: 6/7/2023: The patient was referred by Dr. Judge for dyspnea with negative cardiac evaluation.  Rash developed in February 2023 with concurrent cough and dyspnea.  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 7/10/2023: Large irregular mass in the right upper lobe with stranding to the pleural surface.  This mass is highly suspicious for malignancy.  There is subcarinal lymphadenopathy which is likely metastatic. 7/18/2023: PET scan-hypermetabolic right upper lobe pulmonary mass, which is most consistent with a primary bronchogenic malignancy.  Hypermetabolic mediastinal lymphadenopathy which is most consistent with metastatic disease.  Otherwise no abnormal hypermetabolic activity to suggest malignancy at this time.  Liver cysts and additional subcentimeter low-attenuation lesions in the liver which are too small to further characterize and may represent cysts as well.  Enlarged prostate. 7/19/2023: Follow-up with Dr. Eason 7/21/2023: EBUS with : 4L atypical, 3P NSCLC with adenocarcinoma features 7/27/2023: Thoracic tumor board: T3N2 final stage pending MRI brain.  Patient is not surgical candidate.  Tumor board recommends chemoradiation therapy.  Pathology tissue is not sufficient for molecular testing, needs repeat biopsy. 8/2/23: Repeat IR biopsy: Pathology:  Specimen(s) Submitted 1  Right upper lobe lung core bx + touch prep Final Diagnosis Right upper lobe lung, core biopsy and touch prep -   Non-small cell carcinoma, with necrosis (see note). PD-L1 22c3 100% PD-L1 28-8 95% BRCA2 * MTAP loss BTG1 S170* CDKN2A/B loss DAXX Q177* TP53 E68* 8/16/2023: The patient canceled his simulation with Radonc. 11/27/23: CT CHEST: 1. Since July 7, 2023, slightly decreased left upper lobe malignancy with new treatment induced changes and decreased thoracic melly metastases. 2. Postradiation pneumonitis and foci of large and small airway disease in right lung.  MRI Brain 1/10/2024 Since prior MRI brain 10/5/2023, interval development of 5 enhancing lesion suspicious for intracranial metastasis. The largest lesions include a 3.5 cm left cerebellar mass and a 2.5 cm exophytic mass arising from the right hemipons. There is resultant mass effect with partial effacement of the fourth ventricle and dilatation of the lateral and third ventricles consistent with hydrocephalus. Per the electronic medical record, the neurosurgical team was aware of the findings at the time of dictation.  Hospitalized 1/10/24 - 1/17/2024 at [facility] - sent for NSGY evaluation as MRI Brain 5 new areas of brain metastases. s/p craniresection (1/11). Dexamethasone taper x 1 week to 2BID. Also found to have PE, initially started on heparin gtt, and transitioned to Eliquis  1/11/2024: Pathology 1.  Left cerebellar brain tumor, resection: -   Metastatic adenocarcinoma of lung origin. 2.  Left cerebellar brain tumor, resection: -   Metastatic adenocarcinoma of lung origin. Note: Immunostains performed on block 2A shows that the tumor cells are positive for TTF-1, and focally for p40 and p63.  This staining profile supports the diagnosis.  ONKOSIGHT NEXT GENERATION SEQUENCING GENE FUSION PANEL INTERPRETATION POSITIVE: SDC4-ROS1 gene fusion is DETECTED. Tier II: Variants of Potential Clinical Significance TP53 p.([id number]) Tier III: Variants of Unknown Clinical Significance AKT1 p.([id number]) PD-L1 IMMUNOHISTOCHEMICAL ANALYSIS: Tumor Proportion Score:  80-85% / Positive  CT Chest 1/12/2024 1. Since 11/27/2023, there are new pulmonary emboli bilaterally. 2. Worsening of mediastinal lymphadenopathy. 3. Evolution of radiation induced lung disease in the right lung with development of radiation induced pulmonary fibrosis. The treated tumor has decreased in size. 4. Small bilateral pleural effusions.  CT A/P 1/12/2024 No metastatic lesions identified in the abdomen or pelvis.  2/11/2024 MRI spine: 1.  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 2.  Enhancing lesion measuring up to 1.0 cm is present within the cauda equina at the level of L2. 3.  Several millimeter enhancing focus is present within the T9 superior endplate that could reflect metastatic disease or an atypical lipid poor hemangioma. 4.  Multilevel degenerative disc disease is present throughout the spine. 5.  Large subcarinal mass is again seen with involvement of the esophagus and dilatation of the more proximal esophagus. 6.  Consider follow-up imaging to assess stability as warranted. MRIB: 1.  Continued evolution of postsurgical changes status post resection of left cerebellar mass. Fluid-filled surgical cavity with peripheral enhancement likely secondary to postsurgical changes. Otherwise no evidence of residual tumor. 2.  Decreased size of the exophytic mass in the right hemipons and the left occipital lobe. Stable left frontal and right cerebellar masses. Improvement in vasogenic edema particularly in the po. 3.  No new lesions. [de-identified] : Non-small cell lung cancer with attic adenocarcinoma features [de-identified] : Pulm:  [FreeTextEntry1] : RT - planned completion 10/27/2023 8/24/23 C1 carboplatin/paclitaxel 9/5/23  C2 carboplatin/paclitaxel 9/11/23    C3 carboplatin/paclitaxel  9/18/23   C4 carboplatin/paclitaxel  9/25/23   C5 carboplatin/paclitaxel  10/2/23   C6 carboplatin/paclitaxel 10/9/23: C7 carboplatin/paclitaxel 10/16/23: C8 carboplatin/paclitaxel 10/23/23: C9 carboplatin/paclitaxel 2/14/2024: Repotrectinib  [de-identified] : Currently on Dex 2 mg daily. More nausea and dysphagia. Walking ok. Persistent LUE weakness. Noted on imaging large subcarinal mass with esophageal dilation.

## 2024-02-13 NOTE — PHYSICAL EXAM
[Restricted in physically strenuous activity but ambulatory and able to carry out work of a light or sedentary nature] : Status 1- Restricted in physically strenuous activity but ambulatory and able to carry out work of a light or sedentary nature, e.g., light house work, office work [Normal] : affect appropriate [de-identified] : All rashes have resolved and are drying up.  [Thin] : thin [de-identified] : Frail appearing [de-identified] : Still having delayed finger to nose on left side, 4/5 LUE, 4/5 RLE

## 2024-02-14 NOTE — ED ADULT NURSE NOTE - NS ED NOTE ABUSE RESPONSE YN
Subjective:     Anne Marie Hamilton is a 15 m o  female who is brought in for this well child visit  History provided by: mother    Current Issues:  Current concerns: none  Well Child Assessment:  History was provided by the mother  Yenni Lozada lives with her mother, father and sister  Interval problems do not include recent illness  Nutrition  Types of milk consumed include formula  28 ounces of milk or formula are consumed every 24 hours  Types of intake include vegetables, meats, juices, fruits, fish and eggs  There are difficulties with feeding  Dental  The patient has teething symptoms  Tooth eruption is in progress  Elimination  Elimination problems do not include constipation, diarrhea or urinary symptoms  Sleep  The patient sleeps in her crib  Child falls asleep while in caretaker's arms  Average sleep duration is 8 hours  Safety  Home is child-proofed? yes  There is no smoking in the home  Home has working smoke alarms? yes  Home has working carbon monoxide alarms? yes  There is an appropriate car seat in use  Screening  Immunizations are up-to-date  There are no risk factors for hearing loss  There are no risk factors for tuberculosis  There are no risk factors for lead toxicity  Social  The caregiver enjoys the child  Childcare is provided at child's home  The childcare provider is a parent  Birth History    Birth     Length: 20" (50 8 cm)     Weight: 3240 g (7 lb 2 3 oz)     HC 34 cm (13 39")    Apgar     One: 9 0     Five: 9 0    Discharge Weight: 3127 g (6 lb 14 3 oz)    Delivery Method: Vaginal, Spontaneous    Gestation Age: 36 2/7 wks    Feeding: Breast Fed    Duration of Labor: 2nd: 9m    Days in Hospital: 2 0   [facility]      Born to a 29 year ol  mother after a term , uncomplicated pregnancy    Baby's blood type O+ and mother's blood type O+  Mother is nursing   Received hep B    CCHD scree: pass,   Hearing test: pass both ears Yes The following portions of the patient's history were reviewed and updated as appropriate: allergies, current medications, past family history, past medical history, past social history, past surgical history and problem list     Developmental 9 Months Appropriate     Question Response Comments    Passes small objects from one hand to the other Yes Yes on 2020 (Age - 9mo)    Will try to find objects after they're removed from view Yes Yes on 2020 (Age - 9mo)    At times holds two objects, one in each hand Yes Yes on 2020 (Age - 9mo)    Can bear some weight on legs when held upright Yes Yes on 2020 (Age - 9mo)    Picks up small objects using a 'raking or grabbing' motion with palm downward Yes Yes on 2020 (Age - 9mo)    Can sit unsupported for 60 seconds or more Yes Yes on 2020 (Age - 9mo)    Will feed self a cookie or cracker Yes Yes on 2020 (Age - 9mo)    Seems to react to quiet noises Yes Yes on 2020 (Age - 9mo)    Will stretch with arms or body to reach a toy Yes Yes on 2020 (Age - 9mo)                  Objective:     Growth parameters are noted and are appropriate for age  Wt Readings from Last 1 Encounters:   03/03/21 8 93 kg (19 lb 11 oz) (48 %, Z= -0 04)*     * Growth percentiles are based on WHO (Girls, 0-2 years) data  Ht Readings from Last 1 Encounters:   03/03/21 29 5" (74 9 cm) (62 %, Z= 0 30)*     * Growth percentiles are based on WHO (Girls, 0-2 years) data  Vitals:    03/03/21 0955   Temp: 98 3 °F (36 8 °C)   TempSrc: Axillary   Weight: 8 93 kg (19 lb 11 oz)   Height: 29 5" (74 9 cm)   HC: 46 7 cm (18 39")          Physical Exam  Constitutional:       General: She is not in acute distress  Appearance: Normal appearance  She is well-developed and normal weight     HENT:      Right Ear: Tympanic membrane and external ear normal       Left Ear: Tympanic membrane and external ear normal       Nose: Nose normal  Mouth/Throat:      Mouth: Mucous membranes are moist       Pharynx: Oropharynx is clear  Eyes:      General:         Right eye: No discharge  Left eye: No discharge  Conjunctiva/sclera: Conjunctivae normal       Pupils: Pupils are equal, round, and reactive to light  Neck:      Musculoskeletal: Neck supple  Cardiovascular:      Rate and Rhythm: Regular rhythm  Heart sounds: No murmur (no murmur heard)  Pulmonary:      Effort: Pulmonary effort is normal  No respiratory distress or retractions  Breath sounds: Normal breath sounds  Abdominal:      General: Bowel sounds are normal  There is no distension  Palpations: Abdomen is soft  Tenderness: There is no abdominal tenderness  Genitourinary:     Comments: Normal female genitalia  Musculoskeletal: Normal range of motion  General: No deformity  Comments: No abnormality noted   Skin:     General: Skin is warm  Capillary Refill: Capillary refill takes less than 2 seconds  Coloration: Skin is not jaundiced  Neurological:      Mental Status: She is alert  Cranial Nerves: No cranial nerve deficit  Comments: No abnormality noted           Assessment:     Healthy 15 m o  female child  1  Encounter for well child visit at 13 months of age     3  Encounter for immunization  HEPATITIS A VACCINE PEDIATRIC / ADOLESCENT 2 DOSE IM (VAQTA)(HAVRIX)    MMR VACCINE SQ    VARICELLA VACCINE SQ   3  Screening for iron deficiency anemia  POCT hemoglobin fingerstick   4  Screening for lead exposure  POCT Lead       Plan:       Multivitamins recommended  1  Anticipatory guidance discussed    Specific topics reviewed: avoid potential choking hazards (large, spherical, or coin shaped foods) , avoid putting to bed with bottle, avoid small toys (choking hazard), car seat issues, including proper placement and transition to toddler seat at 20 pounds, caution with possible poisons (including pills, plants, and cosmetics), child-proof home with cabinet locks, outlet plugs, window guards, and stair safety tong, discipline issues: limit-setting, positive reinforcement, importance of varied diet, make middle-of-night feeds "brief and boring", place in crib before completely asleep, Poison Control phone number 6-457.789.2308, risk of child pulling down objects on him/herself, safe sleep furniture, smoke detectors, use of transitional object (mirtha bear, etc ) to help with sleep, wean to cup at 512 months of age and wind-down activities to help with sleep  2  Development: appropriate for age    1  Immunizations today: per orders  Vaccine Counseling: Discussed with: Ped parent/guardian: mother  The benefits, contraindication and side effects for the following vaccines were reviewed: Immunization component list: Hep A, measles, mumps, rubella and varicella  Total number of components reveiwed:5    4  Follow-up visit in 3 months for next well child visit, or sooner as needed

## 2024-02-14 NOTE — ED ADULT NURSE NOTE - ED STAT RN HANDOFF DETAILS
Covering for RN Price: Patient stable for transfer. Transfer and plan of care discussed with patient and verbalized understanding. No acute distress noted. Safety precautions maintained. Belongings secured with patient.

## 2024-02-14 NOTE — ED ADULT NURSE NOTE - OBJECTIVE STATEMENT
79 y/o male pmhx current brain & spinal ca. Takes Eliquis and steroids daily. Presents to the ED with daughter at bedside c/o weakness and fatigue that has been progressively worsening over past few weeks, difficulty swallowing, decreased appetite, difficulty urinating. Endorses occasional CP and SOB, as well as vision changes. Currently denies CP, SOB, F/C, headache, vision changes, numbness/tingling, and any other complaints at this time. On exam, A&Ox4, RA, VSS, NAD, ambulatory. Skin is warm, bruises present but otherwise appropriate for ethnicity. Respirations spontaneous and unlabored, speaking in full coherent sentences. Appears fatigued. Awaiting orders.

## 2024-02-14 NOTE — ED ADULT NURSE NOTE - NSFALLHARMRISKINTERV_ED_ALL_ED

## 2024-02-14 NOTE — ED PROVIDER NOTE - PROGRESS NOTE DETAILS
Bladder scan without significant volume, no concern for significant retention requiring urinary cannulation.  UA not concerning for UTI. Will sign out to Dr. Rothman:    78M with metastatic lung cancer, here with dysphagia and decreased UOP, medically cleared on metabolic and urinary workup, pending CT neck results for disposition.

## 2024-02-14 NOTE — ED PROVIDER NOTE - OBJECTIVE STATEMENT
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  Does not feel any urinary urgency or dysuria.

## 2024-02-14 NOTE — ED ADULT TRIAGE NOTE - CHIEF COMPLAINT QUOTE
79 y/o male c/o difficulty swallowing poor oral intake, decreased urination, Pt with hx of metastatic lung CA.

## 2024-02-14 NOTE — ED PROVIDER NOTE - CLINICAL SUMMARY MEDICAL DECISION MAKING FREE TEXT BOX
Given difficulty with PO intake, must r/o gross metabolic abnormality ?hypernatremia ?MEHREEN ?hyperglycemia 2/2 intake.  Will obtain enrique-esophageal imaging for characterization of possible mechanical obstructive process 2/2 lung cancer.  No sign of peritonsillar or retropharyngeal abscess on exam as cause for symptoms.  Given decreased urine output, r/o urinary retention and r/o UTI.

## 2024-02-14 NOTE — ED PROVIDER NOTE - NSFOLLOWUPINSTRUCTIONS_ED_ALL_ED_FT
Dysphagia    Dysphagia is trouble swallowing. This condition occurs when solids and liquids stick in a person's throat on the way down to the stomach, or when food takes longer to get to the stomach than usual.    You may have problems swallowing food, liquids, or both. You may also have pain while trying to swallow. It may take you more time and effort to swallow something.    What are the causes?  This condition may be caused by:  Muscle problems. These may make it difficult for you to move food and liquids through the esophagus, which is the tube that connects your mouth to your stomach.  Blockages. You may have ulcers, scar tissue, or inflammation that blocks the normal passage of food and liquids. Causes of these problems include:  Acid reflux from your stomach into your esophagus (gastroesophageal reflux).  Infections.  Radiation treatment for cancer.  Medicines taken without enough fluids to wash them down into your stomach.  Stroke. This can affect the nerves and make it difficult to swallow.  Nerve problems. These prevent signals from being sent to the muscles of your esophagus to squeeze (contract) and move what you swallow down to your stomach.  Globus pharyngeus. This is a common problem that involves a feeling like something is stuck in your throat or a sense of trouble with swallowing, even though nothing is wrong with the swallowing passages.  Certain conditions, such as cerebral palsy or Parkinson's disease.  What are the signs or symptoms?  Common symptoms of this condition include:  A feeling that solids or liquids are stuck in your throat on the way down to the stomach.  Pain while swallowing.  Coughing or gagging while trying to swallow.  Other symptoms include:  Food moving back from your stomach to your mouth (regurgitation).  Noises coming from your throat.  Chest discomfort when swallowing.  A feeling of fullness when swallowing.  Drooling, especially when the throat is blocked.  Heartburn.  How is this diagnosed?  This condition may be diagnosed by:  Barium swallow X-ray. In this test, you will swallow a white liquid that sticks to the inside of your esophagus. X-ray images are then taken.  Endoscopy. In this test, a flexible telescope is inserted down your throat to look at your esophagus and your stomach.  CT scans or an MRI.  How is this treated?  Treatment for dysphagia depends on the cause of this condition:  If the dysphagia is caused by acid reflux or infection, medicines may be used. These may include antibiotics or heartburn medicines.  If the dysphagia is caused by problems with the muscles, swallowing therapy may be used to help you strengthen your swallowing muscles. You may have to do specific exercises to strengthen the muscles or stretch them.  If the dysphagia is caused by a blockage or mass, procedures to remove the blockage may be done. You may need surgery and a feeding tube.  You may need to make diet changes. Ask your health care provider for specific instructions.    Follow these instructions at home:  Medicines    Take over-the-counter and prescription medicines only as told by your health care provider.  If you were prescribed an antibiotic medicine, take it as told by your health care provider. Do not stop taking the antibiotic even if you start to feel better.  Eating and drinking      Make any diet changes as told by your health care provider.  Work with a diet and nutrition specialist (dietitian) to create an eating plan that will help you get the nutrients you need in order to stay healthy.  Eat soft foods that are easier to swallow.  Cut your food into small pieces and eat slowly. Take small bites.  Eat and drink only when you are sitting upright.  Do not drink alcohol or caffeine. If you need help quitting, ask your health care provider.  General instructions    Check your weight every day to make sure you are not losing weight.  Do not use any products that contain nicotine or tobacco. These products include cigarettes, chewing tobacco, and vaping devices, such as e-cigarettes. If you need help quitting, ask your health care provider.  Keep all follow-up visits. This is important.  Contact a health care provider if:  You lose weight because you cannot swallow.  You cough when you drink liquids.  You cough up partially digested food.  Get help right away if:  You cannot swallow your saliva.  You have shortness of breath, a fever, or both.  Your voice is hoarse and you have trouble swallowing.  These symptoms may represent a serious problem that is an emergency. Do not wait to see if the symptoms will go away. Get medical help right away. Call your local emergency services (911 in the [place] Do not drive yourself to the hospital.    Summary  Dysphagia is trouble swallowing. This condition occurs when solids and liquids stick in a person's throat on the way down to the stomach. You may cough or gag while trying to swallow.  Dysphagia has many possible causes.  Treatment for dysphagia depends on the cause of the condition.  Keep all follow-up visits. This is important.  This information is not intended to replace advice given to you by your health care provider. Make sure you discuss any questions you have with your health care provider.

## 2024-02-14 NOTE — ED ADULT NURSE NOTE - CHIEF COMPLAINT QUOTE
77 y/o male c/o difficulty swallowing poor oral intake, decreased urination, Pt with hx of metastatic lung CA.

## 2024-02-14 NOTE — ED PROVIDER NOTE - NS ED ROS FT
REVIEW OF SYSTEMS  positive for: runny nose, rash   negative for: fever, headache, eye pain, sore throat, cough, shortness of breath, chest pain, stomach pain, vomiting, diarrhea, dysuria, myalgias,

## 2024-02-14 NOTE — ED ADULT TRIAGE NOTE - BEFAST BALANCE
27 year old male with past medical history of EtOH abuse, substance induced psychosis, depression and several ED/CPEP encounters for alcohol related psychosis and recent psych admission to [facility] who presented to the ED with c/o nausea, abdominal pain and EtOH withdrawal. He denies suicidal or homicidal ideation but has previously endorsed auditory hallucinations for which psychiatry has been consulted to r/o alcohol induced psychotic disorder. CT A/P with findings of thickened inflamed terminal ileal and cecum suggestive of Crohn's disease - GI consulted, recommend empiric antibiotic coverage with ciprofloxacin and flagyl. Patient with CIWA scores ~ 2, on Librium.     <<<INCOMPLETE>>> 27 year old male with past medical history of EtOH abuse, substance induced psychosis, depression and several ED/CPEP encounters for alcohol related psychosis and recent psych admission to [facility] who presented to the ED with c/o nausea, abdominal pain and EtOH withdrawal. He denies suicidal or homicidal ideation but has previously endorsed auditory hallucinations for which psychiatry has been consulted to r/o alcohol induced psychotic disorder. CT A/P with findings of thickened inflamed terminal ileal and cecum suggestive of Crohn's disease - GI consulted, recommend empiric antibiotic coverage with ciprofloxacin and flagyl. Patient with CIWA scores ~ 2, on Librium.       <<<<INCOMPLETE>>>>   Patient is 27 year old male with past medical history of EtOH abuse, substance induced psychosis, depression and several ED/CPEP encounters for alcohol related psychosis and recent psych admission to [facility] who presented to the ED with c/o nausea, abdominal pain and EtOH withdrawal. He denies suicidal or homicidal ideation but has previously endorsed auditory hallucinations for which psychiatry has been consulted to r/o alcohol induced psychotic disorder. CT A/P with findings of thickened inflamed terminal ileal and cecum suggestive of Crohn's disease - GI consulted, recommend empiric antibiotic coverage with ciprofloxacin and flagyl. Patient with CIWA scores ~ 2, on Librium and Zyprexa. Patient will be discharge on Zyprexa 10mg at bedtime, Cipro 500mg PO twice a day and Flagyl 500mg three times per day.    Given patient's improved clinical status and current hemodynamic stability, decision was made to discharge.  Please refer to patient's complete medical chart with documents for a full hospital course, for this is only a brief summary.           No Patient is 27 year old male with past medical history of EtOH abuse, substance induced psychosis, depression and several ED/CPEP encounters for alcohol related psychosis and recent psych admission to [facility] who presented to the ED with c/o nausea, abdominal pain and EtOH withdrawal. He denies suicidal or homicidal ideation but has previously endorsed auditory hallucinations for which psychiatry has been consulted to r/o alcohol induced psychotic disorder. CT A/P with findings of thickened inflamed terminal ileal and cecum suggestive of Crohn's disease - GI consulted, recommend empiric antibiotic coverage with ciprofloxacin and flagyl. Patient with CIWA scores ~ 2, on Librium and Zyprexa. Patient will be discharge on Zyprexa 10mg at bedtime, Cipro 500mg PO twice a day and Flagyl 500mg three times per day.    Take Cipro 500mg PO two times per day for 6 days  Take Flagyl 500mg PO three times per day for 6 days  Take Zyprexa 10mg PO at bedtime    Given patient's improved clinical status and current hemodynamic stability, decision was made to discharge.  Please refer to patient's complete medical chart with documents for a full hospital course, for this is only a brief summary.           Patient is 27 year old male with past medical history of EtOH abuse, substance induced psychosis, depression and several ED/CPEP encounters for alcohol related psychosis and recent psych admission to [facility] who presented to the ED with c/o nausea, abdominal pain and EtOH withdrawal. He denies suicidal or homicidal ideation but has previously endorsed auditory hallucinations for which psychiatry has been consulted to r/o alcohol induced psychotic disorder. CT A/P with findings of thickened inflamed terminal ileal and cecum suggestive of Crohn's disease - GI consulted, recommend empiric antibiotic coverage with ciprofloxacin and flagyl. Patient with CIWA scores ~ 1, on Librium and Zyprexa. Patient will be discharge on Zyprexa 10mg at bedtime, Cipro 500mg PO twice a day and Flagyl 500mg three times per day.    Take Cipro 500mg PO two times per day for 6 days  Take Flagyl 500mg PO three times per day for 6 days  Take Zyprexa 10mg PO at bedtime    Given patient's improved clinical status and current hemodynamic stability, decision was made to discharge.  Please refer to patient's complete medical chart with documents for a full hospital course, for this is only a brief summary.

## 2024-02-14 NOTE — ED PROVIDER NOTE - PHYSICAL EXAMINATION
General: comfortable, resting in ED  HEENT: atraumatic, no eye erythema or discharge, no oropharyngeal erythema / exudate / anatomical distortion, speaking in clear voice  Pulm: no cyanosis, no added work of breathing  Cardiac: extremities warm, intact peripheral pulse  GI: no abdominal distension  Neuro: alert, conversant  Psych: neutral affect, cooperative  Msk: no gross deformity or instability  Skin: no erythema or rash

## 2024-02-14 NOTE — ED ADULT NURSE REASSESSMENT NOTE - NS ED NURSE REASSESS COMMENT FT1
Received pt from previous shift RN, pt aaox4, pt given yogurt, pt reports swallowing w/o difficulty at this time. Pt denies any complaints at this time, in NAD with daughter at bedside.

## 2024-02-15 NOTE — H&P ADULT - PROBLEM SELECTOR PLAN 2
s/p OR with D'Amico on 1/11 with craniotomy and resection.  - Underwent Dexamethason taper now on Dexamethasone 2mg BID  - c/w  pantoprazole 40mg qd  - Heme onc consult in AM- initate pemprolizumab?  - Rad-onc consult in AM - can they do SRS inpatient?  - Plan on dc was to f/u with Dr Sb Marcial on 1/30 for consent to start pembrolizumab as well as Dr D'Amico for SRS outpatient. s/p OR with D'Amico on 1/11 with craniotomy and resection s/p SRS outpatient.   - Underwent Dexamethason taper now on Dexamethasone 2mg BID  - c/w  pantoprazole 40mg qd  - Heme onc consult in AM- follows Dr. Chapman- restart pemprolizumab inpatient, clarify dose.   - PT work inpatient. s/p OR with D'Amico on 1/11 with craniotomy and resection s/p SRS outpatient.   - Underwent Dexamethasone taper now on Dexamethasone 2mg BID  - c/w  pantoprazole 40mg qd  - Heme onc consult in AM- follows Dr. Chapman- restart pemprolizumab inpatient, clarify dose.   - PT work inpatient.

## 2024-02-15 NOTE — DISCHARGE NOTE NURSING/CASE MANAGEMENT/SOCIAL WORK - NSDCPEFALRISK_GEN_ALL_CORE
For information on Fall & Injury Prevention, visit: [email or web address] OR  [email or web address] OR  https://www.cdc.gov/steadi/patient.html Healed laceration with 6-utures in place left elbow w/o redness or warmth.

## 2024-02-15 NOTE — DISCHARGE NOTE PROVIDER - NSDCFUSCHEDAPPT_GEN_ALL_CORE_FT
Doctor, Unknown  [facility] PreAdmits  Scheduled Appointment: 02/26/2024    Billy Chapman  [facility]  [facility] [address]  Scheduled Appointment: 02/26/2024    [facility] PreAdmits  Scheduled Appointment: 03/05/2024    Wernicke, Gabriella  [facility]  [facility] [address]  Scheduled Appointment: 03/19/2024    [facility] PreAdmits  Scheduled Appointment: 03/26/2024    [facility]  Scheduled Appointment: 04/16/2024    [facility] PreAdmits  Scheduled Appointment: 05/07/2024

## 2024-02-15 NOTE — H&P ADULT - NSHPLABSRESULTS_GEN_ALL_CORE
LABS:                         15.3   12.93 )-----------( 126      ( 14 Feb 2024 13:11 )             47.4     02-14    135  |  97  |  21  ----------------------------<  112<H>  4.6   |  29  |  0.44<L>    Ca    9.0      14 Feb 2024 13:11        Urinalysis Basic - ( 14 Feb 2024 19:50 )    Color: Yellow / Appearance: Cloudy / SG: >1.030 / pH: x  Gluc: x / Ketone: Negative mg/dL  / Bili: Negative / Urobili: 1.0 mg/dL   Blood: x / Protein: Negative mg/dL / Nitrite: Negative   Leuk Esterase: Negative / RBC: x / WBC x   Sq Epi: x / Non Sq Epi: x / Bacteria: x            Lactate, Blood: 1.8 mmol/L (02-14 @ 13:11)      RADIOLOGY, EKG & ADDITIONAL TESTS:

## 2024-02-15 NOTE — DISCHARGE NOTE PROVIDER - ATTENDING DISCHARGE PHYSICAL EXAMINATION:
General: nad, sitting up in bed  heent: ncat, mmm  cards: rrr, normal s1s2  pulm: ctab, no wheeze  ab: soft, ntnd  ext: chronic bilateral swelling  neuro: speech is fluent, no acute deficits    Plan as above, will be discharged today with outpatient oncology follow up  no stent or other procedures at this time

## 2024-02-15 NOTE — CONSULT NOTE ADULT - PROBLEM SELECTOR RECOMMENDATION 4
Started conversation regarding code status but patient not ready to make a decision regarding CPR or intubation.  - Full code  - Surrogate: Kayli Galeana (daughter) [phone]

## 2024-02-15 NOTE — CONSULT NOTE ADULT - TIME BILLING
Emotional Support/Supportive Care and Clarification of Potential Disease Trajectory related to metastatic lung cancer and dysphagia.  Exploration of GOC including advanced directives such as HCP designation and code status.

## 2024-02-15 NOTE — PROGRESS NOTE ADULT - SUBJECTIVE AND OBJECTIVE BOX
INTERVAL HPI/OVERNIGHT EVENTS:    Pt was seen and examined at the bedside. He was observed to be lying down comfortably.   He ate today soup and salmon. Feels much better.      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, Normal Hearing, MMM  Neck: No LAD, No JVD  Back: Normal spine flexure, No CVA tenderness  Respiratory: CTAB  Cardiovascular: S1 and S2, RRR, no M/G/R  Gastrointestinal: BS+, soft, NT/ND  Extremities: No peripheral edema  Vascular: 2+ peripheral pulses  Neurological: A/O x 3  Psychiatric: Normal mood, normal affect  Musculoskeletal: 4/5 strength LUE, o/w 5/5  Skin: No rashes        MEDICATIONS  (STANDING):  apixaban 5 milliGRAM(s) Oral every 12 hours  dexAMETHasone     Tablet 2 milliGRAM(s) Oral every 12 hours  tamsulosin 0.4 milliGRAM(s) Oral at bedtime    MEDICATIONS  (PRN):      Allergies    No Known Allergies    Intolerances        LABS:  CBC Full  -  ( 15 Feb 2024 05:30 )  WBC Count : 8.95 K/uL  RBC Count : 4.47 M/uL  Hemoglobin : 13.3 g/dL  Hematocrit : 40.0 %  Platelet Count - Automated : 126 K/uL  Mean Cell Volume : 89.5 fl  Mean Cell Hemoglobin : 29.8 pg  Mean Cell Hemoglobin Concentration : 33.3 gm/dL  Auto Neutrophil # : 8.72 K/uL  Auto Lymphocyte # : 0.00 K/uL  Auto Monocyte # : 0.23 K/uL  Auto Eosinophil # : 0.00 K/uL  Auto Basophil # : 0.00 K/uL  Auto Neutrophil % : 97.4 %  Auto Lymphocyte % : 0.0 %  Auto Monocyte % : 2.6 %  Auto Eosinophil % : 0.0 %  Auto Basophil % : 0.0 %    02-15    139  |  102  |  20  ----------------------------<  103<H>  4.2   |  33<H>  |  0.56    Ca    8.6      15 Feb 2024 05:30  Phos  2.7     02-15  Mg     2.1     02-15    TPro  5.3<L>  /  Alb  2.4<L>  /  TBili  0.4  /  DBili  x   /  AST  16  /  ALT  19  /  AlkPhos  77  02-15    PT/INR - ( 15 Feb 2024 05:30 )   PT: 13.8 sec;   INR: 1.22          PTT - ( 15 Feb 2024 05:30 )  PTT:26.4 sec  Urinalysis Basic - ( 15 Feb 2024 05:30 )    Color: x / Appearance: x / SG: x / pH: x  Gluc: 103 mg/dL / Ketone: x  / Bili: x / Urobili: x   Blood: x / Protein: x / Nitrite: x   Leuk Esterase: x / RBC: x / WBC x   Sq Epi: x / Non Sq Epi: x / Bacteria: x          Activated Partial Thromboplastin Time: 26.4 sec (02-15-24 @ 05:30)  INR: 1.22 (02-15-24 @ 05:30)      RADIOLOGY & ADDITIONAL TESTS: Reviewed.

## 2024-02-15 NOTE — PROGRESS NOTE ADULT - ASSESSMENT
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     SDC4-ROS1 fusion NSCLC with LMD, BMs, subcarinal LAD and esophageal compression  Received repotrectinib 2/14 and started.  Goal is for patient to continue with repotrectinib as this is the only option for stopping disease growth asap.   No role for RT to esophageal compression as that was already radiated  No role for stent, as that can cause fistulas    --c/w repotrectinib 160 mg daily x 14 days, followed by 160 mg BID  --monitor CBC, CMP, CPK, uric acid  --encourage po intake  --agree with dc today  --f/u with me on 2/26    Spoke to hospitalist, physician advisor.      Total time spent on encounter, including but not limited to counseling/coordination of care: 35 mis.

## 2024-02-15 NOTE — CONSULT NOTE ADULT - REASON FOR ADMISSION
Dysphagia, Evaluation for esophageal stent

## 2024-02-15 NOTE — DISCHARGE NOTE PROVIDER - NSDCCPTREATMENT_GEN_ALL_CORE_FT
PRINCIPAL PROCEDURE  Procedure: TTE (transthoracic echocardiography)  Findings and Treatment: 1. Normal left ventricular size and systolic function.   2. Normal right ventricular size and systolic function.   3. Aortic sclerosis without significant stenosis.   4. Mild aortic regurgitation.   5. Pulmonary artery systolic pressure is 30 mmHg.   6. There is an extracardiac mass adjacent to the left atrium consistent   with patient's known mediastinal mass.   7. Compared to the previous TTE performed on 10/20/2023, the mass was   not visualized on previous exam.   8. Small pericardial effusion without echocardiographic evidence of   cardiac tamponade physiology.

## 2024-02-15 NOTE — DISCHARGE NOTE PROVIDER - HOSPITAL COURSE
#Discharge: do not delete    Patient is __ yo M/F with past medical history of _____ presented with _____, found to have _____ (one liner)    Hospital course (by problem):     Patient was discharged to: Home    New medications:   Changes to old medications:  Medications that were stopped:    Items to follow up as outpatient:    Physical exam at the time of discharge:       #Discharge: do not delete    78M with h/o basal cell carcinoma and melanoma (resected several years ago), stage IIIB NSCLC s/p chemoRT (completed 10/2023) with mets to the brain and spine s/p prior chemoradiation, with chronic dysphagia and odynophagia, presenting with worsening swallowing symptoms and decreased output, admitted for further evaluation.    Hospital course (by problem):     #Mediastinal mass.  CT imaging showing enlarging cancerous mass compressing on mid esophagus and l. atrium. CT surgery consulted for imaging showing possible fistulous connection to the esophagus however no acute surgical management at this time. GI consulted for compromisation of swallowing ability due to the mass, plan to admit for possible esophageal stent placement.  - diet restarted prior to discharge  - CT surgery consulted, no acute surgical intervention    #Lung cancer metastatic to brain.   s/p OR with D'Amico on 1/11 with craniotomy and resection s/p SRS outpatient.   - Underwent Dexamethasone taper now on Dexamethasone 2mg BID  - c/w  pantoprazole 40mg qd  - Heme onc notified follows Dr. Chapman  - restart repotrectinib    #Pulmonary embolism.   CT Chest 1/12 showed new PE b/l segmental RLL and subsegmental in LLL likely provoked iso malignancy.  - Continue Eliquis 5mg BID    #BPH (benign prostatic hyperplasia).   - c/w home Tamsulosin 0.4mg qd.    Patient was discharged to: Home    New medications: none  Changes to old medications: none  Medications that were stopped: none    Items to follow up as outpatient: none    Physical exam at the time of discharge:  General: in no acute distress  Eyes: EOMI intact bilaterally. Anicteric sclerae, moist conjunctivae  HENT: Moist mucous membranes  Neck: Trachea midline, supple  Lungs: CTA B/L. No wheezes, rales, or rhonchi  Cardiovascular: RRR. No murmurs, rubs, or gallops  Abdomen: Soft, non-tender non-distended; No rebound or guarding  Extremities: WWP, No clubbing, cyanosis or edema  MSK: No midline bony tenderness. No CVA tenderness bilaterally  Neurological: Alert and oriented x3. Right CN-6 deficit.  Skin: Warm and dry. No obvious rash #Discharge: do not delete    78M with h/o basal cell carcinoma and melanoma (resected several years ago), stage IIIB NSCLC s/p chemoRT (completed 10/2023) with mets to the brain and spine s/p prior chemoradiation, with chronic dysphagia and odynophagia, presenting with worsening swallowing symptoms and decreased output, admitted for further evaluation.    Hospital course (by problem):     #Mediastinal mass.  CT imaging showing enlarging cancerous mass compressing on mid esophagus and l. atrium. CT surgery consulted for imaging showing possible fistulous connection to the esophagus however no acute surgical management at this time. GI consulted for compromisation of swallowing ability due to the mass, plan to admit for possible esophageal stent placement.  - diet restarted prior to discharge  - CT surgery consulted, no acute surgical intervention    #Chronic dysphagia and odynophagia  Worsening swallowing symptoms chronically, but able to drink broth and swallow liquids with some belching and regurgitation. 2/2 to mediastinal mass found on CT imaging. GI consulted for EGD stent placement initially, however this will not be pursued given recommendation per Dr. Chapman due to risk for fistula.      #Decreased urinary output.  Initial c/f UTI vs urinary retention. UA +cloudy however -bacteria, LE, nitrites. Cr was not elevated, 0.44 (low)>0.56. s/p 1L NS Bolus. Bladder scan negative for retention.    #Lung cancer metastatic to brain.   s/p OR with D'Amico on 1/11 with craniotomy and resection s/p SRS outpatient.   - Underwent Dexamethasone taper now on Dexamethasone 2mg BID  - c/w  pantoprazole 40mg qd  - Heme onc notified follows Dr. Chapman  - restart repotrectinib    #Pulmonary embolism.   CT Chest 1/12 showed new PE b/l segmental RLL and subsegmental in LLL likely provoked iso malignancy.  - Continue Eliquis 5mg BID    #BPH (benign prostatic hyperplasia).   #decreased urine output  - bladder scan negative, urinating well   - c/w home Tamsulosin 0.4mg qd.    Patient was discharged to: Home    New medications: none  Changes to old medications: none  Medications that were stopped: none    Items to follow up as outpatient: none    Physical exam at the time of discharge:  General: in no acute distress  Eyes: EOMI intact bilaterally. Anicteric sclerae, moist conjunctivae  HENT: Moist mucous membranes  Neck: Trachea midline, supple  Lungs: CTA B/L. No wheezes, rales, or rhonchi  Cardiovascular: RRR. No murmurs, rubs, or gallops  Abdomen: Soft, non-tender non-distended; No rebound or guarding  Extremities: WWP, No clubbing, cyanosis or edema  MSK: No midline bony tenderness. No CVA tenderness bilaterally  Neurological: Alert and oriented x3. Right CN-6 deficit.  Skin: Warm and dry. No obvious rash #Discharge: do not delete    78M with h/o basal cell carcinoma and melanoma (resected several years ago), stage IIIB NSCLC s/p chemoRT (completed 10/2023) with mets to the brain and spine s/p prior chemoradiation, with chronic dysphagia and odynophagia, presenting with worsening swallowing symptoms and decreased output, admitted for further evaluation.    Hospital course (by problem):     #Mediastinal mass.  CT imaging showing enlarging cancerous mass compressing on mid esophagus and l. atrium. CT surgery consulted for imaging showing possible fistulous connection to the esophagus however no acute surgical management at this time. GI consulted for compromisation of swallowing ability due to the mass, plan to admit for possible esophageal stent placement however not be pursued given recommendation per Dr. Chapman due to risk for fistula.    - diet restarted prior to discharge  - CT surgery consulted, no acute surgical intervention    #Chronic dysphagia and odynophagia  Worsening swallowing symptoms chronically, but able to drink broth and swallow liquids with some belching and regurgitation. 2/2 to mediastinal mass found on CT imaging. GI consulted for EGD stent placement initially, however this will not be pursued given recommendation per Dr. Chapman due to risk for fistula.      #Decreased urinary output.  Initial c/f UTI vs urinary retention. UA +cloudy however -bacteria, LE, nitrites. Cr was not elevated, 0.44 (low)>0.56. s/p 1L NS Bolus. Bladder scan negative for retention.    #Lung cancer metastatic to brain.   s/p OR with D'Amico on 1/11 with craniotomy and resection s/p SRS outpatient.   - Underwent Dexamethasone taper now on Dexamethasone 2mg BID  - c/w  pantoprazole 40mg qd  - Heme onc notified follows Dr. Chapman  - restart repotrectinib    #Pulmonary embolism.   CT Chest 1/12 showed new PE b/l segmental RLL and subsegmental in LLL likely provoked iso malignancy.  - Continue Eliquis 5mg BID    #BPH (benign prostatic hyperplasia).   #decreased urine output  - bladder scan negative, urinating well   - c/w home Tamsulosin 0.4mg qd.    Patient was discharged to: Home    New medications: none  Changes to old medications: none  Medications that were stopped: none    Items to follow up as outpatient: none    Physical exam at the time of discharge:  General: in no acute distress  Eyes: EOMI intact bilaterally. Anicteric sclerae, moist conjunctivae  HENT: Moist mucous membranes  Neck: Trachea midline, supple  Lungs: CTA B/L. No wheezes, rales, or rhonchi  Cardiovascular: RRR. No murmurs, rubs, or gallops  Abdomen: Soft, non-tender non-distended; No rebound or guarding  Extremities: WWP, No clubbing, cyanosis or edema  MSK: No midline bony tenderness. No CVA tenderness bilaterally  Neurological: Alert and oriented x3. Right CN-6 deficit.  Skin: Warm and dry. No obvious rash

## 2024-02-15 NOTE — CONSULT NOTE ADULT - PROBLEM SELECTOR RECOMMENDATION 2
Metastatic lung cancer with mets to brain, leptomeningeal disease.  - Plan to start new oral treatment on discharge  - To follow-up with oncology Dr. Ari hatch

## 2024-02-15 NOTE — PATIENT PROFILE ADULT - FALL HARM RISK - HARM RISK INTERVENTIONS
Assistance with ambulation/Assistance OOB with selected safe patient handling equipment/Communicate Risk of Fall with Harm to all staff/Discuss with provider need for PT consult/Monitor gait and stability/Reinforce activity limits and safety measures with patient and family/Tailored Fall Risk Interventions/Visual Cue: Yellow wristband and red socks/Bed in lowest position, wheels locked, appropriate side rails in place/Call bell, personal items and telephone in reach/Instruct patient to call for assistance before getting out of bed or chair/Non-slip footwear when patient is out of bed/[place] to call system/Physically safe environment - no spills, clutter or unnecessary equipment/Purposeful Proactive Rounding/Room/bathroom lighting operational, light cord in reach

## 2024-02-15 NOTE — H&P ADULT - HISTORY OF PRESENT ILLNESS
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 Notably, she had a preior admission in January 2024 for vision changes and gait instability found to have brain mets as well as acute provoked PE, s/p L. Craniectomy and resection, discharged home with home PT. She was sent by outpatient oncologist for workup of  the swallowing difficulty. She has been having difficulty swallowing for __.     ED Course  T98 HR 64 /63 RR 18 Sat 96% on RA  Labs: WBC 12.93 Platelets 126 Lactate 1.8  Intervention: N/A   78M with h/o basal cell carcinoma and melanoma (resected several years ago), stage IIIB NSCLC s/p chemoRT (completed 10/2023)  with mets to the brain and spine s/p prior chemoradiation, with chronic dysphagia and odynophagia, presenting with worsening swallowing symptoms and decreased output. Notably, he had a prior admission in January 2024 for vision changes and gait instability found to have brain mets as well as acute provoked PE, s/p L. Craniectomy and resection, discharged home with home PT. He had his first dose of pembrolizumab today.  He was sent by outpatient oncologist for workup of  the swallowing difficulty. He denies choking episodes but sometimes has belching which returns a little bit of food. He denies hematemsis, fever.     ED Course  T98 HR 64 /63 RR 18 Sat 96% on RA  Labs: WBC 12.93 Platelets 126 Lactate 1.8  Intervention: N/A

## 2024-02-15 NOTE — PATIENT PROFILE ADULT - FUNCTIONAL ASSESSMENT - BASIC MOBILITY 6.
2-calculated by average/Not able to assess (calculate score using [facility] averaging method)

## 2024-02-15 NOTE — CONSULT NOTE ADULT - PROBLEM SELECTOR RECOMMENDATION 9
Difficulty swallowing prior to admission but has improved. Able to eat lunch today.  - Plan to start new treatment regimen for cancer per oncology

## 2024-02-15 NOTE — CONSULT NOTE ADULT - ASSESSMENT
Assesment:  This is a 77 yo Male with h/o basal cell carcinoma and melanoma (resected several years ago), stage IIIB NSCLC s/p chemoRT (completed 10/2023)  with mets to the brain and spine s/p prior chemoradiation,  who presented with worsening swallowing symptoms.  Patient states he has worsening of swallowing x 2 weeks.  He reports for last several months he has felt as though food was getting stuck in his throat and was slow to pass.  Now he reports he regurgitated food x 2 weeks.  He finds liquids and solid difficult to swallow.  CT chest shows large mediastinal mass posterior to left atrium causing severe compression of left atrium and complete narrowing of mid-esophagus and retained food in proximal esophagus.     Plan:  Problem 1: Esophageal mass causing mass effect  Patient discussed with Dr. Braun  No urgent surgical intervention  Recommend GI consult for evaluation   Strict NPO for concern for aspiration    Problem 2: Pulmonary embolism  Anticoagulation per GI evaluation  Recommend heparin or lovenox given unknown if absorbing    Problem 3: Lung Cancer  Recommend rad-onc consult    Problem 4: BPH  management per primary team    I have reviewed clinical labs tests and reports, radiology tests and reports, as well as old patient medical records, and discussed with the referring physician.    
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     SDC4-ROS1 fusion NSCLC with LMD, BMs, subcarinal LAD and esophageal compression  Received repotrectinib today and started.  Goal is for patient to continue with repotrectinib as this is the only option for stopping disease growth asap  No role for RT to esophageal compression as that was already radiated  No role for stent, as that can cause fistulas  --c/w repotrectinib 160 mg daily x 14 days, followed by 160 mg BID  --monitor CBC, CMP, CPK, uric acid  --encourage po intake  --check bladder scan to rule out urinary retention, if retention pace river, likely 2/2 LMD    Spoke to ED attending  at length.  Spoek to Daughter Kayli.  Spoke to neurosurgery, Dr.D'Amico.    Total time spent on encounter, including but not limited to counseling/coordination of care: 75 mis.
78 M with h/o basal cell carcinoma and melanoma (resected several years ago), stage IIIB NSCLC s/p chemoRT (completed 10/2023)  with mets to the brain and spine s/p prior chemoradiation, with chronic dysphagia and odynophagia, presenting with worsening swallowing symptoms and decreased output, admitted for evaluation for esophageal stent

## 2024-02-15 NOTE — H&P ADULT - PROBLEM SELECTOR PLAN 1
CT imaging showing enalrging cancerous mass vs fluid collection. CT surgery consulted for imaging showing possible fistulous connection to the esophagus however no acute surgical management at this time. GI consulted for compromisation of swallowing ability due to the mass, plan to admit for possible esophageal stent placement.    - NPO except meds  - Coags with AM labs  - f/u GI recs  - Swallowing eval  - Nutrition consult CT imaging showing enalrging cancerous mass vs fluid collection. CT surgery consulted for imaging showing possible fistulous connection to the esophagus however no acute surgical management at this time. GI consulted for compromisation of swallowing ability due to the mass, plan to admit for possible esophageal stent placement.    - NPO except meds  - Coags with AM labs  - f/u GI recs for stent  - Swallowing eval in AM. CT imaging showing enlarging cancerous mass compressing on mid esophagus and l. atrium. CT surgery consulted for imaging showing possible fistulous connection to the esophagus however no acute surgical management at this time. GI consulted for compromisation of swallowing ability due to the mass, plan to admit for possible esophageal stent placement.    - NPO except meds  - Coags with AM labs  - f/u GI recs for stent  - Swallowing eval in AM.  - EKG  - TTE

## 2024-02-15 NOTE — CONSULT NOTE ADULT - SUBJECTIVE AND OBJECTIVE BOX
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  Patient states he has worsening of swallowing x 2 weeks.  He reports for last several months he has felt as though food was getting stuck in his throat and was slow to pass.  Now he reports he regurgitated food x 2 weeks.  He finds liquids and solid difficult to swallow.  CT chest shows large mediastinal mass posterior to left atrium causing severe compression of left atrium and complete narrowing of mid-esophagus and retained food in proximal esophagus.  Patient currently denies chest pain, shortness of breath, nausea, vomiting.    < end of copied text >      PAST MEDICAL & SURGICAL HISTORY:  BPH (benign prostatic hyperplasia)      Prediabetes      Lung cancer metastatic to brain      Skin melanoma      Basal cell carcinoma      History of dental surgery      S/P skin cancer resection          MEDICATIONS  (STANDING):  apixaban 5 milliGRAM(s) Oral every 12 hours  dexAMETHasone     Tablet 2 milliGRAM(s) Oral every 12 hours  senna 2 Tablet(s) Oral at bedtime    MEDICATIONS  (PRN):  polyethylene glycol 3350 17 Gram(s) Oral two times a day PRN for constipation      Allergies    No Known Allergies    Intolerances        SOCIAL HISTORY:  Smoker:  NO         ETOH use:  NO            Ilicit Drug use:  NO      FAMILY HISTORY:      Review of Systems (Need 10):  CONSTITUTIONAL: Denies fevers / chills, sweats, fatigue, weight loss, weight gain                                       NEURO:  Denies parathesias, seizures, syncope, confusion                                                                                  EYES:  Denies blurry vision, discharge, pain, loss of vision                                                                                    ENMT:  Denies difficulty hearing, vertigo, dysphagia, epistaxis, recent dental work                                       CV:  Denies chest pain, palpitations, BRANNON, orthopnea                                                                                           RESPIRATORY:  Denies wWheezing, SOB, cough / sputum, hemoptysis                                                               GI:  Denies nausea, vomiting, diarrhea, constipation, melena                                                                          : Denies hematuria, dysuria, urgency, incontinence                                                                                          MUSKULOSKELETAL:  Denies arthritis, joint swelling, muscle weakness                                                             SKIN/BREAST:  Denies rash, itching, hair loss, masses                                                                                              PSYCH:  Denies depression, anxiety, suicidal ideation                                                                                                HEME/LYMPH:  Denies bruises easily, enlarged lymph nodes, tender lymph nodes                                          ENDOCRINE:  Denies cold intolerance, heat intolerance, polydipsia                                                                      Vital Signs Last 24 Hrs  T(C): 36.7 (15 Feb 2024 00:36), Max: 36.8 (14 Feb 2024 11:59)  T(F): 98.1 (15 Feb 2024 00:36), Max: 98.3 (14 Feb 2024 11:59)  HR: 76 (15 Feb 2024 00:36) (63 - 92)  BP: 121/79 (15 Feb 2024 00:36) (108/63 - 125/71)  BP(mean): --  RR: 18 (15 Feb 2024 00:36) (16 - 20)  SpO2: 95% (15 Feb 2024 00:36) (95% - 96%)    Parameters below as of 15 Feb 2024 00:36  Patient On (Oxygen Delivery Method): room air        Physical Exam (Need 8)  Constitutional: NAD, comfortable in bed.  HEENT: NC/AT, PERRLA, EOMI, no conjunctival pallor or scleral icterus, MMM  Neck: Supple, no JVD  Respiratory: CTA B/L. No w/r/r.   Cardiovascular: RRR, normal S1 and S2, no m/r/g.   Gastrointestinal: +BS, soft NTND, no guarding or rebound tenderness, no palpable masses   Extremities: wwp; no cyanosis, clubbing or edema.   Vascular: Pulses equal and strong throughout.   Neurological: AAOx3, no CN deficits, strength and sensation intact throughout.   Skin: No gross skin abnormalities or rashes                                                          LABS:                        15.3   12.93 )-----------( 126      ( 14 Feb 2024 13:11 )             47.4     02-14    135  |  97  |  21  ----------------------------<  112<H>  4.6   |  29  |  0.44<L>    Ca    9.0      14 Feb 2024 13:11        Urinalysis Basic - ( 14 Feb 2024 19:50 )    Color: Yellow / Appearance: Cloudy / SG: >1.030 / pH: x  Gluc: x / Ketone: Negative mg/dL  / Bili: Negative / Urobili: 1.0 mg/dL   Blood: x / Protein: Negative mg/dL / Nitrite: Negative   Leuk Esterase: Negative / RBC: x / WBC x   Sq Epi: x / Non Sq Epi: x / Bacteria: x              RADIOLOGY & ADDITIONAL STUDIES:    CT Scan: < from: CT Chest w/ IV Cont (02.14.24 @ 14:52) >  LUNGS AND AIRWAYS: Evaluation of the lung parenchyma demonstrates   increasing air component and diminished solid component within a cavitary   nodule of the left upper lobe measuring 3.0 x 3.1 cm, previously   measuring 2.4 cm in 1/2024 with multifocal regions of peripheral   bronchiolectasis and parenchymal bandlike opacity/scarring. Multiple   multifocal nonspecific regions of patchy groundglass opacity scattered   throughout the right lung. There are branching tree-in-bud micronodular   opacity and multifocal mild patchy groundglass opacity in the left lower   lobe, new since 1/2024 which could represent endobronchial spread of   disease. There is no region of dense pulmonary consolidation.  PLEURA: No pleural effusion.  MEDIASTINUM AND JANES: There has been interval enlargement of multiple   large necrotic mediastinal lymph nodes including within the subcarinal   region measuring 5.1 x 4.6 cm, previously measuring 3.1 x 3.4 cm in   1/12/2024 and more inferiorly posterior to the left atrium measuring 6.9   x 4.5 cm, previously measuring 2.3 cm x 2.8 cm resulting in severe   compression of the left atrium and complete narrowing of the midesophagus   which is dilated proximally with retained debris.  VESSELS: There is mild coronary arterial calcification. There is no   central pulmonary embolus.  HEART: Heart size is normal. No pericardial effusion.  CHEST WALL AND LOWER NECK: There are small cystic nodules of the thyroid   gland.  VISUALIZED UPPER ABDOMEN: There are a few scattered hepatic cysts. There   is a left renal cyst.  BONES: There is a sclerotic nodule within a lower thoracic posterior   spinous process measuring 8 mm.. There is also sclerosis of the inferior  eighth left-sided rib, indeterminate in nature.    < end of copied text >  < from: CT Neck Soft Tissue w/ IV Cont (02.14.24 @ 14:57) >  Questionable mass versus large fluid collection in the subcarinal   mediastinum  with a possible fistulous connection to the esophagus causing wall   thickening  and extrinsic compression.  Large amount of ingested material in the more  proximal esophagus places the patient at increased risk for aspiration.  Cavitary lesion in the right lung apex in the location of the previously   seen  tumor.     < end of copied text >    
ONCOLOGY CONSULT NOTE  78M with h/o basal cell carcinoma and melanoma (resected several years ago), stage IIIB NSCLC s/p chemoRT (completed 10/2023)  with mets to the brain and spine s/p prior chemoradiation, with chronic dysphagia and odynophagia, presenting with worsening swallowing symptoms and decreased output. Notably, he had a prior admission in January 2024 for vision changes and gait instability found to have brain mets as well as acute provoked PE, s/p L. Craniectomy and resection, discharged home with home PT. He had his first dose of pembrolizumab today.  He was sent by outpatient oncologist for workup of  the swallowing difficulty. He denies choking episodes but sometimes has belching which returns a little bit of food. He denies hematemsis, fever.     ED Course  T98 HR 64 /63 RR 18 Sat 96% on RA  Labs: WBC 12.93 Platelets 126 Lactate 1.8  Intervention: N/A    REVIEW OF SYSTEMS:  Negative except as above      MEDICATIONS  (STANDING):  apixaban 5 milliGRAM(s) Oral every 12 hours  dexAMETHasone     Tablet 2 milliGRAM(s) Oral every 12 hours  tamsulosin 0.4 milliGRAM(s) Oral at bedtime    MEDICATIONS  (PRN):    Vital Signs Last 24 Hrs  T(C): 36.4 (02-15-24 @ 14:00), Max: 36.7 (02-14-24 @ 20:01)  T(F): 97.5 (02-15-24 @ 14:00), Max: 98.1 (02-15-24 @ 00:36)  HR: 71 (02-15-24 @ 14:00) (64 - 76)  BP: 108/71 (02-15-24 @ 14:00) (96/63 - 124/74)  BP(mean): --  RR: 18 (02-15-24 @ 14:00) (18 - 18)  SpO2: 93% (02-15-24 @ 14:00) (93% - 96%)    PHYSICAL EXAM:  Constitutional: Frail, ill appearing  HEENT: PERRLA, EOMI, Normal Hearing, MMM  Neck: No LAD, No JVD  Back: Normal spine flexure, No CVA tenderness  Respiratory: CTAB  Cardiovascular: S1 and S2, RRR, no M/G/R  Gastrointestinal: BS+, soft  Extremities:2+ peripheral edema  Vascular: 2+ peripheral pulses  Neurological: A/O x 3, right 6th nerve palsy  Psychiatric: Normal mood, normal affect  Musculoskeletal: 4/5 LUE        CBC Full  -  ( 15 Feb 2024 05:30 )  WBC Count : 8.95 K/uL  RBC Count : 4.47 M/uL  Hemoglobin : 13.3 g/dL  Hematocrit : 40.0 %  Platelet Count - Automated : 126 K/uL  Mean Cell Volume : 89.5 fl  Mean Cell Hemoglobin : 29.8 pg  Mean Cell Hemoglobin Concentration : 33.3 gm/dL  Auto Neutrophil # : 8.72 K/uL  Auto Lymphocyte # : 0.00 K/uL  Auto Monocyte # : 0.23 K/uL  Auto Eosinophil # : 0.00 K/uL  Auto Basophil # : 0.00 K/uL  Auto Neutrophil % : 97.4 %  Auto Lymphocyte % : 0.0 %  Auto Monocyte % : 2.6 %  Auto Eosinophil % : 0.0 %  Auto Basophil % : 0.0 %      Activated Partial Thromboplastin Time: 26.4 sec (02-15-24 @ 05:30)  INR: 1.22 (02-15-24 @ 05:30)        02-15    139  |  102  |  20  ----------------------------<  103<H>  4.2   |  33<H>  |  0.56    Ca    8.6      15 Feb 2024 05:30  Phos  2.7     02-15  Mg     2.1     02-15    TPro  5.3<L>  /  Alb  2.4<L>  /  TBili  0.4  /  DBili  x   /  AST  16  /  ALT  19  /  AlkPhos  77  02-15    Pathology:  
HPI:  78M with h/o basal cell carcinoma and melanoma (resected several years ago), stage IIIB NSCLC s/p chemoRT (completed 10/2023)  with mets to the brain and spine s/p prior chemoradiation, with chronic dysphagia and odynophagia, presenting with worsening swallowing symptoms and decreased output. Notably, he had a prior admission in January 2024 for vision changes and gait instability found to have brain mets as well as acute provoked PE, s/p L. Craniectomy and resection, discharged home with home PT. He had his first dose of pembrolizumab today.  He was sent by outpatient oncologist for workup of  the swallowing difficulty. He denies choking episodes but sometimes has belching which returns a little bit of food. He denies hematemsis, fever.     ED Course  T98 HR 64 /63 RR 18 Sat 96% on RA  Labs: WBC 12.93 Platelets 126 Lactate 1.8  Intervention: N/A   (15 Feb 2024 00:43)    PERTINENT PM/SXH:   BPH (benign prostatic hyperplasia)    Lung mass    Prediabetes    Lung cancer metastatic to brain    Skin melanoma    Basal cell carcinoma    History of dental surgery    S/P skin cancer resection      FAMILY HISTORY:  No history of NSCLC in first degree relatives according to chart    ITEMS NOT CHECKED ARE NOT PRESENT  SOCIAL HISTORY:   Significant other/partner:  []  Children:  [X]   Substance hx:  []   Tobacco hx:  []   Alcohol hx: []   Home Opioid hx:  [] I-Stop Reference No:  - no active Rx's / see chart note  Living Situation: [X]Home  []Long term care  []Rehab []Other  Temple/Spiritual practice: ; Role of organized Muslim [ ] important [ ] some [ ] unable to assess  Coping: [X] well [] with difficulty [] poor coping [] unable to assess  Support system: [X] strong [] adequate [] inadequate    ADVANCE DIRECTIVES:  []MOLST  []Living Will  DECISION MAKER(s):  [] Health Care Proxy(s)  [] Surrogate(s)  [] Guardian           Name(s)/Phone Number(s):     BASELINE (I)ADLs (prior to admission):  Henry: [ ]Total  [X] Moderate []Dependent    ALLERGIES:  No Known Allergies    MEDICATIONS  (STANDING):  apixaban 5 milliGRAM(s) Oral every 12 hours  dexAMETHasone     Tablet 2 milliGRAM(s) Oral every 12 hours  tamsulosin 0.4 milliGRAM(s) Oral at bedtime      PRESENT SYMPTOMS/REVIEW OF SYSTEMS: []Unable to obtain due to poor mentation/encephalopathy  Source if other than patient:  []Family   []Team     Pain: [] yes [X] no  QOL Impact -   Location -                    Aggravating Factors -  Quality -  Radiation -  Timing -  Severity (0-10 scale) -   Minimal Acceptable Level (0-10 scale) -    CPOT Score:  (Pain Assessment Scale for Critically Ill)    PAIN AD Score:  (Nonverbal Pain Assessment Scale)    Dyspnea:                           []Mild  []Moderate []Severe  Anxiety:                             []Mild []Moderate []Severe  Fatigue:                             []Mild []Moderate []Severe  Nausea:                             []Mild []Moderate []Severe  Loss of Appetite:              []Mild []Moderate []Severe  Constipation:                    []Mild []Moderate []Severe    Other Symptoms:  [x]All Other Review Of Systems Negative     Palliative Performance Status Version 2:  50%  (Functional Assessment Tool)    PHYSICAL EXAM:  GENERAL:  [X]Alert  [X]Oriented x3   []Lethargic  []Cachexia  []Unarousable  [X]Verbal  []Non-Verbal  Behavioral:   []Anxiety  []Delirium []Agitation [X]Cooperative  HEENT:  [X]Normal   []Dry mouth   []ET Tube/Trach  []Oral lesions  PULMONARY:   [X]Clear []Tachypnea  []Audible excessive secretions  []Normal Work of Breathing []Labored Breathing  []Rhonchi []Crackles []Wheezing  CARDIOVASCULAR:    [X]Regular Rate & Rhythm []Irregular []Tachy  []Bobo  GASTROINTESTINAL:  [X]Soft  []Distended   []+BS  [X]Non tender []Tender  []PEG []OGT/ NGT  Last BM:  GENITOURINARY:  [X]Normal [] Incontinent   []Oliguria/Anuria   []Martinez  MUSCULOSKELETAL:   []Normal   [X]Weakness  []Bed/Wheelchair bound []Edema  NEUROLOGIC:   [X]No focal deficits  []Cognitive impairment  []Dysphagia []Dysarthria []Paresis []Encephalopathic  SKIN:   [X]Normal   []Pressure ulcer(s)  []Rash    CRITICAL CARE:  []Shock Present  []Septic []Cardiogenic []Neurologic []Hypovolemic  []Vasopressors []Inotropes   []Respiratory failure present []Mechanical Ventilation []Non-invasive ventilatory support []High-Flow  []Acute  []Chronic []Hypoxic  []Hypercarbic  []Other organ failure    Vital Signs Last 24 Hrs  T(C): 36.4 (15 Feb 2024 14:00), Max: 36.7 (14 Feb 2024 20:01)  T(F): 97.5 (15 Feb 2024 14:00), Max: 98.1 (15 Feb 2024 00:36)  HR: 71 (15 Feb 2024 14:00) (63 - 76)  BP: 108/71 (15 Feb 2024 14:00) (96/63 - 124/74)  BP(mean): --  RR: 18 (15 Feb 2024 14:00) (16 - 18)  SpO2: 93% (15 Feb 2024 14:00) (93% - 96%)    Parameters below as of 15 Feb 2024 14:00  Patient On (Oxygen Delivery Method): room air        LABS:                        13.3   8.95  )-----------( 126      ( 15 Feb 2024 05:30 )             40.0   02-15    139  |  102  |  20  ----------------------------<  103<H>  4.2   |  33<H>  |  0.56    Ca    8.6      15 Feb 2024 05:30  Phos  2.7     02-15  Mg     2.1     02-15    TPro  5.3<L>  /  Alb  2.4<L>  /  TBili  0.4  /  DBili  x   /  AST  16  /  ALT  19  /  AlkPhos  77  02-15    RADIOLOGY & ADDITIONAL STUDIES:      REFERRALS:  [x]Social Work  []Case management []PT/OT []Chaplaincy  []Hospice  []Patient/Family Support []Massage Therapy []Music Therapy    DISCUSSION OF CASE: Oncology team - discussed plan of care; Primary team - discussed plan of care

## 2024-02-15 NOTE — DISCHARGE NOTE PROVIDER - NSDCMRMEDTOKEN_GEN_ALL_CORE_FT
acetaminophen 500 mg oral tablet: 2 tab(s) orally every 8 hours As needed Mild Pain (1 - 3)  dexAMETHasone 2 mg oral tablet: 1 tab(s) orally every 12 hours  Eliquis 5 mg oral tablet: 1 tab(s) orally 2 times a day  Multiple Vitamins oral tablet: 1 tab(s) orally once a day  pantoprazole 40 mg oral delayed release tablet: 1 tab(s) orally once a day (before a meal)  polyethylene glycol 3350 oral powder for reconstitution: 17 gram(s) orally 2 times a day as needed for  constipation  senna leaf extract oral tablet: 2 tab(s) orally once a day (at bedtime)  sodium chloride 1 g oral tablet: 2 tab(s) orally every 8 hours  tamsulosin 0.4 mg oral capsule: 1 cap(s) orally once a day   acetaminophen 500 mg oral tablet: 2 tab(s) orally every 8 hours As needed Mild Pain (1 - 3)  dexAMETHasone 2 mg oral tablet: 1 tab(s) orally every 12 hours  Eliquis 5 mg oral tablet: 1 tab(s) orally 2 times a day  Multiple Vitamins oral tablet: 1 tab(s) orally once a day  pantoprazole 40 mg oral delayed release tablet: 1 tab(s) orally once a day (before a meal)  polyethylene glycol 3350 oral powder for reconstitution: 17 gram(s) orally 2 times a day as needed for  constipation  Repotrectinib 160mg one tablet daily: oncology  senna leaf extract oral tablet: 2 tab(s) orally once a day (at bedtime)  sodium chloride 1 g oral tablet: 2 tab(s) orally every 8 hours  tamsulosin 0.4 mg oral capsule: 1 cap(s) orally once a day

## 2024-02-15 NOTE — H&P ADULT - NSHPPHYSICALEXAM_GEN_ALL_CORE
PHYSICAL EXAM:  Constitutional: NAD, comfortable in bed.  HEENT: NC/AT, PERRLA, EOMI, no conjunctival pallor or scleral icterus, MMM  Neck: Supple, no JVD  Respiratory: CTA B/L. No w/r/r.   Cardiovascular: RRR, normal S1 and S2, no m/r/g.   Gastrointestinal: +BS, soft NTND, no guarding or rebound tenderness, no palpable masses   Extremities: wwp; no cyanosis, clubbing or edema.   Vascular: Pulses equal and strong throughout.   Neurological: AAOx3, no CN deficits, strength and sensation intact throughout.   Skin: No gross skin abnormalities or rashes PHYSICAL EXAM:  Constitutional: NAD, comfortable in bed.  HEENT: NC/AT, PERRLA, EOMI, no conjunctival pallor or scleral icterus, MMM. However reports double vision. healing scar on top of head.   Neck: Supple, no JVD  Respiratory: CTA B/L. No w/r/r.   Cardiovascular: RRR, normal S1 and S2, no m/r/g.   Gastrointestinal: +BS, soft NTND, no guarding or rebound tenderness, no palpable masses   Extremities: wwp; no cyanosis, clubbing or edema.   Vascular: Pulses equal and strong throughout.   Neurological: AAOx3, no CN deficits, strength and sensation intact throughout.   Skin: No gross skin abnormalities or rashes

## 2024-02-15 NOTE — H&P ADULT - PROBLEM SELECTOR PLAN 5
F: None   E: Replete as necessary K>4 Mg>2  N: NPO diet   DVT Prophylaxis: Elqiuis  GI prophylaxis: Pantoprazole   CODE STATUS: FULL  DISPO: F F: None   E: Replete as necessary K>4 Mg>2  N: NPO diet   DVT Prophylaxis: Heparin  GI prophylaxis: Pantoprazole IV BID  CODE STATUS: FULL  DISPO: F

## 2024-02-15 NOTE — H&P ADULT - PROBLEM SELECTOR PLAN 3
CT Chest 1/12 showed new PE b/l segmental RLL and subsegmental in LLL likely provoked iso malignancy.  - restart apixablan 5mg BID x 3 months   - LE Dopplers CT Chest 1/12 showed new PE b/l segmental RLL and subsegmental in LLL likely provoked iso malignancy.  - hold apixablan 5mg BID  - start on heparin drip.   - LE Dopplers CT Chest 1/12 showed new PE b/l segmental RLL and subsegmental in LLL likely provoked iso malignancy.  - hold apixablan 5mg BID  - start on heparin drip with PTTs   - LE Dopplers

## 2024-02-15 NOTE — H&P ADULT - NSHPPOAPULMEMBOLUS_GEN_A_CORE
yes Discharge Reconciliation   acetaminophen 325 mg oral tablet: 2 tab(s) orally every 6 hours, As needed, Temp greater or equal to 38C (100.4F), Mild Pain (1 - 3)  cholecalciferol 50 mcg (2000 intl units) oral tablet: 1 tab(s) orally once a day  dilTIAZem 300 mg/24 hours oral capsule, extended release: 1 cap(s) orally once a day  Dulcolax Laxative 10 mg rectal suppository: 1 suppository(ies) rectally once a day, As Needed -for constipation   folic acid 1 mg oral tablet: 1 tab(s) orally once a day  furosemide 20 mg oral tablet: 1 tab(s) orally once a day  lactobacillus acidophilus oral capsule: 1 tab(s) orally 2 times a day  levothyroxine 75 mcg (0.075 mg) oral tablet: 1 tab(s) orally once a day  methotrexate 2.5 mg oral tablet: 1 tab(s) orally once a week  mirtazapine 15 mg oral tablet: 1 tab(s) orally once a day (at bedtime)  Myrbetriq 25 mg oral tablet, extended release: 1 tab(s) orally once a day  pantoprazole 40 mg oral delayed release tablet: 1 tab(s) orally once a day  polyethylene glycol 3350 oral powder for reconstitution: 17 gram(s) orally once a day  potassium chloride 10 mEq oral tablet, extended release: 1 tab(s) orally once a day  sertraline 150 mg oral capsule: 1 cap(s) orally once a day  sulfamethoxazole-trimethoprim 800 mg-160 mg oral tablet: 1 tab(s) orally 2 times a day h/o dementia, mild anxiety BIBEMS, responded  to Medical alert, the patients son reports that the medical alert was a false alarm, both the patient and son want to return home

## 2024-02-15 NOTE — DISCHARGE NOTE NURSING/CASE MANAGEMENT/SOCIAL WORK - PATIENT PORTAL LINK FT
You can access the FollowMyHealth Patient Portal offered by [facility] by registering at the following website: [email or web address]. By joining [facility] FollowMyHealth portal, you will also be able to view your health information using other applications (apps) compatible with our system.

## 2024-02-15 NOTE — H&P ADULT - ASSESSMENT
78M with h/o basal cell carcinoma and melanoma (resected several years ago), stage IIIB NSCLC s/p chemoRT (completed 10/2023)  with mets to the brain and spine s/p prior chemoradiation, with chronic dysphagia and odynophagia, presenting with worsening swallowing symptoms and decreased output, admitted for evaluation for esophageal stent

## 2024-02-15 NOTE — CONSULT NOTE ADULT - PROBLEM SELECTOR RECOMMENDATION 5
Will sign off as patient not ready for decision regarding advanced directives. Please reconsult with any clinical changes requiring further GOC discussion.

## 2024-02-15 NOTE — DISCHARGE NOTE PROVIDER - NSDCCPCAREPLAN_GEN_ALL_CORE_FT
PRINCIPAL DISCHARGE DIAGNOSIS  Diagnosis: Esophageal mass  Assessment and Plan of Treatment:      PRINCIPAL DISCHARGE DIAGNOSIS  Diagnosis: Esophageal mass  Assessment and Plan of Treatment: You are undergoing therapy for your esophageal mass that is causing difficulty with swallowing.  ----  Please continue to take your Augtryo (repotrectinib) as prescribed. Follow up with your oncologist, as scheduled. If you are having difficulty breathing, difficulty swallowing liquids or your saliva, or feeling lightheadedness.  ----  Please take your pantoprazole AT LEAST FOUR HOURS AFTER your chemo pill, to avoid reduced absorption.

## 2024-02-17 PROBLEM — I26.99 PULMONARY EMBOLISM: Status: ACTIVE | Noted: 2024-01-01

## 2024-02-21 NOTE — PHYSICAL EXAM
[Restricted in physically strenuous activity but ambulatory and able to carry out work of a light or sedentary nature] : Status 1- Restricted in physically strenuous activity but ambulatory and able to carry out work of a light or sedentary nature, e.g., light house work, office work [Thin] : thin [Normal] : affect appropriate [de-identified] : Frail appearing [de-identified] : Still having delayed finger to nose on left side, 4/5 LUE, 4/5 RLE

## 2024-02-21 NOTE — HISTORY OF PRESENT ILLNESS
[Disease: _____________________] : Disease: [unfilled] [T: ___] : T[unfilled] [N: ___] : N[unfilled] [M: ___] : M[unfilled] [AJCC Stage: ____] : AJCC Stage: [unfilled] [de-identified] : Domingo Galeana is a 78 year old male who presents to the clinic for follow up of SDC4-ROS1 fusion NSCLC with brain metastases and now LMD.  Oncologic history: 6/7/2023: The patient was referred by Dr. Judge for dyspnea with negative cardiac evaluation.  Rash developed in February 2023 with concurrent cough and dyspnea.  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 7/10/2023: Large irregular mass in the right upper lobe with stranding to the pleural surface.  This mass is highly suspicious for malignancy.  There is subcarinal lymphadenopathy which is likely metastatic. 7/18/2023: PET scan-hypermetabolic right upper lobe pulmonary mass, which is most consistent with a primary bronchogenic malignancy.  Hypermetabolic mediastinal lymphadenopathy which is most consistent with metastatic disease.  Otherwise no abnormal hypermetabolic activity to suggest malignancy at this time.  Liver cysts and additional subcentimeter low-attenuation lesions in the liver which are too small to further characterize and may represent cysts as well.  Enlarged prostate. 7/19/2023: Follow-up with Dr. Eason 7/21/2023: EBUS with : 4L atypical, 3P NSCLC with adenocarcinoma features 7/27/2023: Thoracic tumor board: T3N2 final stage pending MRI brain.  Patient is not surgical candidate.  Tumor board recommends chemoradiation therapy.  Pathology tissue is not sufficient for molecular testing, needs repeat biopsy. 8/2/23: Repeat IR biopsy: Pathology:  Specimen(s) Submitted 1  Right upper lobe lung core bx + touch prep Final Diagnosis Right upper lobe lung, core biopsy and touch prep -   Non-small cell carcinoma, with necrosis (see note). PD-L1 22c3 100% PD-L1 28-8 95% BRCA2 * MTAP loss BTG1 S170* CDKN2A/B loss DAXX Q177* TP53 E68* 8/16/2023: The patient canceled his simulation with Radonc. 11/27/23: CT CHEST: 1. Since July 7, 2023, slightly decreased left upper lobe malignancy with new treatment induced changes and decreased thoracic melly metastases. 2. Postradiation pneumonitis and foci of large and small airway disease in right lung.  MRI Brain 1/10/2024 Since prior MRI brain 10/5/2023, interval development of 5 enhancing lesion suspicious for intracranial metastasis. The largest lesions include a 3.5 cm left cerebellar mass and a 2.5 cm exophytic mass arising from the right hemipons. There is resultant mass effect with partial effacement of the fourth ventricle and dilatation of the lateral and third ventricles consistent with hydrocephalus. Per the electronic medical record, the neurosurgical team was aware of the findings at the time of dictation.  Hospitalized 1/10/24 - 1/17/2024 at [facility] - sent for NSGY evaluation as MRI Brain 5 new areas of brain metastases. s/p craniresection (1/11). Dexamethasone taper x 1 week to 2BID. Also found to have PE, initially started on heparin gtt, and transitioned to Eliquis  1/11/2024: Pathology 1.  Left cerebellar brain tumor, resection: -   Metastatic adenocarcinoma of lung origin. 2.  Left cerebellar brain tumor, resection: -   Metastatic adenocarcinoma of lung origin. Note: Immunostains performed on block 2A shows that the tumor cells are positive for TTF-1, and focally for p40 and p63.  This staining profile supports the diagnosis.  ONKOSIGHT NEXT GENERATION SEQUENCING GENE FUSION PANEL INTERPRETATION POSITIVE: SDC4-ROS1 gene fusion is DETECTED. Tier II: Variants of Potential Clinical Significance TP53 p.([id number]) Tier III: Variants of Unknown Clinical Significance AKT1 p.([id number]) PD-L1 IMMUNOHISTOCHEMICAL ANALYSIS: Tumor Proportion Score:  80-85% / Positive  CT Chest 1/12/2024 1. Since 11/27/2023, there are new pulmonary emboli bilaterally. 2. Worsening of mediastinal lymphadenopathy. 3. Evolution of radiation induced lung disease in the right lung with development of radiation induced pulmonary fibrosis. The treated tumor has decreased in size. 4. Small bilateral pleural effusions.  CT A/P 1/12/2024 No metastatic lesions identified in the abdomen or pelvis.  2/11/2024 MRI spine: 1.  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 2.  Enhancing lesion measuring up to 1.0 cm is present within the cauda equina at the level of L2. 3.  Several millimeter enhancing focus is present within the T9 superior endplate that could reflect metastatic disease or an atypical lipid poor hemangioma. 4.  Multilevel degenerative disc disease is present throughout the spine. 5.  Large subcarinal mass is again seen with involvement of the esophagus and dilatation of the more proximal esophagus. 6.  Consider follow-up imaging to assess stability as warranted. MRIB: 1.  Continued evolution of postsurgical changes status post resection of left cerebellar mass. Fluid-filled surgical cavity with peripheral enhancement likely secondary to postsurgical changes. Otherwise no evidence of residual tumor. 2.  Decreased size of the exophytic mass in the right hemipons and the left occipital lobe. Stable left frontal and right cerebellar masses. Improvement in vasogenic edema particularly in the po. 3.  No new lesions. [de-identified] : Non-small cell lung cancer with attic adenocarcinoma features [de-identified] : Pulm:  [Treatment Protocol] : Treatment Protocol [FreeTextEntry1] : RT - planned completion 10/27/2023 8/24/23 C1 carboplatin/paclitaxel 9/5/23  C2 carboplatin/paclitaxel 9/11/23    C3 carboplatin/paclitaxel  9/18/23   C4 carboplatin/paclitaxel  9/25/23   C5 carboplatin/paclitaxel  10/2/23   C6 carboplatin/paclitaxel 10/9/23: C7 carboplatin/paclitaxel 10/16/23: C8 carboplatin/paclitaxel 10/23/23: C9 carboplatin/paclitaxel 2/14/2024: Repotrectinib  [de-identified] : Currently on Dex 2 mg daily. More nausea and dysphagia. Walking ok. Persistent LUE weakness. Noted on imaging large subcarinal mass with esophageal dilation. [80: Normal activity with effort; some signs or symptoms of disease.] : 80: Normal activity with effort; some signs or symptoms of disease.  [ECOG Performance Status: 2 - Ambulatory and capable of all self care but unable to carry out any work activities] : Performance Status: 2 - Ambulatory and capable of all self care but unable to carry out any work activities. Up and about more than 50% of waking hours

## 2024-02-21 NOTE — ASSESSMENT
[FreeTextEntry1] : 78-year-old male with no significant past medical history who presents for f/u of T3N2, stage IIIb non-small cell lung cancer, with features of adenocarcinoma.  Non-small cell lung cancer with features of adenocarcinoma - MTAP loss, BRCA2 *, PD-L1 22c3 100%, PD-L1 28-8 95% SDC4-ROS1 gene fusion is DETECTED on brain tumor sample. 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 Noted esophageal dilation due to subcarinal LN pressure.  --rx reportrectinib 160 mg daily x 14 days, then 160 mg BID ongoing --labs every 2 wks x 2 months: CBC, CMP, CPK, uric acid --increase dex to 2 mg BID and stay on that pending improvement --will not perfomr LP at this time given patient has clear LMD on MRI and waiting for LP would delay therapy, as Eliquis would have to be held  [facility]val was based [facility], a global, multicenter, single-arm, open-label, multi-cohort clinical trial ([id number]) which included patients with ROS1-positive locally advanced or metastatic NSCLC. Efficacy was evaluated in 71 ROS1 TKI-naive patients who received up to 1 prior line of platinum-based chemotherapy and/or immunotherapy and 56 patients who received 1 prior ROS1 TKI with no prior platinum-based chemotherapy or immunotherapy.  The major efficacy outcome measures were overall response rate (MONTENEGRO) and duration of response (MTAT) according to RECIST v1.1 as assessed by blinded independent central review. Confirmed MONTENEGRO in the ROS1 TKI naive group was 79% (95% CI: 68, 88) and 38% (95% CI: 25, 52) in those patients receiving prior treatment with a ROS1 inhibitor. Median MATT was 34.1 months (95% CI: 25.6, not evaluable) and 14.8 months (95% CI: 7.6, not evaluable) in the two respective groups. Responses were observed in intracranial lesions in patients with measurable CNS metastases, and in patients with resistance mutations following TKI therapy.  The most common (>20%) adverse reactions were dizziness, dysgeusia, peripheral neuropathy, constipation, dyspnea, ataxia, fatigue, cognitive disorders, and muscular weakness.  The recommended repotrectinib dose is 160 mg orally once daily with or without food for 14 days, then increased to 160 mg twice daily, until disease progression or unacceptable toxicity.   2.Left Lower Lobe segmental/subsegmental pulmonary emboli - provoked by malignancy - continues eliquis

## 2024-02-21 NOTE — REVIEW OF SYSTEMS
[Fatigue] : no fatigue [Recent Change In Weight] : ~T no recent weight change [Dysphagia] : no dysphagia [Lower Ext Edema] : lower extremity edema [Wheezing] : no wheezing [Cough] : cough [SOB on Exertion] : no shortness of breath during exertion [Constipation] : no constipation [Muscle Pain] : no muscle pain [Negative] : Allergic/Immunologic [FreeTextEntry6] : intermittent cough when lying flat [FreeTextEntry9] : lower leg swelling +1 peripheral edema. + improvement 12/19/23 b/l

## 2024-02-24 PROBLEM — G96.198 LEPTOMENINGEAL DISEASE: Status: ACTIVE | Noted: 2024-01-01

## 2024-03-01 NOTE — CONSULT NOTE ADULT - PROBLEM SELECTOR RECOMMENDATION 4
.  -see GOC note above  -3/1/24 MOLST completed .  -see [facility] note above  -3/1/24 ROBE completed  -HCP document naming daughter Kayli seen in EMR. Patient has capacity to make medical decisions at this time.

## 2024-03-01 NOTE — DIETITIAN INITIAL EVALUATION ADULT - PERTINENT LABORATORY DATA
03-01    136  |  97  |  10  ----------------------------<  104<H>  3.8   |  31  |  0.47<L>    Ca    9.5      01 Mar 2024 01:07  Phos  2.5     03-01  Mg     1.8     03-01    TPro  5.9<L>  /  Alb  2.7<L>  /  TBili  0.5  /  DBili  0.2  /  AST  11  /  ALT  17  /  AlkPhos  91  03-01  POCT Blood Glucose.: 102 mg/dL (03-01-24 @ 01:49)  A1C with Estimated Average Glucose Result: 5.9 % (01-11-24 @ 05:30)

## 2024-03-01 NOTE — PROGRESS NOTE ADULT - SUBJECTIVE AND OBJECTIVE BOX
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   Patient was noted to have episodes of intermitted junctional rhythm on telemetry, EPS was called to evaluate.   Telemetry shows sinus rhythm, APCs  with HR 60 - 70 range , patient is asymptomatic, no EP interventions indicated at this time.

## 2024-03-01 NOTE — PROVIDER CONTACT NOTE (OTHER) - SITUATION
Bladder scan 538ml and pt reports requires [facility] for catheterization, transportation here to [facility] patient to endoscopy. Send patient or wait for Urology team to see patient?

## 2024-03-01 NOTE — PROGRESS NOTE ADULT - PROBLEM SELECTOR PLAN 7
Presenting with intermittent episode of junctional bradycardia to 60s. EP consulted by neurosurgery team. No acute intervention required. Pt seen by [facility] palliative team (Dr. Kevin) during previous admission while actively undergoing cancer treatement. At that time, pt was not ready to participate in advanced directive and GOC planning. Currently pt is DNR/DNI.   - reconsult palliative team in this admission for reinitiation of advance GOC conversation. Pt no longer will be eligible for active cancer treatment and likely qualify for home hospice.  - Will discuss with patient to focus on comfort while inpatient. Pt seen by [facility] palliative team (Dr. Kevin) during previous admission while actively undergoing cancer treatement. At that time, pt was not ready to participate in advanced directive and GOC planning. Currently pt is DNR/DNI.   - reconsult palliative team in this admission for reinitiation of advance GOC conversation. Pt no longer will be eligible for active cancer treatment and likely qualify for home hospice.  - per discussion, pt made comfort care Pt seen by [facility] palliative team (Dr. Kevin) during previous admission while actively undergoing cancer treatement. At that time, pt was not ready to participate in advanced directive and GOC planning. Currently pt is DNR/DNI.   - reconsult palliative team in this admission for reinitiation of advance GOC conversation. Pt no longer will be eligible for active cancer treatment and likely qualify for home hospice.  - per discussion, pt made comfort care. No escalation of care

## 2024-03-01 NOTE — PROGRESS NOTE ADULT - PROBLEM SELECTOR PLAN 2
Pt w/ hx of chronic dysphagia with gradual progression. Currently w/ inability to tolerate solid and liquids for 1 week. Per imaging and EGD 2/2 tumor invasion in mid esophagus. Visualized large solid food bolus sitting in fundus and inability to pass the scope. 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   - Keep NPO  - Given the nature of advanced disease, no a candidate for PEG or TPN.   - Palliative consult

## 2024-03-01 NOTE — PROGRESS NOTE ADULT - PROBLEM SELECTOR PLAN 6
Unclear reason patient was started on abx at [place]?  -No localizing infectious s/s at this time  -Stop Zosyn  -Check BCX

## 2024-03-01 NOTE — PROGRESS NOTE ADULT - SUBJECTIVE AND OBJECTIVE BOX
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 Pt fell , no clear head trauma, brought to [facility]. Workup there notable for urinary retention (river placed), dysphagia (under consideration for EGD), ?PNA/UTI (Zosyn), and +adenovirus. Pt has been unable to tolerate PO intake x 1 week prior to admission; experiencing regurgitation with food/liquids. No n/v/abd pain. Reports vision is blurred and has diplopia but is stable s/p resection of brain lesion. Also reports generalized weakness. Denies focal weakness/numbness. (01 Mar 2024 00:53)    Subejctive:  Patient seen at bedside with daughter present. Reports that he overall feels well although weaker as he has not had anything to eat or drink for almost 7 days. Has produced minimal urine and has had no BM for 7 days. Denies pain, SOB, CP. ROS is otherwise negative.     Allergies    No Known Allergies    Intolerances        MEDICATIONS  (STANDING):  albuterol/ipratropium for Nebulization 3 milliLiter(s) Nebulizer every 6 hours  dexAMETHasone  Injectable 2 milliGRAM(s) IV Push two times a day  dextrose 5% + sodium chloride 0.9%. 1000 milliLiter(s) (60 mL/Hr) IV Continuous <Continuous>  pantoprazole  Injectable 40 milliGRAM(s) IV Push daily  piperacillin/tazobactam IVPB.. 4.5 Gram(s) IV Intermittent every 8 hours  tamsulosin 0.4 milliGRAM(s) Oral at bedtime    MEDICATIONS  (PRN):  acetaminophen   IVPB .. 750 milliGRAM(s) IV Intermittent every 6 hours PRN Mild Pain (1 - 3)      Drug Dosing Weight  Height (cm): 170.2 (15 Feb 2024 01:51)  Weight (kg): 48.8 (01 Mar 2024 02:00)  BMI (kg/m2): 16.8 (01 Mar 2024 02:00)  BSA (m2): 1.55 (01 Mar 2024 02:00)    PAST MEDICAL & SURGICAL HISTORY:  BPH (benign prostatic hyperplasia)      Prediabetes      Lung cancer metastatic to brain      Skin melanoma      Basal cell carcinoma      History of dental surgery      S/P skin cancer resection      H/O brain tumor          FAMILY HISTORY:  No hx of MI    SOCIAL HISTORY: Denies EtOH use    ADVANCE DIRECTIVES:    Vital Signs Last 24 Hrs  T(C): 36.2 (01 Mar 2024 09:14), Max: 36.5 (01 Mar 2024 04:23)  T(F): 97.1 (01 Mar 2024 09:14), Max: 97.7 (01 Mar 2024 04:23)  HR: 68 (01 Mar 2024 10:31) (68 - 92)  BP: 103/63 (01 Mar 2024 10:31) (99/57 - 117/68)  BP(mean): 77 (01 Mar 2024 10:) (73 - 84)  ABP: --  ABP(mean): --  RR: 17 (01 Mar 2024 10:31) (17 - 18)  SpO2: 94% (01 Mar 2024 10:31) (93% - 96%)    O2 Parameters below as of 01 Mar 2024 10:31  Patient On (Oxygen Delivery Method): nasal cannula  O2 Flow (L/min): 3              I&O's Detail    2024 07:  -  01 Mar 2024 07:00  --------------------------------------------------------  IN:    dextrose 5% + sodium chloride 0.9%: 360 mL    Heparin Infusion: 45 mL    IV PiggyBack: 100 mL  Total IN: 505 mL    OUT:  Total OUT: 0 mL    Total NET: 505 mL      01 Mar 2024 07:01  -  01 Mar 2024 12:58  --------------------------------------------------------  IN:    dextrose 5% + sodium chloride 0.9%: 360 mL  Total IN: 360 mL    OUT:    Oral Fluid: 0 mL    Voided (mL): 0 mL  Total OUT: 0 mL    Total NET: 360 mL          PHYSICAL EXAM:      Constitutional: NAD  Eyes: PERRLA  ENMT: MMM  Neck: supple  Back: midline  Respiratory: CTA b/l  Cardiovascular: rrr, s1s2, no m/r/g  Gastrointestinal: soft, NTND, + BS  Extremities: wwp  Vascular: + 2 pulses DP/TP  Neurological: AAO x 4, no focal deficits  Skin: no rash  Lymph Nodes: no LAD  Musculoskeletal: no joint swelling  Psychiatric: normal affect        LABS:  CBC Full  -  ( 01 Mar 2024 01:07 )  WBC Count : 10.20 K/uL  RBC Count : 4.08 M/uL  Hemoglobin : 12.0 g/dL  Hematocrit : 37.6 %  Platelet Count - Automated : 264 K/uL  Mean Cell Volume : 92.2 fl  Mean Cell Hemoglobin : 29.4 pg  Mean Cell Hemoglobin Concentration : 31.9 gm/dL  Auto Neutrophil # : x  Auto Lymphocyte # : x  Auto Monocyte # : x  Auto Eosinophil # : x  Auto Basophil # : x  Auto Neutrophil % : x  Auto Lymphocyte % : x  Auto Monocyte % : x  Auto Eosinophil % : x  Auto Basophil % : x    03-    136  |  97  |  10  ----------------------------<  104<H>  3.8   |  31  |  0.47<L>    Ca    9.5      01 Mar 2024 01:07  Phos  2.5     03-  Mg     1.8     03-    TPro  5.9<L>  /  Alb  2.7<L>  /  TBili  0.5  /  DBili  0.2  /  AST  11  /  ALT  17  /  AlkPhos  91  03-01    CAPILLARY BLOOD GLUCOSE      POCT Blood Glucose.: 102 mg/dL (01 Mar 2024 01:49)    PT/INR - ( 01 Mar 2024 01:07 )   PT: 13.0 sec;   INR: 1.14          PTT - ( 01 Mar 2024 08:26 )  PTT:45.9 sec  Urinalysis Basic - ( 01 Mar 2024 01:07 )    Color: Yellow / Appearance: Clear / S.018 / pH: x  Gluc: 104 mg/dL / Ketone: 15 mg/dL  / Bili: Negative / Urobili: 2.0 mg/dL   Blood: x / Protein: Negative mg/dL / Nitrite: Negative   Leuk Esterase: Negative / RBC: 5 /HPF / WBC 2 /HPF   Sq Epi: x / Non Sq Epi: 0 /HPF / Bacteria: Negative /HPF        EKG:    ECHO, US:    RADIOLOGY:    CRITICAL CARE TIME SPENT:

## 2024-03-01 NOTE — PATIENT PROFILE ADULT - PATIENT'S GENDER IDENTITY
Problem: Knowledge Deficit - Standard  Goal: Patient and family/care givers will demonstrate understanding of plan of care, disease process/condition, diagnostic tests and medications  Outcome: Progressing   The patient is Stable - Low risk of patient condition declining or worsening    Shift Goals  Clinical Goals: monitorcardiac rhythm  Patient Goals: discharge    Progress made toward(s) clinical / shift goals: Pt will understand his disease better and the process and treatment plan before discharge so that he will be able to make educated decisions about his care.    Patient is not progressing towards the following goals: N/A       Male

## 2024-03-01 NOTE — CONSULT NOTE ADULT - PROBLEM SELECTOR RECOMMENDATION 2
.  worsening over several weeks in setting of subcarinal LAD compressing esophagus  - unable to swallow PO chemo x 1 week  - pending GI eval  - pending SLP

## 2024-03-01 NOTE — PROGRESS NOTE ADULT - PROBLEM SELECTOR PLAN 8
Pt seen by [facility] palliative team (Dr. Kevin) during previous admission while actively undergoing cancer treatement. At that time, pt was not ready to participate in advanced directive and GOC planning. Currently pt is DNR/DNI.   - reconsult palliative team in this admission for reinitiation of advance GOC conversation. Pt no longer will be eligible for active cancer treatment and likely qualify for home hospice.  - Will discuss with patient to focus on comfort while inpatient. F: IVF PRN for now pending further GOC discussion  E: replete K<4, Mg<2  N: NPO  VTE Prophylaxis: SCDs  GI: not needed  C: DNR/DNI  D: RMF F: n/a comfort care  E: replete K<4, Mg<2  N: NPO  VTE Prophylaxis: None Comfort care  GI: not needed  C: DNR/DNI  D: RMF F: n/a comfort care  E: replete K<4, Mg<2  N: NPO  VTE Prophylaxis: Lovenox full AC   GI: not needed  C: DNR/DNI  D: RMF

## 2024-03-01 NOTE — PROGRESS NOTE ADULT - SUBJECTIVE AND OBJECTIVE BOX
****************** TRANSFER ACCEPTANCE NOTE NEUROSURGERY TO REGIONAL MEDICAL FLOORS  ******************    ----- HOSPITAL COURSE -----    78 y.o M w/ PMHx of BSC, Melanoma s/p resection, stage IIIB NSCLC s/p Chemo/RT (completed 10/2023) with brain mets s/p left retrosigmoid craniectomy & resection of cerebellar lesions (2024), new evidence of leptomeningeal disease (diag on MRI 2024) BPH, pulmonary embolism on eliquis (2024) and recent admission for worsening dysphagia ( -02/15) with plans for esophageal stent placement that were aborted due to concerns for presence of fistula, was transferred to [facility] from [facility] for continuation of care. Since prior discharge, pt had continued to have worsening dysphagia, odynophagia and unable to tolerate PO diet for 1 week prior to hospitalization. After eating solids&liquids pt has been experiencing regurgitation w/o associated gross nausea/vomiting.  He presented to [facility] for  FTT however c/c/b urinary retention requiring river placement, PNA/UTI? + adenovirus and was started on Zosyn?. Per request of Dr. D'Amico, pt was transferred to [facility] for further care and management.  Pt was admitted to neurosurgery service for evaluation given recent surgical intervention. Work up was sig for CT head w/ marked improvement in prepontine and cystic lesions. Given the lack of further neurosurgical need, pt is transferred to medicine for further medical care and management of active dysphagia via EGD.      ----- INTERVAL HPI/OVERNIGHT EVENTS -----     Patient was seen and examined at bedside. As per nurse and patient, no o/n events, patient resting comfortably. No complaints at this time. Patient denies: fever, chills, dizziness, weakness, HA, Changes in vision, CP, palpitations, SOB, cough, N/V/D/C, dysuria, changes in bowel movements, LE edema. ROS otherwise negative.      Subjective: Patient is a 78y old  Male who presents with a chief complaint of DYSPHAGIA    FAILURE TO THRIVE     (01 Mar 2024 15:55)      ----- VITAL SIGNS -----  T(F): 97.4 (24 @ 13:17)  HR: 68 (24 @ 15:15)  BP: 103/63 (24 @ 15:15)  RR: 17 (24 @ 15:15)  SpO2: 94% (24 @ 15:15)  Wt(kg): --      24 @ 07:01  -  24 @ 07:00  --------------------------------------------------------  IN: 505 mL / OUT: 0 mL / NET: 505 mL    24 @ 07:01  -  24 @ 16:01  --------------------------------------------------------  IN: 360 mL / OUT: 0 mL / NET: 360 mL        ----- Physical Exam -----     Constitutional: resting comfortably in bed; NAD  HEENT: NC/AT, PERRL, EOMI, anicteric sclera, no nasal discharge; uvula midline, no oropharyngeal erythema or exudates, MMM; no thyromegaly or anterior/posterior or submental EZRA; neck supple  Respiratory: CTA B/L; no W/R/R, no retractions  Cardiac: +S1/S2; RRR; no M/R/G appreciated  Gastrointestinal: abdomen soft, NT/ND, +BS, no rebound tenderness or guarding  Back: no CVAT B/L  Extremities: legs WWP, no clubbing or cyanosis; no peripheral edema  Vascular: 2+ distal pedal pulses B/L  Dermatologic: skin warm, dry and intact; no rashes, wounds, or scars  Neurologic: AAOx3; CNII-XII grossly intact; strength 5/5 and sensation intact in all 4 extremities; no focal deficits  Psychiatric: affect and characteristics of appearance, verbalizations, behaviors are appropriate    MEDICATIONS  (STANDING):  albuterol/ipratropium for Nebulization 3 milliLiter(s) Nebulizer every 6 hours  dexAMETHasone  Injectable 2 milliGRAM(s) IV Push two times a day  dextrose 5% + sodium chloride 0.9%. 1000 milliLiter(s) (60 mL/Hr) IV Continuous <Continuous>  pantoprazole  Injectable 40 milliGRAM(s) IV Push daily  tamsulosin 0.4 milliGRAM(s) Oral at bedtime    MEDICATIONS  (PRN):  acetaminophen   IVPB .. 750 milliGRAM(s) IV Intermittent every 6 hours PRN Mild Pain (1 - 3)      Allergies    No Known Allergies    Intolerances        ----- LABS -----                         12.0   10.20 )-----------( 264      ( 01 Mar 2024 01:07 )             37.6     03-    136  |  97  |  10  ----------------------------<  104<H>  3.8   |  31  |  0.47<L>    Ca    9.5      01 Mar 2024 01:07  Phos  2.5     03-01  Mg     1.8     03-    TPro  5.9<L>  /  Alb  2.7<L>  /  TBili  0.5  /  DBili  0.2  /  AST  11  /  ALT  17  /  AlkPhos  91  03-01    PT/INR - ( 01 Mar 2024 01:07 )   PT: 13.0 sec;   INR: 1.14          PTT - ( 01 Mar 2024 08:26 )  PTT:45.9 sec  Urinalysis Basic - ( 01 Mar 2024 01:07 )    Color: Yellow / Appearance: Clear / S.018 / pH: x  Gluc: 104 mg/dL / Ketone: 15 mg/dL  / Bili: Negative / Urobili: 2.0 mg/dL   Blood: x / Protein: Negative mg/dL / Nitrite: Negative   Leuk Esterase: Negative / RBC: 5 /HPF / WBC 2 /HPF   Sq Epi: x / Non Sq Epi: 0 /HPF / Bacteria: Negative /HPF          ----- RADIOLOGY & ADDITIONAL TESTS -----     Reviewed ****** TRANSFER ACCEPTANCE NOTE NEUROSURGERY TO REGIONAL MEDICAL FLOORS  *******    ----- HOSPITAL COURSE -----    78 y.o M w/ PMHx of BSC, Melanoma s/p resection, stage IIIB NSCLC s/p Chemo/RT (completed 10/2023) with brain mets s/p left retrosigmoid craniectomy & resection of cerebellar lesions (2024), new evidence of leptomeningeal disease (diag on MRI 2024) BPH, pulmonary embolism on eliquis (2024) and recent admission for worsening dysphagia ( -02/15) with plans for esophageal stent placement that were aborted due to concerns for presence of fistula, was transferred to [facility] from [facility] for continuation of care. Since prior discharge, pt had continued to have worsening dysphagia, odynophagia and unable to tolerate PO diet for 1 week prior to hospitalization. After eating solids&liquids pt has been experiencing regurgitation w/o associated gross nausea/vomiting.  He presented to [facility] for  FTT however c/c/b urinary retention requiring river placement, PNA/UTI? + adenovirus and was started on Zosyn?. Per request of Dr. D'Amico, pt was transferred to [facility] for further care and management.  Pt was admitted to neurosurgery service for evaluation given recent surgical intervention. Work up was sig for CT head w/ marked improvement in prepontine and cystic lesions. Given the lack of further neurosurgical need, pt is transferred to medicine for further medical care and management of active dysphagia via EGD.      ----- INTERVAL HPI/OVERNIGHT EVENTS -----     Patient was seen and examined at bedside. As per nurse and patient, no o/n events, patient resting comfortably. No complaints at this time. Patient denies: fever, chills, dizziness, weakness, HA, Changes in vision, CP, palpitations, SOB, cough, N/V/D/C, dysuria, changes in bowel movements, LE edema. ROS otherwise negative.      Subjective: Patient is a 78y old  Male who presents with a chief complaint of DYSPHAGIA    FAILURE TO THRIVE     (01 Mar 2024 15:55)      ----- VITAL SIGNS -----  T(F): 97.4 (24 @ 13:17)  HR: 68 (24 @ 15:15)  BP: 103/63 (24 @ 15:15)  RR: 17 (24 @ 15:15)  SpO2: 94% (24 @ 15:15)  Wt(kg): --      24 @ 07:01  -  24 @ 07:00  --------------------------------------------------------  IN: 505 mL / OUT: 0 mL / NET: 505 mL    24 @ 07:01  -  24 @ 16:01  --------------------------------------------------------  IN: 360 mL / OUT: 0 mL / NET: 360 mL        ----- Physical Exam -----     Constitutional: resting comfortably in bed; NAD  HEENT: NC/AT, PERRL, EOMI, anicteric sclera, no nasal discharge; uvula midline, no oropharyngeal erythema or exudates, MMM; no thyromegaly or anterior/posterior or submental EZRA; neck supple  Respiratory: CTA B/L; no W/R/R, no retractions  Cardiac: +S1/S2; RRR; no M/R/G appreciated  Gastrointestinal: abdomen soft, NT/ND, +BS, no rebound tenderness or guarding  Back: no CVAT B/L  Extremities: legs WWP, no clubbing or cyanosis; no peripheral edema  Vascular: 2+ distal pedal pulses B/L  Dermatologic: skin warm, dry and intact; no rashes, wounds, or scars  Neurologic: AAOx3; CNII-XII grossly intact; strength 5/5 and sensation intact in all 4 extremities; no focal deficits  Psychiatric: affect and characteristics of appearance, verbalizations, behaviors are appropriate    MEDICATIONS  (STANDING):  albuterol/ipratropium for Nebulization 3 milliLiter(s) Nebulizer every 6 hours  dexAMETHasone  Injectable 2 milliGRAM(s) IV Push two times a day  dextrose 5% + sodium chloride 0.9%. 1000 milliLiter(s) (60 mL/Hr) IV Continuous <Continuous>  pantoprazole  Injectable 40 milliGRAM(s) IV Push daily  tamsulosin 0.4 milliGRAM(s) Oral at bedtime    MEDICATIONS  (PRN):  acetaminophen   IVPB .. 750 milliGRAM(s) IV Intermittent every 6 hours PRN Mild Pain (1 - 3)      Allergies    No Known Allergies    Intolerances        ----- LABS -----                         12.0   10.20 )-----------( 264      ( 01 Mar 2024 01:07 )             37.6     03-    136  |  97  |  10  ----------------------------<  104<H>  3.8   |  31  |  0.47<L>    Ca    9.5      01 Mar 2024 01:07  Phos  2.5     03-  Mg     1.8     03-    TPro  5.9<L>  /  Alb  2.7<L>  /  TBili  0.5  /  DBili  0.2  /  AST  11  /  ALT  17  /  AlkPhos  91  03-01    PT/INR - ( 01 Mar 2024 01:07 )   PT: 13.0 sec;   INR: 1.14          PTT - ( 01 Mar 2024 08:26 )  PTT:45.9 sec  Urinalysis Basic - ( 01 Mar 2024 01:07 )    Color: Yellow / Appearance: Clear / S.018 / pH: x  Gluc: 104 mg/dL / Ketone: 15 mg/dL  / Bili: Negative / Urobili: 2.0 mg/dL   Blood: x / Protein: Negative mg/dL / Nitrite: Negative   Leuk Esterase: Negative / RBC: 5 /HPF / WBC 2 /HPF   Sq Epi: x / Non Sq Epi: 0 /HPF / Bacteria: Negative /HPF          ----- RADIOLOGY & ADDITIONAL TESTS -----   < from: CT Head No Cont (24 @ 11:30) >  Age-appropriate involutional and ischemic gliotic changes. No   hemorrhage. Left suboccipital craniotomy. Cystic lesion left posterior   fossa smaller compared to the previous exam 2024. Right prepontine   lesion also smaller.    < end of copied text >    Reviewed ****** TRANSFER ACCEPTANCE NOTE NEUROSURGERY TO REGIONAL MEDICAL FLOORS  *******    ----- HOSPITAL COURSE -----    78 y.o M w/ PMHx of BSC, Melanoma s/p resection, stage IIIB NSCLC s/p Chemo/RT (completed 10/2023) with brain mets s/p left retrosigmoid craniectomy & resection of cerebellar lesions (2024), new evidence of leptomeningeal disease (diag on MRI 2024) BPH, pulmonary embolism on eliquis (2024) and recent admission for worsening dysphagia ( -02/15) with plans for esophageal stent placement that were aborted due to concerns for presence of fistula, was transferred to [facility] from [facility] for continuation of care. Since prior discharge, pt had continued to have worsening dysphagia, odynophagia and unable to tolerate PO diet for 1 week prior to hospitalization. After eating solids&liquids pt has been experiencing regurgitation w/ associated gross nausea/vomiting.  He presented to [facility] for  FTT however c/c/b urinary retention requiring river placement, PNA/UTI? + adenovirus and was started on Zosyn?. Per request of Dr. D'Amico, pt was transferred to [facility] for further care and management.  Pt was admitted to neurosurgery service for evaluation given recent surgical intervention. Work up was sig for CT head w/ marked improvement in prepontine and cystic lesions. Given the lack of further neurosurgical need, pt is transferred to medicine for further medical care and management of active dysphagia via EGD.      ----- INTERVAL HPI/OVERNIGHT EVENTS -----     Patient was seen and examined at bedside. As per nurse and patient, no o/n events, patient resting comfortably. No complaints at this time. Patient denies: fever, chills, dizziness, weakness, HA, Changes in vision, CP, palpitations, SOB, cough, N/V/D/C, dysuria, changes in bowel movements, LE edema. ROS otherwise negative.      Subjective: Patient is a 78y old  Male who presents with a chief complaint of DYSPHAGIA    FAILURE TO THRIVE     (01 Mar 2024 15:55)      ----- VITAL SIGNS -----  T(F): 97.4 (24 @ 13:17)  HR: 68 (24 @ 15:15)  BP: 103/63 (24 @ 15:15)  RR: 17 (24 @ 15:15)  SpO2: 94% (24 @ 15:15)  Wt(kg): --      24 @ 07:01  -  24 @ 07:00  --------------------------------------------------------  IN: 505 mL / OUT: 0 mL / NET: 505 mL    24 @ 07:01  -  24 @ 16:01  --------------------------------------------------------  IN: 360 mL / OUT: 0 mL / NET: 360 mL        ----- Physical Exam -----     Constitutional: resting comfortably in bed; NAD  HEENT: NC/AT, PERRL, EOMI, anicteric sclera, no nasal discharge; uvula midline, no oropharyngeal erythema or exudates, MMM; no thyromegaly or anterior/posterior or submental EZRA; neck supple  Respiratory: CTA B/L; no W/R/R, no retractions  Cardiac: +S1/S2; RRR; no M/R/G appreciated  Gastrointestinal: abdomen soft, NT/ND, +BS, no rebound tenderness or guarding  Back: no CVAT B/L  Extremities: legs WWP, no clubbing or cyanosis; no peripheral edema  Vascular: 2+ distal pedal pulses B/L  Dermatologic: skin warm, dry and intact; no rashes, wounds, or scars  Neurologic: AAOx3; CNII-XII grossly intact; strength 5/5 and sensation intact in all 4 extremities; no focal deficits  Psychiatric: affect and characteristics of appearance, verbalizations, behaviors are appropriate    MEDICATIONS  (STANDING):  albuterol/ipratropium for Nebulization 3 milliLiter(s) Nebulizer every 6 hours  dexAMETHasone  Injectable 2 milliGRAM(s) IV Push two times a day  dextrose 5% + sodium chloride 0.9%. 1000 milliLiter(s) (60 mL/Hr) IV Continuous <Continuous>  pantoprazole  Injectable 40 milliGRAM(s) IV Push daily  tamsulosin 0.4 milliGRAM(s) Oral at bedtime    MEDICATIONS  (PRN):  acetaminophen   IVPB .. 750 milliGRAM(s) IV Intermittent every 6 hours PRN Mild Pain (1 - 3)      Allergies    No Known Allergies    Intolerances        ----- LABS -----                         12.0   10.20 )-----------( 264      ( 01 Mar 2024 01:07 )             37.6     03-    136  |  97  |  10  ----------------------------<  104<H>  3.8   |  31  |  0.47<L>    Ca    9.5      01 Mar 2024 01:07  Phos  2.5     03-  Mg     1.8     03-    TPro  5.9<L>  /  Alb  2.7<L>  /  TBili  0.5  /  DBili  0.2  /  AST  11  /  ALT  17  /  AlkPhos  91  03-01    PT/INR - ( 01 Mar 2024 01:07 )   PT: 13.0 sec;   INR: 1.14          PTT - ( 01 Mar 2024 08:26 )  PTT:45.9 sec  Urinalysis Basic - ( 01 Mar 2024 01:07 )    Color: Yellow / Appearance: Clear / S.018 / pH: x  Gluc: 104 mg/dL / Ketone: 15 mg/dL  / Bili: Negative / Urobili: 2.0 mg/dL   Blood: x / Protein: Negative mg/dL / Nitrite: Negative   Leuk Esterase: Negative / RBC: 5 /HPF / WBC 2 /HPF   Sq Epi: x / Non Sq Epi: 0 /HPF / Bacteria: Negative /HPF          ----- RADIOLOGY & ADDITIONAL TESTS -----   < from: CT Head No Cont (24 @ 11:30) >  Age-appropriate involutional and ischemic gliotic changes. No   hemorrhage. Left suboccipital craniotomy. Cystic lesion left posterior   fossa smaller compared to the previous exam 2024. Right prepontine   lesion also smaller.    < end of copied text >    Reviewed ****** TRANSFER ACCEPTANCE NOTE NEUROSURGERY TO REGIONAL MEDICAL FLOORS  *******    ----- HOSPITAL COURSE -----    78 y.o M w/ PMHx of BSC, Melanoma s/p resection, stage IIIB NSCLC s/p Chemo/RT (completed 10/2023) with brain mets s/p left retrosigmoid craniectomy & resection of cerebellar lesions (2024), new evidence of leptomeningeal disease (diag on MRI 2024) BPH, pulmonary embolism on eliquis (2024) and recent admission for worsening dysphagia ( -02/15) with plans for esophageal stent placement that were aborted due to concerns for presence of fistula, was transferred to [facility] from [facility] for continuation of care. Since prior discharge, pt had continued to have worsening dysphagia, odynophagia and unable to tolerate PO diet for 1 week prior to hospitalization. After eating solids&liquids pt has been experiencing regurgitation w/ associated gross nausea/vomiting.  He presented to [facility] for  FTT however c/c/b urinary retention requiring river placement, PNA/UTI? + adenovirus and was started on Zosyn?. Per request of Dr. D'Amico, pt was transferred to [facility] for further care and management.  Pt was admitted to neurosurgery service for evaluation given recent surgical intervention. Work up was sig for CT head w/ marked improvement in prepontine and cystic lesions. Given the lack of further neurosurgical need, pt is transferred to medicine for further medical care and management of active dysphagia via EGD.      ----- INTERVAL HPI/OVERNIGHT EVENTS -----     Patient was seen and examined at bedside. He denied pain, discomfort, N/V. Spoke with the patient regarding the EGD finding, presence of fistula and lack of further treatment options. Pt claimed full understanding. Discussed with the patient the focus on providing comfort going forward rather than having curative intent. He was agreeable with the plan. Family were at bedside and showed understanding and supportive of this transition.         Subjective: Patient is a 78y old  Male who presents with a chief complaint of DYSPHAGIA    FAILURE TO THRIVE     (01 Mar 2024 15:55)      ----- VITAL SIGNS -----  T(F): 97.4 (24 @ 13:17)  HR: 68 (24 @ 15:15)  BP: 103/63 (24 @ 15:15)  RR: 17 (24 @ 15:15)  SpO2: 94% (24 @ 15:15)  Wt(kg): --      24 @ 07:01  -  24 @ 07:00  --------------------------------------------------------  IN: 505 mL / OUT: 0 mL / NET: 505 mL    24 @ 07:01  -  24 @ 16:01  --------------------------------------------------------  IN: 360 mL / OUT: 0 mL / NET: 360 mL        ----- Physical Exam -----     Constitutional: frail, cachectic   HEENT:  anicteric sclera, no nasal discharge  Respiratory: CTA B/L; no W/R/R, no retractions  Cardiac: +S1/S2; RRR; no M/R/G appreciated  Gastrointestinal: abdomen soft, NT/ND, +BS, no rebound tenderness or guarding  Back: no CVAT B/L  Extremities: no open wounds or rashes  Vascular: 2+ distal pedal pulses B/L  Dermatologic: skin warm, dry and intact  Neurologic: AAOx3; CNII-XII grossly intact  Psychiatric: affect and characteristics of appearance, verbalizations, behaviors are appropriate    MEDICATIONS  (STANDING):  albuterol/ipratropium for Nebulization 3 milliLiter(s) Nebulizer every 6 hours  dexAMETHasone  Injectable 2 milliGRAM(s) IV Push two times a day  dextrose 5% + sodium chloride 0.9%. 1000 milliLiter(s) (60 mL/Hr) IV Continuous <Continuous>  pantoprazole  Injectable 40 milliGRAM(s) IV Push daily  tamsulosin 0.4 milliGRAM(s) Oral at bedtime    MEDICATIONS  (PRN):  acetaminophen   IVPB .. 750 milliGRAM(s) IV Intermittent every 6 hours PRN Mild Pain (1 - 3)      Allergies    No Known Allergies    Intolerances        ----- LABS -----                         12.0   10.20 )-----------( 264      ( 01 Mar 2024 01:07 )             37.6     03-    136  |  97  |  10  ----------------------------<  104<H>  3.8   |  31  |  0.47<L>    Ca    9.5      01 Mar 2024 01:07  Phos  2.5     03-  Mg     1.8     -    TPro  5.9<L>  /  Alb  2.7<L>  /  TBili  0.5  /  DBili  0.2  /  AST  11  /  ALT  17  /  AlkPhos  91  03-    PT/INR - ( 01 Mar 2024 01:07 )   PT: 13.0 sec;   INR: 1.14          PTT - ( 01 Mar 2024 08:26 )  PTT:45.9 sec  Urinalysis Basic - ( 01 Mar 2024 01:07 )    Color: Yellow / Appearance: Clear / S.018 / pH: x  Gluc: 104 mg/dL / Ketone: 15 mg/dL  / Bili: Negative / Urobili: 2.0 mg/dL   Blood: x / Protein: Negative mg/dL / Nitrite: Negative   Leuk Esterase: Negative / RBC: 5 /HPF / WBC 2 /HPF   Sq Epi: x / Non Sq Epi: 0 /HPF / Bacteria: Negative /HPF          ----- RADIOLOGY & ADDITIONAL TESTS -----   < from: CT Head No Cont (24 @ 11:30) >  Age-appropriate involutional and ischemic gliotic changes. No   hemorrhage. Left suboccipital craniotomy. Cystic lesion left posterior   fossa smaller compared to the previous exam 2024. Right prepontine   lesion also smaller.    < end of copied text >    Reviewed

## 2024-03-01 NOTE — PROGRESS NOTE ADULT - PROBLEM SELECTOR PLAN 1
Appears to have been chronic but possibly worsened?  S/S eval  GI consult for possible EGD  -Nutrition consult

## 2024-03-01 NOTE — PROGRESS NOTE ADULT - PROBLEM SELECTOR PLAN 6
P Presenting with intermittent episode of junctional bradycardia to 60s. EP consulted by neurosurgery team. No acute intervention required.  - no further intervention or need for close cardiac monitoring

## 2024-03-01 NOTE — CONSULT NOTE ADULT - ATTENDING COMMENTS
Complex medical decision making / symptom management in the setting of metastatic malignancy.    77yo M with PMH of Metastatic Lung CA p/w inability to tolerate PO and found to have progression of disease. Palliative consulted for complex medical decision making in the setting of life-limiting illness. See GOC note above. Awaiting Onc and GI evaluation to see if oral route can be re-established. If patient is unable to tolerate DMT or no longer offered, then he would be eligible for home hospice. No acute symptom management needs at this time. Recommend Morphine 2mg IV q2h PRN for Severe Pain or RR >22 if symptom arise.    In addition to the E/M visit, an advance care planning meeting was performed. Start time: 11:00AM; End time: 11:16AM; Total time: 16min. A face to face meeting to discuss advance care planning was held today regarding: ERMA HER. Primary decision maker: Patient is able to participate in decision making; Alternate/surrogate: . Discussed advance directives including, but not limited to, healthcare proxy and code status. Decision regarding code status: DNR/DNI; Documentation completed today: [facility] note    Will continue to follow for ongoing GOC discussion / titration of palliative regimen. Emotional support provided, questions answered.  Active Psychosocial Referrals:  [x]Social Work/Case management [x]PT/OT []Chaplaincy []Hospice  []Patient/Family Support []Holistic RN []Massage Therapy []Music Therapy []Ethics  Coping: [x] well [] with difficulty [] poor coping [] unable to assess  Support system: [x] strong [] adequate [] inadequate    For new or uncontrolled symptoms, please call Palliative Care at [phone] ([phone]). The service is available 24/7 (including nights & weekends) to provide symptom management recommendations over the phone as appropriate

## 2024-03-01 NOTE — DIETITIAN INITIAL EVALUATION ADULT - ADD RECOMMEND
- Recommend starting 100 mg thiamine for three days and 1 mg folic acid for three days prophylactically as patient is at risk for Wernicke's in the setting of hypocaloric intake for a prolonged period of time.   - Of note, patient is at risk for refeeding syndrome; Recommend checking potassium, magnesium, and phosphorus level daily and replete lytes PRN.   - If oral intake is feasible per MD/SLP, recommend REGULAR diet with diet/liquid consistency as per SLP recommendations.   - If patient is unable to advance diet in the next 24 hours, recommend nutrition support given patient with severe malnutrition.      If unable to advance diet and enteral access obtained, recommend the following tube feed regimen:     >> 1488 mL total volume of Promote 1.0 (1488 kcal, 94 g protein, 1243 mL free fluid)    >> Start at 10 mL/hr and increase by 10 mL/hr q12h to goal rate 62 mL/hr; 24 hrs continuous; start time 1100    >> The above meets all macronutrient needs (30.4 kcal/kg and 1.92 g/kg protein based on current body weight 3/01 kg)    >> Multivitamin as above meets micronutrient needs    >> Hold feeds if increase in pressor requirements, MAPs consistently trending <60 mmHg, lactic acidosis presents, or GI intolerance is noted

## 2024-03-01 NOTE — PRE-ANESTHESIA EVALUATION ADULT - NSRADCARDRESULTSFT_GEN_ALL_CORE
TTE 2/15/2024  "CONCLUSIONS:     1. Normal left ventricular size and systolic function.   2. Normal right ventricular size and systolic function.   3. Aortic sclerosis without significant stenosis.   4. Mild aortic regurgitation.   5. Pulmonary artery systolic pressure is 30 mmHg.   6. There is an extracardiac mass adjacent to the left atrium consistent with patient's known mediastinal mass.   7. Compared to the previous TTE performed on 10/20/2023, the mass was not visualized on previous exam.   8. Small pericardial effusion without echocardiographic evidence of cardiac tamponade physiology." TTE 2/15/2024  "CONCLUSIONS:     1. Normal left ventricular size and systolic function.   2. Normal right ventricular size and systolic function.   3. Aortic sclerosis without significant stenosis.   4. Mild aortic regurgitation.   5. Pulmonary artery systolic pressure is 30 mmHg.   6. There is an extracardiac mass adjacent to the left atrium consistent with patient's known mediastinal mass.   7. Compared to the previous TTE performed on 10/20/2023, the mass was not visualized on previous exam.   8. Small pericardial effusion without echocardiographic evidence of cardiac tamponade physiology."    CT Neck Soft Tissue with IV Contrast 2/14/2024  "IMPRESSION:  -Moderately distended superior esophagus likely secondary to obstruction   at the level of the aortic arch by adjacent necrotic mass. Fistulous   connection between the esophagus and necrotic mass cannot be ruled out.    -Outpouching from the posterior aspect of the trachea just above the   julia with close approximation to the necrotic mass. Tracheal fistula   cannot be ruled out.    Please see concurrent dedicated CT chest regarding lung findings."

## 2024-03-01 NOTE — DIETITIAN NUTRITION RISK NOTIFICATION - ADDITIONAL COMMENTS/DIETITIAN RECOMMENDATIONS
RECOMMENDATIONS:   - Recommend starting 100 mg thiamine for three days and 1 mg folic acid for three days prophylactically as patient is at risk for Wernicke's in the setting of hypocaloric intake for a prolonged period of time.   - Of note, patient is at risk for refeeding syndrome; Recommend checking potassium, magnesium, and phosphorus level daily and replete lytes PRN.   - If oral intake is feasible per MD/SLP, recommend REGULAR diet with diet/liquid consistency as per SLP recommendations.   - If patient is unable to advance diet in the next 24 hours, recommend nutrition support given patient with severe malnutrition.      If unable to advance diet and enteral access obtained, recommend the following tube feed regimen:     >> 1488 mL total volume of Promote 1.0 (1488 kcal, 94 g protein, 1243 mL free fluid)    >> Start at 10 mL/hr and increase by 10 mL/hr q12h to goal rate 62 mL/hr; 24 hrs continuous; start time 1100    >> The above meets all macronutrient needs (30.4 kcal/kg and 1.92 g/kg protein based on current body weight 3/01 kg)    >> Multivitamin as above meets micronutrient needs    >> Hold feeds if increase in pressor requirements, MAPs consistently trending <60 mmHg, lactic acidosis presents, or GI intolerance is noted

## 2024-03-01 NOTE — PRE-ANESTHESIA EVALUATION ADULT - NSPROPOSEDPROCEDFT_GEN_ALL_CORE
The right coronary artery was selectively engaged and injected. Multiple views of the injected vessel were taken. Esophagogastroduodenoscopy

## 2024-03-01 NOTE — PROGRESS NOTE ADULT - PROBLEM SELECTOR PLAN 1
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 D'Amico on 1/11  and recently diagnosed leptomeningeal disease. CT imaging w/ enlarging malignant mass compressing on mid esophagus and L. Atrium. On last hospitalization CT surg consulted and pt not candidate for intervention. Previously treated with repotrectinib w/ Dr. Chapman.   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 D'Amico on 1/11  and recently diagnosed leptomeningeal disease. CT imaging w/ enlarging malignant mass compressing on mid esophagus and L. Atrium. On last hospitalization CT surg consulted and pt not candidate for intervention. Previously treated with repotrectinib w/ Dr. Chapman.   - CT from [facility] w/ 20% reduction in mediastinal lymphadenopathy, pending CT c/a/p here  - CTH w/ interval improvement in prepontine and cystic lesions   Further tx based on EGD finding:   - Per onc concern for tracheoesophageal fistula and if present, pt no longer a candidate for repotrectinib or any other treatement modality   - Currently s/p EGD -> presence of esophageal tumor invasion and fistulous tract. EGD aborted due to inability to pass. Rec palliation and NPO

## 2024-03-01 NOTE — PROGRESS NOTE ADULT - PROBLEM SELECTOR PLAN 9
F: IVF PRN for now pending further GOC discussion  E: replete K<4, Mg<2  N: NPO  VTE Prophylaxis: SCDs  GI: not needed  C: DNR/DNI  D: RMF

## 2024-03-01 NOTE — CHART NOTE - NSCHARTNOTEFT_GEN_A_CORE
EGD performed in Endoscopy suite with Dr Crawford and myself, findings as noted below:    Impression:  Upon advancement of gastroscope, with copious mucous noted, suctioned. At approximately 27 cm from the incisors, with ulcerated, abnormal appearing mucosa invading into esophageal lumen, narrowing lumen, highly suspicious for tumor invasion. Noted site of fistulous tract upon careful inspection. Exchanged gastroscope for XP scope as unable to traverse narrowing. Advanced XP scope into gastric lumen however noted to have large solid food bolus sitting in fundus. Unable to pass. Aborted procedure.    Plan:  -NPO     -Palliative consult     -Return to floor for further management

## 2024-03-01 NOTE — CONSULT NOTE ADULT - ASSESSMENT
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 GI consulted for dysphagia in the setting of known malignancy and for concern for tracheo-esophageal fistula.    Presenting with acute onset dysphagia, unable to tolerate PO starting 2/23/24, history consistent with esophageal dysphagia, with admission to [place] for aspiration event. Transferred to [facility] for continuity of care. Recent imaging dated 2/14/24, noting moderate distention of the proximal esophagus filled with debris. The esophagus adjacent to the aortic arch appears thickened and narrows to a point due to extrinsic compression and possible tumor involvement. Also with an outpouching arising from the posterior aspect of the trachea just above the julia, with a possible fistulous connection to a necrotic mass/fluid collection in the mediastinum.     EGD (3/1/24): Upon advancement of gastroscope, with copious mucous noted, suctioned. At approximately 27 cm from the incisors, with ulcerated, abnormal appearing mucosa invading into esophageal lumen, narrowing lumen, highly suspicious for tumor invasion. Noted site of fistulous tract upon careful inspection. Exchanged gastroscope for XP scope as unable to traverse narrowing. Advanced XP scope into gastric lumen however noted to have large solid food bolus sitting in fundus. Unable to pass. Aborted procedure.    #Dysphagia   -keep NPO for now     -Palliative consult     -Will discuss with advanced attending re: role for palliative esophageal stenting   -Remainder of management as per primary team    Case discussed with [facility] attending and primary team.     Verena Méndez DO  Gastroenterology Fellow  Pager: [phone] 78M PMHx basal cell carcinoma and melanoma (resected several years ago), stage IIIB NSCLC s/p chemoRT (completed 10/2023) with multiple brain mets (s/p Left retrosigmoid craniectomy for resection of cerebellar lesion),   leptomeningeal disease on MRI whole spine from 2/11/2024, BPH, pulmonary embolism, with chronic dysphagia and odynophagia, presenting with worsening swallowing symptoms. GI consulted for dysphagia in the setting of known malignancy and for concern for tracheo-esophageal fistula.    Presenting with acute onset dysphagia, unable to tolerate PO starting 2/23/24, history consistent with esophageal dysphagia, with admission to [place] for aspiration event. Transferred to [facility] for continuity of care. Recent imaging dated 2/14/24, noting moderate distention of the proximal esophagus filled with debris. The esophagus adjacent to the aortic arch appears thickened and narrows to a point due to extrinsic compression and possible tumor involvement. Also with an outpouching arising from the posterior aspect of the trachea just above the julia, with a possible fistulous connection to a necrotic mass/fluid collection in the mediastinum.     EGD (3/1/24): Upon advancement of gastroscope, with copious mucous noted, suctioned. At approximately 27 cm from the incisors, with ulcerated, abnormal appearing mucosa invading into esophageal lumen, narrowing lumen, highly suspicious for tumor invasion. Noted site of fistulous tract upon careful inspection. Exchanged gastroscope for XP scope as unable to traverse narrowing. Advanced XP scope into gastric lumen however noted to have large solid food bolus sitting in fundus. Unable to pass. Aborted procedure.    As far as interventions, limited options as esophageal stent may further exacerbate current fistulous tract and compress airway.     #Dysphagia   -keep NPO for now     -Palliative consult     -Will discuss with advanced attending re: role for palliative esophageal stenting   -Remainder of management as per primary team    Case discussed with [facility] attending and primary team.     Verena Méndez DO  Gastroenterology Fellow  Pager: [phone]

## 2024-03-01 NOTE — CONSULT NOTE ADULT - SUBJECTIVE AND OBJECTIVE BOX
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 Pt fell 2/24, no clear head trauma, brought to [facility]. Workup there notable for urinary retention (river placed), dysphagia (under consideration for EGD), ?PNA/UTI (Zosyn), and +adenovirus. Pt has been unable to tolerate PO intake x 1 week prior to admission; experiencing regurgitation with food/liquids. No n/v/abd pain. Reports vision is blurred and has diplopia but is stable s/p resection of brain lesion. Also reports generalized weakness. Denies focal weakness/numbness. (01 Mar 2024 00:53)    GI consulted for dysphagia in the setting of known malignancy and for concern for tracheo-esophageal fistula.    Allergies    No Known Allergies    Intolerances      MEDICATIONS:  MEDICATIONS  (STANDING):  albuterol/ipratropium for Nebulization 3 milliLiter(s) Nebulizer every 6 hours  dexAMETHasone  Injectable 2 milliGRAM(s) IV Push two times a day  dextrose 5% + sodium chloride 0.9%. 1000 milliLiter(s) (60 mL/Hr) IV Continuous <Continuous>  pantoprazole  Injectable 40 milliGRAM(s) IV Push daily  tamsulosin 0.4 milliGRAM(s) Oral at bedtime    MEDICATIONS  (PRN):  acetaminophen   IVPB .. 750 milliGRAM(s) IV Intermittent every 6 hours PRN Mild Pain (1 - 3)    PAST MEDICAL & SURGICAL HISTORY:  BPH (benign prostatic hyperplasia)      Prediabetes      Lung cancer metastatic to brain      Skin melanoma      Basal cell carcinoma      History of dental surgery      S/P skin cancer resection      H/O brain tumor        FAMILY HISTORY:    SOCIAL HISTORY:  [place]: [ ] Current, [ ] Former, [ ] Never; Pack Years:  Alcohol:  Illicit Drugs:    REVIEW OF SYSTEMS:  Constitutional: No fever, chills, weight loss, or fatigue  ENMT:  No visual changes; No difficulty hearing, tinnitus, vertigo; No sinus or throat pain  Neck: No pain or stiffness  Respiratory: No cough, wheezing, or hemoptysis; No shortness of breath  Cardiovascular: No chest pain, palpitations, dizziness, orthopnea, PND, or leg swelling  Gastrointestinal: No abdominal or epigastric pain. No  nausea, vomiting, or hematemesis. No diarrhea, constipation, or steatorrhea. No melena or hematochezia  Genitourinary: No dysuria, urinary frequency/hesitancy, or hematuria  Skin: No itching, burning, rashes or lesions   Musculoskeletal: No joint pain or swelling; No muscle, back or extremity pain    Vital Signs Last 24 Hrs  T(C): 36.3 (01 Mar 2024 15:15), Max: 36.5 (01 Mar 2024 04:23)  T(F): 97.4 (01 Mar 2024 13:17), Max: 97.7 (01 Mar 2024 04:23)  HR: 68 (01 Mar 2024 15:15) (68 - 92)  BP: 103/63 (01 Mar 2024 15:15) (99/57 - 117/68)  BP(mean): 77 (01 Mar 2024 15:15) (73 - 84)  RR: 17 (01 Mar 2024 15:15) (17 - 18)  SpO2: 94% (01 Mar 2024 15:15) (93% - 96%)    Parameters below as of 01 Mar 2024 15:15    O2 Flow (L/min): 3      02-29 @ 07:01 - 03-01 @ 07:00  --------------------------------------------------------  IN: 505 mL / OUT: 0 mL / NET: 505 mL    03-01 @ 07:01  -  03-01 @ 17:05  --------------------------------------------------------  IN: 360 mL / OUT: 0 mL / NET: 360 mL        PHYSICAL EXAM:    General: thin, frail  male, lying in bed; in no acute distress; daughter at bedside    HEENT: MMM, conjunctiva and sclera clear  Gastrointestinal: Soft, non-tender non-distended; Normal bowel sounds; No rebound or guarding  Extremities: Normal range of motion, No clubbing, cyanosis or edema  Neurological: Alert and oriented x3  Skin: Warm and dry. No obvious rash    LABS:                        12.0   10.20 )-----------( 264      ( 01 Mar 2024 01:07 )             37.6     03-01    136  |  97  |  10  ----------------------------<  104<H>  3.8   |  31  |  0.47<L>    Ca    9.5      01 Mar 2024 01:07  Phos  2.5     03-01  Mg     1.8     03-01    TPro  5.9<L>  /  Alb  2.7<L>  /  TBili  0.5  /  DBili  0.2  /  AST  11  /  ALT  17  /  AlkPhos  91  03-01        RADIOLOGY & ADDITIONAL STUDIES:

## 2024-03-01 NOTE — CONSULT NOTE ADULT - PROBLEM SELECTOR RECOMMENDATION 5
.  Patient would benefit from outpatient Palliative/Supportive Oncology follow-up on discharge with Dr. Liss Kevin at [facility], include her name and contact info ([phone]) in Discharge Summary    Feel free to message me via Teams (preferred) weekdays between 9am-4pm. Palliative Care is available 24/7, please call [phone] with any questions if no answer or outside those hours.    Verena Armenta MD, PGY4  Hospice & Palliative Medicine Fellow

## 2024-03-01 NOTE — CONSULT NOTE ADULT - ASSESSMENT
78YM w/ SDC4-ROS1 fusion NSCLC with LMD, BMs, subcarinal LAD and esophageal compression, now presenting as a transfer from [facility] following an episode of aspiration/viral illness and inability to tolerate PO    # SDC4-ROS1 fusion  - CT from [facility] w/ 20% reduction in mediastinal lymphadenopathy, pending CT c/a/p here. Possible esophageal/tracheal erosion - would require EGD for further delineation  if fistula confirmed, he would no longer be eligible for repotrectinib or any other treatment modalities.   If no fistula seen, would recommend PEG tube placement for nutrition + repotrectinib - although not FDA approved to crush or dissolve. This was discussed with patients family, who verbalized understanding.       D/w neuro oncology attending, Dr Billy Chapman

## 2024-03-01 NOTE — PRE-ANESTHESIA EVALUATION ADULT - NSANTHPMHFT_GEN_ALL_CORE
Cardiac: Positive for mediastinal mass with pericardial effusion and prior pulmonary embolism on eliquis. Denies HTN, HLD, MI/Angina/Heart Failure, Arrhythmia, Murmur/Valvular Disorder. <4 METS  Pulmonary: Positive for Stage IIIB NSCLC s/p chemotherapy/radiation therapy completed 10/2023 c/b multiple brain metastases s/p left retrosigmoid craniectomy for resection of cerebellar lesion. Positive for pneumonia and adenovirus. Positive for prior pulmonary embolism on eliquis.   Renal: Positive for BPH with current urinary retention and UTI. Denies kidney dysfunction.   Hepatic: Denies liver dysfunction  Gastrointestinal: Positive for dysphagia, unable to tolerate PO intake for one week. Denies GERD/IBS  Endocrine: Positive for prediabetes. Denies thyroid dysfunction  Neurologic: NSCLC Stage IIIB with multiple brain metastases s/p left retrosigmoid craniectomy for resection of cerebellar lesion and widespread leptomeningeal disease.   Hematologic: Positive for anemia, eliquis use, prior pulmonary embolism.     PSH: Left retrosigmoid crniectomy for resection of cerebellar lesion, skin cancer resection, dental surgery. Cardiac: Positive for mediastinal mass with pericardial effusion and prior pulmonary embolism on eliquis. Denies HTN, HLD, MI/Angina/Heart Failure, Arrhythmia, Murmur/Valvular Disorder. <4 METS  Pulmonary: Positive for Stage IIIB NSCLC s/p chemotherapy/radiation therapy completed 10/2023 c/b multiple brain metastases s/p left retrosigmoid craniectomy for resection of cerebellar lesion. Positive for pneumonia and adenovirus. Positive for prior pulmonary embolism on eliquis. 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   Renal: Positive for BPH with current urinary retention and UTI. Denies kidney dysfunction.   Hepatic: Denies liver dysfunction  Gastrointestinal: Positive for dysphagia, unable to tolerate PO intake for one week. Denies GERD/IBS  Endocrine: Positive for prediabetes. Denies thyroid dysfunction  Neurologic: NSCLC Stage IIIB with multiple brain metastases s/p left retrosigmoid craniectomy for resection of cerebellar lesion and widespread leptomeningeal disease.   Hematologic: Positive for anemia, eliquis use, prior pulmonary embolism.     PSH: Left retrosigmoid crniectomy for resection of cerebellar lesion, skin cancer resection, dental surgery.

## 2024-03-01 NOTE — PROGRESS NOTE ADULT - PROBLEM SELECTOR PLAN 2
Diagnosed on 1/12  -Can c/w Lovenox to bridge given high risk but will d/w Oncology  -Add on Cystatin-C

## 2024-03-01 NOTE — PATIENT PROFILE ADULT - FALL HARM RISK - HARM RISK INTERVENTIONS
Assistance with ambulation/Assistance OOB with selected safe patient handling equipment/Communicate Risk of Fall with Harm to all staff/Discuss with provider need for PT consult/Monitor gait and stability/Reinforce activity limits and safety measures with patient and family/Tailored Fall Risk Interventions/Visual Cue: Yellow wristband and red socks/Bed in lowest position, wheels locked, appropriate side rails in place/Call bell, personal items and telephone in reach/Instruct patient to call for assistance before getting out of bed or chair/Non-slip footwear when patient is out of bed/[place] to call system/Physically safe environment - no spills, clutter or unnecessary equipment/Purposeful Proactive Rounding/Room/bathroom lighting operational, light cord in reach

## 2024-03-01 NOTE — PROGRESS NOTE ADULT - PROBLEM SELECTOR PLAN 5
Pt with PE (1/12) involving b/l segmental RLL and subsegmental LLL likely provoked i/s/o malignancy. Pt on eliquis 5mg BID.  - Pt with PE (1/12) involving b/l segmental RLL and subsegmental LLL likely provoked i/s/o malignancy. Pt on eliquis 5mg BID.  - Would hold off anticoagulation 2/2 risk benefit analysis and end of life care Pt with PE (1/12) involving b/l segmental RLL and subsegmental LLL likely provoked i/s/o malignancy. Pt on eliquis 5mg BID.  - on lovenox full AC given hx of PE and for now maybe a candidate for palliative stenting of fistula although unclear if possible.   - d/c lovenox if no possibility of further palliative intervention

## 2024-03-01 NOTE — H&P ADULT - NSHPPHYSICALEXAM_GEN_ALL_CORE
General: NAD, cachectic  HEENT: CN II-XII intact, PERRL 3mm briskly reactive, EOMI b/l, face symmetric, tongue midline, neck FROM  Cardiovascular: RRR, normal S1 and S2   Respiratory: lungs CTAB, no wheezing, rhonchi, or crackles   GI: normoactive BS to auscultation, abd soft, NTND   Neuro: A&Ox3, No aphasia, speech clear, no dysmetria, no pronator drift. Follows commands.  BANDA x4 spontaneously, 5/5 strength in all extremities throughout. Sensation intact throughout.   Extremities: distal pulses 2+ x4

## 2024-03-01 NOTE — CONSULT NOTE ADULT - PROBLEM SELECTOR RECOMMENDATION 9
- s/p chemoRT (carboplatin/paclitaxel) finishing 10/27/23; Based on PACIFIC trial, patient would have been candidate for durvalumab, though refused at shirin time  - s/p OR craniotomy 1/11/24 by Dr. Damico - s/p chemoRT (carboplatin/paclitaxel) finishing 10/27/23  - s/p OR craniotomy 1/11/24 by Dr. Damico .  PPSV = 50%, requires assistance for most ADLs  -PT Eval  -c/w supportive care, OOB, encourage movement

## 2024-03-01 NOTE — PROGRESS NOTE ADULT - ASSESSMENT
78M with h/o basal cell carcinoma and melanoma (resected several years ago), stage IIIB NSCLC s/p chemoRT (completed 10/2023) with multiple brain mets (s/p Left retrosigmoid craniectomy for resection of cerebellar lesion), leptomeningeal disease on MRI whole spine from 2/11/2024, BPH, pulmonary embolism, with chronic dysphagia and odynophagia, tx from [facility] to [facility] for continuity of care.

## 2024-03-01 NOTE — CONSULT NOTE ADULT - PROBLEM SELECTOR RECOMMENDATION 3
.  Stage IIIB metastatic to brain and spine  - Oncologist Dr. Chapman  - s/p carboplatin/paclitaxel finished 10/27/23, recently taking repotrectinib to supprss disease growth as there was no role for RT or stent per Onc  - s/p OR craniotomy 1/11/24 by Dr. D'Amico

## 2024-03-01 NOTE — DIETITIAN INITIAL EVALUATION ADULT - PERTINENT MEDS FT
MEDICATIONS  (STANDING):  albuterol/ipratropium for Nebulization 3 milliLiter(s) Nebulizer every 6 hours  dexAMETHasone  Injectable 2 milliGRAM(s) IV Push two times a day  dextrose 5% + sodium chloride 0.9%. 1000 milliLiter(s) (60 mL/Hr) IV Continuous <Continuous>  pantoprazole  Injectable 40 milliGRAM(s) IV Push daily  tamsulosin 0.4 milliGRAM(s) Oral at bedtime    MEDICATIONS  (PRN):  acetaminophen   IVPB .. 750 milliGRAM(s) IV Intermittent every 6 hours PRN Mild Pain (1 - 3)

## 2024-03-01 NOTE — H&P ADULT - ASSESSMENT
78M with h/o basal cell carcinoma and melanoma (resected several years ago), stage IIIB NSCLC s/p chemoRT (completed 10/2023) with multiple brain mets (s/p Left retrosigmoid craniectomy for resection of cerebellar lesion),   leptomeningeal disease on MRI whole spine from 2/11/2024, BPH, pulmonary embolism, with chronic dysphagia and odynophagia, tx from [facility] to [facility] for continuity of care.     NEURO  - neuro checks q8h, vitals q4h  - pain control PRN: IV tylenol  - CTH    CV  - SBP     PULM  - NC 4L  - CT Chest     GI  - NPO pending SLP eval  - GI consult  - GI ppx: Pantoprazole 40mg qd  - CT A/P    RENAL  - DC river present on admission, TOV  - D5NS 60cc/hr while NPO    ENDO  - ISS    HEME/ONC  - hx PE: hold home Eliquis, cont heparin gtt  - onc consult  - palliative consult    ID  - +adenovirus at OSH, resolved  - Cont Zosyn, per daughter for UTI vs PNA  - f/u urine cx on admission      d/w Dr. D'Amico

## 2024-03-01 NOTE — CONSULT NOTE ADULT - ATTENDING COMMENTS
79 yo M with stage IV NSCLC with ROS1 fusion started on po repotrectinib on 2/14/2024 with excellent MD, c/b inability to swallow since Friday 2/23/24, admitted to [place] x 6 days, transferred to [facility] overnight.  Of note good response to repotrectinib can provide as long as 36 mo of overall survival if sustained response.  Has hx of PEs on Eliquis now being held from [place] and transitioned to heparin gtt for EGD today at [facility].  Discussed case with Dr.Subroto Braun, Miguel Young, Dr.Randy D'Amico,  and GI fellow .  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 This will not be able to heal on it's own. Fistula likely happened from tumor shrinkage from repotrectinib therapy. GI not confident in utility of esophageal stent due to them causing airway collapse.    From a cancer perspective he had an excellent response, but given his inability to tolerate po and therefore not get his repotrectinib and no utility in PEG tube or J tube while fistula is present due to constant recurrent aspiration hospice is definitely appropriate. The daughter Kayli agreed today that no further escalation of care: invasive procedures, confirmed DNR/DNI, no escalation to telemetry or ICU if worsening medical condition.  GI to evaluate utility of esophageal stent placement to "plug" the fistula and will let us know of their recommendation.  Family open to hospice if no further utility in intervention described above.  Will await final GI recommendations.    Total time spent on encounter, including but not limited to counseling/coordination of care: 75 mis. 79 yo M with stage IV NSCLC with ROS1 fusion started on po repotrectinib on 2/14/2024 with excellent NE, c/b inability to swallow since Friday 2/23/24, admitted to [place] x 6 days, transferred to [facility] overnight.  Of note good response to repotrectinib can provide as long as 36 mo of overall survival if sustained response.  Has hx of PEs on Eliquis now being held from [place] and transitioned to heparin gtt for EGD today at [facility].  Discussed case with Dr.Subroto Braun, Miguel Young, Dr.Randy D'Amico,  and GI fellow .  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 This will not be able to heal on it's own. Fistula likely happened from tumor shrinkage from repotrectinib therapy. GI not confident in utility of esophageal stent due to them causing airway collapse.    From a cancer perspective he had an excellent response, but given his inability to tolerate po and therefore not get his repotrectinib and no utility in PEG tube or J tube while fistula is present due to constant recurrent aspiration hospice is definitely appropriate. The daughter Kayli agreed today that no further escalation of care: invasive procedures, confirmed DNR/DNI, no escalation to telemetry or ICU if worsening medical condition.  GI to evaluate utility of esophageal stent placement to "plug" the fistula and will let us know of their recommendation. If stent is an actual option, then the patient could continue with repotrectinib with expected ongoing continued good response.  Family open to hospice if no further utility in intervention described above.  Will await final GI recommendations.    Total time spent on encounter, including but not limited to counseling/coordination of care: 75 mis.

## 2024-03-01 NOTE — PRE-ANESTHESIA EVALUATION ADULT - NSDENTALSD_ENT_ALL_CORE
Missing majority of teeth, poor dentition with several chipped teeth, ground/worn teeth, cavities./missing teeth Full Upper and Lower Dentures/missing teeth

## 2024-03-01 NOTE — PACU DISCHARGE NOTE - COMMENTS
3L NC - came with this as well  this patient was visibly evaluated and deemed ready for discharge to floor. Patient met discharge and Todd criteria. Todd score greater or equal to 9.

## 2024-03-01 NOTE — CONSULT NOTE ADULT - ASSESSMENT
Mr. 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  Mr. 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

## 2024-03-01 NOTE — CONSULT NOTE ADULT - NS ATTEST RISK PROBLEM GEN_ALL_CORE FT
Chronic Illness With Severe Exacerbation/Progression  Decision Made To Not Resuscitate (DNR)  Prescription Of Parenteral Controlled Substances

## 2024-03-01 NOTE — CONSULT NOTE ADULT - SUBJECTIVE AND OBJECTIVE BOX
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 Pt fell , no clear head trauma, brought to [facility]. Workup there notable for urinary retention (river placed), dysphagia (under consideration for EGD), ?PNA/UTI (Zosyn), and +adenovirus. Pt has been unable to tolerate PO intake x 1 week prior to admission; experiencing regurgitation with food/liquids. No n/v/abd pain. Reports vision is blurred and has diplopia but is stable s/p resection of brain lesion. Also reports generalized weakness. Denies focal weakness/numbness. (01 Mar 2024 00:53)    PERTINENT PM/SXH:   BPH (benign prostatic hyperplasia)  Lung mass  Prediabetes  Lung cancer metastatic to brain  Skin melanoma  Basal cell carcinoma  History of dental surgery  S/P skin cancer resection  H/O brain tumor    ITEMS NOT CHECKED ARE NOT PRESENT    SOCIAL HISTORY:   Significant other/partner[ ]  Children[ x] Zoroastrianism/Spirituality:  Substance hx:  [ ]   Tobacco hx:  [x]   Alcohol hx: [ ]   Home Opioid hx:  [ ] I-Stop Reference No: : [id number]   -no active Rx  Living Situation: [x] Home  [ ]Long term care  [ ]Rehab [ ]Other    ADVANCE DIRECTIVES:    DNR/MOLST  [ ]  Living Will  [ ]   DECISION MAKER(s):  [x ] Health Care Proxy(s)  [ ] Surrogate(s)  [ ] Guardian           Name(s): Phone Number(s): Kayli Galeana (daughter) [phone].    BASELINE (I)ADL(s) (prior to admission):  [place]: [ ]Total  [x ] Moderate [ ]Dependent    Allergies  No Known Allergies    MEDICATIONS  (STANDING):  albuterol/ipratropium for Nebulization 3 milliLiter(s) Nebulizer every 6 hours  dexAMETHasone  Injectable 2 milliGRAM(s) IV Push two times a day  dextrose 5% + sodium chloride 0.9%. 1000 milliLiter(s) (60 mL/Hr) IV Continuous <Continuous>  pantoprazole  Injectable 40 milliGRAM(s) IV Push daily  piperacillin/tazobactam IVPB.. 4.5 Gram(s) IV Intermittent every 8 hours  tamsulosin 0.4 milliGRAM(s) Oral at bedtime    MEDICATIONS  (PRN):  acetaminophen   IVPB .. 750 milliGRAM(s) IV Intermittent every 6 hours PRN Mild Pain (1 - 3)    PRESENT SYMPTOMS: [ ]Unable to self-report  [ ] CPOT [ ] PAINADs [ ] RDOS  Source if other than patient:  [ ]Family   [ ]Team     Pain: [ ]yes [ x]no  QOL impact -   Location -                    Aggravating factors -  Quality -  Radiation -  Timing-  Severity (0-10 scale):  Minimal acceptable level (0-10 scale):     CPOT:    https://www.[facility].org/getattachment/[email or web address]/Critical-Care-Pain-Observation-Tool-(CPOT)    PAINAD Score: See PAINAD tool and score below     Dyspnea:                           [ ]Mild [ ]Moderate [ ]Severe    RDOS: See RDOS tool and score below   0 to 2  minimal or no respiratory distress   3  mild distress  4 to 6 moderate distress  >7 severe distress      Anxiety:                             [ ]Mild [ ]Moderate [ ]Severe  Fatigue:                             [ ]Mild [ ]Moderate [ ]Severe  Nausea:                             [ ]Mild [ ]Moderate [ ]Severe  Loss of appetite:              [ ]Mild [ ]Moderate [ ]Severe  Constipation:                    [ ]Mild [ ]Moderate [ ]Severe    PCSSQ[Palliative Care Spiritual Screening Question]   Severity (0-10):  Score of 4 or > indicate consideration of Chaplaincy referral.  Chaplaincy Referral: [ ] yes [ ] refused [ ] following [x ] Deferred     Caregiver [place]? : [x ] yes [ ] no [ ] Deferred [ ] Declined             Social work referral [ ] Patient & Family Centered Care Referral [ ]     Anticipatory Grief present?:  [ ] yes [x ] no  [ ] Deferred                  Social work referral [ ] Chaplaincy Referral [ ]    		  Other Symptoms:  +odynophagia, dysphagia  [ ]All other review of systems negative     Palliative Performance Status Version 2:   See PPSv2 tool and score below          PHYSICAL EXAM:  Vital Signs Last 24 Hrs  T(C): 36.2 (01 Mar 2024 09:14), Max: 36.5 (01 Mar 2024 04:23)  T(F): 97.1 (01 Mar 2024 09:14), Max: 97.7 (01 Mar 2024 04:23)  HR: 68 (01 Mar 2024 10:31) (68 - 92)  BP: 103/63 (01 Mar 2024 10:31) (99/57 - 117/68)  BP(mean): 77 (01 Mar 2024 10:31) (73 - 84)  RR: 17 (01 Mar 2024 10:31) (17 - 18)  SpO2: 94% (01 Mar 2024 10:31) (93% - 96%)    Parameters below as of 01 Mar 2024 10:31  Patient On (Oxygen Delivery Method): nasal cannula  O2 Flow (L/min): 3   I&O's Summary    2024 07:  -  01 Mar 2024 07:00  --------------------------------------------------------  IN: 505 mL / OUT: 0 mL / NET: 505 mL    01 Mar 2024 07:01  -  01 Mar 2024 12:37  --------------------------------------------------------  IN: 360 mL / OUT: 0 mL / NET: 360 mL    GENERAL: [x ]Cachexia    [ x]Alert  [x ]Oriented x3   [ ]Lethargic  [ ]Unarousable  [x ]Verbal  [ ]Non-Verbal  Behavioral:   [ ] Anxiety  [ ] Delirium [ ] Agitation [ ] Other  HEENT:  [ x]Normal   [ ]Dry mouth   [ ]ET Tube/Trach  [ ]Oral lesions  PULMONARY:   [x ]Clear [ ]Tachypnea  [ ]Audible excessive secretions   [ ]Rhonchi        [ ]Right [ ]Left [ ]Bilateral  [ ]Crackles        [ ]Right [ ]Left [ ]Bilateral  [ ]Wheezing     [ ]Right [ ]Left [ ]Bilateral  [ ]Diminished breath sounds [ ]right [ ]left [ ]bilateral  CARDIOVASCULAR:    [ ]Regular [ ]Irregular [ ]Tachy  [ ]Bobo [ ]Murmur [ ]Other  GASTROINTESTINAL:  [ ]Soft  [ ]Distended   [ ]+BS  [ ]Non tender [ ]Tender  [ ]Other [ ]PEG [ ]OGT/ NGT  Last BM:  GENITOURINARY:  [ ]Normal [ ] Incontinent   [ ]Oliguria/Anuria   [ ]River  MUSCULOSKELETAL:   [ ]Normal   [ ]Weakness  [ ]Bed/Wheelchair bound [ ]Edema  NEUROLOGIC:   [ ]No focal deficits  [ ]Cognitive impairment  [ ]Dysphagia [ ]Dysarthria [ ]Paresis [ ]Other   SKIN:   [ ]Normal  [ ]Rash  [ ]Other  [ ]Pressure ulcer(s)       Present on admission [ ]y [ ]n    LABS:             12.0   10.20 )-----------( 264      ( 01 Mar 2024 01:07 )             37.6   03-01    136  |  97  |  10  ----------------------------<  104<H>  3.8   |  31  |  0.47<L>    Ca    9.5      01 Mar 2024 01:07  Phos  2.5     03-01  Mg     1.8     03-01    TPro  5.9<L>  /  Alb  2.7<L>  /  TBili  0.5  /  DBili  0.2  /  AST  11  /  ALT  17  /  AlkPhos  91  03-01  PT/INR - ( 01 Mar 2024 01:07 )   PT: 13.0 sec;   INR: 1.14     PTT - ( 01 Mar 2024 08:26 )  PTT:45.9 sec    Urinalysis Basic - ( 01 Mar 2024 01:07 )  Color: Yellow / Appearance: Clear / S.018 / pH: x  Gluc: 104 mg/dL / Ketone: 15 mg/dL  / Bili: Negative / Urobili: 2.0 mg/dL   Blood: x / Protein: Negative mg/dL / Nitrite: Negative   Leuk Esterase: Negative / RBC: 5 /HPF / WBC 2 /HPF   Sq Epi: x / Non Sq Epi: 0 /HPF / Bacteria: Negative /HPF    RADIOLOGY & ADDITIONAL STUDIES:  ACC: [id number] EXAM:  CT BRAIN   ORDERED BY: SAIMA MONTOYA   PROCEDURE DATE:  2024      INTERPRETATION: There has been a left suboccipital craniotomy. The cystic appearing   lesion in the left lateral posterior fossa is smaller when compared to   the previous exam measuring 1.8 cm compared with the prior of 1. 2.2 cm.   The mass anterior to the right po is not as well identified and the   prior ring-enhancing lesion in the left frontal white matter is also less   visible. No acute hemorrhage is identified. Circular and sulcal   prominence and small vessel white matter ischemic changes are for age.    IMPRESSION: Age-appropriate involutional and ischemic gliotic changes. No   hemorrhage. Left suboccipital craniotomy. Cystic lesion left posterior   fossa smaller compared to the previous exam 2024. Right prepontine   lesion also smaller.    ACC: [id number] EXAM:  CT NECK SOFT TISSUE IC   ORDERED BY: RUBEN NARAYANAN   PROCEDURE DATE:  2024      FINDINGS:  Pharynx: Unremarkable. No significant tonsillar enlargement.  Larynx: Unremarkable. Epiglottis is normal.  Prevertebral and retropharyngeal spaces: Unremarkable.  Salivary glands: Normal. Glands are normal in size.  Thyroid: There are cysts or nodules in the thyroid, measuring up to 1.1   cm on  the right.  Lymph nodes: No cervical lymphadenopathy identified.    Included mediastinum:  There is moderate distention of the proximal esophagus filled with   debris. The esophagus adjacent to the aortic arch appears thickened and   narrows to a point due to extrinsic compression and possible tumor   involvement (3:).  There is an outpouching arising from the posterior aspect of the trachea   just above the julia, with a possible fistulous connection to a necrotic   mass/fluid collection in the mediastinum, (3:102-106).    Please see separate CT chest regarding the lungs.    Bones/joints: Unremarkable. No acute fracture.  Soft tissues: Unremarkable. No significant soft tissue swelling.  Other: 1.8 cm cystic lesion in the left lateral cerebellum corresponds   with lesion seen on MRI brain 2024.    IMPRESSION:  -Moderately distended superior esophagus likely secondary to obstruction   at the level of the aortic arch by adjacent necrotic mass. Fistulous   connection between the esophagus and necrotic mass cannot be ruledout.    -Outpouching from the posterior aspect of the trachea just above the   julia with close approximation to the necrotic mass. Tracheal fistula   cannot be ruled out.    Please see concurrent dedicated CT chest regarding lung findings.    THIS REPORT CONTAINS FINDINGS THAT MAY BE CRITICAL TO PATIENT CARE. The   findings were verbally communicated via telephone conference at 10:51 PM   EST on 2024 with CHRYSTAL Weston. The findings were acknowledged   and understood.    ACC: [id number] EXAM:  CT CHEST IC   ORDERED BY: RUBEN NARAYANAN   PROCEDURE DATE:  2024      LUNGS AND AIRWAYS: Evaluation of the lung parenchyma demonstrates   increasing air component and diminished solid component within a cavitary   nodule of the left upper lobe measuring 3.0 x 3.1 cm, previously   measuring 2.4 cm in 2024 with multifocal regions of peripheral   bronchiolectasis and parenchymal bandlike opacity/scarring. Multiple   multifocal nonspecific regions of patchy groundglass opacity scattered   throughout the right lung. There are branching tree-in-bud micronodular   opacity and multifocal mild patchy groundglass opacity in the left lower   lobe, new since 2024 which could represent endobronchial spread of   disease. There is no region of dense pulmonary consolidation.  PLEURA: No pleural effusion.  MEDIASTINUM AND JANES: There has been interval enlargement of multiple   large necrotic mediastinal lymph nodes including within the subcarinal   region measuring 5.1 x 4.6 cm, previously measuring 3.1 x 3.4 cm in   2024 and more inferiorly posterior to the left atrium measuring 6.9   x 4.5 cm, previously measuring 2.3 cm x 2.8 cm resulting in severe   compression of the left atrium and complete narrowing of the midesophagus   which is dilated proximally with retained debris.  VESSELS: There is mild coronary arterial calcification. There is no   central pulmonary embolus.  HEART: Heart size is normal. No pericardial effusion.  CHEST WALL AND LOWER NECK: There are small cystic nodules of the thyroid   gland.  VISUALIZED UPPER ABDOMEN: There are a few scattered hepatic cysts. There   is a left renal cyst.  BONES: There is a sclerotic nodule within a lower thoracic posterior   spinous process measuring 8 mm.. There is also sclerosis of the inferior  eighth left-sided rib, indeterminate in nature.    IMPRESSION:    Marked interval enlargement of multiple large necrotic metastatic   mediastinal lymph nodes which causes severe mass effect on the left   atrium inferiorly and occlude the esophagus more proximally. Persistent   cavitary lesion within the right upper lobe.    Interval development of possible endobronchial spread of disease within   the left lower lobe.    PROTEIN CALORIE MALNUTRITION PRESENT: [ ]mild [ ]moderate [ ]severe [ ]underweight [ ]morbid obesity  https://www.andeal.org/vault/2440/web/files/ONC/Table_Clinical%20Characteristics%20to%20Document%20Malnutrition-White%20JV%20et%20al%2020.pdf    Height (cm): 170.2 (02-15-24 @ 01:51), 170.2 (24 @ 10:50), 170.2 (24 @ 22:09)  Weight (kg): 48.8 (24 @ 02:00), 43.5 (02-15-24 @ 01:51), 49.4 (24 @ 10:50)  BMI (kg/m2): 16.8 (24 @ 02:00), 15 (02-15-24 @ 01:51), 17.1 (24 @ 10:50)    [ ]PPSV2 < or = to 30% [ ]significant weight loss  [ ]poor nutritional intake  [ ]anasarca[ ]Artificial Nutrition      Other REFERRALS:  [ ]Hospice  [ ]Child Life  [ ]Social Work  [ ]Case management [ ]Holistic Therapy     Goals of Care Document:  Date of Service:24 @ 12:37  HPI:  78M with h/o basal cell carcinoma and melanoma (resected several years ago), stage IIIB NSCLC s/p chemoRT (completed 10/2023) with multiple brain mets (s/p Left retrosigmoid craniectomy for resection of cerebellar lesion),   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 Pt fell , no clear head trauma, brought to [facility]. Workup there notable for urinary retention (river placed), dysphagia (under consideration for EGD), ?PNA/UTI (Zosyn), and +adenovirus. Pt has been unable to tolerate PO intake x 1 week prior to admission; experiencing regurgitation with food/liquids. No n/v/abd pain. Reports vision is blurred and has diplopia but is stable s/p resection of brain lesion. Also reports generalized weakness. Denies focal weakness/numbness. (01 Mar 2024 00:53)    PERTINENT PM/SXH:   BPH (benign prostatic hyperplasia)  Lung mass  Prediabetes  Lung cancer metastatic to brain  Skin melanoma  Basal cell carcinoma  History of dental surgery  S/P skin cancer resection  H/O brain tumor    ITEMS NOT CHECKED ARE NOT PRESENT    SOCIAL HISTORY:   Significant other/partner[ ]  Children[ x] Nondenominational/Spirituality:  Substance hx:  [ ]   Tobacco hx:  [x]   Alcohol hx: [ ]   Home Opioid hx:  [ ] I-Stop Reference No: : [id number]   -no active Rx  Living Situation: [x] Home  [ ]Long term care  [ ]Rehab [ ]Other    ADVANCE DIRECTIVES:    DNR/MOLST  [ ]  Living Will  [ ]   DECISION MAKER(s):  [x ] Health Care Proxy(s)  [ ] Surrogate(s)  [ ] Guardian           Name(s): Phone Number(s): Kayli Galeana (daughter) [phone].    BASELINE (I)ADL(s) (prior to admission):  [place]: [ ]Total  [x ] Moderate [ ]Dependent    Allergies  No Known Allergies    MEDICATIONS  (STANDING):  albuterol/ipratropium for Nebulization 3 milliLiter(s) Nebulizer every 6 hours  dexAMETHasone  Injectable 2 milliGRAM(s) IV Push two times a day  dextrose 5% + sodium chloride 0.9%. 1000 milliLiter(s) (60 mL/Hr) IV Continuous <Continuous>  pantoprazole  Injectable 40 milliGRAM(s) IV Push daily  piperacillin/tazobactam IVPB.. 4.5 Gram(s) IV Intermittent every 8 hours  tamsulosin 0.4 milliGRAM(s) Oral at bedtime    MEDICATIONS  (PRN):  acetaminophen   IVPB .. 750 milliGRAM(s) IV Intermittent every 6 hours PRN Mild Pain (1 - 3)    PRESENT SYMPTOMS: [ ]Unable to self-report  [ ] CPOT [ ] PAINADs [ ] RDOS  Source if other than patient:  [ ]Family   [ ]Team     Pain: [ ]yes [ x]no  QOL impact -   Location -                    Aggravating factors -  Quality -  Radiation -  Timing-  Severity (0-10 scale):  Minimal acceptable level (0-10 scale):     CPOT:    https://www.[facility].org/getattachment/[email or web address]/Critical-Care-Pain-Observation-Tool-(CPOT)    PAINAD Score: See PAINAD tool and score below     Dyspnea:                           [ ]Mild [ ]Moderate [ ]Severe    RDOS: See RDOS tool and score below   0 to 2  minimal or no respiratory distress   3  mild distress  4 to 6 moderate distress  >7 severe distress      Anxiety:                             [ ]Mild [ ]Moderate [ ]Severe  Fatigue:                             [ ]Mild [ ]Moderate [ ]Severe  Nausea:                             [ ]Mild [ ]Moderate [ ]Severe  Loss of appetite:              [ ]Mild [ ]Moderate [ ]Severe  Constipation:                    [ ]Mild [ ]Moderate [ ]Severe    PCSSQ[Palliative Care Spiritual Screening Question]   Severity (0-10):  Score of 4 or > indicate consideration of Chaplaincy referral.  Chaplaincy Referral: [ ] yes [ ] refused [ ] following [x ] Deferred     Caregiver [place]? : [x ] yes [ ] no [ ] Deferred [ ] Declined             Social work referral [ ] Patient & Family Centered Care Referral [ ]     Anticipatory Grief present?:  [ ] yes [x ] no  [ ] Deferred                  Social work referral [ ] Chaplaincy Referral [ ]    		  Other Symptoms:  +odynophagia, dysphagia  [ ]All other review of systems negative     Palliative Performance Status Version 2:   See PPSv2 tool and score below          PHYSICAL EXAM:  Vital Signs Last 24 Hrs  T(C): 36.2 (01 Mar 2024 09:14), Max: 36.5 (01 Mar 2024 04:23)  T(F): 97.1 (01 Mar 2024 09:14), Max: 97.7 (01 Mar 2024 04:23)  HR: 68 (01 Mar 2024 10:31) (68 - 92)  BP: 103/63 (01 Mar 2024 10:31) (99/57 - 117/68)  BP(mean): 77 (01 Mar 2024 10:31) (73 - 84)  RR: 17 (01 Mar 2024 10:31) (17 - 18)  SpO2: 94% (01 Mar 2024 10:31) (93% - 96%)    Parameters below as of 01 Mar 2024 10:31  Patient On (Oxygen Delivery Method): nasal cannula  O2 Flow (L/min): 3   I&O's Summary    2024 07:  -  01 Mar 2024 07:00  --------------------------------------------------------  IN: 505 mL / OUT: 0 mL / NET: 505 mL    01 Mar 2024 07:01  -  01 Mar 2024 12:37  --------------------------------------------------------  IN: 360 mL / OUT: 0 mL / NET: 360 mL    GENERAL: [x ]Cachexia    [ x]Alert  [x ]Oriented x3   [ ]Lethargic  [ ]Unarousable  [x ]Verbal  [ ]Non-Verbal  Behavioral:   [ ] Anxiety  [ ] Delirium [ ] Agitation [ ] Other  HEENT:  [ x]Normal   [ ]Dry mouth   [ ]ET Tube/Trach  [ ]Oral lesions  PULMONARY:   [x ]Clear [ ]Tachypnea  [ ]Audible excessive secretions   [ ]Rhonchi        [ ]Right [ ]Left [ ]Bilateral  [ ]Crackles        [ ]Right [ ]Left [ ]Bilateral  [ ]Wheezing     [ ]Right [ ]Left [ ]Bilateral  [ ]Diminished breath sounds [ ]right [ ]left [ ]bilateral  CARDIOVASCULAR:    [ ]Regular [ ]Irregular [ ]Tachy  [ ]Bobo [ ]Murmur [ ]Other  GASTROINTESTINAL:  [ x]Soft  [ ]Distended   [x ]+BS  [ x]Non tender [ ]Tender  [ ]Other [ ]PEG [ ]OGT/ NGT  Last BM:  GENITOURINARY:  [ ]Normal [ ] Incontinent   [ ]Oliguria/Anuria   [ ]River  MUSCULOSKELETAL:   [ ]Normal   [ ]Weakness  [ ]Bed/Wheelchair bound [ ]Edema  NEUROLOGIC:   [ ]No focal deficits  [ ]Cognitive impairment  [ ]Dysphagia [ ]Dysarthria [ ]Paresis [ ]Other   SKIN:   [ ]Normal  [ ]Rash  [ ]Other  [ ]Pressure ulcer(s)       Present on admission [ ]y [ ]n    LABS:             12.0   10.20 )-----------( 264      ( 01 Mar 2024 01:07 )             37.6   03-01    136  |  97  |  10  ----------------------------<  104<H>  3.8   |  31  |  0.47<L>    Ca    9.5      01 Mar 2024 01:07  Phos  2.5     03-01  Mg     1.8     03-01    TPro  5.9<L>  /  Alb  2.7<L>  /  TBili  0.5  /  DBili  0.2  /  AST  11  /  ALT  17  /  AlkPhos  91  03-01  PT/INR - ( 01 Mar 2024 01:07 )   PT: 13.0 sec;   INR: 1.14     PTT - ( 01 Mar 2024 08:26 )  PTT:45.9 sec    Urinalysis Basic - ( 01 Mar 2024 01:07 )  Color: Yellow / Appearance: Clear / S.018 / pH: x  Gluc: 104 mg/dL / Ketone: 15 mg/dL  / Bili: Negative / Urobili: 2.0 mg/dL   Blood: x / Protein: Negative mg/dL / Nitrite: Negative   Leuk Esterase: Negative / RBC: 5 /HPF / WBC 2 /HPF   Sq Epi: x / Non Sq Epi: 0 /HPF / Bacteria: Negative /HPF    RADIOLOGY & ADDITIONAL STUDIES:  ACC: [id number] EXAM:  CT BRAIN   ORDERED BY: SAIMA MONTOYA   PROCEDURE DATE:  2024      INTERPRETATION: There has been a left suboccipital craniotomy. The cystic appearing   lesion in the left lateral posterior fossa is smaller when compared to   the previous exam measuring 1.8 cm compared with the prior of 1. 2.2 cm.   The mass anterior to the right po is not as well identified and the   prior ring-enhancing lesion in the left frontal white matter is also less   visible. No acute hemorrhage is identified. Circular and sulcal   prominence and small vessel white matter ischemic changes are for age.    IMPRESSION: Age-appropriate involutional and ischemic gliotic changes. No   hemorrhage. Left suboccipital craniotomy. Cystic lesion left posterior   fossa smaller compared to the previous exam 2024. Right prepontine   lesion also smaller.    ACC: [id number] EXAM:  CT NECK SOFT TISSUE IC   ORDERED BY: RUBEN NARAYANAN   PROCEDURE DATE:  2024      FINDINGS:  Pharynx: Unremarkable. No significant tonsillar enlargement.  Larynx: Unremarkable. Epiglottis is normal.  Prevertebral and retropharyngeal spaces: Unremarkable.  Salivary glands: Normal. Glands are normal in size.  Thyroid: There are cysts or nodules in the thyroid, measuring up to 1.1   cm on  the right.  Lymph nodes: No cervical lymphadenopathy identified.    Included mediastinum:  There is moderate distention of the proximal esophagus filled with   debris. The esophagus adjacent to the aortic arch appears thickened and   narrows to a point due to extrinsic compression and possible tumor   involvement (3:).  There is an outpouching arising from the posterior aspect of the trachea   just above the julia, with a possible fistulous connection to a necrotic   mass/fluid collection in the mediastinum, (3:102-106).    Please see separate CT chest regarding the lungs.    Bones/joints: Unremarkable. No acute fracture.  Soft tissues: Unremarkable. No significant soft tissue swelling.  Other: 1.8 cm cystic lesion in the left lateral cerebellum corresponds   with lesion seen on MRI brain 2024.    IMPRESSION:  -Moderately distended superior esophagus likely secondary to obstruction   at the level of the aortic arch by adjacent necrotic mass. Fistulous   connection between the esophagus and necrotic mass cannot be ruledout.    -Outpouching from the posterior aspect of the trachea just above the   julia with close approximation to the necrotic mass. Tracheal fistula   cannot be ruled out.    Please see concurrent dedicated CT chest regarding lung findings.    THIS REPORT CONTAINS FINDINGS THAT MAY BE CRITICAL TO PATIENT CARE. The   findings were verbally communicated via telephone conference at 10:51 PM   EST on 2024 with CHRYSTAL Weston. The findings were acknowledged   and understood.    ACC: [id number] EXAM:  CT CHEST IC   ORDERED BY: RUBEN NARAYANAN   PROCEDURE DATE:  2024      LUNGS AND AIRWAYS: Evaluation of the lung parenchyma demonstrates   increasing air component and diminished solid component within a cavitary   nodule of the left upper lobe measuring 3.0 x 3.1 cm, previously   measuring 2.4 cm in 2024 with multifocal regions of peripheral   bronchiolectasis and parenchymal bandlike opacity/scarring. Multiple   multifocal nonspecific regions of patchy groundglass opacity scattered   throughout the right lung. There are branching tree-in-bud micronodular   opacity and multifocal mild patchy groundglass opacity in the left lower   lobe, new since 2024 which could represent endobronchial spread of   disease. There is no region of dense pulmonary consolidation.  PLEURA: No pleural effusion.  MEDIASTINUM AND JANES: There has been interval enlargement of multiple   large necrotic mediastinal lymph nodes including within the subcarinal   region measuring 5.1 x 4.6 cm, previously measuring 3.1 x 3.4 cm in   2024 and more inferiorly posterior to the left atrium measuring 6.9   x 4.5 cm, previously measuring 2.3 cm x 2.8 cm resulting in severe   compression of the left atrium and complete narrowing of the midesophagus   which is dilated proximally with retained debris.  VESSELS: There is mild coronary arterial calcification. There is no   central pulmonary embolus.  HEART: Heart size is normal. No pericardial effusion.  CHEST WALL AND LOWER NECK: There are small cystic nodules of the thyroid   gland.  VISUALIZED UPPER ABDOMEN: There are a few scattered hepatic cysts. There   is a left renal cyst.  BONES: There is a sclerotic nodule within a lower thoracic posterior   spinous process measuring 8 mm.. There is also sclerosis of the inferior  eighth left-sided rib, indeterminate in nature.    IMPRESSION:    Marked interval enlargement of multiple large necrotic metastatic   mediastinal lymph nodes which causes severe mass effect on the left   atrium inferiorly and occlude the esophagus more proximally. Persistent   cavitary lesion within the right upper lobe.    Interval development of possible endobronchial spread of disease within   the left lower lobe.    PROTEIN CALORIE MALNUTRITION PRESENT: [ ]mild [ ]moderate [ ]severe [ ]underweight [ ]morbid obesity  https://www.andeal.org/vault/2440/web/files/ONC/Table_Clinical%20Characteristics%20to%20Document%20Malnutrition-White%20JV%20et%20al%2020.pdf    Height (cm): 170.2 (02-15-24 @ 01:51), 170.2 (24 @ 10:50), 170.2 (24 @ 22:09)  Weight (kg): 48.8 (24 @ 02:00), 43.5 (02-15-24 @ 01:51), 49.4 (24 @ 10:50)  BMI (kg/m2): 16.8 (24 @ 02:00), 15 (02-15-24 @ 01:51), 17.1 (24 @ 10:50)    [ ]PPSV2 < or = to 30% [ ]significant weight loss  [ ]poor nutritional intake  [ ]anasarca[ ]Artificial Nutrition      Other REFERRALS:  [ ]Hospice  [ ]Child Life  [ ]Social Work  [ ]Case management [ ]Holistic Therapy  Date of Service:24 @ 12:37  HPI:  78M with h/o basal cell carcinoma and melanoma (resected several years ago), stage IIIB NSCLC s/p chemoRT (completed 10/2023) with multiple brain mets (s/p Left retrosigmoid craniectomy for resection of cerebellar lesion),   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 Pt fell , no clear head trauma, brought to [facility]. Workup there notable for urinary retention (river placed), dysphagia (under consideration for EGD), ?PNA/UTI (Zosyn), and +adenovirus. Pt has been unable to tolerate PO intake x 1 week prior to admission; experiencing regurgitation with food/liquids. No n/v/abd pain. Reports vision is blurred and has diplopia but is stable s/p resection of brain lesion. Also reports generalized weakness. Denies focal weakness/numbness. (01 Mar 2024 00:53)    PERTINENT PM/SXH:   BPH (benign prostatic hyperplasia)  Lung mass  Prediabetes  Lung cancer metastatic to brain  Skin melanoma  Basal cell carcinoma  History of dental surgery  S/P skin cancer resection  H/O brain tumor    ITEMS NOT CHECKED ARE NOT PRESENT    SOCIAL HISTORY:   Significant other/partner[ ]  Children[ x] Pentecostalism/Spirituality:  Substance hx:  [ ]   Tobacco hx:  [x]   Alcohol hx: [ ]   Home Opioid hx:  [ ] I-Stop Reference No: : [id number]   -no active Rx  Living Situation: [x] Home  [ ]Long term care  [ ]Rehab [ ]Other    ADVANCE DIRECTIVES:    DNR/MOLST  [ ]  Living Will  [ ]   DECISION MAKER(s):  [x ] Health Care Proxy(s)  [ ] Surrogate(s)  [ ] Guardian           Name(s): Phone Number(s): Kayli Galeana (daughter) [phone].    BASELINE (I)ADL(s) (prior to admission):  Gregg: [ ]Total  [x ] Moderate [ ]Dependent    Allergies  No Known Allergies    MEDICATIONS  (STANDING):  albuterol/ipratropium for Nebulization 3 milliLiter(s) Nebulizer every 6 hours  dexAMETHasone  Injectable 2 milliGRAM(s) IV Push two times a day  dextrose 5% + sodium chloride 0.9%. 1000 milliLiter(s) (60 mL/Hr) IV Continuous <Continuous>  pantoprazole  Injectable 40 milliGRAM(s) IV Push daily  piperacillin/tazobactam IVPB.. 4.5 Gram(s) IV Intermittent every 8 hours  tamsulosin 0.4 milliGRAM(s) Oral at bedtime    MEDICATIONS  (PRN):  acetaminophen   IVPB .. 750 milliGRAM(s) IV Intermittent every 6 hours PRN Mild Pain (1 - 3)    PRESENT SYMPTOMS: [ ]Unable to self-report  [ ] CPOT [ ] PAINADs [ ] RDOS  Source if other than patient:  [ ]Family   [ ]Team     Pain: [ ]yes [ x]no  QOL impact -   Location -                    Aggravating factors -  Quality -  Radiation -  Timing-  Severity (0-10 scale):  Minimal acceptable level (0-10 scale):     PAINAD Score: See PAINAD tool and score below     Dyspnea:                           [ ]Mild [ ]Moderate [ ]Severe    RDOS: See RDOS tool and score below   0 to 2  minimal or no respiratory distress   3  mild distress  4 to 6 moderate distress  >7 severe distress      Anxiety:                             [ ]Mild [ ]Moderate [ ]Severe  Fatigue:                             [ ]Mild [ ]Moderate [ ]Severe  Nausea:                             [ ]Mild [ ]Moderate [ ]Severe  Loss of appetite:              [ ]Mild [ ]Moderate [ ]Severe  Constipation:                    [ ]Mild [ ]Moderate [ ]Severe    PCSSQ[Palliative Care Spiritual Screening Question]   Severity (0-10):  Score of 4 or > indicate consideration of Chaplaincy referral.  Chaplaincy Referral: [ ] yes [ ] refused [ ] following [x ] Deferred   Caregiver [place]? : [x ] yes [ ] no [ ] Deferred [ ] Declined             Social work referral [ ] Patient & Family Centered Care Referral [ ]   Anticipatory Grief present?:  [ ] yes [x ] no  [ ] Deferred                  Social work referral [ ] Chaplaincy Referral [ ]  	  Other Symptoms:  +odynophagia, dysphagia  [ ]All other review of systems negative     Palliative Performance Status Version 2:   See PPSv2 tool and score below          PHYSICAL EXAM:  Vital Signs Last 24 Hrs  T(C): 36.2 (01 Mar 2024 09:14), Max: 36.5 (01 Mar 2024 04:23)  T(F): 97.1 (01 Mar 2024 09:14), Max: 97.7 (01 Mar 2024 04:23)  HR: 68 (01 Mar 2024 10:31) (68 - 92)  BP: 103/63 (01 Mar 2024 10:31) (99/57 - 117/68)  BP(mean): 77 (01 Mar 2024 10:31) (73 - 84)  RR: 17 (01 Mar 2024 10:31) (17 - 18)  SpO2: 94% (01 Mar 2024 10:31) (93% - 96%)    Parameters below as of 01 Mar 2024 10:31  Patient On (Oxygen Delivery Method): nasal cannula  O2 Flow (L/min): 3   I&O's Summary    2024 07:01  -  01 Mar 2024 07:00  --------------------------------------------------------  IN: 505 mL / OUT: 0 mL / NET: 505 mL    01 Mar 2024 07:01  -  01 Mar 2024 12:37  --------------------------------------------------------  IN: 360 mL / OUT: 0 mL / NET: 360 mL    GENERAL: [x ]Cachexia    [ x]Alert  [x ]Oriented x3   [ ]Lethargic  [ ]Unarousable  [x ]Verbal  [ ]Non-Verbal  Behavioral:   [ ] Anxiety  [ ] Delirium [ ] Agitation [ ] Other  HEENT:  [ x]Normal   [ ]Dry mouth   [ ]ET Tube/Trach  [ ]Oral lesions  PULMONARY:   [x ]Clear [ ]Tachypnea  [ ]Audible excessive secretions   [ ]Rhonchi        [ ]Right [ ]Left [ ]Bilateral  [ ]Crackles        [ ]Right [ ]Left [ ]Bilateral  [ ]Wheezing     [ ]Right [ ]Left [ ]Bilateral  [ ]Diminished breath sounds [ ]right [ ]left [ ]bilateral  CARDIOVASCULAR:    [ ]Regular [ ]Irregular [ ]Tachy  [ ]Bobo [ ]Murmur [ ]Other  GASTROINTESTINAL:  [ x]Soft  [ ]Distended   [x ]+BS  [ x]Non tender [ ]Tender  [ ]Other [ ]PEG [ ]OGT/ NGT  Last BM:  GENITOURINARY:  [ ]Normal [ ] Incontinent   [ ]Oliguria/Anuria   [ ]River  MUSCULOSKELETAL:   [ ]Normal   [ ]Weakness  [ ]Bed/Wheelchair bound [ ]Edema  NEUROLOGIC:   [ ]No focal deficits  [ ]Cognitive impairment  [ ]Dysphagia [ ]Dysarthria [ ]Paresis [ ]Other   SKIN:   [ ]Normal  [ ]Rash  [ ]Other  [ ]Pressure ulcer(s)       Present on admission [ ]y [ ]n    LABS:             12.0   10.20 )-----------( 264      ( 01 Mar 2024 01:07 )             37.6   03-01    136  |  97  |  10  ----------------------------<  104<H>  3.8   |  31  |  0.47<L>    Ca    9.5      01 Mar 2024 01:07  Phos  2.5     03-01  Mg     1.8     03-    TPro  5.9<L>  /  Alb  2.7<L>  /  TBili  0.5  /  DBili  0.2  /  AST  11  /  ALT  17  /  AlkPhos  91  03-01  PT/INR - ( 01 Mar 2024 01:07 )   PT: 13.0 sec;   INR: 1.14     PTT - ( 01 Mar 2024 08:26 )  PTT:45.9 sec    Urinalysis Basic - ( 01 Mar 2024 01:07 )  Color: Yellow / Appearance: Clear / S.018 / pH: x  Gluc: 104 mg/dL / Ketone: 15 mg/dL  / Bili: Negative / Urobili: 2.0 mg/dL   Blood: x / Protein: Negative mg/dL / Nitrite: Negative   Leuk Esterase: Negative / RBC: 5 /HPF / WBC 2 /HPF   Sq Epi: x / Non Sq Epi: 0 /HPF / Bacteria: Negative /HPF    RADIOLOGY & ADDITIONAL STUDIES:  ACC: [id number] EXAM:  CT BRAIN   ORDERED BY: SAIMA MONTOYA   PROCEDURE DATE:  2024    INTERPRETATION: There has been a left suboccipital craniotomy. The cystic appearing   lesion in the left lateral posterior fossa is smaller when compared to   the previous exam measuring 1.8 cm compared with the prior of 1. 2.2 cm.   The mass anterior to the right po is not as well identified and the   prior ring-enhancing lesion in the left frontal white matter is also less   visible. No acute hemorrhage is identified. Circular and sulcal   prominence and small vessel white matter ischemic changes are for age.  IMPRESSION: Age-appropriate involutional and ischemic gliotic changes. No   hemorrhage. Left suboccipital craniotomy. Cystic lesion left posterior   fossa smaller compared to the previous exam 2024. Right prepontine   lesion also smaller.    ACC: [id number] EXAM:  CT NECK SOFT TISSUE IC   ORDERED BY: RUBEN NARAYANAN   PROCEDURE DATE:  2024    FINDINGS:  Pharynx: Unremarkable. No significant tonsillar enlargement.  Larynx: Unremarkable. Epiglottis is normal.  Prevertebral and retropharyngeal spaces: Unremarkable.  Salivary glands: Normal. Glands are normal in size.  Thyroid: There are cysts or nodules in the thyroid, measuring up to 1.1   cm on  the right.  Lymph nodes: No cervical lymphadenopathy identified.  Included mediastinum:  There is moderate distention of the proximal esophagus filled with   debris. The esophagus adjacent to the aortic arch appears thickened and   narrows to a point due to extrinsic compression and possible tumor   involvement (3:).  There is an outpouching arising from the posterior aspect of the trachea   just above the julia, with a possible fistulous connection to a necrotic   mass/fluid collection in the mediastinum, (3:102-106).  Please see separate CT chest regarding the lungs.  Bones/joints: Unremarkable. No acute fracture.  Soft tissues: Unremarkable. No significant soft tissue swelling.  Other: 1.8 cm cystic lesion in the left lateral cerebellum corresponds   with lesion seen on MRI brain 2024.  IMPRESSION:  -Moderately distended superior esophagus likely secondary to obstruction   at the level of the aortic arch by adjacent necrotic mass. Fistulous   connection between the esophagus and necrotic mass cannot be ruledout.  -Outpouching from the posterior aspect of the trachea just above the   julia with close approximation to the necrotic mass. Tracheal fistula   cannot be ruled out.  Please see concurrent dedicated CT chest regarding lung findings.    ACC: [id number] EXAM:  CT CHEST IC   ORDERED BY: RUBEN NARAYANAN   PROCEDURE DATE:  2024    LUNGS AND AIRWAYS: Evaluation of the lung parenchyma demonstrates   increasing air component and diminished solid component within a cavitary   nodule of the left upper lobe measuring 3.0 x 3.1 cm, previously   measuring 2.4 cm in 2024 with multifocal regions of peripheral   bronchiolectasis and parenchymal bandlike opacity/scarring. Multiple   multifocal nonspecific regions of patchy groundglass opacity scattered   throughout the right lung. There are branching tree-in-bud micronodular   opacity and multifocal mild patchy groundglass opacity in the left lower   lobe, new since 2024 which could represent endobronchial spread of   disease. There is no region of dense pulmonary consolidation.  PLEURA: No pleural effusion.  MEDIASTINUM AND JANES: There has been interval enlargement of multiple   large necrotic mediastinal lymph nodes including within the subcarinal   region measuring 5.1 x 4.6 cm, previously measuring 3.1 x 3.4 cm in   2024 and more inferiorly posterior to the left atrium measuring 6.9   x 4.5 cm, previously measuring 2.3 cm x 2.8 cm resulting in severe   compression of the left atrium and complete narrowing of the midesophagus   which is dilated proximally with retained debris.  VESSELS: There is mild coronary arterial calcification. There is no   central pulmonary embolus.  HEART: Heart size is normal. No pericardial effusion.  CHEST WALL AND LOWER NECK: There are small cystic nodules of the thyroid   gland.  VISUALIZED UPPER ABDOMEN: There are a few scattered hepatic cysts. There   is a left renal cyst.  BONES: There is a sclerotic nodule within a lower thoracic posterior   spinous process measuring 8 mm.. There is also sclerosis of the inferior  eighth left-sided rib, indeterminate in nature.    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 Persistent   cavitary lesion within the right upper lobe.  Interval development of possible endobronchial spread of disease within   the left lower lobe.    PROTEIN CALORIE MALNUTRITION PRESENT: [ ]mild [ ]moderate [ ]severe [ ]underweight [ ]morbid obesity  https://www.andeal.org/vault/2440/web/files/ONC/Table_Clinical%20Characteristics%20to%20Document%20Malnutrition-White%20JV%20et%20al%2020.pdf    Height (cm): 170.2 (02-15-24 @ 01:51), 170.2 (24 @ 10:50), 170.2 (24 @ 22:09)  Weight (kg): 48.8 (24 @ 02:00), 43.5 (02-15-24 @ 01:51), 49.4 (24 @ 10:50)  BMI (kg/m2): 16.8 (24 @ 02:00), 15 (02-15-24 @ 01:51), 17.1 (24 @ 10:50)    [ ]PPSV2 < or = to 30% [ ]significant weight loss  [ ]poor nutritional intake  [ ]anasarca[ ]Artificial Nutrition      Other REFERRALS:  [ ]Hospice  [ ]Child Life  [ ]Social Work  [ ]Case management [ ]Holistic Therapy

## 2024-03-01 NOTE — DIETITIAN INITIAL EVALUATION ADULT - NSFNSPHYEXAMSKINFT_GEN_A_CORE
Pressure Injury 1: sacrum, Stage II  Pressure Injury 2: Right:, lateral, knee, Stage I  Pressure Injury 3: none, none  Pressure Injury 4: none, none  Pressure Injury 5: none, none  Pressure Injury 6: none, none  Pressure Injury 7: none, none  Pressure Injury 8: none, none  Pressure Injury 9: none, none  Pressure Injury 10: none, none  Pressure Injury 11: none, none

## 2024-03-01 NOTE — CONSULT NOTE ADULT - SUBJECTIVE AND OBJECTIVE BOX
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 Pt fell 2/24, no clear head trauma, brought to [facility]. Workup there notable for urinary retention (river placed), dysphagia (under consideration for EGD), ?PNA/UTI (Zosyn), and +adenovirus. Pt has been unable to tolerate PO intake x 1 week prior to admission; experiencing regurgitation with food/liquids. No n/v/abd pain. Reports vision is blurred and has diplopia but is stable s/p resection of brain lesion. Also reports generalized weakness. Denies focal weakness/numbness.       Allergie  No Known Allergies  Intolerances    MEDICATIONS  (STANDING):  albuterol/ipratropium for Nebulization 3 milliLiter(s) Nebulizer every 6 hours  dexAMETHasone  Injectable 2 milliGRAM(s) IV Push two times a day  dextrose 5% + sodium chloride 0.9%. 1000 milliLiter(s) (60 mL/Hr) IV Continuous <Continuous>  pantoprazole  Injectable 40 milliGRAM(s) IV Push daily  tamsulosin 0.4 milliGRAM(s) Oral at bedtime    MEDICATIONS  (PRN):  acetaminophen   IVPB .. 750 milliGRAM(s) IV Intermittent every 6 hours PRN Mild Pain (1 - 3)    PAST MEDICAL & SURGICAL HISTORY:  BPH (benign prostatic hyperplasia)  Prediabetes  Lung cancer metastatic to brain  Skin melanoma  Basal cell carcinoma  History of dental surgery  S/P skin cancer resection  H/O brain tumor    FAMILY HISTORY:      SOCIAL HISTORY: No EtOH, no tobacco    REVIEW OF SYSTEMS:    CONSTITUTIONAL: No weakness, fevers or chills  EYES/ENT: No visual changes;  No vertigo or throat pain   NECK: No pain or stiffness  RESPIRATORY: No cough, wheezing, hemoptysis; No shortness of breath  CARDIOVASCULAR: No chest pain or palpitations  GASTROINTESTINAL: No abdominal or epigastric pain. No nausea, vomiting, or hematemesis; No diarrhea or constipation. No melena or hematochezia.  GENITOURINARY: No dysuria, frequency or hematuria  NEUROLOGICAL: No numbness or weakness  SKIN: No itching, burning, rashes, or lesions   All other review of systems is negative unless indicated above.      Weight (kg): 48.8 (03-01 @ 02:00)    T(F): 97.4 (03-01-24 @ 13:17), Max: 97.7 (03-01-24 @ 04:23)  HR: 68 (03-01-24 @ 15:15)  BP: 103/63 (03-01-24 @ 15:15)  RR: 17 (03-01-24 @ 15:15)  SpO2: 94% (03-01-24 @ 15:15)  Wt(kg): --    GENERAL: NAD, well-developed  HEAD:  Atraumatic, Normocephalic  EYES: EOMI, PERRLA, conjunctiva and sclera clear  NECK: Supple, No JVD  CHEST/LUNG: Clear to auscultation bilaterally; No wheeze  HEART: Regular rate and rhythm; No murmurs, rubs, or gallops  ABDOMEN: Soft, Nontender, Nondistended; Bowel sounds present  EXTREMITIES:  2+ Peripheral Pulses, No clubbing, cyanosis, or edema  NEUROLOGY: non-focal  SKIN: No rashes or lesions                          12.0   10.20 )-----------( 264      ( 01 Mar 2024 01:07 )             37.6       03-01    136  |  97  |  10  ----------------------------<  104<H>  3.8   |  31  |  0.47<L>    Ca    9.5      01 Mar 2024 01:07  Phos  2.5     03-01  Mg     1.8     03-01    TPro  5.9<L>  /  Alb  2.7<L>  /  TBili  0.5  /  DBili  0.2  /  AST  11  /  ALT  17  /  AlkPhos  91  03-01      Magnesium: 1.8 mg/dL (03-01 @ 01:07)  Phosphorus: 2.5 mg/dL (03-01 @ 01:07)       Hematology Consult Note  78M with  stage IIIB NSCLC s/p chemoRT (completed 10/2023) with brain metastasis (s/p Left retrosigmoid craniectomy for resection of cerebellar lesion),   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 Workup there notable for urinary retention (river placed), dysphagia (under consideration for EGD), ?PNA/UTI (Zosyn), and +adenovirus. Pt has been unable to tolerate PO intake x 1 week prior to admission; experiencing regurgitation with food/liquids. No n/v/abd pain. Reports vision is blurred and has diplopia but is stable s/p resection of brain lesion. Also reports generalized weakness. Denies focal weakness/numbness.     Oncologic history:  6/7/2023: The patient was referred by Dr. Judge for dyspnea with negative cardiac evaluation. Rash developed in February 2023 with concurrent cough and dyspnea. 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  7/10/2023: Large irregular mass in the right upper lobe with stranding to the pleural surface. This mass is highly suspicious for malignancy. There is subcarinal lymphadenopathy which is likely metastatic.  7/18/2023: PET scan-hypermetabolic right upper lobe pulmonary mass, which is most consistent with a primary bronchogenic malignancy. Hypermetabolic mediastinal lymphadenopathy which is most consistent with metastatic disease. Otherwise no abnormal hypermetabolic activity to suggest malignancy at this time. Liver cysts and additional subcentimeter low-attenuation lesions in the liver which are too small to further characterize and may represent cysts as well. Enlarged prostate.  7/19/2023: Follow-up with Dr. Eason  7/21/2023: EBUS with : 4L atypical, 3P NSCLC with adenocarcinoma features  7/27/2023: Thoracic tumor board: T3N2 final stage pending MRI brain. Patient is not surgical candidate. Tumor board recommends chemoradiation therapy. Pathology tissue is not sufficient for molecular testing, needs repeat biopsy.  8/2/23: Repeat IR biopsy:  Pathology:  Specimen(s) Submitted  1 Right upper lobe lung core bx + touch prep  Final Diagnosis  Right upper lobe lung, core biopsy and touch prep  - Non-small cell carcinoma, with necrosis (see note).  PD-L1 22c3 100%  PD-L1 28-8 95%  BRCA2 *  MTAP loss  BTG1 S170*  CDKN2A/B loss  DAXX Q177*  TP53 E68*  8/16/2023: The patient canceled his simulation with Radonc.  11/27/23: CT CHEST: 1. Since July 7, 2023, slightly decreased left upper lobe malignancy with new treatment induced changes and decreased thoracic melly metastases.  2. Postradiation pneumonitis and foci of large and small airway disease in right lung.  ?  MRI Brain 1/10/2024  Since prior MRI brain 10/5/2023, interval development of 5 enhancing lesion suspicious for intracranial metastasis. The largest lesions include a 3.5 cm left cerebellar mass and a 2.5 cm exophytic mass arising from the right hemipons. There is resultant mass effect with partial effacement of the fourth ventricle and dilatation of the lateral and third ventricles consistent with hydrocephalus. Per the electronic medical record, the neurosurgical team was aware of the findings at the time of dictation.  ?  Hospitalized 1/10/24 - 1/17/2024 at [facility] - sent for NSGY evaluation as MRI Brain 5 new areas of brain metastases. s/p craniresection (1/11). Dexamethasone taper x 1 week to 2BID. Also found to have PE, initially started on heparin gtt, and transitioned to Eliquis  ?  1/11/2024:  Pathology  1. Left cerebellar brain tumor, resection:  - Metastatic adenocarcinoma of lung origin.  2. Left cerebellar brain tumor, resection:  - Metastatic adenocarcinoma of lung origin.  Note: Immunostains performed on block 2A shows that the tumor cells are  positive for TTF-1, and focally for p40 and p63. This staining profile  supports the diagnosis.  ?  ONKOSIGHT NEXT GENERATION SEQUENCING GENE FUSION PANEL  INTERPRETATION  POSITIVE: SDC4-ROS1 gene fusion is DETECTED.  Tier II: Variants of Potential Clinical Significance  TP53 p.([id number])  Tier III: Variants of Unknown Clinical Significance  AKT1 p.([id number])  PD-L1 IMMUNOHISTOCHEMICAL ANALYSIS:  Tumor Proportion Score: 80-85% / Positive  ?  CT Chest 1/12/2024  1. Since 11/27/2023, there are new pulmonary emboli bilaterally.  2. Worsening of mediastinal lymphadenopathy.  3. Evolution of radiation induced lung disease in the right lung with development of radiation induced pulmonary fibrosis. The treated tumor has decreased in size.  4. Small bilateral pleural effusions.  ?  CT A/P 1/12/2024  No metastatic lesions identified in the abdomen or pelvis.  ?  2/11/2024 MRI spine:  1. 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  2. Enhancing lesion measuring up to 1.0 cm is present within the cauda equina at the level of L2.  3. Several millimeter enhancing focus is present within the T9 superior endplate that could reflect metastatic disease or an atypical lipid poor hemangioma.  4. Multilevel degenerative disc disease is present throughout the spine.  5. Large subcarinal mass is again seen with involvement of the esophagus and dilatation of the more proximal esophagus.  6. Consider follow-up imaging to assess stability as warranted.  MRIB:  1. Continued evolution of postsurgical changes status post resection of left cerebellar mass. Fluid-filled surgical cavity with peripheral enhancement likely secondary to postsurgical changes. Otherwise no evidence of residual tumor.  2. Decreased size of the exophytic mass in the right hemipons and the left occipital lobe. Stable left frontal and right cerebellar masses. Improvement in vasogenic edema particularly in the po.  3. No new lesions.     Disease: NSCLC    Pathology: Non-small cell lung cancer with attic adenocarcinoma features    TNM stage: T3, N2, Mx  AJCC Stage: IIIB      PAST MEDICAL & SURGICAL HISTORY:  BPH (benign prostatic hyperplasia)  Prediabetes  Lung cancer metastatic to brain  Skin melanoma  Basal cell carcinoma  History of dental surgery  S/P skin cancer resection  H/O brain tumor    SOCIAL HISTORY: No EtOH, no tobacco    Weight (kg): 48.8 (03-01 @ 02:00)    T(F): 97.4 (03-01-24 @ 13:17), Max: 97.7 (03-01-24 @ 04:23)  HR: 68 (03-01-24 @ 15:15)  BP: 103/63 (03-01-24 @ 15:15)  RR: 17 (03-01-24 @ 15:15)  SpO2: 94% (03-01-24 @ 15:15)  Wt(kg): --    GENERAL: NAD. Appears chronically ill   CHEST/LUNG: Equal chest rise   HEART: RRR  ABDOMEN: Soft, Nontender, Nondistended  NEUROLOGY: AAOx3   SKIN: No rashes or lesions                          12.0   10.20 )-----------( 264      ( 01 Mar 2024 01:07 )             37.6       03-01    136  |  97  |  10  ----------------------------<  104<H>  3.8   |  31  |  0.47<L>    Ca    9.5      01 Mar 2024 01:07  Phos  2.5     03-01  Mg     1.8     03-01    TPro  5.9<L>  /  Alb  2.7<L>  /  TBili  0.5  /  DBili  0.2  /  AST  11  /  ALT  17  /  AlkPhos  91  03-01      Magnesium: 1.8 mg/dL (03-01 @ 01:07)  Phosphorus: 2.5 mg/dL (03-01 @ 01:07)

## 2024-03-01 NOTE — DIETITIAN INITIAL EVALUATION ADULT - OTHER CALCULATIONS
HT (inches): 67       WT (pounds): 107.6 (3/1)       BMI (kg/m^2): 16.9       IBW (pounds): 148 +/- 10%       %IBW: 72.7 %  Ideal body weight (IBW) used to calculate energy needs due to patient's current body weight <80% IBW per [facility] Standards of Nutrition Care. Needs adjusted for age and current clinical status with consideration for severe malnutrition and pressure injuries. Fluids at team's discretion. Using reported height.

## 2024-03-01 NOTE — DIETITIAN INITIAL EVALUATION ADULT - OTHER INFO
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     Chart, meds, and labs reviewed. Tmax 36.5 C. MAPs 73-84 mm Hg. Meds significant for pantoprazole (protonix). Labs significant for POCT 102-125H, glucose 104H, HgbA1c 5.9H, potassium 3.8 WNL, phosphorus 2.5 WNL, magnesium 1.8 WNL. IV fluids: D5 + Sodium Chloride 0.9% 1000 mL @ 60 mL/hr. On nasal cannula. Denies pain or discomfort. Skin: no edema documented; pressure injury sacrum stage II; pressure injury Rt lateral knee stage I. GI: passing flatus; no bowel movement documented.     Met with patient on 8 [place]. His sister Shirley and daughter Kayli were present and participated in the nutrition interview/education. Patient reports a height of 67 inches and recent unintentional weight loss. Upon chart review, patient weight stable x 1 month (1/12/23: 109 pounds) and significant weight loss (-14 pounds or -11.8 %) over 4 months. States that he often coughs when eating. This past week he was unable to tolerate either food or drink - all attempts were regurgitated. NKFA per chart. Nutrition interview/education cut short as patient was going for a scan. Moderate muscle wasting of the temples and severe muscle wasting of the clavicles observed; unable to complete full nutrition focused physical exam. Based on ASPEN guidelines, patient meets criteria for malnutrition; please see below.

## 2024-03-01 NOTE — PROGRESS NOTE ADULT - PROBLEM SELECTOR PLAN 3
Patient presenting due to failure to thrive 2/2 to poor PO intake 2/2 progression of dysphagia to solid and liquids.   - plan as above   - Not a candidate for nutritional support as end of life care and focus on comfort

## 2024-03-01 NOTE — GOALS OF CARE CONVERSATION - ADVANCED CARE PLANNING - CONVERSATION DETAILS
Given the presence of fistula on EGD and per documentation by Hematology/oncology, explained to the patient and family (daughter and wife at bedside) that he no longer qualifies for repotrectinib or any other treatment modalities. Given the end stage of his lung cancer and no further curative intent, he likely qualifies for end of life hospice referral and care. Explained to the patient and family that our focus will be to provide comfort. Explained to the patient that will officially consult our palliative team for further advanced potential discussions. Patient and family claimed understanding and supportive with the plan. Will initiative comfort care measures.

## 2024-03-01 NOTE — DIETITIAN NUTRITION RISK NOTIFICATION - TREATMENT: THE FOLLOWING DIET HAS BEEN RECOMMENDED
Diet, NPO:   Except Medications (03-01-24 @ 08:06) [Active]      ANTHROPOMETRICS:  HT (inches): 67       WT (pounds): 107.6 (3/1)       BMI (kg/m^2): 16.9       IBW (pounds): 148 +/- 10%       %IBW: 72.7 %      NUTRITION DIAGNOSIS:   Meets the criteria for SEVERE malnutrition in the context of ACUTE INJURY/ILLNESS based on the following:   - Energy =50% =5 days  - WT Loss of >7.5% in 3 months  - Moderate (at minimum) muscle wasting      GOAL:   Reduce the signs/symptoms of malnutrition

## 2024-03-01 NOTE — CONSULT NOTE ADULT - CONVERSATION DETAILS
Met with patient and daughter Kayli at bedside, the visit and discussion is of voluntary nature. Addressed decisions about what types of treatment patient would want if faced with a life-limiting event. Reviewed the risks and benefits of resuscitative measures including cardiopulmonary resuscitation and mechanical ventilation at the end of life in the setting of advanced malignancy. Mr. Galeana shared that he would want a natural passing and would not want CPR or intubation.     MOL completed stating DNR/DNI.     Regarding patient's cancer, Mr. Galeana shared an understanding that his difficulty swallowing is caused by spread of the cancer to his swallowing system. Palliative team shared that his disease associated lymph nodes are causing compression of his esophagus, limiting his swallow ability. Patient is hopeful this can be reversed and is interested in any further DMT if offered. He and daughter Kayli shared frustration at prolonged hospitalization course with no interventions (was transferred from [place]). Emotional support provided and feelings affirmed. Reminded patient and family of primary team's plan moving forward, including GI and SLP evaluations. Patient shared his primary goals are to eat food again and return home to get back to projects around the house, though it's unclear whether the patient has been well enough to do any kind of house work recently.     Questions answered, support provided. Met with patient and daughter Kayli at bedside, the visit and discussion is of voluntary nature. Addressed decisions about what types of treatment patient would want if faced with a life-limiting event. Reviewed the risks and benefits of resuscitative measures including cardiopulmonary resuscitation and mechanical ventilation at the end of life in the setting of advanced malignancy. Mr. Galeana shared that he would want a natural passing and would not want CPR or intubation.     MOL completed stating DNR/DNI.     Regarding patient's cancer, Mr. Galeana shared an understanding that his difficulty swallowing is caused by spread of the cancer to his swallowing system. Palliative team shared that his disease is causing compression of the esophagus, limiting his swallow ability. Patient is hopeful this can be reversed and is interested in any further DMT if offered. He and daughter Kayli shared frustration at prolonged hospitalization course with no interventions (was transferred from [place]). Emotional support provided and feelings affirmed. Reminded patient and family of primary team's plan moving forward, including GI and SLP evaluations. Patient shared his primary goals are to eat food again and return home to get back to projects around the house, though it's unclear whether the patient has been well enough to do any kind of house work recently.     Questions answered, support provided.

## 2024-03-01 NOTE — H&P ADULT - NSICDXPASTSURGICALHX_GEN_ALL_CORE_FT
PAST SURGICAL HISTORY:  H/O brain tumor     History of dental surgery     S/P skin cancer resection

## 2024-03-01 NOTE — H&P ADULT - HISTORY OF PRESENT ILLNESS
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 Pt fell 2/24, no clear head trauma, brought to [facility]. Workup there notable for urinary retention (river placed), dysphagia (under consideration for EGD), ?PNA/UTI (Zosyn), and +adenovirus. Pt has been unable to tolerate PO intake x 1 week prior to admission; experiencing regurgitation with food/liquids. No n/v/abd pain. Reports vision is blurred and has diplopia but is stable s/p resection of brain lesion. Also reports generalized weakness. Denies focal weakness/numbness.

## 2024-03-01 NOTE — PROGRESS NOTE ADULT - PROBLEM SELECTOR PLAN 4
Pt with hx of urinary retention 2/2 BPH. Pt failed TOV. Martinez placed by Urology   - c/w Martinez for now as failed TOV Pt with hx of urinary retention 2/2 BPH. Pt failed TOV. Martinez placed by Urology   - c/w Martinez for now as failed TOV for comfort

## 2024-03-01 NOTE — PROGRESS NOTE ADULT - ASSESSMENT
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 to [facility] for continuity of care.

## 2024-03-01 NOTE — PRE-ANESTHESIA EVALUATION ADULT - NSANTHADDINFOFT_GEN_ALL_CORE
Full DNR/DNI suspension per patient wishes after extensive discussion for the enrique-procedural period.    Urinary catheter, Coude, placed by urology services in preoperative area for urinary retention, total 600 mL on bladder scan.    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 Tolliver, thoracic surgeons, Dr. Christensen and Dr. Young. Discussion with Dr. Paulino,  of anesthesiology. Will proceed with MAC and GA backup.

## 2024-03-02 NOTE — PHYSICAL THERAPY INITIAL EVALUATION ADULT - GENERAL OBSERVATIONS, REHAB EVAL
PT IE completed. Chart reviewed. Pt received semi-supine, NAD, +IV, +O2NC. RN Shazia (covering) cleared pt for PT.

## 2024-03-02 NOTE — CONSULT NOTE ADULT - CONSULT REASON
dysphagia
PM&R evaluation
NSCLC
Complex medical decision making and symptom management in the setting of metastatic malignancy.

## 2024-03-02 NOTE — PROGRESS NOTE ADULT - PROBLEM SELECTOR PLAN 4
Pt with hx of urinary retention 2/2 BPH. Pt failed TOV. Martinez placed by Urology   - c/w Martinez for now as failed TOV for comfort

## 2024-03-02 NOTE — PROGRESS NOTE ADULT - ASSESSMENT
77 yo M with stage IV NSCLC with ROS1 fusion started on po repotrectinib on 2/14/2024 with excellent [place], c/b inability to swallow since Friday 2/23/24, admitted to [place] x 6 days, transferred to [facility] overnight.      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   This will not be able to heal on it's own. Fistula likely happened from tumor shrinkage from repotrectinib therapy. GI not confident in utility of esophageal stent due to them causing airway collapse.  From a cancer perspective he had an excellent response, but given his inability to tolerate po and therefore not get his repotrectinib and PEG tube or J tube while fistula is present would be futile due to constant recurrent aspiration hospice is definitely appropriate.     --GI to evaluate utility of esophageal stent placement to "plug" the fistula and will let us know of their recommendation. If stent is an actual option, then the patient could continue with repotrectinib with expected ongoing continued good response. The patient understands that might be an option, but is not sure he wants to pursue it even if offered.  --Patient and family open to hospice, but would like to hear from GI first to make a final decision regarding further intervention for fistula.   --Will await final GI recommendations.  --several family members and patient were updated today at bedside  --consult palliative care to assist in further discussion and hospice placement    Pulmonary embolus  --Agree with Lovenox while awaiting decision regarding further intervention    Total time spent on encounter, including but not limited to counseling/coordination of care: 35 mis.  77 yo M with stage IV NSCLC with ROS1 fusion started on po repotrectinib on 2/14/2024 with excellent [place], c/b inability to swallow since Friday 2/23/24, admitted to [place] x 6 days, transferred to [facility] overnight.      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   This will not be able to heal on it's own. Fistula likely happened from tumor shrinkage from repotrectinib therapy. GI not confident in utility of esophageal stent due to them causing airway collapse.  From a cancer perspective he had an excellent response, but given his inability to tolerate po and therefore not get his repotrectinib and PEG tube or J tube while fistula is present would be futile due to constant recurrent aspiration hospice is definitely appropriate.     --GI to evaluate utility of esophageal stent placement to "plug" the fistula and will let us know of their recommendation. If stent is an actual option, then the patient could continue with repotrectinib with expected ongoing continued good response. The patient understands that might be an option, but is not sure he wants to pursue it even if offered.  --Patient and family open to hospice, but would like to hear from GI first to make a final decision regarding further intervention for fistula.   --Will await final GI recommendations.  --several family members and patient were updated today at bedside  --consult palliative care to assist in further discussion and hospice placement    Pulmonary embolus  --Agree with Lovenox while awaiting decision regarding further intervention    Total time spent on encounter, including but not limited to counseling/coordination of care: 35 mis.

## 2024-03-02 NOTE — CONSULT NOTE ADULT - ASSESSMENT
I M    78 y o M with h/o basal cell carcinoma and melanoma (resected several years ago), stage IIIB NSCLC s/p chemoRT (completed 10/2023) with multiple brain mets (s/p Left retrosigmoid craniectomy for resection of cerebellar lesion), leptomeningeal disease on MRI whole spine from 2/11/2024, BPH, pulmonary embolism, with chronic dysphagia and odynophagia, tx from [facility] to [facility] for continuity of care.    Nutritional Assessment:  · Nutritional Assessment	This patient has been assessed with a concern for Malnutrition and has been determined to have a diagnosis/diagnoses of Severe protein-calorie malnutrition and Underweight (BMI < 19).    This patient is being managed with:   Diet NPO-  Except Medications  Entered: Mar  1 2024  8:06AM    Problem/Plan - 1:  ·  Problem: Metastatic primary lung cancer.   ·  Plan: - CT from [facility] w/ 20% reduction in mediastinal lymphadenopathy, pending CT c/a/p here  - CTH w/ interval improvement in prepontine and cystic lesions   Further tx based on EGD finding:   - Per onc concern for tracheoesophageal fistula and if present, pt no longer a candidate for repotrectinib or any other treatement modality   - Currently s/p EGD -> presence of esophageal tumor invasion and fistulous tract. EGD aborted due to inability to pass. Rec palliation and NPO  - Pending further discussion with GI/CTS regarding stent.    Problem/Plan - 2:  ·  Problem: Dysphagia.   ·  Plan: - Keep NPO  - Given the nature of advanced disease, no a candidate for PEG or TPN.   - Palliative consult.    Problem/Plan - 3:  ·  Problem: Adult failure to thrive.   ·  Plan: Patient presenting due to failure to thrive 2/2 to poor PO intake 2/2 progression of dysphagia to solid and liquids.   - plan as above   - Not a candidate for nutritional support as end of life care and focus on comfort.    Problem/Plan - 4:  ·  Problem: Urinary retention.   ·  Plan: Pt with hx of urinary retention 2/2 BPH. Pt failed TOV. Martinez placed by Urology   - c/w Martinez for now as failed TOV for comfort.    Problem/Plan - 5:  ·  Problem: Pulmonary thromboembolism.   ·  Plan: Pt with PE (1/12) involving b/l segmental RLL and subsegmental LLL likely provoked i/s/o malignancy. Pt on eliquis 5mg BID.  - on lovenox full AC given hx of PE and for now maybe a candidate for palliative stenting of fistula although unclear if possible.   - d/c lovenox if no possibility of further palliative intervention.    Problem/Plan - 6:  ·  Problem: Junctional rhythm.   ·  Plan: Presenting with intermittent episode of junctional bradycardia to 60s. EP consulted by neurosurgery team. No acute intervention required.  - no further intervention or need for close cardiac monitoring.    Problem/Plan - 7:  ·  Problem: Encounter for palliative care.   ·  Plan: Pt seen by [facility] palliative team (Dr. Kevin) during previous admission while actively undergoing cancer treatement. At that time, pt was not ready to participate in advanced directive and GOC planning. Currently pt is DNR/DNI.   - reconsult palliative team in this admission for reinitiation of advance GOC conversation. Pt no longer will be eligible for active cancer treatment and likely qualify for home hospice.  - per discussion, pt made comfort care. No escalation of care.    Problem/Plan - 8:  ·  Problem: Prophylactic measure.   ·  Plan: F: n/a comfort care  E: replete K<4, Mg<2  N: NPO  VTE Prophylaxis: Lovenox full AC   GI: not needed  C: DNR/DNI  D: RMF.

## 2024-03-02 NOTE — PROGRESS NOTE ADULT - SUBJECTIVE AND OBJECTIVE BOX
O/N Events: APRYL  Subjective/ROS: No complaints. Denies HA, CP, SOB, n/v, changes in bowel/urinary habits.  12pt ROS otherwise negative.    VITALS  Vital Signs Last 24 Hrs  T(C): 36.4 (02 Mar 2024 05:48), Max: 36.6 (01 Mar 2024 21:02)  T(F): 97.6 (02 Mar 2024 05:48), Max: 97.8 (01 Mar 2024 21:02)  HR: 92 (02 Mar 2024 05:48) (62 - 92)  BP: 110/66 (02 Mar 2024 05:48) (103/63 - 116/71)  BP(mean): 77 (01 Mar 2024 15:15) (77 - 82)  RR: 16 (02 Mar 2024 05:48) (16 - 18)  SpO2: 91% (02 Mar 2024 05:48) (91% - 95%)    Parameters below as of 02 Mar 2024 05:48  Patient On (Oxygen Delivery Method): nasal cannula  O2 Flow (L/min): 4      I&O's Summary    01 Mar 2024 07:01  -  02 Mar 2024 07:00  --------------------------------------------------------  IN: 420 mL / OUT: 100 mL / NET: 320 mL        CAPILLARY BLOOD GLUCOSE          PHYSICAL EXAM  Constitutional: frail, cachectic   HEENT:  anicteric sclera, no nasal discharge  Respiratory: CTA B/L; no W/R/R, no retractions  Cardiac: +S1/S2; RRR; no M/R/G appreciated  Gastrointestinal: abdomen soft, NT/ND, +BS, no rebound tenderness or guarding  Back: no CVAT B/L  Extremities: no open wounds or rashes  Vascular: 2+ distal pedal pulses B/L  Dermatologic: skin warm, dry and intact  Neurologic: AAOx3; CNII-XII grossly intact  Psychiatric: affect and characteristics of appearance, verbalizations, behaviors are appropriate    MEDICATIONS  (STANDING):  albuterol/ipratropium for Nebulization 3 milliLiter(s) Nebulizer every 6 hours  dexAMETHasone  Injectable 2 milliGRAM(s) IV Push two times a day  dextrose 5% + sodium chloride 0.9%. 1000 milliLiter(s) (60 mL/Hr) IV Continuous <Continuous>  enoxaparin Injectable 50 milliGRAM(s) SubCutaneous every 12 hours  tamsulosin 0.4 milliGRAM(s) Oral at bedtime    MEDICATIONS  (PRN):  acetaminophen   IVPB .. 750 milliGRAM(s) IV Intermittent every 6 hours PRN Mild Pain (1 - 3)  morphine  - Injectable 2 milliGRAM(s) IV Push every 4 hours PRN Severe Pain (7 - 10)      No Known Allergies      LABS                        12.0   10.36 )-----------( 237      ( 02 Mar 2024 05:30 )             38.3     03-02    137  |  102  |  13  ----------------------------<  121<H>  4.1   |  26  |  0.37<L>    Ca    9.3      02 Mar 2024 05:30  Phos  2.0     03-02  Mg     2.0     03-02    TPro  5.8<L>  /  Alb  2.2<L>  /  TBili  0.4  /  DBili  x   /  AST  11  /  ALT  15  /  AlkPhos  88  03-02    PT/INR - ( 01 Mar 2024 01:07 )   PT: 13.0 sec;   INR: 1.14          PTT - ( 01 Mar 2024 08:26 )  PTT:45.9 sec  Urinalysis Basic - ( 02 Mar 2024 05:30 )    Color: x / Appearance: x / SG: x / pH: x  Gluc: 121 mg/dL / Ketone: x  / Bili: x / Urobili: x   Blood: x / Protein: x / Nitrite: x   Leuk Esterase: x / RBC: x / WBC x   Sq Epi: x / Non Sq Epi: x / Bacteria: x            IMAGING/EKG/ETC: reviewed

## 2024-03-02 NOTE — PHYSICAL THERAPY INITIAL EVALUATION ADULT - NSPTDISCHREC_GEN_A_CORE
TBD pending further medical work up // If pt were to return home, pt to benefit from family assistance, hospital bed, commode, and home PT

## 2024-03-02 NOTE — PHYSICAL THERAPY INITIAL EVALUATION ADULT - MODALITIES TREATMENT COMMENTS
Pt educated in therex for supine SAQs, ankle pumps, heel slides, seated marching, LAQs; AROM BUE shoulder flexion, elbow flexion/extension, wrist flexion/extension // Pt educated in breathing exercises with B shoulder flexion/extension // Pt educated in postural exercises and weight shifting while supine to avoid pressure injury

## 2024-03-02 NOTE — PROGRESS NOTE ADULT - PROBLEM SELECTOR PLAN 6
Presenting with intermittent episode of junctional bradycardia to 60s. EP consulted by neurosurgery team. No acute intervention required.  - no further intervention or need for close cardiac monitoring

## 2024-03-02 NOTE — PROGRESS NOTE ADULT - PROBLEM SELECTOR PLAN 5
Pt with PE (1/12) involving b/l segmental RLL and subsegmental LLL likely provoked i/s/o malignancy. Pt on eliquis 5mg BID.  - on lovenox full AC given hx of PE and for now maybe a candidate for palliative stenting of fistula although unclear if possible.   - d/c lovenox if no possibility of further palliative intervention

## 2024-03-02 NOTE — PROGRESS NOTE ADULT - PROBLEM SELECTOR PLAN 3
Patient presenting due to failure to thrive 2/2 to poor PO intake 2/2 progression of dysphagia to solid and liquids.   - plan as above   - Not a candidate for nutritional support as end of life care and focus on comfort negative Alert & oriented; no sensory, motor or coordination deficits, normal reflexes

## 2024-03-02 NOTE — PROGRESS NOTE ADULT - PROBLEM SELECTOR PLAN 7
Dr Cabello has been paged for a change in patient's heart rate.  Awaiting call back   Pt seen by [facility] palliative team (Dr. Kevin) during previous admission while actively undergoing cancer treatement. At that time, pt was not ready to participate in advanced directive and GOC planning. Currently pt is DNR/DNI.   - reconsult palliative team in this admission for reinitiation of advance GOC conversation. Pt no longer will be eligible for active cancer treatment and likely qualify for home hospice.  - per discussion, pt made comfort care. No escalation of care

## 2024-03-02 NOTE — PHYSICAL THERAPY INITIAL EVALUATION ADULT - ADDITIONAL COMMENTS
Pt reports living in apartment with sister, with 5 FOS to enter. Reports that daughter will be staying with pt once D/C from [facility]. Pt reports owning rolling walker, but was independent with ALDs prior to December 2023.

## 2024-03-02 NOTE — PROGRESS NOTE ADULT - PROBLEM SELECTOR PLAN 1
- CT from [facility] w/ 20% reduction in mediastinal lymphadenopathy, pending CT c/a/p here  - CTH w/ interval improvement in prepontine and cystic lesions   Further tx based on EGD finding:   - Per onc concern for tracheoesophageal fistula and if present, pt no longer a candidate for repotrectinib or any other treatement modality   - Currently s/p EGD -> presence of esophageal tumor invasion and fistulous tract. EGD aborted due to inability to pass. Rec palliation and NPO  - Pending further discussion with GI/CTS regarding stent

## 2024-03-02 NOTE — PROGRESS NOTE ADULT - ASSESSMENT
78M with h/o basal cell carcinoma and melanoma (resected several years ago), stage IIIB NSCLC s/p chemoRT (completed 10/2023) with multiple brain mets (s/p Left retrosigmoid craniectomy for resection of cerebellar lesion), leptomeningeal disease on MRI whole spine from 2/11/2024, BPH, pulmonary embolism, with chronic dysphagia and odynophagia, tx from [facility] to [facility] for continuity of care.

## 2024-03-02 NOTE — PHYSICAL THERAPY INITIAL EVALUATION ADULT - AMBULATION SKILLS, REHAB EVAL
Hannah Cornell, RN 1/5/2024 9:33 AM CST        ----- Message -----  From: Ba Noriega  Sent: 1/4/2024 9:34 AM CST  To: Em Triage Support  Subject: I have Shingles     Hello,   Wanted to report in that I have been diagnosed by and Opthalmologist with Shingles after having gone in w/ an eye problem (unfortunately, it's partially in my eye).     He has me on an anti-viral called Acyclovir 800mg, 5x a day.     Please let me know if there is anything else I should do. I have two small children - 2.5 years and 8 months old who has congenital heart disease.     Thank you,   Ba   independent

## 2024-03-02 NOTE — PROGRESS NOTE ADULT - PROBLEM SELECTOR PLAN 8
F: n/a comfort care  E: replete K<4, Mg<2  N: NPO  VTE Prophylaxis: Lovenox full AC   GI: not needed  C: DNR/DNI  D: RMF

## 2024-03-02 NOTE — PROGRESS NOTE ADULT - SUBJECTIVE AND OBJECTIVE BOX
INTERVAL HPI/OVERNIGHT EVENTS:    Pt was seen and examined at the bedside. He was observed to be lying down comfortably.       VITAL SIGNS:  T(F): 97.9 (03-02-24 @ 15:00)  HR: 79 (03-02-24 @ 15:00)  BP: 113/78 (03-02-24 @ 15:00)  RR: 18 (03-02-24 @ 15:00)  SpO2: 92% (03-02-24 @ 15:00)  Wt(kg): --  I&O's Summary    01 Mar 2024 07:01  -  02 Mar 2024 07:00  --------------------------------------------------------  IN: 420 mL / OUT: 100 mL / NET: 320 mL      PHYSICAL EXAM:  Constitutional: NAD, chronically ill appearing  HEENT: PERRLA, EOMI, Normal Hearing, MMM  Neck: No LAD, No JVD  Back: Normal spine flexure, No CVA tenderness  Respiratory: b/l crackles and wheezing  Cardiovascular: S1 and S2, RRR, no M/G/R  Gastrointestinal: BS+, soft, NT/ND  Extremities: No peripheral edema  Vascular: 2+ peripheral pulses  Neurological: A/O x 3, no focal deficits, resoled 6th right nerve palsy  Psychiatric: Normal mood, normal affect          MEDICATIONS  (STANDING):  albuterol/ipratropium for Nebulization 3 milliLiter(s) Nebulizer every 6 hours  dexAMETHasone  Injectable 2 milliGRAM(s) IV Push two times a day  dextrose 5% + sodium chloride 0.9%. 1000 milliLiter(s) (60 mL/Hr) IV Continuous <Continuous>  enoxaparin Injectable 50 milliGRAM(s) SubCutaneous every 12 hours  tamsulosin 0.4 milliGRAM(s) Oral at bedtime    MEDICATIONS  (PRN):  acetaminophen   IVPB .. 750 milliGRAM(s) IV Intermittent every 6 hours PRN Mild Pain (1 - 3)  morphine  - Injectable 2 milliGRAM(s) IV Push every 4 hours PRN Severe Pain (7 - 10)      Allergies    No Known Allergies    Intolerances        LABS:  CBC Full  -  ( 02 Mar 2024 05:30 )  WBC Count : 10.36 K/uL  RBC Count : 4.01 M/uL  Hemoglobin : 12.0 g/dL  Hematocrit : 38.3 %  Platelet Count - Automated : 237 K/uL  Mean Cell Volume : 95.5 fl  Mean Cell Hemoglobin : 29.9 pg  Mean Cell Hemoglobin Concentration : 31.3 gm/dL  Auto Neutrophil # : x  Auto Lymphocyte # : x  Auto Monocyte # : x  Auto Eosinophil # : x  Auto Basophil # : x  Auto Neutrophil % : x  Auto Lymphocyte % : x  Auto Monocyte % : x  Auto Eosinophil % : x  Auto Basophil % : x    03-02    137  |  102  |  13  ----------------------------<  121<H>  4.1   |  26  |  0.37<L>    Ca    9.3      02 Mar 2024 05:30  Phos  2.0     03-02  Mg     2.0     03-02    TPro  5.8<L>  /  Alb  2.2<L>  /  TBili  0.4  /  DBili  x   /  AST  11  /  ALT  15  /  AlkPhos  88  03-02    PT/INR - ( 01 Mar 2024 01:07 )   PT: 13.0 sec;   INR: 1.14          PTT - ( 01 Mar 2024 08:26 )  PTT:45.9 sec  Urinalysis Basic - ( 02 Mar 2024 05:30 )    Color: x / Appearance: x / SG: x / pH: x  Gluc: 121 mg/dL / Ketone: x  / Bili: x / Urobili: x   Blood: x / Protein: x / Nitrite: x   Leuk Esterase: x / RBC: x / WBC x   Sq Epi: x / Non Sq Epi: x / Bacteria: x          Activated Partial Thromboplastin Time: 45.9 sec (03-01-24 @ 08:26)  INR: 1.14 (03-01-24 @ 01:07)  Activated Partial Thromboplastin Time: 44.9 sec (03-01-24 @ 01:07)      RADIOLOGY & ADDITIONAL TESTS: Reviewed.

## 2024-03-02 NOTE — PROGRESS NOTE ADULT - PROBLEM SELECTOR PLAN 2
- Keep NPO  - Given the nature of advanced disease, no a candidate for PEG or TPN.   - Palliative consult

## 2024-03-02 NOTE — CONSULT NOTE ADULT - SUBJECTIVE AND OBJECTIVE BOX
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 Pt fell 2/24, no clear head trauma, brought to [facility]. Workup there notable for urinary retention (river placed), dysphagia (under consideration for EGD), ?PNA/UTI (Zosyn), and +adenovirus. Pt has been unable to tolerate PO intake x 1 week prior to admission; experiencing regurgitation with food/liquids. No n/v/abd pain. Reports vision is blurred and has diplopia but is stable s/p resection of brain lesion. Also reports generalized weakness. Denies focal weakness/numbness. (01 Mar 2024 00:53)    PAST MEDICAL & SURGICAL HISTORY:  BPH (benign prostatic hyperplasia)      Prediabetes      Lung cancer metastatic to brain      Skin melanoma      Basal cell carcinoma      History of dental surgery      S/P skin cancer resection      H/O brain tumor        MEDICATIONS  (STANDING):  albuterol/ipratropium for Nebulization 3 milliLiter(s) Nebulizer every 6 hours  dexAMETHasone  Injectable 2 milliGRAM(s) IV Push two times a day  dextrose 5% + sodium chloride 0.9%. 1000 milliLiter(s) (60 mL/Hr) IV Continuous <Continuous>  enoxaparin Injectable 50 milliGRAM(s) SubCutaneous every 12 hours  tamsulosin 0.4 milliGRAM(s) Oral at bedtime    MEDICATIONS  (PRN):  acetaminophen   IVPB .. 750 milliGRAM(s) IV Intermittent every 6 hours PRN Mild Pain (1 - 3)  morphine  - Injectable 2 milliGRAM(s) IV Push every 4 hours PRN Severe Pain (7 - 10)      FAMILY HISTORY:      CBC Full  -  ( 02 Mar 2024 05:30 )  WBC Count : 10.36 K/uL  RBC Count : 4.01 M/uL  Hemoglobin : 12.0 g/dL  Hematocrit : 38.3 %  Platelet Count - Automated : 237 K/uL  Mean Cell Volume : 95.5 fl  Mean Cell Hemoglobin : 29.9 pg  Mean Cell Hemoglobin Concentration : 31.3 gm/dL  Auto Neutrophil # : x  Auto Lymphocyte # : x  Auto Monocyte # : x  Auto Eosinophil # : x  Auto Basophil # : x  Auto Neutrophil % : x  Auto Lymphocyte % : x  Auto Monocyte % : x  Auto Eosinophil % : x  Auto Basophil % : x      03-02    137  |  102  |  13  ----------------------------<  121<H>  4.1   |  26  |  0.37<L>    Ca    9.3      02 Mar 2024 05:30  Phos  2.0     03-02  Mg     2.0     03-02    TPro  5.8<L>  /  Alb  2.2<L>  /  TBili  0.4  /  DBili  x   /  AST  11  /  ALT  15  /  AlkPhos  88  03-02      Urinalysis Basic - ( 02 Mar 2024 05:30 )    Color: x / Appearance: x / SG: x / pH: x  Gluc: 121 mg/dL / Ketone: x  / Bili: x / Urobili: x   Blood: x / Protein: x / Nitrite: x   Leuk Esterase: x / RBC: x / WBC x   Sq Epi: x / Non Sq Epi: x / Bacteria: x        Radiology :     < from: CT Head No Cont (03.01.24 @ 11:30) >  ACC: [id number] EXAM:  CT BRAIN   ORDERED BY: SAIMA MONTOYA     PROCEDURE DATE:  03/01/2024          INTERPRETATION:  Clinical Indication: Follow-up brain metastases, lung   cancer    5mm axial sections of the brain were obtained from base to vertex,   without the intravenous administration of contrast material. Coronal and   sagittal computer generated reconstructed views are available.    Comparison is made with the prior CT 1/16/2024 and MRI 2/11/2024.    There has been a left suboccipital craniotomy. The cystic appearing   lesion in the left lateral posterior fossa is smaller when compared to   the previous exam measuring 1.8 cm compared with the prior of 1. 2.2 cm.   The mass anterior to the right po is not as well identified and the   prior ring-enhancing lesion in the left frontal white matter is also less   visible. No acute hemorrhage is identified. Circular and sulcal   prominence and small vessel white matter ischemic changes are for age.          IMPRESSION: Age-appropriate involutional and ischemic gliotic changes. No   hemorrhage. Left suboccipital craniotomy. Cystic lesion left posterior   fossa smaller compared to the previous exam 1/16/2024. Right prepontine   lesion also smaller.              Review of Systems : per HPI         Vital Signs Last 24 Hrs  T(C): 36.4 (02 Mar 2024 05:48), Max: 36.6 (01 Mar 2024 21:02)  T(F): 97.6 (02 Mar 2024 05:48), Max: 97.8 (01 Mar 2024 21:02)  HR: 92 (02 Mar 2024 05:48) (62 - 92)  BP: 110/66 (02 Mar 2024 05:48) (103/63 - 116/71)  BP(mean): 77 (01 Mar 2024 15:15) (77 - 82)  RR: 16 (02 Mar 2024 05:48) (16 - 18)  SpO2: 91% (02 Mar 2024 05:48) (91% - 95%)    Parameters below as of 02 Mar 2024 05:48  Patient On (Oxygen Delivery Method): nasal cannula  O2 Flow (L/min): 4          Physical Exam :  cachectic 78 y andrés lying comfortably in semi Alfaro's position , awake , alert , no new complaints , feels tired     Head : normocephalic , atraumatic    Eyes : PERRLA , EOMI , no nystagmus , sclera anicteric    ENT / FACE : neg nasal discharge , uvula midline , no oropharyngeal erythema / exudate    Neck : supple , negative JVD , negative carotid bruits , no thyromegaly    Chest : CTA bilaterally , neg wheeze / rhonchi / rales / crackles / egophany    Cardiovascular : regular rate and rhythm , neg murmurs / rubs / gallops    Abdomen : soft , non distended , non tender to palpation in all 4 quadrants ,  normal bowel sounds     Extremities : WWP , neg cyanosis /clubbing / edema , negative calf tenderness to palpation , negative Bruce's sign    Musculoskeletal : sarcopenic      Neurologic Exam :     Alert and oriented to person , place , date/year , speech fluent w/o dysarthria         Motor Exam:        > 3+/5 x 4 extremities        Sensation :         intact to light touch x 4 extremities     DTR :           biceps/brachioradialis : equal                            patella/ankle : equal      Gait :  not tested          PM&R Impression :      - h/o basal cell carcinoma and melanoma (resected several years ago), stage IIIB NSCLC s/p chemoRT (completed 10/2023) with multiple brain mets     - adult FTT    - deconditioned         Recommendations / Plan :       1) Physical / Occupational therapy focusing on therapeutic exercises , equipment evaluation , bed mobility/transfer out of bed evaluation , progressive ambulation with assistive devices prn .    2) Current disposition plan :  home hospice

## 2024-03-02 NOTE — PHYSICAL THERAPY INITIAL EVALUATION ADULT - PERTINENT HX OF CURRENT PROBLEM, REHAB EVAL
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

## 2024-03-02 NOTE — PHYSICAL THERAPY INITIAL EVALUATION ADULT - PLANNED THERAPY INTERVENTIONS, PT EVAL
balance training/bed mobility training/gait training/postural re-education/strengthening/transfer training Induction of labor-Medicinal/External electronic FM/Antibiotics in labor/Fetal intolerance

## 2024-03-03 NOTE — PROGRESS NOTE ADULT - SUBJECTIVE AND OBJECTIVE BOX
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 Pt fell 2/24, no clear head trauma, brought to [facility]. Workup there notable for urinary retention (river placed), dysphagia (under consideration for EGD), ?PNA/UTI (Zosyn), and +adenovirus. Pt has been unable to tolerate PO intake x 1 week prior to admission; experiencing regurgitation with food/liquids. No n/v/abd pain. Reports vision is blurred and has diplopia but is stable s/p resection of brain lesion. Also reports generalized weakness. Denies focal weakness/numbness. (01 Mar 2024 00:53)                            12.0   10.36 )-----------( 237      ( 02 Mar 2024 05:30 )             38.3       03-02    137  |  102  |  13  ----------------------------<  121<H>  4.1   |  26  |  0.37<L>    Ca    9.3      02 Mar 2024 05:30  Phos  2.0     03-02  Mg     2.0     03-02    TPro  5.8<L>  /  Alb  2.2<L>  /  TBili  0.4  /  DBili  x   /  AST  11  /  ALT  15  /  AlkPhos  88  03-02    Vital Signs Last 24 Hrs  T(C): 36.4 (03 Mar 2024 06:18), Max: 36.6 (02 Mar 2024 15:00)  T(F): 97.5 (03 Mar 2024 06:18), Max: 97.9 (02 Mar 2024 15:00)  HR: 85 (03 Mar 2024 06:18) (72 - 85)  BP: 129/72 (03 Mar 2024 06:18) (111/68 - 129/72)  BP(mean): 92 (03 Mar 2024 06:18) (92 - 92)  RR: 17 (03 Mar 2024 06:18) (17 - 18)  SpO2: 88% (03 Mar 2024 06:18) (88% - 94%)    Parameters below as of 03 Mar 2024 06:18  Patient On (Oxygen Delivery Method): room air        MEDICATIONS  (STANDING):  albuterol/ipratropium for Nebulization 3 milliLiter(s) Nebulizer every 6 hours  dexAMETHasone  Injectable 2 milliGRAM(s) IV Push two times a day  dextrose 5% + sodium chloride 0.9%. 1000 milliLiter(s) (60 mL/Hr) IV Continuous <Continuous>  enoxaparin Injectable 50 milliGRAM(s) SubCutaneous every 12 hours  tamsulosin 0.4 milliGRAM(s) Oral at bedtime    MEDICATIONS  (PRN):  acetaminophen   IVPB .. 750 milliGRAM(s) IV Intermittent every 6 hours PRN Mild Pain (1 - 3)  morphine  - Injectable 2 milliGRAM(s) IV Push every 4 hours PRN Severe Pain (7 - 10)          Initial Functional Status Assessment :       Previous Level of Function:     · Ambulation Skills	independent  · Transfer Skills	independent  · ADL Skills	independent  · Work/Leisure Activity	independent  · Additional Comments	Pt reports living in apartment with sister, with 5 FOS to enter. Reports that daughter will be staying with pt once D/C from [facility]. Pt reports owning rolling walker, but was independent with ALDs prior to December 2023.    Cognitive Status Examination:   · Orientation	oriented to person, place, time and situation  · Level of Consciousness	alert  · Follows Commands and Answers Questions	100% of the time  · Personal Safety and Judgment	intact    Range of Motion Exam:   · Active Range of Motion Examination	no Active ROM deficits were identified    Manual Muscle Testing:   · Manual Muscle Testing Results	PT deferred MMT    Bed Mobility: Rolling/Turning:     · Level of [place]	contact guard  · Physical Assist/Nonphysical Assist	1 person assist; verbal cues    Bed Mobility: Scooting/Bridging:     · Level of [place]	contact guard  · Physical Assist/Nonphysical Assist	1 person assist; verbal cues    Bed Mobility: Sit to Supine:     · Level of [place]	contact guard  · Physical Assist/Nonphysical Assist	1 person assist; verbal cues    Bed Mobility: Supine to Sit:     · Level of [place]	contact guard  · Physical Assist/Nonphysical Assist	1 person assist; verbal cues    Bed Mobility Analysis:     · Bed Mobility Limitations	impaired ability to control trunk for mobility; decreased ability to use legs for bridging/pushing  · Impairments Contributing to Impaired Bed Mobility	impaired balance; narrow base of support; impaired postural control; decreased strength    Transfer: Sit to Stand:     · Level of [place]	PT deferred sit to/from stand transferring    Balance Skills Assessment:     · Sitting Balance: Static	supervision  · Sitting Balance: Dynamic	supervision  · Sit-to-Stand Balance	N/T  · Systems Impairment Contributing to Balance Disturbance	musculoskeletal  · Identified Impairments Contributing to Balance Disturbance	decreased strength; impaired postural control    Sensory Examination:   Sensory Examination:    Grossly Intact:   · Gross Sensory Examination	Grossly Intact; Left UE; Right UE; Left LE; Right LE      Treatment Location:   · Comments	Pt educated in therex for supine SAQs, ankle pumps, heel slides, seated marching, LAQs; AROM BUE shoulder flexion, elbow flexion/extension, wrist flexion/extension // Pt educated in breathing exercises with B shoulder flexion/extension // Pt educated in postural exercises and weight shifting while supine to avoid pressure injury    Clinical Impressions:   · Criteria for Skilled Therapeutic Interventions	impairments found; functional limitations in following categories; risk reduction/prevention; anticipated discharge recommendation  · Impairments Found (describe specific impairments)	posture; gait, locomotion, and balance; muscle strength  · Functional Limitations in Following Categories (describe specific limitations)	self-care; home management; community/leisure  · Risk Reduction/Prevention (Describe Specific Areas of risk reduction/prevention)	risk factors  · Risk Areas	fall        PM&R Impression : as above    Current disposition plan recommendation :    pending

## 2024-03-03 NOTE — OCCUPATIONAL THERAPY INITIAL EVALUATION ADULT - ASR WT BEARING STATUS EVAL
Verified Results  (1) ALLERGY, FOOD PANEL 04Apr2016 11:07AM [place] Gutierrez Order Number: [id number]    As in all diagnostic testing, a diagnosis should be made by the physician based on both test results and patient clinical history  Test Name Result Flag Reference   FISH COD <0 10 kUA/I  0 00-0 09   EGG WHITE <0 10 kUA/I  0 00-0 09   GLUTEN <0 10 kUA/I  0 00-0 09   MILK <0 10 kUA/I  0 00-0 09   PEANUT <0 10 kUA/I  0 00-0 09   F041 SALMON <0 10 kUA/I  0 00-0 09   SCALLOP <0 10 kUA/I  0 00-0 09   SESAME <0 10 kUA/I  0 00-0 09   SHRIMP <0 10 kUA/I  0 00-0 09   SOYBEAN <0 10 kUA/I  0 00-0 09   ALLERGEN TUNA (F40) IGE <0 10 kUA/I  0 00-0 09   WALNUT <0 10 kUA/I  0 00-0 09   WHEAT <0 10 kUA/I  0 00-0 09   TOTAL IGE 5 19 kU/l  0-113   ALLERGEN ALMONDS <0 10 kUA/I  0 00-0 09   ALLERGEN CASHEW <0 10 kUA/I  0 00-0 09   ALLERGEN HAZELNUT/FILBERT IGE <0 10 kUA/l  0 00-0 09   ALLERGEN COMMENT See Below         Discussion/Summary   FOOD ALLERGY PANEL NORMAL     [facility]
no weight-bearing restrictions

## 2024-03-03 NOTE — OCCUPATIONAL THERAPY INITIAL EVALUATION ADULT - MODIFIED CLINICAL TEST OF SENSORY INTEGRATION IN BALANCE TEST
pt. tolerated dangle at EOB w. SUP ~10 minutes, noted w. increased secretions, deferring further OOB (aka STS transfer and mobility at this anjelica)

## 2024-03-03 NOTE — OCCUPATIONAL THERAPY INITIAL EVALUATION ADULT - RANGE OF MOTION EXAMINATION, LOWER EXTREMITY
Intake at bedside.   bilateral LE Active ROM was WFL  (within functional limits)/bilateral LE Passive ROM was WFL  (within functional limits)

## 2024-03-03 NOTE — OCCUPATIONAL THERAPY INITIAL EVALUATION ADULT - GENERAL OBSERVATIONS, REHAB EVAL
OT IE complete. Pt. received semi-supine in bed, +o2 via NC 4L, +IV in NAD, agreeable to session. OT IE complete. Pt. received semi-supine in bed, +o2 via NC 2L, +IV in NAD, agreeable to session.

## 2024-03-03 NOTE — OCCUPATIONAL THERAPY INITIAL EVALUATION ADULT - PLANNED THERAPY INTERVENTIONS, OT EVAL
ADL retraining/IADL retraining/balance training/bed mobility training/neuromuscular re-education/parent/caregiver training.../strengthening/transfer training

## 2024-03-03 NOTE — PROGRESS NOTE ADULT - PROBLEM SELECTOR PLAN 1
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 EGD aborted due to inability to pass. Rec palliation and NPO  - Pending further discussion with GI/CTS regarding stent Pt w/ hx sig for Stage IIIB NSCLC with multiple mets to brain s/p craniectomy and cerebellar resection w/ Dr. D'Amico on 1/11  and recently diagnosed leptomeningeal disease. CT imaging w/ enlarging malignant mass compressing on mid esophagus and L. [facility]. On last hospitalization CT surg consulted and pt not candidate for intervention. Previously treated with repotrectinib w/ Dr. Chapman.   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 EGD aborted due to inability to pass. Rec palliation and NPO  - Pending further discussion with GI/CTS regarding stent

## 2024-03-03 NOTE — PROGRESS NOTE ADULT - PROBLEM SELECTOR PLAN 7
Pt seen by [facility] palliative team (Dr. Kevin) during previous admission while actively undergoing cancer treatement. At that time, pt was not ready to participate in advanced directive and GOC planning. Currently pt is DNR/DNI.   - reconsult palliative team in this admission for reinitiation of advance GOC conversation. Pt no longer will be eligible for active cancer treatment and likely qualify for home hospice.  - per discussion, pt made comfort care. No escalation of care Pt seen by [facility] palliative team (Dr. Kevin) during previous admission while actively undergoing cancer treatement. At that time, pt was not ready to participate in advanced directive and GOC planning. Currently pt is DNR/DNI.   - reconsulted palliative team in this admission for reinitiation of advance GOC conversation. Pt no longer will be eligible for active cancer treatment and likely qualify for home hospice.  - per discussion, pt made no escalation of care

## 2024-03-03 NOTE — PROGRESS NOTE ADULT - ASSESSMENT
{\rtf1\vehhga85238\ansi\vrixxnc2702\ftnbj\uc1\deff0  {\fonttbl{\f0 \fnil Segoe UI;}{\f1 \fnil \fcharset0 Segoe UI;}{\f2 \fnil Times New Asher;}}  {\colortbl ;\xvv597\ivwet941\aexi576 ;\red0\green0\blue0 ;\red0\green0\blue0 ;}  {\stylesheet{\f0\fs20 Normal;}{\cs1 Default Paragraph Font;}{\cs2\f0\fs16 Line Number;}{\cs3\f2\fs24 Hyperlink;}}  {\*\revtbl{Unknown;}}  \ztjmjt19387\ibbvyl47198\xmqwk8774\ceram7031\yxsgs3347\utfoa3990\wlldmol984\vnduacc343\nogrowautofit\oaspfl336\formshade\nofeaturethrottle1\dntblnsbdb\fet4\aendnotes\aftnnrlc\pgbrdrhead\pgbrdrfoot  \sectd\zcheeu53614\prgitk85144\guttersxn0\taeoordx4256\fodvahio1233\djtrsrnr5450\cxsufswh0789\ijgszyk411\jedrgkh771\sbkpage\pgncont\pgndec  \plain\plain\f0\fs24\ql\plain\f0\fs24\plain\f0\fs20\wmik8606\hich\f0\dbch\f0\loch\f0\fs20\par  Nsgy\par  \par  78 y o M with h/o basal cell carcinoma and melanoma (resected several years ago), stage IIIB NSCLC s/p chemoRT (completed 10/2023) with multiple brain mets (s/p Left retrosigmoid craniectomy for resection of cerebellar lesion), leptomeningeal disease   on MRI whole spine from 2/11/2024, BPH, pulmonary embolism, with chronic dysphagia and odynophagia, tx from [facility] to [facility] for continuity of care.\par  \par  \plain\f1\fs20\vxvg0586\hich\f1\dbch\f1\loch\f1\cf2\fs20\ul{\field{\*\fldinst HYPERLINK [id number] }{\fldrslt Problem/Plan - 1:}}\plain\f0\fs20\pqwc9669\hich\f0\dbch\f0\loch\f0\fs20\ql\par  \'b7  {\*\bkmkstart me56669776884}{\*\bkmkend vt58968512260}Problem: {\*\bkmkstart qj48514555081}{\*\bkmkend zg67085229702}Metastatic primary lung cancer. \par  \'b7  {\*\bkmkstart ph57359760742}{\*\bkmkend tm40367271824}Plan: {\*\bkmkstart xk84323786808}{\*\bkmkend ap83498509761}Pt w/ hx sig for Stage IIIB NSCLC with multiple mets to brain s/p craniectomy and cerebellar resection w/ Dr. D'Amico on 1/11  and   recently diagnosed leptomeningeal disease. CT imaging w/ enlarging malignant mass compressing on mid esophagus and L. [facility]. On last hospitalization CT surg consulted and pt not candidate for intervention. Previously treated with repotrectinib w/ Dr. Chapman. 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 EGD aborted due to inability to pass. Rec palliation and NPO.\par  \par  \plain\f1\fs20\zpka7180\hich\f1\dbch\f1\loch\f1\cf2\fs20\ul{\field{\*\fldinst HYPERLINK [id number] }{\fldrslt Problem/Plan - 2:}}\plain\f0\fs20\qczj7591\hich\f0\dbch\f0\loch\f0\fs20\ql\par  \'b7  {\*\bkmkstart px21446413786}{\*\bkmkend jd35629993904}Problem: {\*\bkmkstart se33743838856}{\*\bkmkend er10191209115}Dysphagia. \par  \'b7  {\*\bkmkstart tv67461576251}{\*\bkmkend dz20609986095}Plan: {\*\bkmkstart aw25889102102}{\*\bkmkend gg19896072074}Pt w/ hx of chronic dysphagia with gradual progression. Currently w/ inability to tolerate solid and liquids for 1 week. Per imaging   and EGD 2/2 tumor invasion in mid esophagus. Visualized large solid food bolus sitting in fundus and inability to pass the scope. 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 \par  - Keep NPO\par  - Given the nature of advanced disease, no a candidate for PEG or TPN. \par  - Palliative consult.\par  \par  \plain\f1\fs20\klzc0969\hich\f1\dbch\f1\loch\f1\cf2\fs20\ul{\field{\*\fldinst HYPERLINK [id number] }{\fldrslt Problem/Plan - 3:}}\plain\f0\fs20\mmaq0424\hich\f0\dbch\f0\loch\f0\fs20\ql\par  \'b7  {\*\bkmkstart cp34206232617}{\*\bkmkend iz46615190087}Problem: {\*\bkmkstart gt91205811319}{\*\bkmkend nh45473487112}Adult failure to thrive. \par  \'b7  {\*\bkmkstart xl45780912192}{\*\bkmkend in87090137399}Plan: {\*\bkmkstart nu31388036181}{\*\bkmkend lx44756050036}Patient presenting due to failure to thrive 2/2 to poor PO intake 2/2 progression of dysphagia to solid and liquids. \par  - plan as above \par  - Not a candidate for nutritional support as end of life care and focus on comfort.\par  \par  \plain\f1\fs20\oyig6045\hich\f1\dbch\f1\loch\f1\cf2\fs20\ul{\field{\*\fldinst HYPERLINK [id number] }{\fldrslt Problem/Plan - 4:}}\plain\f0\fs20\pjmo9363\hich\f0\dbch\f0\loch\f0\fs20\ql\par  \'b7  {\*\bkmkstart ay88048451587}{\*\bkmkend qh82750357579}Problem: {\*\bkmkstart fx40204993302}{\*\bkmkend jg31789440696}Urinary retention. \par  \'b7  {\*\bkmkstart uv97095051510}{\*\bkmkend ii93499648768}Plan: {\*\bkmkstart od27621959624}{\*\bkmkend ul24623999520}Pt with hx of urinary retention 2/2 BPH. Pt failed TOV. Martinez placed by Urology \par  - c/w Martinez for now as failed TOV for comfort.\par  \par  \plain\f1\fs20\vvds1478\hich\f1\dbch\f1\loch\f1\cf2\fs20\ul{\field{\*\fldinst HYPERLINK [id number] }{\fldrslt Problem/Plan - 5:}}\plain\f0\fs20\zouh9300\hich\f0\dbch\f0\loch\f0\fs20\ql\par  \'b7  {\*\bkmkstart ko05336303406}{\*\bkmkend rz05158170575}Problem: {\*\bkmkstart ax05285507624}{\*\bkmkend fz58392402491}Pulmonary thromboembolism. \par  \'b7  {\*\bkmkstart kv28386132737}{\*\bkmkend bn92604691598}Plan: {\*\bkmkstart gs02662611385}{\*\bkmkend uy41276715586}Pt with PE (1/12) involving b/l segmental RLL and subsegmental LLL likely provoked i/s/o malignancy. Pt on eliquis 5mg BID.\par  - on lovenox full AC given hx of PE and for now maybe a candidate for palliative stenting of fistula although unclear if possible. \par  - d/c lovenox if no possibility of further palliative intervention.\plain\f1\fs20\bcyr1349\hich\f1\dbch\f1\loch\f1\cf2\fs20\strike\plain\f0\fs20\drtd1936\hich\f0\dbch\f0\loch\f0\fs20\par  \par  \plain\f1\fs20\kuee4397\hich\f1\dbch\f1\loch\f1\cf2\fs20\ul{\field{\*\fldinst HYPERLINK [id number] }{\fldrslt Problem/Plan - 6:}}\plain\f0\fs20\hvno2792\hich\f0\dbch\f0\loch\f0\fs20\ql\par  \'b7  {\*\bkmkstart dy71157752253}{\*\bkmkend gd87182406178}Problem: {\*\bkmkstart bm14891253056}{\*\bkmkend zt14834370235}Junctional rhythm. \par  \'b7  {\*\bkmkstart cb16293376695}{\*\bkmkend hb89416790977}Plan: {\*\bkmkstart fz80567615713}{\*\bkmkend uw86058655198}Presenting with intermittent episode of junctional bradycardia to 60s. EP consulted by neurosurgery team. No acute intervention required.\par  - no further intervention or need for close cardiac monitoring.\par  \par  \plain\f1\fs20\hsrg0293\hich\f1\dbch\f1\loch\f1\cf2\fs20\ul{\field{\*\fldinst HYPERLINK [id number] }{\fldrslt Problem/Plan - 7:}}\plain\f0\fs20\tjkx6854\hich\f0\dbch\f0\loch\f0\fs20\ql\par  \'b7  {\*\bkmkstart am56963863632}{\*\bkmkend xy66769659948}Problem: {\*\bkmkstart jc28912240485}{\*\bkmkend pr07600165082}Encounter for palliative care. \par  \'b7  {\*\bkmkstart jp45024263275}{\*\bkmkend vb96701805123}Plan: {\*\bkmkstart hy40287701366}{\*\bkmkend hy56870449726}Pt seen by [facility] palliative team (Dr. Kevin) during previous admission while actively undergoing cancer treatement. At that time,   pt was not ready to participate in advanced directive and GOC planning. Currently pt is DNR/DNI. \par  - reconsult palliative team in this admission for reinitiation of advance GOC conversation. Pt no longer will be eligible for active cancer treatment and likely qualify for home hospice.\par  - per discussion, pt made comfort care. No escalation of care.\plain\f1\fs20\ifcn2710\hich\f1\dbch\f1\loch\f1\cf2\fs20\strike\plain\f0\fs20\tpco4276\hich\f0\dbch\f0\loch\f0\fs20\par  \par  \plain\f1\fs20\sytw7832\hich\f1\dbch\f1\loch\f1\cf2\fs20\ul{\field{\*\fldinst HYPERLINK [id number] }{\fldrslt Problem/Plan - 8:}}\plain\f0\fs20\mzec5305\hich\f0\dbch\f0\loch\f0\fs20\ql\par  \'b7  {\*\bkmkstart lh20826952788}{\*\bkmkend hp45530167194}Problem: {\*\bkmkstart no52007938547}{\*\bkmkend pk77515643096}Prophylactic measure. \par  \'b7  {\*\bkmkstart nf30280227804}{\*\bkmkend yb64864442742}Plan: {\*\bkmkstart ul67737728107}{\*\bkmkend wz68166086400}F: n/a comfort care\par  E: replete K<4, Mg<2\par  N: NPO\par  VTE Prophylaxis: Lovenox full AC \par  GI: not needed\par  C: DNR/DNI\par  D: RMF.\plain\f1\fs20\cdty1820\hich\f1\dbch\f1\loch\f1\cf2\fs20\strike\plain\f0\fs20\jhso1390\hich\f0\dbch\f0\loch\f0\fs20\par  \par  }

## 2024-03-03 NOTE — PROGRESS NOTE ADULT - SUBJECTIVE AND OBJECTIVE BOX
OVERNIGHT EVENTS:    SUBJECTIVE / INTERVAL HPI: Patient seen and examined at bedside. No complaints at this time. Patient denies: fever, chills, dizziness, weakness, HA, Changes in vision, CP, palpitations, SOB, cough, N/V/D/C, dysuria, changes in bowel movements, LE edema. ROS otherwise negative.    VITAL SIGNS:  Vital Signs Last 24 Hrs  T(C): 36.4 (03 Mar 2024 13:34), Max: 36.6 (02 Mar 2024 15:00)  T(F): 97.5 (03 Mar 2024 13:34), Max: 97.9 (02 Mar 2024 15:00)  HR: 88 (03 Mar 2024 13:34) (72 - 88)  BP: 108/66 (03 Mar 2024 13:34) (108/66 - 129/72)  BP(mean): 92 (03 Mar 2024 06:18) (92 - 92)  RR: 16 (03 Mar 2024 13:34) (16 - 18)  SpO2: 92% (03 Mar 2024 13:34) (88% - 94%)    Parameters below as of 03 Mar 2024 13:34  Patient On (Oxygen Delivery Method): nasal cannula  O2 Flow (L/min): 4      PHYSICAL EXAM:    General: NAD  HEENT: NCAT  Neck: supple, trachea midline  Cardiovascular: S1, S2 normal; RRR, no M/G/R  Respiratory: CTABL; no W/R/R  Gastrointestinal: soft, nontender, nondistended. bowel sounds present.  Skin: no ulcerations or visible rashes appreciated  Extremities: WWP; no edema, clubbing or cyanosis  Vascular: 2+ radial, DP/PT pulses B/L  Neurological: AAOx3; CN II-XII grossly intact; no focal deficits    MEDICATIONS:  MEDICATIONS  (STANDING):  albuterol/ipratropium for Nebulization 3 milliLiter(s) Nebulizer every 6 hours  dexAMETHasone  Injectable 2 milliGRAM(s) IV Push two times a day  dextrose 5% + sodium chloride 0.9%. 1000 milliLiter(s) (60 mL/Hr) IV Continuous <Continuous>  enoxaparin Injectable 50 milliGRAM(s) SubCutaneous every 12 hours  tamsulosin 0.4 milliGRAM(s) Oral at bedtime    MEDICATIONS  (PRN):  acetaminophen   IVPB .. 750 milliGRAM(s) IV Intermittent every 6 hours PRN Mild Pain (1 - 3)  morphine  - Injectable 2 milliGRAM(s) IV Push every 4 hours PRN Severe Pain (7 - 10)      ALLERGIES:  Allergies    No Known Allergies    Intolerances        LABS:                        12.0   10.36 )-----------( 237      ( 02 Mar 2024 05:30 )             38.3     03-02    137  |  102  |  13  ----------------------------<  121<H>  4.1   |  26  |  0.37<L>    Ca    9.3      02 Mar 2024 05:30  Phos  2.0     03-02  Mg     2.0     03-02    TPro  5.8<L>  /  Alb  2.2<L>  /  TBili  0.4  /  DBili  x   /  AST  11  /  ALT  15  /  AlkPhos  88  03-02      Urinalysis Basic - ( 02 Mar 2024 05:30 )    Color: x / Appearance: x / SG: x / pH: x  Gluc: 121 mg/dL / Ketone: x  / Bili: x / Urobili: x   Blood: x / Protein: x / Nitrite: x   Leuk Esterase: x / RBC: x / WBC x   Sq Epi: x / Non Sq Epi: x / Bacteria: x      CAPILLARY BLOOD GLUCOSE          RADIOLOGY & ADDITIONAL TESTS: Reviewed. OVERNIGHT EVENTS: none    SUBJECTIVE / INTERVAL HPI: Patient seen and examined at bedside. No complaints at this time. ROS otherwise negative.    VITAL SIGNS:  Vital Signs Last 24 Hrs  T(C): 36.4 (03 Mar 2024 13:34), Max: 36.6 (02 Mar 2024 15:00)  T(F): 97.5 (03 Mar 2024 13:34), Max: 97.9 (02 Mar 2024 15:00)  HR: 88 (03 Mar 2024 13:34) (72 - 88)  BP: 108/66 (03 Mar 2024 13:34) (108/66 - 129/72)  BP(mean): 92 (03 Mar 2024 06:18) (92 - 92)  RR: 16 (03 Mar 2024 13:34) (16 - 18)  SpO2: 92% (03 Mar 2024 13:34) (88% - 94%)    Parameters below as of 03 Mar 2024 13:34  Patient On (Oxygen Delivery Method): nasal cannula  O2 Flow (L/min): 4      PHYSICAL EXAM:    Constitutional: frail, cachectic   HEENT:  anicteric sclera, no nasal discharge  Respiratory: CTA B/L; no W/R/R, no retractions  Cardiac: +S1/S2; RRR; no M/R/G appreciated  Gastrointestinal: abdomen soft, NT/ND,   Extremities: no open wounds or rashes  Vascular: 2+ distal pedal pulses B/L  Dermatologic: skin warm, dry and intact  Neurologic: AAOx3; CNII-XII grossly intact  Psychiatric: affect and characteristics of appearance, verbalizations, behaviors are appropriate    MEDICATIONS:  MEDICATIONS  (STANDING):  albuterol/ipratropium for Nebulization 3 milliLiter(s) Nebulizer every 6 hours  dexAMETHasone  Injectable 2 milliGRAM(s) IV Push two times a day  dextrose 5% + sodium chloride 0.9%. 1000 milliLiter(s) (60 mL/Hr) IV Continuous <Continuous>  enoxaparin Injectable 50 milliGRAM(s) SubCutaneous every 12 hours  tamsulosin 0.4 milliGRAM(s) Oral at bedtime    MEDICATIONS  (PRN):  acetaminophen   IVPB .. 750 milliGRAM(s) IV Intermittent every 6 hours PRN Mild Pain (1 - 3)  morphine  - Injectable 2 milliGRAM(s) IV Push every 4 hours PRN Severe Pain (7 - 10)      ALLERGIES:  Allergies    No Known Allergies    Intolerances        LABS:                        12.0   10.36 )-----------( 237      ( 02 Mar 2024 05:30 )             38.3     03-02    137  |  102  |  13  ----------------------------<  121<H>  4.1   |  26  |  0.37<L>    Ca    9.3      02 Mar 2024 05:30  Phos  2.0     03-02  Mg     2.0     03-02    TPro  5.8<L>  /  Alb  2.2<L>  /  TBili  0.4  /  DBili  x   /  AST  11  /  ALT  15  /  AlkPhos  88  03-02      Urinalysis Basic - ( 02 Mar 2024 05:30 )    Color: x / Appearance: x / SG: x / pH: x  Gluc: 121 mg/dL / Ketone: x  / Bili: x / Urobili: x   Blood: x / Protein: x / Nitrite: x   Leuk Esterase: x / RBC: x / WBC x   Sq Epi: x / Non Sq Epi: x / Bacteria: x      CAPILLARY BLOOD GLUCOSE          RADIOLOGY & ADDITIONAL TESTS: Reviewed.

## 2024-03-03 NOTE — PROGRESS NOTE ADULT - PROBLEM SELECTOR PLAN 2
- Keep NPO  - Given the nature of advanced disease, no a candidate for PEG or TPN.   - Palliative consult Pt w/ hx of chronic dysphagia with gradual progression. Currently w/ inability to tolerate solid and liquids for 1 week. Per imaging and EGD 2/2 tumor invasion in mid esophagus. Visualized large solid food bolus sitting in fundus and inability to pass the scope. 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   - Keep NPO  - Given the nature of advanced disease, no a candidate for PEG or TPN.   - Palliative consult

## 2024-03-03 NOTE — PROGRESS NOTE ADULT - ASSESSMENT
78M with h/o basal cell carcinoma and melanoma (resected several years ago), stage IIIB NSCLC s/p chemoRT (completed 10/2023) with multiple brain mets (s/p Left retrosigmoid craniectomy for resection of cerebellar lesion), leptomeningeal disease on MRI whole spine from 2/11/2024, BPH, pulmonary embolism, with chronic dysphagia and odynophagia, tx from [facility] to [facility] for continuity of care.

## 2024-03-03 NOTE — OCCUPATIONAL THERAPY INITIAL EVALUATION ADULT - ADDITIONAL COMMENTS
Pt reports living in apartment with sister, with 5 FOS to enter. Reports that daughter will be staying with pt once D/C from [facility]. Pt reports owning rolling walker, but was independent with ALDs prior to December 2023. Per pt. just has a tub(?). Family requesting a hospital bed

## 2024-03-03 NOTE — PROGRESS NOTE ADULT - PROBLEM SELECTOR PLAN 8
F: n/a comfort care  E: replete K<4, Mg<2  N: NPO  VTE Prophylaxis: Lovenox full AC   GI: not needed  C: DNR/DNI  D: RMF F: none  E: replete K<4, Mg<2  N: NPO  VTE Prophylaxis: Lovenox full AC   GI: not needed  C: DNR/DNI  D: RMF

## 2024-03-03 NOTE — OCCUPATIONAL THERAPY INITIAL EVALUATION ADULT - NSOTDISCHREC_GEN_A_CORE
pending pt. further workup/prognosis- pt. possibly for home hospice /end of life care/Sub-acute Rehab/Home OT TBD- pending goals of care, hospice and further workup

## 2024-03-03 NOTE — OCCUPATIONAL THERAPY INITIAL EVALUATION ADULT - DIAGNOSIS, OT EVAL
Pt. admitted to [facility] for continual of care, pt. with hx of lung and brain mets, pmhx of multiple cancers. Upon assessment, pt. demo generalized deconditioning impacting engagement in ADLs and functional mob/transfers.

## 2024-03-04 NOTE — PROGRESS NOTE ADULT - SUBJECTIVE AND OBJECTIVE BOX
Date of Service: 03-04-24 @ 17:05    SUBJECTIVE AND OBJECTIVE:  Indication for Geriatrics and Palliative Care Services/INTERVAL HPI:    OVERNIGHT EVENTS:    Allergies  No Known Allergies    MEDICATIONS  (STANDING):  albuterol/ipratropium for Nebulization 3 milliLiter(s) Nebulizer every 6 hours  dexAMETHasone  Injectable 2 milliGRAM(s) IV Push two times a day  dextrose 5% + sodium chloride 0.9%. 1000 milliLiter(s) (60 mL/Hr) IV Continuous <Continuous>  enoxaparin Injectable 50 milliGRAM(s) SubCutaneous every 12 hours  tamsulosin 0.4 milliGRAM(s) Oral at bedtime    MEDICATIONS  (PRN):  acetaminophen   IVPB .. 750 milliGRAM(s) IV Intermittent every 6 hours PRN Mild Pain (1 - 3)  morphine  - Injectable 2 milliGRAM(s) IV Push every 4 hours PRN Severe Pain (7 - 10)    PRESENT SYMPTOMS: [ ]Unable to self-report  [ ] CPOT [ ] PAINADs [ ] RDOS  Source if other than patient:  [ ]Family   [ ]Team     Pain: [ ]yes [ x]no  QOL impact -   Location -                    Aggravating factors -  Quality -  Radiation -  Timing-  Severity (0-10 scale):  Minimal acceptable level (0-10 scale):     Dyspnea:                           [ x]Mild [ ]Moderate [ ]Severe  Anxiety:                             [ ]Mild [ ]Moderate [ ]Severe  Fatigue:                             [ ]Mild [ ]Moderate [ ]Severe  Nausea:                             [ ]Mild [ ]Moderate [ ]Severe  Loss of appetite:              [ ]Mild [ ]Moderate [ ]Severe  Constipation:                    [ ]Mild [ ]Moderate [ ]Severe    Chaplaincy Referral: [ ] yes [ ] refused [ ] following [x ] Deferred     Caregiver [place]? : [x ] yes [ ] no [ ] Deferred [ ] Declined             Social work referral [ ] Patient & Family Centered Care Referral [ ]   Anticipatory Grief present?:  [ ] yes [x ] no  [ ] Deferred                  Social work referral [ ] Chaplaincy Referral [ ]    Other Symptoms:  +odynophagia, dysphagia  [ ]All other review of systems negative     PHYSICAL EXAM:  Vital Signs Last 24 Hrs  T(C): 36.4 (04 Mar 2024 12:34), Max: 36.9 (04 Mar 2024 05:39)  T(F): 97.6 (04 Mar 2024 12:34), Max: 98.4 (04 Mar 2024 05:39)  HR: 79 (04 Mar 2024 12:34) (79 - 96)  BP: 122/82 (04 Mar 2024 12:34) (109/61 - 122/82)  BP(mean): 84 (04 Mar 2024 05:39) (84 - 84)  RR: 18 (04 Mar 2024 12:34) (18 - 19)  SpO2: 92% (04 Mar 2024 12:34) (91% - 93%)    Parameters below as of 04 Mar 2024 12:34  Patient On (Oxygen Delivery Method): nasal cannula w/ humidification  O2 Flow (L/min): 4   I&O's Summary    03 Mar 2024 07:01  -  04 Mar 2024 07:00  --------------------------------------------------------  IN: 1380 mL / OUT: 500 mL / NET: 880 mL     GENERAL: [x ]Cachexia    [ x]Alert  [x ]Oriented x3   [ ]Lethargic  [ ]Unarousable  [x ]Verbal  [ ]Non-Verbal  Behavioral:   [ ] Anxiety  [ ] Delirium [ ] Agitation [ ] Other  HEENT:  [ x]Normal   [ ]Dry mouth   [ ]ET Tube/Trach  [ ]Oral lesions  PULMONARY:   [x ]Clear [ ]Tachypnea  [ ]Audible excessive secretions   [ ]Rhonchi        [ ]Right [ ]Left [ ]Bilateral  [ ]Crackles        [ ]Right [ ]Left [ ]Bilateral  [ ]Wheezing     [ ]Right [ ]Left [ ]Bilateral  [ ]Diminished breath sounds [ ]right [ ]left [ ]bilateral  CARDIOVASCULAR:    [ ]Regular [ ]Irregular [ ]Tachy  [ ]Bobo [ ]Murmur [ ]Other  GASTROINTESTINAL:  [ x]Soft  [ ]Distended   [x ]+BS  [ x]Non tender [ ]Tender  [ ]Other [ ]PEG [ ]OGT/ NGT  Last BM:  GENITOURINARY:  [ ]Normal [ ] Incontinent   [ ]Oliguria/Anuria   [x ]Martinez  MUSCULOSKELETAL:   [ ]Normal   [ ]Weakness  [ ]Bed/Wheelchair bound [ ]Edema  NEUROLOGIC:   [ ]No focal deficits  [ ]Cognitive impairment  [ ]Dysphagia [ ]Dysarthria [ ]Paresis [ ]Other   SKIN:   [ ]Normal  [ ]Rash  [ ]Other  [ ]Pressure ulcer(s)       Present on admission [ ]y [ ]n    LABS:             12.1   12.73 )-----------( 257      ( 04 Mar 2024 05:30 )             38.3   03-04    138  |  104  |  9   ----------------------------<  127<H>  3.6   |  28  |  0.32<L>    Ca    8.9      04 Mar 2024 05:30  Phos  2.2     03-04  Mg     1.8     03-04    TPro  6.0  /  Alb  2.3<L>  /  TBili  0.3  /  DBili  x   /  AST  10  /  ALT  15  /  AlkPhos  87  03-04  PT/INR - ( 04 Mar 2024 05:30 )   PT: 12.1 sec;   INR: 1.06       PTT - ( 04 Mar 2024 05:30 )  PTT:26.4 sec  Urinalysis Basic - ( 04 Mar 2024 05:30 )    Color: x / Appearance: x / SG: x / pH: x  Gluc: 127 mg/dL / Ketone: x  / Bili: x / Urobili: x   Blood: x / Protein: x / Nitrite: x   Leuk Esterase: x / RBC: x / WBC x   Sq Epi: x / Non Sq Epi: x / Bacteria: x    RADIOLOGY & ADDITIONAL STUDIES:  Endoscopy 3/1  Upon advancement of gastroscope, with copious mucous noted, suctioned. At  approximately 27 cm from the incisors, with ulcerated, abnormal appearing mucosa  invading into esophageal lumen, narrowing lumen, highly suspicious for tumor  invasion. Noted site of fistulous tract upon careful inspection. Exchanged  gastroscope for XP scope as unable to traverse narrowing. Advanced XP scope into  gastric lumen however noted to have large solid food bolus sitting in fundus.  Unable to pass. Aborted procedure.    Height (cm): 170.2 (02-15-24 @ 01:51), 170.2 (01-11-24 @ 10:50), 170.2 (01-07-24 @ 22:09)  Weight (kg): 48.8 (03-01-24 @ 02:00), 43.5 (02-15-24 @ 01:51), 49.4 (01-11-24 @ 10:50)  BMI (kg/m2): 16.8 (03-01-24 @ 02:00), 15 (02-15-24 @ 01:51), 17.1 (01-11-24 @ 10:50)    [x ]PPSV2 < or = 30%  [ ]significant weight loss [ ]poor nutritional intake [ ]anasarca [ ]Artificial Nutrition  Protein Calorie Malnutrition Present: [ ]mild [ ]moderate [ ]severe [ ]underweight   [ ]morbid obesity > 40 BMI Date of Service: 03-04-24 @ 17:05    SUBJECTIVE AND OBJECTIVE:  Indication for Geriatrics and Palliative Care Services/INTERVAL HPI: Mr. 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 EGD revealed esophageal fistula, pending advanced GI evaluation.     OVERNIGHT EVENTS: Chart reviewed and patient seen and examined at the bedside. On Friday, EGD revealed esophageal fistula, pending advanced GI evaluation. Patient has expressed interest in home hospice if no advanced therapies offered. The patient required no PRNs within a 24hr period 8am-8am.    Allergies  No Known Allergies    MEDICATIONS  (STANDING):  albuterol/ipratropium for Nebulization 3 milliLiter(s) Nebulizer every 6 hours  dexAMETHasone  Injectable 2 milliGRAM(s) IV Push two times a day  dextrose 5% + sodium chloride 0.9%. 1000 milliLiter(s) (60 mL/Hr) IV Continuous <Continuous>  enoxaparin Injectable 50 milliGRAM(s) SubCutaneous every 12 hours  tamsulosin 0.4 milliGRAM(s) Oral at bedtime    MEDICATIONS  (PRN):  acetaminophen   IVPB .. 750 milliGRAM(s) IV Intermittent every 6 hours PRN Mild Pain (1 - 3)  morphine  - Injectable 2 milliGRAM(s) IV Push every 4 hours PRN Severe Pain (7 - 10)    PRESENT SYMPTOMS: [ ]Unable to self-report  [ ] CPOT [ ] PAINADs [ ] RDOS  Source if other than patient:  [ ]Family   [ ]Team     Pain: [ ]yes [ x]no  QOL impact -   Location -                    Aggravating factors -  Quality -  Radiation -  Timing-  Severity (0-10 scale):  Minimal acceptable level (0-10 scale):     Dyspnea:                           [ x]Mild [ ]Moderate [ ]Severe  Anxiety:                             [ ]Mild [ ]Moderate [ ]Severe  Fatigue:                             [ ]Mild [ ]Moderate [ ]Severe  Nausea:                             [ ]Mild [ ]Moderate [ ]Severe  Loss of appetite:              [ ]Mild [ ]Moderate [ ]Severe  Constipation:                    [ ]Mild [ ]Moderate [ ]Severe    Chaplaincy Referral: [ ] yes [ ] refused [ ] following [x ] Deferred     Caregiver [place]? : [x ] yes [ ] no [ ] Deferred [ ] Declined             Social work referral [ ] Patient & Family Centered Care Referral [ ]   Anticipatory Grief present?:  [ ] yes [x ] no  [ ] Deferred                  Social work referral [ ] Chaplaincy Referral [ ]    Other Symptoms:  +odynophagia, dysphagia  [ ]All other review of systems negative     PHYSICAL EXAM:  Vital Signs Last 24 Hrs  T(C): 36.4 (04 Mar 2024 12:34), Max: 36.9 (04 Mar 2024 05:39)  T(F): 97.6 (04 Mar 2024 12:34), Max: 98.4 (04 Mar 2024 05:39)  HR: 79 (04 Mar 2024 12:34) (79 - 96)  BP: 122/82 (04 Mar 2024 12:34) (109/61 - 122/82)  BP(mean): 84 (04 Mar 2024 05:39) (84 - 84)  RR: 18 (04 Mar 2024 12:34) (18 - 19)  SpO2: 92% (04 Mar 2024 12:34) (91% - 93%)    Parameters below as of 04 Mar 2024 12:34  Patient On (Oxygen Delivery Method): nasal cannula w/ humidification  O2 Flow (L/min): 4   I&O's Summary    03 Mar 2024 07:01  -  04 Mar 2024 07:00  --------------------------------------------------------  IN: 1380 mL / OUT: 500 mL / NET: 880 mL     GENERAL: [x ]Cachexia    [ x]Alert  [x ]Oriented x3   [ ]Lethargic  [ ]Unarousable  [x ]Verbal  [ ]Non-Verbal  Behavioral:   [ ] Anxiety  [ ] Delirium [ ] Agitation [ ] Other  HEENT:  [ x]Normal   [ ]Dry mouth   [ ]ET Tube/Trach  [ ]Oral lesions  PULMONARY:   [x ]Clear [ ]Tachypnea  [ ]Audible excessive secretions   [ ]Rhonchi        [ ]Right [ ]Left [ ]Bilateral  [ ]Crackles        [ ]Right [ ]Left [ ]Bilateral  [ ]Wheezing     [ ]Right [ ]Left [ ]Bilateral  [ ]Diminished breath sounds [ ]right [ ]left [ ]bilateral  CARDIOVASCULAR:    [ ]Regular [ ]Irregular [ ]Tachy  [ ]Bobo [ ]Murmur [ ]Other  GASTROINTESTINAL:  [ x]Soft  [ ]Distended   [x ]+BS  [ x]Non tender [ ]Tender  [ ]Other [ ]PEG [ ]OGT/ NGT  Last BM:  GENITOURINARY:  [ ]Normal [ ] Incontinent   [ ]Oliguria/Anuria   [x ]Martinez  MUSCULOSKELETAL:   [ ]Normal   [ ]Weakness  [ ]Bed/Wheelchair bound [ ]Edema  NEUROLOGIC:   [ ]No focal deficits  [ ]Cognitive impairment  [ ]Dysphagia [ ]Dysarthria [ ]Paresis [ ]Other   SKIN:   [ ]Normal  [ ]Rash  [ ]Other  [ ]Pressure ulcer(s)       Present on admission [ ]y [ ]n    LABS:             12.1   12.73 )-----------( 257      ( 04 Mar 2024 05:30 )             38.3   03-04    138  |  104  |  9   ----------------------------<  127<H>  3.6   |  28  |  0.32<L>    Ca    8.9      04 Mar 2024 05:30  Phos  2.2     03-04  Mg     1.8     03-04    TPro  6.0  /  Alb  2.3<L>  /  TBili  0.3  /  DBili  x   /  AST  10  /  ALT  15  /  AlkPhos  87  03-04  PT/INR - ( 04 Mar 2024 05:30 )   PT: 12.1 sec;   INR: 1.06       PTT - ( 04 Mar 2024 05:30 )  PTT:26.4 sec  Urinalysis Basic - ( 04 Mar 2024 05:30 )    Color: x / Appearance: x / SG: x / pH: x  Gluc: 127 mg/dL / Ketone: x  / Bili: x / Urobili: x   Blood: x / Protein: x / Nitrite: x   Leuk Esterase: x / RBC: x / WBC x   Sq Epi: x / Non Sq Epi: x / Bacteria: x    RADIOLOGY & ADDITIONAL STUDIES:  Endoscopy 3/1  Upon advancement of gastroscope, with copious mucous noted, suctioned. At  approximately 27 cm from the incisors, with ulcerated, abnormal appearing mucosa  invading into esophageal lumen, narrowing lumen, highly suspicious for tumor  invasion. Noted site of fistulous tract upon careful inspection. Exchanged  gastroscope for XP scope as unable to traverse narrowing. Advanced XP scope into  gastric lumen however noted to have large solid food bolus sitting in fundus.  Unable to pass. Aborted procedure.    Height (cm): 170.2 (02-15-24 @ 01:51), 170.2 (01-11-24 @ 10:50), 170.2 (01-07-24 @ 22:09)  Weight (kg): 48.8 (03-01-24 @ 02:00), 43.5 (02-15-24 @ 01:51), 49.4 (01-11-24 @ 10:50)  BMI (kg/m2): 16.8 (03-01-24 @ 02:00), 15 (02-15-24 @ 01:51), 17.1 (01-11-24 @ 10:50)    [x ]PPSV2 < or = 30%  [ ]significant weight loss [ ]poor nutritional intake [ ]anasarca [ ]Artificial Nutrition  Protein Calorie Malnutrition Present: [ ]mild [ ]moderate [ ]severe [ ]underweight   [ ]morbid obesity > 40 BMI Date of Service: 03-04-24 @ 17:05    SUBJECTIVE AND OBJECTIVE:  Indication for Geriatrics and Palliative Care Services/INTERVAL HPI: Mr. 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 EGD revealed esophageal fistula, pending advanced GI evaluation.     OVERNIGHT EVENTS: Chart reviewed and patient seen and examined at the bedside. On Friday, EGD revealed esophageal fistula, pending advanced GI evaluation. Patient has expressed interest in home hospice if no advanced therapies offered. The patient required no PRNs within a 24hr period 8am-8am.    Allergies  No Known Allergies    MEDICATIONS  (STANDING):  albuterol/ipratropium for Nebulization 3 milliLiter(s) Nebulizer every 6 hours  dexAMETHasone  Injectable 2 milliGRAM(s) IV Push two times a day  dextrose 5% + sodium chloride 0.9%. 1000 milliLiter(s) (60 mL/Hr) IV Continuous <Continuous>  enoxaparin Injectable 50 milliGRAM(s) SubCutaneous every 12 hours  tamsulosin 0.4 milliGRAM(s) Oral at bedtime    MEDICATIONS  (PRN):  acetaminophen   IVPB .. 750 milliGRAM(s) IV Intermittent every 6 hours PRN Mild Pain (1 - 3)  morphine  - Injectable 2 milliGRAM(s) IV Push every 4 hours PRN Severe Pain (7 - 10)    PRESENT SYMPTOMS: [ ]Unable to self-report  [ ] CPOT [ ] PAINADs [ ] RDOS  Source if other than patient:  [ ]Family   [ ]Team     Pain: [ ]yes [ x]no  QOL impact -   Location -                    Aggravating factors -  Quality -  Radiation -  Timing-  Severity (0-10 scale):  Minimal acceptable level (0-10 scale):     Dyspnea:                           [ x]Mild [ ]Moderate [ ]Severe  Anxiety:                             [ ]Mild [ ]Moderate [ ]Severe  Fatigue:                             [ ]Mild [ ]Moderate [ ]Severe  Nausea:                             [ ]Mild [ ]Moderate [ ]Severe  Loss of appetite:              [ ]Mild [ ]Moderate [ ]Severe  Constipation:                    [ ]Mild [ ]Moderate [ ]Severe    Chaplaincy Referral: [ ] yes [ ] refused [ ] following [x ] Deferred     Caregiver [place]? : [x ] yes [ ] no [ ] Deferred [ ] Declined             Social work referral [ ] Patient & Family Centered Care Referral [ ]   Anticipatory Grief present?:  [ ] yes [x ] no  [ ] Deferred                  Social work referral [ ] Chaplaincy Referral [ ]    Other Symptoms:  +odynophagia, dysphagia  [ ]All other review of systems negative     PHYSICAL EXAM:  Vital Signs Last 24 Hrs  T(C): 36.4 (04 Mar 2024 12:34), Max: 36.9 (04 Mar 2024 05:39)  T(F): 97.6 (04 Mar 2024 12:34), Max: 98.4 (04 Mar 2024 05:39)  HR: 79 (04 Mar 2024 12:34) (79 - 96)  BP: 122/82 (04 Mar 2024 12:34) (109/61 - 122/82)  BP(mean): 84 (04 Mar 2024 05:39) (84 - 84)  RR: 18 (04 Mar 2024 12:34) (18 - 19)  SpO2: 92% (04 Mar 2024 12:34) (91% - 93%)    Parameters below as of 04 Mar 2024 12:34  Patient On (Oxygen Delivery Method): nasal cannula w/ humidification  O2 Flow (L/min): 4   I&O's Summary    03 Mar 2024 07:01  -  04 Mar 2024 07:00  --------------------------------------------------------  IN: 1380 mL / OUT: 500 mL / NET: 880 mL     GENERAL: [x ]Cachexia    [ x]Alert  [x ]Oriented x3   [ ]Lethargic  [ ]Unarousable  [x ]Verbal  [ ]Non-Verbal  Behavioral:   [ ] Anxiety  [ ] Delirium [ ] Agitation [x ] Cooperative  HEENT:  [ x]Normal   [ ]Dry mouth   [ ]ET Tube/Trach  [ ]Oral lesions  PULMONARY:   [x ]Clear [ ]Tachypnea  [ ]Audible excessive secretions   [ ]Rhonchi        [ ]Right [ ]Left [ ]Bilateral  [ ]Crackles        [ ]Right [ ]Left [ ]Bilateral  [ ]Wheezing     [ ]Right [ ]Left [ ]Bilateral  [ ]Diminished breath sounds [ ]right [ ]left [ ]bilateral  CARDIOVASCULAR:    [ ]Regular [ ]Irregular [ ]Tachy  [ ]Bobo [ ]Murmur [ ]Other  GASTROINTESTINAL:  [ x]Soft  [ ]Distended   [x ]+BS  [ x]Non tender [ ]Tender  [ ]Other [ ]PEG [ ]OGT/ NGT  Last BM:  GENITOURINARY:  [x ]Normal [ ] Incontinent   [ ]Oliguria/Anuria   [x ]Martinez  MUSCULOSKELETAL:   [ ]Normal   [x ]Weakness  [ ]Bed/Wheelchair bound [ ]Edema  NEUROLOGIC:   [ ]No focal deficits  [ ]Cognitive impairment  [ ]Dysphagia [ ]Dysarthria [ ]Paresis [ ]Other   SKIN:   [x ]Normal  [ ]Rash  [ ]Other  [ ]Pressure ulcer(s)       Present on admission [ ]y [ ]n    LABS:             12.1   12.73 )-----------( 257      ( 04 Mar 2024 05:30 )             38.3   03-04    138  |  104  |  9   ----------------------------<  127<H>  3.6   |  28  |  0.32<L>    Ca    8.9      04 Mar 2024 05:30  Phos  2.2     03-04  Mg     1.8     03-04    TPro  6.0  /  Alb  2.3<L>  /  TBili  0.3  /  DBili  x   /  AST  10  /  ALT  15  /  AlkPhos  87  03-04  PT/INR - ( 04 Mar 2024 05:30 )   PT: 12.1 sec;   INR: 1.06       PTT - ( 04 Mar 2024 05:30 )  PTT:26.4 sec  Urinalysis Basic - ( 04 Mar 2024 05:30 )    Color: x / Appearance: x / SG: x / pH: x  Gluc: 127 mg/dL / Ketone: x  / Bili: x / Urobili: x   Blood: x / Protein: x / Nitrite: x   Leuk Esterase: x / RBC: x / WBC x   Sq Epi: x / Non Sq Epi: x / Bacteria: x    RADIOLOGY & ADDITIONAL STUDIES:  Endoscopy 3/1  Upon advancement of gastroscope, with copious mucous noted, suctioned. At  approximately 27 cm from the incisors, with ulcerated, abnormal appearing mucosa  invading into esophageal lumen, narrowing lumen, highly suspicious for tumor  invasion. Noted site of fistulous tract upon careful inspection. Exchanged  gastroscope for XP scope as unable to traverse narrowing. Advanced XP scope into  gastric lumen however noted to have large solid food bolus sitting in fundus.  Unable to pass. Aborted procedure.    Height (cm): 170.2 (02-15-24 @ 01:51), 170.2 (01-11-24 @ 10:50), 170.2 (01-07-24 @ 22:09)  Weight (kg): 48.8 (03-01-24 @ 02:00), 43.5 (02-15-24 @ 01:51), 49.4 (01-11-24 @ 10:50)  BMI (kg/m2): 16.8 (03-01-24 @ 02:00), 15 (02-15-24 @ 01:51), 17.1 (01-11-24 @ 10:50)    [x ]PPSV2 < or = 30%  [ ]significant weight loss [ ]poor nutritional intake [ ]anasarca [ ]Artificial Nutrition  Protein Calorie Malnutrition Present: [ ]mild [ ]moderate [ ]severe [ ]underweight   [ ]morbid obesity > 40 BMI

## 2024-03-04 NOTE — PROGRESS NOTE ADULT - PROBLEM SELECTOR PLAN 1
.  PPSV = 30%, requires assistance for all ADLs  -PT following  -c/w supportive care, turning & repositioning

## 2024-03-04 NOTE — DISCHARGE NOTE PROVIDER - NSDCFUSCHEDAPPT_GEN_ALL_CORE_FT
[facility]  Scheduled Appointment: 03/05/2024    Wernicke, Gabriella  [facility] [address]  Scheduled Appointment: 03/19/2024    [facility] PreAdmits  Scheduled Appointment: 03/26/2024    [facility] PreAdmits  Scheduled Appointment: 04/16/2024    [facility] PreAdmits  Scheduled Appointment: 05/07/2024    [facility] PreAdmits  Scheduled Appointment: 05/28/2024     Wernicke, Gabriella  [facility]  [facility] [address]  Scheduled Appointment: 03/19/2024    [facility] PreAdmits  Scheduled Appointment: 03/26/2024    [facility] PreAdmits  Scheduled Appointment: 04/16/2024    [facility] PreAdmits  Scheduled Appointment: 05/07/2024    [facility] PreAdmits  Scheduled Appointment: 05/28/2024

## 2024-03-04 NOTE — DISCHARGE NOTE PROVIDER - HOSPITAL COURSE
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    • Pt comfortable on following inpt regimen:       - Ofrimev 750mg q6h PRN for mild pain       - C/w morphine 2mg IV BID PRN for severe pain       - C/w decadron 2mg BID       - C/w Duonebs 16h       - C/w Flomax 0.4 qHS.   • Patient will continue hospice at *** on discharge, discharged with [facility] for comfort.    Patient was discharged to: (home/HOA/acute rehab/hospice, etc, and with what services – home health PT/RN? Home O2?)    New medications: --  Changes to old medications: --  Medications that were stopped: --    Items to follow up as outpatient: None    Physical exam at the time of discharge: AAOx3, alert, RRR, lungs CLAB, abd NT/ND, extremities wwp, no peripheral edema.     Patient was medically optimized, stable and ready for discharge. Plan of care and return precautions were discussed with the patient who verbally stated understanding. On the day of discharge, the patient was seen and examined. Symptoms improved. Vital signs are stable. Labs and imaging reviewed. Patient is medically optimized and hemodynamically stable. Return precautions discussed, medication teach back done, and importance of physician follow up emphasized. The patient verbalized understanding. 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    • Pt comfortable on following inpt regimen:       - Ofrimev 750mg q6h PRN for mild pain       - C/w morphine 2mg IV BID PRN for severe pain       - C/w decadron 2mg BID       - C/w Duonebs 16h       - C/w Flomax 0.4 qHS.   • Patient will continue hospice at *** on discharge, discharged with [facility] for comfort.    Patient was discharged to: (home/HOA/acute rehab/hospice, etc, and with what services – home health PT/RN? Home O2?)    New medications: --  Changes to old medications: --  Medications that were stopped: PO medications     Items to follow up as outpatient: None    Physical exam at the time of discharge: AAOx3, alert, RRR, lungs CLAB, abd NT/ND, extremities wwp, no peripheral edema.     Patient was medically optimized, stable and ready for discharge. Plan of care and return precautions were discussed with the patient who verbally stated understanding. On the day of discharge, the patient was seen and examined. Symptoms improved. Vital signs are stable. Labs and imaging reviewed. Patient is medically optimized and hemodynamically stable. Return precautions discussed, medication teach back done, and importance of physician follow up emphasized. The patient verbalized understanding.

## 2024-03-04 NOTE — DISCHARGE NOTE PROVIDER - CARE PROVIDER_API CALL
Liss Kevin  Hospice/Palliative Medicine  [address] [address]  Phone: [phone]  Fax: [phone]  Follow Up Time: 2 weeks

## 2024-03-04 NOTE — PROGRESS NOTE ADULT - PROBLEM SELECTOR PLAN 3
.  Stage IIIB metastatic to brain and spine  - Oncologist Dr. Chapman  - s/p carboplatin/paclitaxel finished 10/27/23, recently taking repotrectinib to supprss disease growth as there was no role for RT or stent per Onc  - s/p OR craniotomy 1/11/24 by Dr. D'Amico.

## 2024-03-04 NOTE — DISCHARGE NOTE PROVIDER - NSDCMRMEDTOKEN_GEN_ALL_CORE_FT
acetaminophen 500 mg oral tablet: 2 tab(s) orally every 8 hours As needed Mild Pain (1 - 3)  dexAMETHasone 2 mg oral tablet: 1 tab(s) orally every 12 hours  Eliquis 5 mg oral tablet: 1 tab(s) orally 2 times a day  Multiple Vitamins oral tablet: 1 tab(s) orally once a day  pantoprazole 40 mg oral delayed release tablet: 1 tab(s) orally once a day (before a meal)  Repotrectinib 160mg one tablet daily: oncology  senna leaf extract oral tablet: 2 tab(s) orally once a day (at bedtime)  tamsulosin 0.4 mg oral capsule: 1 cap(s) orally once a day   acetaminophen 500 mg oral tablet: 2 tab(s) orally every 8 hours As needed Mild Pain (1 - 3)  dexAMETHasone 2 mg oral tablet: 1 tab(s) orally every 12 hours  Eliquis 5 mg oral tablet: 1 tab(s) orally 2 times a day  LORazepam 2 mg/mL oral concentrate: 0.5 milliliter(s) orally every 6 hours MDD: 4mg  morphine 20 mg/5 mL oral solution: 2 milliliter(s) orally every 4 hours as needed for  severe pain or shortness of breath MDD: 48mg  Multiple Vitamins oral tablet: 1 tab(s) orally once a day  pantoprazole 40 mg oral delayed release tablet: 1 tab(s) orally once a day (before a meal)  Repotrectinib 160mg one tablet daily: oncology  senna leaf extract oral tablet: 2 tab(s) orally once a day (at bedtime)  tamsulosin 0.4 mg oral capsule: 1 cap(s) orally once a day   acetaminophen 500 mg oral tablet: 2 tab(s) orally every 8 hours As needed Mild Pain (1 - 3)  dexAMETHasone 6 mg/25 mL-NaCl 0.9% intravenous solution: 8.33 milliliter(s) intravenous 2 times a day  glycopyrrolate 0.2 mg/mL injectable solution: 0.4 milligram(s) injectable every 6 hours  LORazepam 2 mg/mL oral concentrate: 0.5 milliliter(s) orally every 6 hours MDD: 4mg  morphine 20 mg/5 mL oral solution: 2 milliliter(s) orally every 4 hours as needed for  severe pain or shortness of breath MDD: 48mg  scopolamine 1 mg/72 hr transdermal film, extended release: 1 patch transdermally every 72 hours   ipratropium-albuterol 0.5 mg-2.5 mg/3 mL inhalation solution: 3 milliliter(s) by nebulizer every 8 hours  LORazepam 2 mg/mL oral concentrate: 0.5 milliliter(s) orally every 6 hours MDD: 4mg  morphine 20 mg/5 mL oral solution: 2 milliliter(s) orally every 4 hours as needed for  severe pain or shortness of breath MDD: 48mg  scopolamine 1 mg/72 hr transdermal film, extended release: 1 patch transdermally every 72 hours

## 2024-03-04 NOTE — PROGRESS NOTE ADULT - PROBLEM SELECTOR PLAN 4
.  -see [facility] note above  -3/1/24 ROBE completed  -HCP document naming daughter Kayli seen in EMR. Patient has capacity to make medical decisions at this time.

## 2024-03-04 NOTE — PROGRESS NOTE ADULT - PROBLEM SELECTOR PLAN 2
.  worsening over several weeks in setting of subcarinal LAD compressing esophagus  - unable to swallow PO chemo x 1 week  - fistula seen on EGD pending advanced GI evaluation and recommendation

## 2024-03-04 NOTE — PROGRESS NOTE ADULT - PROBLEM SELECTOR PLAN 3
Likely 2/2 tumor shrinkage from repotrectinib therapy | Pt w/ hx of chronic dysphagia with gradual progression. Currently w/ inability to tolerate solid and liquids for 1 week. Per imaging and EGD 2/2 tumor invasion in mid esophagus. Visualized large solid food bolus sitting in fundus and inability to pass the scope. 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     - Keep NPO  - Given the nature of advanced disease, no a candidate for PEG or TPN.   - Palliative consult

## 2024-03-04 NOTE — PROGRESS NOTE ADULT - PROBLEM SELECTOR PLAN 5
.  Patient would benefit from outpatient Palliative/Supportive Oncology follow-up on discharge with Dr. Liss Kevin at [facility], include her name and contact info ([phone]) in Discharge Summary    Feel free to message me via Teams (preferred) weekdays between 9am-4pm. Palliative Care is available 24/7, please call [phone] with any questions if no answer or outside those hours.    Verena Armenta MD, PGY4  Hospice & Palliative Medicine Fellow

## 2024-03-04 NOTE — DISCHARGE NOTE PROVIDER - ATTENDING DISCHARGE PHYSICAL EXAMINATION:
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 Pt is on IV D5NS and NPO.,  -pt is discharged home with home hospice

## 2024-03-04 NOTE — PROGRESS NOTE ADULT - CONVERSATION DETAILS
Met with patient and family (daughter, son-in-law, sister, and brother) at bedside. Re-introduced role of palliative care team. Patient verbalized an understanding of pt's current clinical status and expressed interest in both learning more about stent procedure with GI and learning more about hospice. Reviewed the philosophy and services provided under hospice emphasizing the goal of elevating patient's quality of life and optimizing symptom management while avoiding future hospitalizations. Patient and family expressed strong interest in hospice but wish to learn more about advanced GI interventions that might be offered, if any. Met with patient and family (daughter, son-in-law, sister, and brother) at bedside. Re-introduced role of palliative care team. Patient verbalized an understanding of pt's current clinical status and expressed interest in learning more about both possibility of stent procedure with GI and hospice. Reviewed the philosophy and services provided under hospice emphasizing the goal of elevating patient's quality of life and optimizing symptom management while avoiding future hospitalizations. Patient and family expressed strong interest in hospice but wish to learn more about advanced GI interventions that might be offered, if any.

## 2024-03-04 NOTE — PROGRESS NOTE ADULT - ASSESSMENT
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 Transitioned to comfort care, now pending hospice planning.

## 2024-03-04 NOTE — DISCHARGE NOTE PROVIDER - NSDCCPCAREPLAN_GEN_ALL_CORE_FT
PRINCIPAL DISCHARGE DIAGNOSIS  Diagnosis: Comfort measures only status  Assessment and Plan of Treatment: Hospice palliative care provides medical treatment to relieve symptoms at the end of life. The goal is to keep you comfortable, not to try to cure you. Hospice palliative care does not speed up or lengthen dying. It focuses on easing pain and other symptoms. Hospice palliative caregivers want to enhance your quality of life. Hospice palliative care also offers emotional help and spiritual support when you are dying. It also helps family members care for a loved one who is dying. Hospice palliative care can help you review your life, say important things to family and friends, and explore spiritual issues. Hospice palliative care also helps your family and friends deal with their grief after you die.

## 2024-03-04 NOTE — PROGRESS NOTE ADULT - PROBLEM SELECTOR PLAN 1
Made DNR/DNI, MEWS exempt, comfort-care only on 03/01 after extensive discussions between primary/palliative teams, patient, and patient's family.    - C/w ofrimev 750mg q6h PRN for mild pain  - C/w morphine 2mg IV BID PRN for severe pain  - C/w decadron 2mg BID  - C/w Duonebs 16h  - C/w Flomax 0.4 qHS

## 2024-03-04 NOTE — PROGRESS NOTE ADULT - PROBLEM SELECTOR PLAN 2
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 D'Amico on 1/11  and recently diagnosed leptomeningeal disease. CT imaging w/ enlarging malignant mass compressing on mid esophagus and [facility]. [facility]. On last hospitalization CT surg consulted and pt not candidate for intervention. Previously treated with repotrectinib w/ Dr. Chapman.   - CT from [facility] w/ 20% reduction in mediastinal lymphadenopathy, pending CT c/a/p here  - CTH w/ interval improvement in prepontine and cystic lesions   Further tx based on EGD finding:   - Per onc concern for tracheoesophageal fistula and if present, pt no longer a candidate for repotrectinib or any other treatement modality   - Currently s/p EGD -> presence of esophageal tumor invasion and fistulous tract. EGD aborted due to inability to pass. Rec palliation and NPO  - Pending further discussion with GI/CTS regarding stent

## 2024-03-04 NOTE — PROGRESS NOTE ADULT - PROBLEM SELECTOR PLAN 8
Pt seen by [facility] palliative team (Dr. Kevin) during previous admission while actively undergoing cancer treatement. At that time, pt was not ready to participate in advanced directive and GOC planning. Currently pt is DNR/DNI.   - reconsulted palliative team in this admission for reinitiation of advance GOC conversation. Pt no longer will be eligible for active cancer treatment and likely qualify for home hospice.  - per discussion, pt made no escalation of care

## 2024-03-04 NOTE — PROGRESS NOTE ADULT - PROBLEM SELECTOR PLAN 9
F: none  E: replete K<4, Mg<2  N: NPO  VTE Prophylaxis: Lovenox full AC   GI: not needed  C: DNR/DNI  D: RMF

## 2024-03-04 NOTE — PROGRESS NOTE ADULT - ASSESSMENT
Mr. 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

## 2024-03-04 NOTE — PROGRESS NOTE ADULT - SUBJECTIVE AND OBJECTIVE BOX
Medicine Progress Note  INCOMPLETE NOTE    INTERVAL EVENTS:   No acute events overnight.    SUBJECTIVE:   Patient seen and examined at bedside. Condition largely unchanged from yesterday. No acute complaints at this time.    ROS:  Negative unless otherwise stated above.    PHYSICAL EXAM:  General: Alert and oriented x 3, able to follow conversations and commands. No acute distress.   Eyes: EOMI. Anicteric.  HEENT: Moist mucous membranes. No scleral icterus. No cervical lymphadenopathy.  Cardiovascular: Regular rate and rhythm. No murmur. No JVD.  Lungs: Clear to auscultation bilaterally. No accessory muscle use.  Abdomen: Soft, non-tender and non-distended. No palpable masses.  Extremities: No edema. Non-tender.  Skin: No rashes or lesions. Warm.  Neurologic: No apparent focal neurological deficits. CN II-XII grossly intact, but not individually tested.  Psychiatric: Cooperative. Appropriate mood and affect.    VITAL SIGNS:  Vital Signs Last 24 Hrs  T(C): 36.9 (04 Mar 2024 05:39), Max: 36.9 (04 Mar 2024 05:39)  T(F): 98.4 (04 Mar 2024 05:39), Max: 98.4 (04 Mar 2024 05:39)  HR: 96 (04 Mar 2024 05:39) (81 - 96)  BP: 109/71 (04 Mar 2024 05:39) (108/66 - 109/71)  BP(mean): 84 (04 Mar 2024 05:39) (84 - 84)  RR: 18 (04 Mar 2024 05:39) (16 - 19)  SpO2: 91% (04 Mar 2024 05:39) (91% - 93%)    Parameters below as of 04 Mar 2024 05:39  Patient On (Oxygen Delivery Method): nasal cannula      INPATIENT MEDICATIONS:   MEDICATIONS  (STANDING):  albuterol/ipratropium for Nebulization 3 milliLiter(s) Nebulizer every 6 hours  dexAMETHasone  Injectable 2 milliGRAM(s) IV Push two times a day  dextrose 5% + sodium chloride 0.9%. 1000 milliLiter(s) (60 mL/Hr) IV Continuous <Continuous>  enoxaparin Injectable 50 milliGRAM(s) SubCutaneous every 12 hours  tamsulosin 0.4 milliGRAM(s) Oral at bedtime    MEDICATIONS  (PRN):  acetaminophen   IVPB .. 750 milliGRAM(s) IV Intermittent every 6 hours PRN Mild Pain (1 - 3)  morphine  - Injectable 2 milliGRAM(s) IV Push every 4 hours PRN Severe Pain (7 - 10)    HOME MEDICATIONS  acetaminophen 500 mg oral tablet: 2 tab(s) orally every 8 hours As needed Mild Pain (1 - 3)  dexAMETHasone 2 mg oral tablet: 1 tab(s) orally every 12 hours  Eliquis 5 mg oral tablet: 1 tab(s) orally 2 times a day  Multiple Vitamins oral tablet: 1 tab(s) orally once a day  pantoprazole 40 mg oral delayed release tablet: 1 tab(s) orally once a day (before a meal)  senna leaf extract oral tablet: 2 tab(s) orally once a day (at bedtime)  tamsulosin 0.4 mg oral capsule: 1 cap(s) orally once a day    ALLERGIES:  Allergies    No Known Allergies    Intolerances    LABS:             CAPILLARY BLOOD GLUCOSE        RADIOLOGY & ADDITIONAL TESTS: Reviewed. Medicine Progress Note    INTERVAL EVENTS:   No acute events overnight.    SUBJECTIVE:   Patient seen and examined at bedside. Condition largely unchanged from yesterday. No acute complaints at this time. Denies pain, discomfort.     ROS:  Negative unless otherwise stated above.    PHYSICAL EXAM:  General: Alert and oriented x 3, able to follow conversations and commands. Chronically ill appearing. No acute distress.   Eyes: EOMI. Anicteric.  HEENT: Moist mucous membranes. No scleral icterus. No cervical lymphadenopathy.  Cardiovascular: Regular rate and rhythm. No murmur. No JVD.  Lungs: Clear to auscultation bilaterally. No accessory muscle use.  Abdomen: Soft, non-tender and non-distended. No palpable masses.  Extremities: No edema. Non-tender.  Skin: No rashes or lesions. Warm.  Neurologic: No apparent focal neurological deficits. CN II-XII grossly intact, but not individually tested.  Psychiatric: Cooperative. Appropriate mood and affect.    VITAL SIGNS:  Vital Signs Last 24 Hrs  T(C): 36.9 (04 Mar 2024 05:39), Max: 36.9 (04 Mar 2024 05:39)  T(F): 98.4 (04 Mar 2024 05:39), Max: 98.4 (04 Mar 2024 05:39)  HR: 96 (04 Mar 2024 05:39) (81 - 96)  BP: 109/71 (04 Mar 2024 05:39) (108/66 - 109/71)  BP(mean): 84 (04 Mar 2024 05:39) (84 - 84)  RR: 18 (04 Mar 2024 05:39) (16 - 19)  SpO2: 91% (04 Mar 2024 05:39) (91% - 93%)    Parameters below as of 04 Mar 2024 05:39  Patient On (Oxygen Delivery Method): nasal cannula      INPATIENT MEDICATIONS:   MEDICATIONS  (STANDING):  albuterol/ipratropium for Nebulization 3 milliLiter(s) Nebulizer every 6 hours  dexAMETHasone  Injectable 2 milliGRAM(s) IV Push two times a day  dextrose 5% + sodium chloride 0.9%. 1000 milliLiter(s) (60 mL/Hr) IV Continuous <Continuous>  enoxaparin Injectable 50 milliGRAM(s) SubCutaneous every 12 hours  tamsulosin 0.4 milliGRAM(s) Oral at bedtime    MEDICATIONS  (PRN):  acetaminophen   IVPB .. 750 milliGRAM(s) IV Intermittent every 6 hours PRN Mild Pain (1 - 3)  morphine  - Injectable 2 milliGRAM(s) IV Push every 4 hours PRN Severe Pain (7 - 10)    HOME MEDICATIONS  acetaminophen 500 mg oral tablet: 2 tab(s) orally every 8 hours As needed Mild Pain (1 - 3)  dexAMETHasone 2 mg oral tablet: 1 tab(s) orally every 12 hours  Eliquis 5 mg oral tablet: 1 tab(s) orally 2 times a day  Multiple Vitamins oral tablet: 1 tab(s) orally once a day  pantoprazole 40 mg oral delayed release tablet: 1 tab(s) orally once a day (before a meal)  senna leaf extract oral tablet: 2 tab(s) orally once a day (at bedtime)  tamsulosin 0.4 mg oral capsule: 1 cap(s) orally once a day    ALLERGIES:  Allergies    No Known Allergies    Intolerances    LABS:             CAPILLARY BLOOD GLUCOSE        RADIOLOGY & ADDITIONAL TESTS: Reviewed.

## 2024-03-05 NOTE — PROGRESS NOTE ADULT - PROBLEM SELECTOR PLAN 3
.  Stage IIIB metastatic to brain and spine  - Oncologist Dr. Chapman  - s/p carboplatin/paclitaxel finished 10/27/23, recently taking repotrectinib to supprss disease growth as there was no role for RT or stent per Onc  - s/p OR craniotomy 1/11/24 by Dr. D'Amico. .  Stage IIIB metastatic to brain and spine  - Oncologist Dr. Chapman  - s/p carboplatin/paclitaxel finished 10/27/23, recently taking repotrectinib to suppress disease growth as there was no role for RT or stent per Onc  - s/p OR craniotomy 1/11/24 by Dr. D'Amico.

## 2024-03-05 NOTE — PROGRESS NOTE ADULT - SUBJECTIVE AND OBJECTIVE BOX
Date of Service: 03-05-24 @ 17:55    SUBJECTIVE AND OBJECTIVE:  Indication for Geriatrics and Palliative Care Services/INTERVAL HPI: Mr. 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 EGD revealed esophageal fistula, pending advanced GI evaluation.     OVERNIGHT EVENTS: Chart reviewed and patient seen and examined at the bedside. On Friday, EGD revealed esophageal fistula, pending advanced GI evaluation. Patient has expressed interest in home hospice if no advanced therapies offered. The patient required no PRNs within a 24hr period 8am-8am.    Allergies  No Known Allergies    MEDICATIONS  (STANDING):  albuterol/ipratropium for Nebulization 3 milliLiter(s) Nebulizer every 6 hours  dexAMETHasone  Injectable 2 milliGRAM(s) IV Push two times a day  dextrose 5% + sodium chloride 0.9%. 1000 milliLiter(s) (60 mL/Hr) IV Continuous <Continuous>  enoxaparin Injectable 50 milliGRAM(s) SubCutaneous every 12 hours  tamsulosin 0.4 milliGRAM(s) Oral at bedtime    MEDICATIONS  (PRN):  acetaminophen   IVPB .. 750 milliGRAM(s) IV Intermittent every 6 hours PRN Mild Pain (1 - 3)  morphine  - Injectable 2 milliGRAM(s) IV Push every 4 hours PRN Severe Pain (7 - 10)    PRESENT SYMPTOMS: [ ]Unable to self-report  [ ] CPOT [ ] PAINADs [ ] RDOS  Source if other than patient:  [ ]Family   [ ]Team     Pain: [ ]yes [ x]no  QOL impact -   Location -                    Aggravating factors -  Quality -  Radiation -  Timing-  Severity (0-10 scale):  Minimal acceptable level (0-10 scale):     Dyspnea:                           [ x]Mild [ ]Moderate [ ]Severe  Anxiety:                             [ ]Mild [ ]Moderate [ ]Severe  Fatigue:                             [ ]Mild [ ]Moderate [ ]Severe  Nausea:                             [ ]Mild [ ]Moderate [ ]Severe  Loss of appetite:              [ ]Mild [ ]Moderate [ ]Severe  Constipation:                    [ ]Mild [ ]Moderate [ ]Severe    Chaplaincy Referral: [ ] yes [ ] refused [ ] following [x ] Deferred     Caregiver [place]? : [x ] yes [ ] no [ ] Deferred [ ] Declined             Social work referral [ ] Patient & Family Centered Care Referral [ ]   Anticipatory Grief present?:  [ ] yes [x ] no  [ ] Deferred                  Social work referral [ ] Chaplaincy Referral [ ]    Other Symptoms:  +odynophagia, dysphagia  [ ]All other review of systems negative     PHYSICAL EXAM:  Vital Signs Last 24 Hrs  T(C): 36.4 (04 Mar 2024 12:34), Max: 36.9 (04 Mar 2024 05:39)  T(F): 97.6 (04 Mar 2024 12:34), Max: 98.4 (04 Mar 2024 05:39)  HR: 79 (04 Mar 2024 12:34) (79 - 96)  BP: 122/82 (04 Mar 2024 12:34) (109/61 - 122/82)  BP(mean): 84 (04 Mar 2024 05:39) (84 - 84)  RR: 18 (04 Mar 2024 12:34) (18 - 19)  SpO2: 92% (04 Mar 2024 12:34) (91% - 93%)    Parameters below as of 04 Mar 2024 12:34  Patient On (Oxygen Delivery Method): nasal cannula w/ humidification  O2 Flow (L/min): 4   I&O's Summary    03 Mar 2024 07:01  -  04 Mar 2024 07:00  --------------------------------------------------------  IN: 1380 mL / OUT: 500 mL / NET: 880 mL     GENERAL: [x ]Cachexia    [ x]Alert  [x ]Oriented x3   [ ]Lethargic  [ ]Unarousable  [x ]Verbal  [ ]Non-Verbal  Behavioral:   [ ] Anxiety  [ ] Delirium [ ] Agitation [ ] Other  HEENT:  [ x]Normal   [ ]Dry mouth   [ ]ET Tube/Trach  [ ]Oral lesions  PULMONARY:   [x ]Clear [ ]Tachypnea  [ ]Audible excessive secretions   [ ]Rhonchi        [ ]Right [ ]Left [ ]Bilateral  [ ]Crackles        [ ]Right [ ]Left [ ]Bilateral  [ ]Wheezing     [ ]Right [ ]Left [ ]Bilateral  [ ]Diminished breath sounds [ ]right [ ]left [ ]bilateral  CARDIOVASCULAR:    [ ]Regular [ ]Irregular [ ]Tachy  [ ]Bobo [ ]Murmur [ ]Other  GASTROINTESTINAL:  [ x]Soft  [ ]Distended   [x ]+BS  [ x]Non tender [ ]Tender  [ ]Other [ ]PEG [ ]OGT/ NGT  Last BM:  GENITOURINARY:  [ ]Normal [ ] Incontinent   [ ]Oliguria/Anuria   [x ]Martinez  MUSCULOSKELETAL:   [ ]Normal   [ ]Weakness  [ ]Bed/Wheelchair bound [ ]Edema  NEUROLOGIC:   [ ]No focal deficits  [ ]Cognitive impairment  [ ]Dysphagia [ ]Dysarthria [ ]Paresis [ ]Other   SKIN:   [ ]Normal  [ ]Rash  [ ]Other  [ ]Pressure ulcer(s)       Present on admission [ ]y [ ]n    LABS:             12.1   12.73 )-----------( 257      ( 04 Mar 2024 05:30 )             38.3   03-04    138  |  104  |  9   ----------------------------<  127<H>  3.6   |  28  |  0.32<L>    Ca    8.9      04 Mar 2024 05:30  Phos  2.2     03-04  Mg     1.8     03-04    TPro  6.0  /  Alb  2.3<L>  /  TBili  0.3  /  DBili  x   /  AST  10  /  ALT  15  /  AlkPhos  87  03-04  PT/INR - ( 04 Mar 2024 05:30 )   PT: 12.1 sec;   INR: 1.06       PTT - ( 04 Mar 2024 05:30 )  PTT:26.4 sec  Urinalysis Basic - ( 04 Mar 2024 05:30 )    Color: x / Appearance: x / SG: x / pH: x  Gluc: 127 mg/dL / Ketone: x  / Bili: x / Urobili: x   Blood: x / Protein: x / Nitrite: x   Leuk Esterase: x / RBC: x / WBC x   Sq Epi: x / Non Sq Epi: x / Bacteria: x    RADIOLOGY & ADDITIONAL STUDIES:  Endoscopy 3/1  Upon advancement of gastroscope, with copious mucous noted, suctioned. At  approximately 27 cm from the incisors, with ulcerated, abnormal appearing mucosa  invading into esophageal lumen, narrowing lumen, highly suspicious for tumor  invasion. Noted site of fistulous tract upon careful inspection. Exchanged  gastroscope for XP scope as unable to traverse narrowing. Advanced XP scope into  gastric lumen however noted to have large solid food bolus sitting in fundus.  Unable to pass. Aborted procedure.    Height (cm): 170.2 (02-15-24 @ 01:51), 170.2 (01-11-24 @ 10:50), 170.2 (01-07-24 @ 22:09)  Weight (kg): 48.8 (03-01-24 @ 02:00), 43.5 (02-15-24 @ 01:51), 49.4 (01-11-24 @ 10:50)  BMI (kg/m2): 16.8 (03-01-24 @ 02:00), 15 (02-15-24 @ 01:51), 17.1 (01-11-24 @ 10:50)    [x ]PPSV2 < or = 30%  [ ]significant weight loss [ ]poor nutritional intake [ ]anasarca [ ]Artificial Nutrition  Protein Calorie Malnutrition Present: [ ]mild [ ]moderate [ ]severe [ ]underweight   [ ]morbid obesity > 40 BMI Date of Service: 03-05-24 @ 17:55    SUBJECTIVE AND OBJECTIVE:  Indication for Geriatrics and Palliative Care Services/INTERVAL HPI: Mr. 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 EGD revealed esophageal fistula, GI has reviewed risks/benefits of esophageal stenting with patient.     OVERNIGHT EVENTS: Chart reviewed and patient seen and examined at the bedside. On Friday, EGD revealed esophageal fistula, GI has reviewed risks/benefits of esophageal stenting with patient who deferred procedure and wishes to pursue hospice. The patient required no PRNs within a 24hr period 8am-8am.    Allergies  No Known Allergies    MEDICATIONS  (STANDING):  albuterol/ipratropium for Nebulization 3 milliLiter(s) Nebulizer every 6 hours  dexAMETHasone  Injectable 2 milliGRAM(s) IV Push two times a day  dextrose 5% + sodium chloride 0.9%. 1000 milliLiter(s) (60 mL/Hr) IV Continuous <Continuous>  enoxaparin Injectable 50 milliGRAM(s) SubCutaneous every 12 hours  tamsulosin 0.4 milliGRAM(s) Oral at bedtime    MEDICATIONS  (PRN):  acetaminophen   IVPB .. 750 milliGRAM(s) IV Intermittent every 6 hours PRN Mild Pain (1 - 3)  morphine  - Injectable 2 milliGRAM(s) IV Push every 4 hours PRN Severe Pain (7 - 10)    PRESENT SYMPTOMS: [ ]Unable to self-report  [ ] CPOT [ ] PAINADs [ ] RDOS  Source if other than patient:  [ ]Family   [ ]Team     Pain: [ ]yes [ x]no  QOL impact -   Location -                    Aggravating factors -  Quality -  Radiation -  Timing-  Severity (0-10 scale):  Minimal acceptable level (0-10 scale):     Dyspnea:                           [ x]Mild [ ]Moderate [ ]Severe  Anxiety:                             [ ]Mild [ ]Moderate [ ]Severe  Fatigue:                             [ ]Mild [ ]Moderate [ ]Severe  Nausea:                             [ ]Mild [ ]Moderate [ ]Severe  Loss of appetite:              [ ]Mild [ ]Moderate [ ]Severe  Constipation:                    [ ]Mild [ ]Moderate [ ]Severe    Chaplaincy Referral: [ ] yes [ ] refused [ ] following [x ] Deferred     Caregiver [place]? : [x ] yes [ ] no [ ] Deferred [ ] Declined             Social work referral [ ] Patient & Family Centered Care Referral [ ]   Anticipatory Grief present?:  [ ] yes [x ] no  [ ] Deferred                  Social work referral [ ] Chaplaincy Referral [ ]    Other Symptoms:  +odynophagia, dysphagia  [x ]All other review of systems negative     PHYSICAL EXAM:  Vital Signs Last 24 Hrs  T(C): 36.2 (05 Mar 2024 12:23), Max: 36.5 (04 Mar 2024 20:28)  T(F): 97.2 (05 Mar 2024 12:23), Max: 97.7 (04 Mar 2024 20:28)  HR: 75 (05 Mar 2024 12:23) (71 - 87)  BP: 119/79 (05 Mar 2024 12:23) (96/56 - 123/76)  BP(mean): --  RR: 18 (05 Mar 2024 12:23) (18 - 18)  SpO2: 94% (05 Mar 2024 12:23) (93% - 94%)    Parameters below as of 05 Mar 2024 12:23  Patient On (Oxygen Delivery Method): nasal cannula  O2 Flow (L/min): 4   I&O's Summary    04 Mar 2024 07:01  -  05 Mar 2024 07:00  --------------------------------------------------------  IN: 0 mL / OUT: 1000 mL / NET: -1000 mL     GENERAL: [x ]Cachexia    [ x]Alert  [x ]Oriented x3   [ ]Lethargic  [ ]Unarousable  [x ]Verbal  [ ]Non-Verbal  Behavioral:   [ ] Anxiety  [ ] Delirium [ ] Agitation [ ] Other  HEENT:  [ x]Normal   [ ]Dry mouth   [ ]ET Tube/Trach  [ ]Oral lesions  PULMONARY:   [x ]Clear [ ]Tachypnea  [ ]Audible excessive secretions   [ ]Rhonchi        [ ]Right [ ]Left [ ]Bilateral  [ ]Crackles        [ ]Right [ ]Left [ ]Bilateral  [ ]Wheezing     [ ]Right [ ]Left [ ]Bilateral  [ ]Diminished breath sounds [ ]right [ ]left [ ]bilateral  CARDIOVASCULAR:    [ ]Regular [ ]Irregular [ ]Tachy  [ ]Boob [ ]Murmur [ ]Other  GASTROINTESTINAL:  [ x]Soft  [ ]Distended   [x ]+BS  [ x]Non tender [ ]Tender  [ ]Other [ ]PEG [ ]OGT/ NGT  Last BM:  GENITOURINARY:  [ ]Normal [ ] Incontinent   [ ]Oliguria/Anuria   [x ]Martinez  MUSCULOSKELETAL:   [ ]Normal   [ ]Weakness  [ ]Bed/Wheelchair bound [ ]Edema  NEUROLOGIC:   [ ]No focal deficits  [ ]Cognitive impairment  [ ]Dysphagia [ ]Dysarthria [ ]Paresis [ ]Other   SKIN:   [ ]Normal  [ ]Rash  [ ]Other  [ ]Pressure ulcer(s)       Present on admission [ ]y [ ]n    LABS: No new labs  RADIOLOGY & ADDITIONAL STUDIES: No new imaging    Height (cm): 170.2 (02-15-24 @ 01:51), 170.2 (01-11-24 @ 10:50), 170.2 (01-07-24 @ 22:09)  Weight (kg): 48.8 (03-01-24 @ 02:00), 43.5 (02-15-24 @ 01:51), 49.4 (01-11-24 @ 10:50)  BMI (kg/m2): 16.8 (03-01-24 @ 02:00), 15 (02-15-24 @ 01:51), 17.1 (01-11-24 @ 10:50)    [x ]PPSV2 < or = 30%  [ ]significant weight loss [ ]poor nutritional intake [ ]anasarca [ ]Artificial Nutrition  Protein Calorie Malnutrition Present: [ ]mild [ ]moderate [ ]severe [ ]underweight   [ ]morbid obesity > 40 BMI Date of Service: 03-05-24 @ 17:55    SUBJECTIVE AND OBJECTIVE:  Indication for Geriatrics and Palliative Care Services/INTERVAL HPI: Mr. 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 EGD revealed esophageal fistula, GI has reviewed risks/benefits of esophageal stenting with patient.     OVERNIGHT EVENTS: Chart reviewed and patient seen and examined at the bedside. On Friday, EGD revealed esophageal fistula, GI has reviewed risks/benefits of esophageal stenting with patient who deferred procedure and leaning towards hospice. The patient required no PRNs within a 24hr period 8am-8am.    Allergies  No Known Allergies    MEDICATIONS  (STANDING):  albuterol/ipratropium for Nebulization 3 milliLiter(s) Nebulizer every 6 hours  dexAMETHasone  Injectable 2 milliGRAM(s) IV Push two times a day  dextrose 5% + sodium chloride 0.9%. 1000 milliLiter(s) (60 mL/Hr) IV Continuous <Continuous>  enoxaparin Injectable 50 milliGRAM(s) SubCutaneous every 12 hours  tamsulosin 0.4 milliGRAM(s) Oral at bedtime    MEDICATIONS  (PRN):  acetaminophen   IVPB .. 750 milliGRAM(s) IV Intermittent every 6 hours PRN Mild Pain (1 - 3)  morphine  - Injectable 2 milliGRAM(s) IV Push every 4 hours PRN Severe Pain (7 - 10)    PRESENT SYMPTOMS: [ ]Unable to self-report  [ ] CPOT [ ] PAINADs [ ] RDOS  Source if other than patient:  [ ]Family   [ ]Team     Pain: [ ]yes [ x]no  QOL impact -   Location -                    Aggravating factors -  Quality -  Radiation -  Timing-  Severity (0-10 scale):  Minimal acceptable level (0-10 scale):     Dyspnea:                           [ x]Mild [ ]Moderate [ ]Severe  Anxiety:                             [ ]Mild [ ]Moderate [ ]Severe  Fatigue:                             [ ]Mild [ ]Moderate [ ]Severe  Nausea:                             [ ]Mild [ ]Moderate [ ]Severe  Loss of appetite:              [ ]Mild [ ]Moderate [ ]Severe  Constipation:                    [ ]Mild [ ]Moderate [ ]Severe    Chaplaincy Referral: [ ] yes [ ] refused [ ] following [x ] Deferred     Caregiver [place]? : [x ] yes [ ] no [ ] Deferred [ ] Declined             Social work referral [ ] Patient & Family Centered Care Referral [ ]   Anticipatory Grief present?:  [ ] yes [x ] no  [ ] Deferred                  Social work referral [ ] Chaplaincy Referral [ ]    Other Symptoms:  +odynophagia, dysphagia  [x ]All other review of systems negative     PHYSICAL EXAM:  Vital Signs Last 24 Hrs  T(C): 36.2 (05 Mar 2024 12:23), Max: 36.5 (04 Mar 2024 20:28)  T(F): 97.2 (05 Mar 2024 12:23), Max: 97.7 (04 Mar 2024 20:28)  HR: 75 (05 Mar 2024 12:23) (71 - 87)  BP: 119/79 (05 Mar 2024 12:23) (96/56 - 123/76)  BP(mean): --  RR: 18 (05 Mar 2024 12:23) (18 - 18)  SpO2: 94% (05 Mar 2024 12:23) (93% - 94%)    Parameters below as of 05 Mar 2024 12:23  Patient On (Oxygen Delivery Method): nasal cannula  O2 Flow (L/min): 4   I&O's Summary    04 Mar 2024 07:01  -  05 Mar 2024 07:00  --------------------------------------------------------  IN: 0 mL / OUT: 1000 mL / NET: -1000 mL     GENERAL: [x ]Cachexia    [ x]Alert  [x ]Oriented x3   [ ]Lethargic  [ ]Unarousable  [x ]Verbal  [ ]Non-Verbal  Behavioral:   [ ] Anxiety  [ ] Delirium [ ] Agitation [ ] Other  HEENT:  [ x]Normal   [ ]Dry mouth   [ ]ET Tube/Trach  [ ]Oral lesions  PULMONARY:   [x ]Clear [ ]Tachypnea  [ ]Audible excessive secretions   [ ]Rhonchi        [ ]Right [ ]Left [ ]Bilateral  [ ]Crackles        [ ]Right [ ]Left [ ]Bilateral  [ ]Wheezing     [ ]Right [ ]Left [ ]Bilateral  [ ]Diminished breath sounds [ ]right [ ]left [ ]bilateral  CARDIOVASCULAR:    [ ]Regular [ ]Irregular [ ]Tachy  [ ]Bobo [ ]Murmur [ ]Other  GASTROINTESTINAL:  [ x]Soft  [ ]Distended   [x ]+BS  [ x]Non tender [ ]Tender  [ ]Other [ ]PEG [ ]OGT/ NGT  Last BM:  GENITOURINARY:  [ ]Normal [ ] Incontinent   [ ]Oliguria/Anuria   [x ]Martinez  MUSCULOSKELETAL:   [ ]Normal   [ ]Weakness  [ ]Bed/Wheelchair bound [ ]Edema  NEUROLOGIC:   [ ]No focal deficits  [ ]Cognitive impairment  [ ]Dysphagia [ ]Dysarthria [ ]Paresis [ ]Other   SKIN:   [ ]Normal  [ ]Rash  [ ]Other  [ ]Pressure ulcer(s)       Present on admission [ ]y [ ]n    LABS: No new labs  RADIOLOGY & ADDITIONAL STUDIES: No new imaging    Height (cm): 170.2 (02-15-24 @ 01:51), 170.2 (01-11-24 @ 10:50), 170.2 (01-07-24 @ 22:09)  Weight (kg): 48.8 (03-01-24 @ 02:00), 43.5 (02-15-24 @ 01:51), 49.4 (01-11-24 @ 10:50)  BMI (kg/m2): 16.8 (03-01-24 @ 02:00), 15 (02-15-24 @ 01:51), 17.1 (01-11-24 @ 10:50)    [x ]PPSV2 < or = 30%  [ ]significant weight loss [ ]poor nutritional intake [ ]anasarca [ ]Artificial Nutrition  Protein Calorie Malnutrition Present: [ ]mild [ ]moderate [ ]severe [ ]underweight   [ ]morbid obesity > 40 BMI

## 2024-03-05 NOTE — PROGRESS NOTE ADULT - PROBLEM SELECTOR PLAN 5
.  Patient would benefit from outpatient Palliative/Supportive Oncology follow-up on discharge with Dr. Liss Kevin at [facility], include her name and contact info ([phone]) in Discharge Summary    Feel free to message me via Teams (preferred) weekdays between 9am-4pm. Palliative Care is available 24/7, please call [phone] with any questions if no answer or outside those hours.    Verena Armenta MD, PGY4  Hospice & Palliative Medicine Fellow .  Patient would benefit from outpatient Palliative/Supportive Oncology follow-up on discharge with Dr. Liss Kevin at [facility], include her name and contact info ([phone]) in Discharge Summary    Palliative Care is available 24/7, please call [phone] with any questions.    Verena Armenta MD, PGY4  Hospice & Palliative Medicine Fellow

## 2024-03-05 NOTE — PROGRESS NOTE ADULT - PROBLEM SELECTOR PLAN 2
.  worsening over several weeks in setting of subcarinal LAD compressing esophagus  - unable to swallow PO chemo x 1 week  - fistula seen on EGD pending advanced GI evaluation and recommendation .  worsening over several weeks in setting of subcarinal LAD compressing esophagus  - unable to swallow PO chemo x 1 week  - EGD revealed esophageal fistula, GI has reviewed risks/benefits of esophageal stenting with patient who deferred procedure and wishes to pursue hospice. .  worsening over several weeks in setting of subcarinal LAD compressing esophagus  - unable to swallow PO chemo x 1 week  - EGD revealed esophageal fistula, GI has reviewed risks/benefits of esophageal stenting with patient who deferred procedure and leaning towards pursuing hospice.

## 2024-03-05 NOTE — PROGRESS NOTE ADULT - SUBJECTIVE AND OBJECTIVE BOX
Medicine Progress Note  INCOMPLETE NOTE    INTERVAL EVENTS:   No acute events overnight.    SUBJECTIVE:   Patient seen and examined at bedside. Condition largely unchanged from yesterday. No acute complaints at this time.    ROS:  Negative unless otherwise stated above.    PHYSICAL EXAM:  General: Alert and oriented x 3, able to follow conversations and commands. No acute distress.   Eyes: EOMI. Anicteric.  HEENT: Moist mucous membranes. No scleral icterus. No cervical lymphadenopathy.  Cardiovascular: Regular rate and rhythm. No murmur. No JVD.  Lungs: Clear to auscultation bilaterally. No accessory muscle use.  Abdomen: Soft, non-tender and non-distended. No palpable masses.  Extremities: No edema. Non-tender.  Skin: No rashes or lesions. Warm.  Neurologic: No apparent focal neurological deficits. CN II-XII grossly intact, but not individually tested.  Psychiatric: Cooperative. Appropriate mood and affect.    VITAL SIGNS:  Vital Signs Last 24 Hrs  T(C): 36.5 (04 Mar 2024 20:28), Max: 36.5 (04 Mar 2024 20:28)  T(F): 97.7 (04 Mar 2024 20:28), Max: 97.7 (04 Mar 2024 20:28)  HR: 87 (05 Mar 2024 05:44) (71 - 87)  BP: 123/76 (05 Mar 2024 05:44) (96/56 - 123/76)  BP(mean): --  RR: 18 (05 Mar 2024 05:44) (18 - 18)  SpO2: 94% (05 Mar 2024 05:44) (92% - 94%)    Parameters below as of 05 Mar 2024 05:44    O2 Flow (L/min): 4    INPATIENT MEDICATIONS:   MEDICATIONS  (STANDING):  albuterol/ipratropium for Nebulization 3 milliLiter(s) Nebulizer every 6 hours  dexAMETHasone  Injectable 2 milliGRAM(s) IV Push two times a day  dextrose 5% + sodium chloride 0.9%. 1000 milliLiter(s) (60 mL/Hr) IV Continuous <Continuous>  enoxaparin Injectable 50 milliGRAM(s) SubCutaneous every 12 hours  tamsulosin 0.4 milliGRAM(s) Oral at bedtime    MEDICATIONS  (PRN):  acetaminophen   IVPB .. 750 milliGRAM(s) IV Intermittent every 6 hours PRN Mild Pain (1 - 3)  morphine  - Injectable 2 milliGRAM(s) IV Push every 4 hours PRN Severe Pain (7 - 10)    HOME MEDICATIONS  acetaminophen 500 mg oral tablet: 2 tab(s) orally every 8 hours As needed Mild Pain (1 - 3)  dexAMETHasone 2 mg oral tablet: 1 tab(s) orally every 12 hours  Eliquis 5 mg oral tablet: 1 tab(s) orally 2 times a day  Multiple Vitamins oral tablet: 1 tab(s) orally once a day  pantoprazole 40 mg oral delayed release tablet: 1 tab(s) orally once a day (before a meal)  senna leaf extract oral tablet: 2 tab(s) orally once a day (at bedtime)  tamsulosin 0.4 mg oral capsule: 1 cap(s) orally once a day    ALLERGIES:  Allergies    No Known Allergies    Intolerances    LABS:                       12.1   12.73 )-----------( 257      ( 04 Mar 2024 05:30 )             38.3     03-04    138  |  104  |  9   ----------------------------<  127<H>  3.6   |  28  |  0.32<L>    Ca    8.9      04 Mar 2024 05:30  Phos  2.2     03-04  Mg     1.8     03-04    TPro  6.0  /  Alb  2.3<L>  /  TBili  0.3  /  DBili  x   /  AST  10  /  ALT  15  /  AlkPhos  87  03-04    PT/INR - ( 04 Mar 2024 05:30 )   PT: 12.1 sec;   INR: 1.06          PTT - ( 04 Mar 2024 05:30 )  PTT:26.4 sec  Urinalysis Basic - ( 04 Mar 2024 05:30 )    Color: x / Appearance: x / SG: x / pH: x  Gluc: 127 mg/dL / Ketone: x  / Bili: x / Urobili: x   Blood: x / Protein: x / Nitrite: x   Leuk Esterase: x / RBC: x / WBC x   Sq Epi: x / Non Sq Epi: x / Bacteria: x      CAPILLARY BLOOD GLUCOSE        RADIOLOGY & ADDITIONAL TESTS: Reviewed. Medicine Progress Note    INTERVAL EVENTS:   No acute events overnight.    SUBJECTIVE:   Patient seen and examined at bedside. Condition largely unchanged from yesterday. No acute complaints at this time.    ROS:  Negative unless otherwise stated above.    PHYSICAL EXAM:  General: Alert and oriented x 3, able to follow conversations and commands. No acute distress.   Eyes: EOMI. Anicteric.  HEENT: Moist mucous membranes. No scleral icterus. No cervical lymphadenopathy.  Cardiovascular: Regular rate and rhythm. No murmur. No JVD.  Lungs: Clear to auscultation bilaterally. No accessory muscle use.  Abdomen: Soft, non-tender and non-distended. No palpable masses.  Extremities: No edema. Non-tender.  Skin: No rashes or lesions. Warm.  Neurologic: No apparent focal neurological deficits. CN II-XII grossly intact, but not individually tested.  Psychiatric: Cooperative. Appropriate mood and affect.    VITAL SIGNS:  Vital Signs Last 24 Hrs  T(C): 36.5 (04 Mar 2024 20:28), Max: 36.5 (04 Mar 2024 20:28)  T(F): 97.7 (04 Mar 2024 20:28), Max: 97.7 (04 Mar 2024 20:28)  HR: 87 (05 Mar 2024 05:44) (71 - 87)  BP: 123/76 (05 Mar 2024 05:44) (96/56 - 123/76)  BP(mean): --  RR: 18 (05 Mar 2024 05:44) (18 - 18)  SpO2: 94% (05 Mar 2024 05:44) (92% - 94%)    Parameters below as of 05 Mar 2024 05:44    O2 Flow (L/min): 4    INPATIENT MEDICATIONS:   MEDICATIONS  (STANDING):  albuterol/ipratropium for Nebulization 3 milliLiter(s) Nebulizer every 6 hours  dexAMETHasone  Injectable 2 milliGRAM(s) IV Push two times a day  dextrose 5% + sodium chloride 0.9%. 1000 milliLiter(s) (60 mL/Hr) IV Continuous <Continuous>  enoxaparin Injectable 50 milliGRAM(s) SubCutaneous every 12 hours  tamsulosin 0.4 milliGRAM(s) Oral at bedtime    MEDICATIONS  (PRN):  acetaminophen   IVPB .. 750 milliGRAM(s) IV Intermittent every 6 hours PRN Mild Pain (1 - 3)  morphine  - Injectable 2 milliGRAM(s) IV Push every 4 hours PRN Severe Pain (7 - 10)    HOME MEDICATIONS  acetaminophen 500 mg oral tablet: 2 tab(s) orally every 8 hours As needed Mild Pain (1 - 3)  dexAMETHasone 2 mg oral tablet: 1 tab(s) orally every 12 hours  Eliquis 5 mg oral tablet: 1 tab(s) orally 2 times a day  Multiple Vitamins oral tablet: 1 tab(s) orally once a day  pantoprazole 40 mg oral delayed release tablet: 1 tab(s) orally once a day (before a meal)  senna leaf extract oral tablet: 2 tab(s) orally once a day (at bedtime)  tamsulosin 0.4 mg oral capsule: 1 cap(s) orally once a day    ALLERGIES:  Allergies    No Known Allergies    Intolerances    LABS:                       12.1   12.73 )-----------( 257      ( 04 Mar 2024 05:30 )             38.3     03-04    138  |  104  |  9   ----------------------------<  127<H>  3.6   |  28  |  0.32<L>    Ca    8.9      04 Mar 2024 05:30  Phos  2.2     03-04  Mg     1.8     03-04    TPro  6.0  /  Alb  2.3<L>  /  TBili  0.3  /  DBili  x   /  AST  10  /  ALT  15  /  AlkPhos  87  03-04    PT/INR - ( 04 Mar 2024 05:30 )   PT: 12.1 sec;   INR: 1.06          PTT - ( 04 Mar 2024 05:30 )  PTT:26.4 sec  Urinalysis Basic - ( 04 Mar 2024 05:30 )    Color: x / Appearance: x / SG: x / pH: x  Gluc: 127 mg/dL / Ketone: x  / Bili: x / Urobili: x   Blood: x / Protein: x / Nitrite: x   Leuk Esterase: x / RBC: x / WBC x   Sq Epi: x / Non Sq Epi: x / Bacteria: x      CAPILLARY BLOOD GLUCOSE        RADIOLOGY & ADDITIONAL TESTS: Reviewed.

## 2024-03-05 NOTE — PROGRESS NOTE ADULT - SUBJECTIVE AND OBJECTIVE BOX
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 Pt fell 2/24, no clear head trauma, brought to [facility]. Workup there notable for urinary retention (river placed), dysphagia (under consideration for EGD), ?PNA/UTI (Zosyn), and +adenovirus. Pt has been unable to tolerate PO intake x 1 week prior to admission; experiencing regurgitation with food/liquids. No n/v/abd pain. Reports vision is blurred and has diplopia but is stable s/p resection of brain lesion. Also reports generalized weakness. Denies focal weakness/numbness. (01 Mar 2024 00:53)                            12.1   12.73 )-----------( 257      ( 04 Mar 2024 05:30 )             38.3       03-04    138  |  104  |  9   ----------------------------<  127<H>  3.6   |  28  |  0.32<L>    Ca    8.9      04 Mar 2024 05:30  Phos  2.2     03-04  Mg     1.8     03-04    TPro  6.0  /  Alb  2.3<L>  /  TBili  0.3  /  DBili  x   /  AST  10  /  ALT  15  /  AlkPhos  87  03-04    Vital Signs Last 24 Hrs  T(C): 36.2 (05 Mar 2024 12:23), Max: 36.5 (04 Mar 2024 20:28)  T(F): 97.2 (05 Mar 2024 12:23), Max: 97.7 (04 Mar 2024 20:28)  HR: 75 (05 Mar 2024 12:23) (71 - 87)  BP: 119/79 (05 Mar 2024 12:23) (96/56 - 123/76)  BP(mean): --  RR: 18 (05 Mar 2024 12:23) (18 - 18)  SpO2: 94% (05 Mar 2024 12:23) (92% - 94%)    Parameters below as of 05 Mar 2024 12:23  Patient On (Oxygen Delivery Method): nasal cannula  O2 Flow (L/min): 4      MEDICATIONS  (STANDING):  albuterol/ipratropium for Nebulization 3 milliLiter(s) Nebulizer every 6 hours  dexAMETHasone  Injectable 2 milliGRAM(s) IV Push two times a day  dextrose 5% + sodium chloride 0.9%. 1000 milliLiter(s) (60 mL/Hr) IV Continuous <Continuous>  enoxaparin Injectable 50 milliGRAM(s) SubCutaneous every 12 hours  tamsulosin 0.4 milliGRAM(s) Oral at bedtime    MEDICATIONS  (PRN):  acetaminophen   IVPB .. 750 milliGRAM(s) IV Intermittent every 6 hours PRN Mild Pain (1 - 3)  morphine  - Injectable 2 milliGRAM(s) IV Push every 4 hours PRN Severe Pain (7 - 10)      3/4/2024 Functional Status Assessment :       Pain Assessment/Number Scale (0-10) Adult  Presence of Pain: denies pain/discomfort (Rating = 0)  Pain Rating (0-10): Rest: 0 (no pain/absence of nonverbal indicators of pain)  Pain Rating (0-10): Activity: 0 (no pain/absence of nonverbal indicators of pain)    Cognitive/Neuro      Cognitive/Neuro/Behavioral  Cognitive/Neuro/Behavioral [WDL Definition: Alert; opens eyes spontaneously; arouses to voice or touch; oriented x 4; follows commands; speech spontaneous, logical; purposeful motor response; behavior appropriate to situation]: WDL    Language Assistance  Preferred Language to Address Healthcare Preferred Language to Address Healthcare: English    Therapeutic Interventions      Bed Mobility  Bed Mobility Training Scooting: contact guard;  verbal cues;  1 person assist  Bed Mobility Training Sit-to-Supine: verbal cues;  1 person assist;  minimum assist (75% patient effort)  Bed Mobility Training Supine-to-Sit: minimum assist (75% patient effort);  verbal cues;  1 person assist  Bed Mobility Training Limitations: decreased ability to use legs for bridging/pushing;  impaired ability to control trunk for mobility;  impaired balance;  decreased strength;  impaired postural control    Sit-Stand Transfer Training  Transfer Training Sit-to-Stand Transfer: moderate assist (50% patient effort);  verbal cues;  1 person assist  Transfer Training Stand-to-Sit Transfer: minimum assist (75% patient effort);  verbal cues;  1 person assist  Sit-to-Stand Transfer Training Transfer Safety Analysis: decreased balance;  decreased strength;  impaired balance;  impaired postural control    Gait Training  Gait Training: verbal cues;  1 person assist;  minimum assist (75% patient effort);  hand held 3 side steps to the right and then left ;  4LNC astting >90%  Gait Analysis: 2-point gait   crouch;  decreased beckie;  shuffling;  decreased step length;  decreased stride length;  impaired balance;  decreased strength;  impaired postural control  Gait Number of Times:: x 1            PM&R Impression : as above    Current disposition plan: home hospice

## 2024-03-05 NOTE — PROGRESS NOTE ADULT - ASSESSMENT
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 for continuity of care. Transitioned to comfort care, now pending hospice planning.     Problem/Plan - 1:  ·  Problem: Comfort measures only status.   ·  Plan: Made DNR/DNI, MEWS exempt, comfort-care only on 03/01 after extensive discussions between primary/palliative teams, patient, and patient's family.    - C/w ofrimev 750mg q6h PRN for mild pain  - C/w morphine 2mg IV BID PRN for severe pain  - C/w decadron 2mg BID  - C/w Duonebs 16h  - C/w Flomax 0.4 qHS.    Problem/Plan - 2:  ·  Problem: Metastatic primary lung cancer.   ·  Plan: Pt w/ hx sig for Stage IIIB NSCLC with multiple mets to brain s/p craniectomy and cerebellar resection w/ Dr. D'Amico on 1/11  and recently diagnosed leptomeningeal disease. CT imaging w/ enlarging malignant mass compressing on mid esophagus and L. [facility]. On last hospitalization CT surg consulted and pt not candidate for intervention. Previously treated with repotrectinib w/ Dr. Chapman.   - CT from [facility] w/ 20% reduction in mediastinal lymphadenopathy, pending CT c/a/p here  - [facility] w/ interval improvement in prepontine and cystic lesions   Further tx based on EGD finding:   - Per onc concern for tracheoesophageal fistula and if present, pt no longer a candidate for repotrectinib or any other treatement modality   - Currently s/p EGD -> presence of esophageal tumor invasion and fistulous tract. EGD aborted due to inability to pass. Rec palliation and NPO  - Pending further discussion with GI/CTS regarding stent.    Problem/Plan - 3:  ·  Problem: Fistula, bronchoesophageal.   ·  Plan: Likely 2/2 tumor shrinkage from repotrectinib therapy | Pt w/ hx of chronic dysphagia with gradual progression. Currently w/ inability to tolerate solid and liquids for 1 week. Per imaging and EGD 2/2 tumor invasion in mid esophagus. Visualized large solid food bolus sitting in fundus and inability to pass the scope. 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     - Keep NPO  - Given the nature of advanced disease, no a candidate for PEG or TPN.   - Palliative consult.    Problem/Plan - 4:  ·  Problem: Adult failure to thrive.   ·  Plan: Patient presenting due to failure to thrive 2/2 to poor PO intake 2/2 progression of dysphagia to solid and liquids.     - plan as above   - Not a candidate for nutritional support as end of life care and focus on comfort.    Problem/Plan - 5:  ·  Problem: Urinary retention.   ·  Plan: Pt with hx of urinary retention 2/2 BPH. Pt failed TOV. Martinez placed by Urology   - c/w Martinez for now as failed TOV for comfort.    Problem/Plan - 6:  ·  Problem: Pulmonary thromboembolism.   ·  Plan: Pt with PE (1/12) involving b/l segmental RLL and subsegmental LLL likely provoked i/s/o malignancy. Pt on eliquis 5mg BID.  - on lovenox full AC given hx of PE and for now maybe a candidate for palliative stenting of fistula although unclear if possible.   - d/c lovenox if no possibility of further palliative intervention.    Problem/Plan - 7:  ·  Problem: Junctional rhythm.   ·  Plan: Presenting with intermittent episode of junctional bradycardia to 60s. EP consulted by neurosurgery team. No acute intervention required.  - no further intervention or need for close cardiac monitoring.    Problem/Plan - 8:  ·  Problem: Encounter for palliative care.   ·  Plan: Pt seen by [facility] palliative team (Dr. Kevin) during previous admission while actively undergoing cancer treatement. At that time, pt was not ready to participate in advanced directive and GOC planning. Currently pt is DNR/DNI.   - reconsulted palliative team in this admission for reinitiation of advance GOC conversation. Pt no longer will be eligible for active cancer treatment and likely qualify for home hospice.  - per discussion, pt made no escalation of care.    Problem/Plan - 9:  ·  Problem: Prophylactic measure.   ·  Plan: F: none  E: replete K<4, Mg<2  N: NPO  VTE Prophylaxis: Lovenox full AC   GI: not needed  C: DNR/DNI  D: RMF.

## 2024-03-05 NOTE — PROGRESS NOTE ADULT - SUBJECTIVE AND OBJECTIVE BOX
GASTROENTEROLOGY PROGRESS NOTE  Patient seen and examined at bedside.  -APRYL ON    ROS: Patient with no major complaints, denies any fevers, chills, NS, nausea/vomiting.    PERTINENT REVIEW OF SYSTEMS:  CONSTITUTIONAL: No weakness, fevers or chills  HEENT: No visual changes; No vertigo or throat pain   GASTROINTESTINAL: As above.  NEUROLOGICAL: No numbness or weakness  SKIN: No itching, burning, rashes, or lesions     Allergies    No Known Allergies    Intolerances      MEDICATIONS:  MEDICATIONS  (STANDING):  albuterol/ipratropium for Nebulization 3 milliLiter(s) Nebulizer every 6 hours  dexAMETHasone  Injectable 2 milliGRAM(s) IV Push two times a day  dextrose 5% + sodium chloride 0.9%. 1000 milliLiter(s) (60 mL/Hr) IV Continuous <Continuous>  enoxaparin Injectable 50 milliGRAM(s) SubCutaneous every 12 hours  tamsulosin 0.4 milliGRAM(s) Oral at bedtime    MEDICATIONS  (PRN):  acetaminophen   IVPB .. 750 milliGRAM(s) IV Intermittent every 6 hours PRN Mild Pain (1 - 3)  morphine  - Injectable 2 milliGRAM(s) IV Push every 4 hours PRN Severe Pain (7 - 10)    Vital Signs Last 24 Hrs  T(C): 36.2 (05 Mar 2024 12:23), Max: 36.5 (04 Mar 2024 20:28)  T(F): 97.2 (05 Mar 2024 12:23), Max: 97.7 (04 Mar 2024 20:28)  HR: 75 (05 Mar 2024 12:23) (71 - 87)  BP: 119/79 (05 Mar 2024 12:23) (96/56 - 123/76)  BP(mean): --  RR: 18 (05 Mar 2024 12:23) (18 - 18)  SpO2: 94% (05 Mar 2024 12:23) (93% - 94%)    Parameters below as of 05 Mar 2024 12:23  Patient On (Oxygen Delivery Method): nasal cannula  O2 Flow (L/min): 4      03-04 @ 07:01  -  03-05 @ 07:00  --------------------------------------------------------  IN: 0 mL / OUT: 1000 mL / NET: -1000 mL      PHYSICAL EXAM:    General: , frail appearing male, family at bedside; in no acute distress; on NC  HEENT: MMM, conjunctiva and sclera clear  Gastrointestinal: Soft non-tender non-distended; No rebound or guarding  Skin: Warm and dry. No obvious rash    LABS:                        12.1   12.73 )-----------( 257      ( 04 Mar 2024 05:30 )             38.3     03-04    138  |  104  |  9   ----------------------------<  127<H>  3.6   |  28  |  0.32<L>    Ca    8.9      04 Mar 2024 05:30  Phos  2.2     03-04  Mg     1.8     03-04    TPro  6.0  /  Alb  2.3<L>  /  TBili  0.3  /  DBili  x   /  AST  10  /  ALT  15  /  AlkPhos  87  03-04    PT/INR - ( 04 Mar 2024 05:30 )   PT: 12.1 sec;   INR: 1.06          PTT - ( 04 Mar 2024 05:30 )  PTT:26.4 sec      Urinalysis Basic - ( 04 Mar 2024 05:30 )    Color: x / Appearance: x / SG: x / pH: x  Gluc: 127 mg/dL / Ketone: x  / Bili: x / Urobili: x   Blood: x / Protein: x / Nitrite: x   Leuk Esterase: x / RBC: x / WBC x   Sq Epi: x / Non Sq Epi: x / Bacteria: x      RADIOLOGY & ADDITIONAL STUDIES:  Reviewed

## 2024-03-05 NOTE — PROGRESS NOTE ADULT - PROBLEM SELECTOR PLAN 4
.  -see [facility] note above  -3/1/24 ROBE completed  -HCP document naming daughter Kayli seen in EMR. Patient has capacity to make medical decisions at this time. .  -3/1/24 MOLST completed  -HCP document naming daughter Kayli seen in EMR. Patient has capacity to make medical decisions at this time.  -Patient still contemplating, but leaning towards deferring GI intervention at this time and pursuing hospice.

## 2024-03-05 NOTE — PROGRESS NOTE ADULT - ASSESSMENT
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 Transitioned to comfort care, now pending hospice planning.  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 for continuity of care. Transitioned to comfort care, now pending home hospice vs. [place].

## 2024-03-05 NOTE — PROGRESS NOTE ADULT - PROBLEM SELECTOR PLAN 2
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 D'Amico on 1/11  and recently diagnosed leptomeningeal disease. CT imaging w/ enlarging malignant mass compressing on mid esophagus and L. [facility]. On last hospitalization CT surg consulted and pt not candidate for intervention. Previously treated with repotrectinib w/ Dr. Chapman.   - CT from [facility] w/ 20% reduction in mediastinal lymphadenopathy, pending CT c/a/p here  - [facility] w/ interval improvement in prepontine and cystic lesions   Further tx based on EGD finding:   - Per onc concern for tracheoesophageal fistula and if present, pt no longer a candidate for repotrectinib or any other treatement modality   - Currently s/p EGD -> presence of esophageal tumor invasion and fistulous tract. EGD aborted due to inability to pass. Rec palliation and NPO  - Pending further discussion with GI/CTS regarding stent

## 2024-03-05 NOTE — PROGRESS NOTE ADULT - ASSESSMENT
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 GI consulted for dysphagia in the setting of known malignancy and for concern for tracheo-esophageal fistula.    Presenting with acute onset dysphagia, unable to tolerate PO starting 2/23/24, history consistent with esophageal dysphagia, with admission to [facility] for aspiration event. Transferred to [facility] for continuity of care. Recent imaging dated 2/14/24, noting moderate distention of the proximal esophagus filled with debris. The esophagus adjacent to the aortic arch appears thickened and narrows to a point due to extrinsic compression and possible tumor involvement. Also with an outpouching arising from the posterior aspect of the trachea just above the julia, with a possible fistulous connection to a necrotic mass/fluid collection in the mediastinum.     EGD (3/1/24): Upon advancement of gastroscope, with copious mucous noted, suctioned. At approximately 27 cm from the incisors, with ulcerated, abnormal appearing mucosa invading into esophageal lumen, narrowing lumen, highly suspicious for tumor invasion. Noted site of fistulous tract upon careful inspection. Exchanged gastroscope for XP scope as unable to traverse narrowing. Advanced XP scope into gastric lumen however noted to have large solid food bolus sitting in fundus. Unable to pass. Aborted procedure.    Limited interventions available for tracheoesophageal fistula. Reviewed risks/benefits of esophageal stenting with the patient and patient's family with patient ultimately expressing deferral for procedure and pursuit of hospice.     #Dysphagia   -keep NPO   -Hospice   -Remainder of management as per primary team    Case discussed with [facility] attending and primary team.     Thank you for allowing us to participate in the care of this patient.  GI will sign off. Please call back with any questions or concerns.     Verena Méndez DO  Gastroenterology Fellow  Pager: [phone]

## 2024-03-06 NOTE — PROGRESS NOTE ADULT - SUBJECTIVE AND OBJECTIVE BOX
*****INCOMPLETE NOTE*****    INTERVAL HPI/OVERNIGHT EVENTS:     SUBJECTIVE: Pt seen and examined at bedside. APRYL.   Denies f/c/n/v/d, abd pain, SOB, CP,  sxs,     ANTIBIOTICS/RELEVANT:    MEDICATIONS  (STANDING):  albuterol/ipratropium for Nebulization 3 milliLiter(s) Nebulizer every 6 hours  dexAMETHasone  Injectable 2 milliGRAM(s) IV Push two times a day  dextrose 5% + sodium chloride 0.9%. 1000 milliLiter(s) (60 mL/Hr) IV Continuous <Continuous>  enoxaparin Injectable 50 milliGRAM(s) SubCutaneous every 12 hours  scopolamine 1 mG/72 Hr(s) Patch 1 Patch Transdermal every 72 hours  tamsulosin 0.4 milliGRAM(s) Oral at bedtime    MEDICATIONS  (PRN):  acetaminophen   IVPB .. 750 milliGRAM(s) IV Intermittent every 6 hours PRN Mild Pain (1 - 3)  glycopyrrolate Injectable 0.4 milliGRAM(s) IV Push every 6 hours PRN Excessive Secretions  LORazepam   Injectable 0.5 milliGRAM(s) IV Push every 4 hours PRN Anxiety/Agitation/Insomnia  morphine  - Injectable 2 milliGRAM(s) IV Push every 2 hours PRN Severe Pain (7 - 10) or RR>22/SOB      Vital Signs Last 24 Hrs  T(C): 36.3 (06 Mar 2024 06:25), Max: 36.7 (05 Mar 2024 21:37)  T(F): 97.3 (06 Mar 2024 06:25), Max: 98 (05 Mar 2024 21:37)  HR: 85 (06 Mar 2024 06:25) (73 - 85)  BP: 96/54 (06 Mar 2024 06:25) (96/54 - 119/79)  BP(mean): --  RR: 18 (06 Mar 2024 06:25) (18 - 18)  SpO2: 93% (06 Mar 2024 06:25) (93% - 94%)    Parameters below as of 06 Mar 2024 06:25    O2 Flow (L/min): 4      PHYSICAL EXAM:  General: in no acute distress, non toxic appearing, speaking in full sentences  Eyes: EOMI intact bilaterally. Anicteric sclerae, moist conjunctivae  HENT: Moist mucous membranes  Neck: Trachea midline, supple  Lungs: CTA B/L. No wheezes, rales, or rhonchi  Cardiovascular: RRR. No murmurs, rubs, or gallops  Abdomen: +BS Soft, non-tender non-distended; No rebound or guarding  Vasc: Rad and DP intact and equal b/l   Extremities: WWP, No clubbing, cyanosis or edema  Neurological: Alert and oriented  Skin: Warm and dry. No obvious rash     LABS:                MICROBIOLOGY:    RADIOLOGY & ADDITIONAL STUDIES: INTERVAL HPI/OVERNIGHT EVENTS: APRYL     SUBJECTIVE: Pt seen and examined at bedside. Reports hasn't had a BM for days now, but has also not been eating/drinking.   Denies f/c/n/v/d, abd pain, SOB, CP,  sxs,     ANTIBIOTICS/RELEVANT:    MEDICATIONS  (STANDING):  albuterol/ipratropium for Nebulization 3 milliLiter(s) Nebulizer every 6 hours  dexAMETHasone  Injectable 2 milliGRAM(s) IV Push two times a day  dextrose 5% + sodium chloride 0.9%. 1000 milliLiter(s) (60 mL/Hr) IV Continuous <Continuous>  enoxaparin Injectable 50 milliGRAM(s) SubCutaneous every 12 hours  scopolamine 1 mG/72 Hr(s) Patch 1 Patch Transdermal every 72 hours  tamsulosin 0.4 milliGRAM(s) Oral at bedtime    MEDICATIONS  (PRN):  acetaminophen   IVPB .. 750 milliGRAM(s) IV Intermittent every 6 hours PRN Mild Pain (1 - 3)  glycopyrrolate Injectable 0.4 milliGRAM(s) IV Push every 6 hours PRN Excessive Secretions  LORazepam   Injectable 0.5 milliGRAM(s) IV Push every 4 hours PRN Anxiety/Agitation/Insomnia  morphine  - Injectable 2 milliGRAM(s) IV Push every 2 hours PRN Severe Pain (7 - 10) or RR>22/SOB      Vital Signs Last 24 Hrs  T(C): 36.3 (06 Mar 2024 06:25), Max: 36.7 (05 Mar 2024 21:37)  T(F): 97.3 (06 Mar 2024 06:25), Max: 98 (05 Mar 2024 21:37)  HR: 85 (06 Mar 2024 06:25) (73 - 85)  BP: 96/54 (06 Mar 2024 06:25) (96/54 - 119/79)  BP(mean): --  RR: 18 (06 Mar 2024 06:25) (18 - 18)  SpO2: 93% (06 Mar 2024 06:25) (93% - 94%)    Parameters below as of 06 Mar 2024 06:25    O2 Flow (L/min): 4      PHYSICAL EXAM:  General: in no acute distress, non toxic appearing, speaking in full sentences  Eyes: EOMI intact bilaterally. Anicteric sclerae, moist conjunctivae  HENT: Moist mucous membranes  Neck: Trachea midline, supple  Lungs: CTA B/L. No wheezes, rales, or rhonchi  Cardiovascular: RRR. No murmurs, rubs, or gallops  Abdomen: +BS Soft, non-tender non-distended; No rebound or guarding  Vasc: Rad and DP intact and equal b/l   Extremities: WWP, No clubbing, cyanosis or edema  Neurological: Alert and oriented x3   Skin: Warm and dry. No obvious rash     LABS:                MICROBIOLOGY:    RADIOLOGY & ADDITIONAL STUDIES:

## 2024-03-06 NOTE — PROGRESS NOTE ADULT - PROBLEM SELECTOR PLAN 4
.  worsening over several weeks in setting of subcarinal LAD compressing esophagus  - unable to swallow PO chemo x 1 week  - EGD revealed esophageal fistula, GI has reviewed risks/benefits of esophageal stenting with patient who deferred procedure and leaning towards pursuing hospice.

## 2024-03-06 NOTE — PROGRESS NOTE ADULT - PROBLEM SELECTOR PLAN 7
.  Patient would benefit from outpatient Palliative/Supportive Oncology follow-up on discharge with Dr. Liss Kevin at [facility], include her name and contact info ([phone]) in Discharge Summary    Palliative Care is available 24/7, please call [phone] with any questions.    Verena Armenta MD, PGY4  Hospice & Palliative Medicine Fellow

## 2024-03-06 NOTE — PROGRESS NOTE ADULT - PROBLEM SELECTOR PLAN 6
.  -3/1/24 MOLST completed  -HCP document naming daughter Kayli seen in EMR. Patient has capacity to make medical decisions at this time.  -Patient still contemplating, but leaning towards deferring GI intervention at this time and pursuing hospice.

## 2024-03-06 NOTE — PROGRESS NOTE ADULT - PROBLEM SELECTOR PLAN 1
.  Increased work of breathing with oropharyngeal secretions  -morphine 2mg IV q2h prn respiratory distress  -glycopyrrolate 0.4mg IV q6h prn excessive secretions  -scopolamine patch q72h

## 2024-03-06 NOTE — PROGRESS NOTE ADULT - PROBLEM SELECTOR PLAN 9
F: none  E: replete K<4, Mg<2  N: NPO  VTE Prophylaxis: Lovenox full AC   GI: not needed  C: DNR/DNI  D: RMF Principal Discharge DX:	Dyspnea

## 2024-03-06 NOTE — CHART NOTE - NSCHARTNOTEFT_GEN_A_CORE
Admitting Diagnosis:   Patient is a 78y old  Male who presents with a chief complaint of odynophagia (05 Mar 2024 14:54)    PAST MEDICAL & SURGICAL HISTORY:  BPH (benign prostatic hyperplasia)  Prediabetes  Lung cancer metastatic to brain  Skin melanoma  Basal cell carcinoma  History of dental surgery  S/P skin cancer resection  H/O brain tumor    Current Nutrition Order:  NPO: Except Medications     PO Intake: Good (%) [   ]  Fair (50-75%) [   ] Poor (<25%) [   ] - N/A NPO    GI Issues:   No nausea/vomiting/diarrhea noted   No BMs recorded during admission   No abdominal distension/discomfort noted     Pain:  No pain reported or nonverbal indicators noted    Skin Integrity:  No edema noted  Stage II sacral and stage I right knee pressure injuries documented  Rafael score 13    Labs:       136  |  103  |  8   ----------------------------<  100<H>  4.2   |  22  |  0.35<L>    Ca    9.4      06 Mar 2024 05:30  Phos  2.5     03-  Mg     1.9     -    TPro  5.9<L>  /  Alb  2.3<L>  /  TBili  0.5  /  DBili  x   /  AST  17  /  ALT  19  /  AlkPhos  91  -    Medications:  MEDICATIONS  (STANDING):  albuterol/ipratropium for Nebulization 3 milliLiter(s) Nebulizer every 6 hours  dexAMETHasone  Injectable 2 milliGRAM(s) IV Push two times a day  dextrose 5% + sodium chloride 0.9%. 1000 milliLiter(s) (60 mL/Hr) IV Continuous <Continuous>  enoxaparin Injectable 50 milliGRAM(s) SubCutaneous every 12 hours  scopolamine 1 mG/72 Hr(s) Patch 1 Patch Transdermal every 72 hours  tamsulosin 0.4 milliGRAM(s) Oral at bedtime    MEDICATIONS  (PRN):  acetaminophen   IVPB .. 750 milliGRAM(s) IV Intermittent every 6 hours PRN Mild Pain (1 - 3)  glycopyrrolate Injectable 0.4 milliGRAM(s) IV Push every 6 hours PRN Excessive Secretions  LORazepam   Injectable 0.5 milliGRAM(s) IV Push every 4 hours PRN Anxiety/Agitation/Insomnia  morphine  - Injectable 2 milliGRAM(s) IV Push every 2 hours PRN Severe Pain (7 - 10) or RR>22/SOB    Anthropometrics:  Height: 5'7" (per pt report 3/1)  Weight: 108lb/48.8kg   BMI: 16.8  IBW: 148lb/67.3kg     73% IBW    Weight Change:   No new wts obtained.     Nutrition Focused Physical Exam:   Completed 3/1, see nutrition risk notification.     Estimated energy needs:   Calories: 30-35kcal/k-1708kcal/d  Protein: 1.3-1.5g/k-73g/d  Defer fluids to team.     Subjective:     Previous Nutrition Diagnosis:    Active [   ]  Resolved [   ]    If resolved, new PES:     Goal:    Recommendations:    Education:     Risk Level: High [   ] Moderate [   ] Low [   ] Admitting Diagnosis:   Patient is a 78y old  Male who presents with a chief complaint of odynophagia (05 Mar 2024 14:54)    PAST MEDICAL & SURGICAL HISTORY:  BPH (benign prostatic hyperplasia)  Prediabetes  Lung cancer metastatic to brain  Skin melanoma  Basal cell carcinoma  History of dental surgery  S/P skin cancer resection  H/O brain tumor    Current Nutrition Order:  NPO: Except Medications     PO Intake: Good (%) [   ]  Fair (50-75%) [   ] Poor (<25%) [   ] - N/A NPO    GI Issues:   No nausea/vomiting/diarrhea noted   No BMs recorded during admission   No abdominal distension/discomfort noted     Pain:  No pain reported or nonverbal indicators noted    Skin Integrity:  No edema noted  Stage II sacral and stage I right knee pressure injuries documented  Rafael score 13    Labs:       136  |  103  |  8   ----------------------------<  100<H>  4.2   |  22  |  0.35<L>    Ca    9.4      06 Mar 2024 05:30  Phos  2.5     03-  Mg     1.9     -    TPro  5.9<L>  /  Alb  2.3<L>  /  TBili  0.5  /  DBili  x   /  AST  17  /  ALT  19  /  AlkPhos  91  -    Medications:  MEDICATIONS  (STANDING):  albuterol/ipratropium for Nebulization 3 milliLiter(s) Nebulizer every 6 hours  dexAMETHasone  Injectable 2 milliGRAM(s) IV Push two times a day  dextrose 5% + sodium chloride 0.9%. 1000 milliLiter(s) (60 mL/Hr) IV Continuous <Continuous>  enoxaparin Injectable 50 milliGRAM(s) SubCutaneous every 12 hours  scopolamine 1 mG/72 Hr(s) Patch 1 Patch Transdermal every 72 hours  tamsulosin 0.4 milliGRAM(s) Oral at bedtime    MEDICATIONS  (PRN):  acetaminophen   IVPB .. 750 milliGRAM(s) IV Intermittent every 6 hours PRN Mild Pain (1 - 3)  glycopyrrolate Injectable 0.4 milliGRAM(s) IV Push every 6 hours PRN Excessive Secretions  LORazepam   Injectable 0.5 milliGRAM(s) IV Push every 4 hours PRN Anxiety/Agitation/Insomnia  morphine  - Injectable 2 milliGRAM(s) IV Push every 2 hours PRN Severe Pain (7 - 10) or RR>22/SOB    Anthropometrics:  Height: 5'7" (per pt report 3/1)  Weight: 108lb/48.8kg   BMI: 16.8  IBW: 148lb/67.3kg     73% IBW    Weight Change:   No new wts obtained.     Nutrition Focused Physical Exam:   Completed 3/1, see nutrition risk notification.     Estimated energy needs:   Calories: 30-35kcal/k-2356kcal/d  Protein: 1.3-1.5g/k-101g/d  Defer fluids to team.   Estimated needs based on IBW as dosing wt <80% IBW. Needs adjusted for age, cancer, malnutrition, and wound healing.     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 for continuity of care. Transitioned to comfort care, now pending home hospice vs. [place].       Pt seen for nutrition assessment, engaged with team. NPO. Pt discussed in interdisciplinary team rounds and Palliative rounds, pt declined esophageal stent, plan for continued NPO and pursuit of hospice. Pt with goals of care aligned with comfort measures - order in chart. With consideration for pressure injuries, pt can be determined at moderate complexity. See nutrition recommendations.     Previous Nutrition Diagnosis:  Severe acute on chronic malnutrition related to inadequate intake as evidenced by energy =50% =5 days, wt loss of >7.5% in 3 months, moderate-severe wasting.     Active [ x ]  Resolved [   ]    Goal:  Optimize nutrition and hydration status within the goals of care.     Recommendations:  1. Continue NPO.  >>Maintain nutrition within goals of care at all times.   >>Consider comfort feeds if pt alert and expressing desire to eat, defer consistency to team/SLP. Do not force feed. Honor all food preferences as able.  2. Monitor GI tolerance, weight trends, labs, & skin integrity as appropriate.  3. Defer bowel and pain regimens to team.   4. RD to remain available for dietary intervention prn.     Education:   Education not appropriate at this time, RD remains available for dietary education prn.     Risk Level: High [   ] Moderate [ x ] Low [   ]

## 2024-03-06 NOTE — PROGRESS NOTE ADULT - PROBLEM SELECTOR PLAN 2
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 D'Amico on 1/11  and recently diagnosed leptomeningeal disease. CT imaging w/ enlarging malignant mass compressing on mid esophagus and L. Atrium. On last hospitalization CT surg consulted and pt not candidate for intervention. Previously treated with repotrectinib w/ Dr. Chapman.   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 EGD aborted due to inability to pass. Rec palliation and NPO  - Pending further discussion with GI/CTS regarding stent

## 2024-03-06 NOTE — PROGRESS NOTE ADULT - ASSESSMENT
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 Transitioned to comfort care, now pending home hospice vs. [place].

## 2024-03-06 NOTE — PROGRESS NOTE ADULT - PROBLEM SELECTOR PROBLEM 7
Encounter for palliative care Mart-1 - Negative Histology Text: MART-1 staining demonstrates a normal density and pattern of melanocytes along the dermal-epidermal junction. The surgical margins are negative for tumor cells.

## 2024-03-06 NOTE — PROGRESS NOTE ADULT - SUBJECTIVE AND OBJECTIVE BOX
Date of Service: 03-06-24 @ 17:40    SUBJECTIVE AND OBJECTIVE:  Indication for Geriatrics and Palliative Care Services/INTERVAL HPI: Mr. 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 EGD revealed esophageal fistula, GI has reviewed risks/benefits of esophageal stenting with patient.     OVERNIGHT EVENTS: Chart reviewed and patient seen and examined at the bedside. On Friday, EGD revealed esophageal fistula, GI has reviewed risks/benefits of esophageal stenting with patient who deferred procedure and leaning towards hospice.  He complains of dyspnea and respiratory secretions, PRN morphine, robinul, ativan ordered yesterday. The patient required no PRNs within a 24hr period 8am-8am.    Allergies  No Known Allergies    MEDICATIONS  (STANDING):  albuterol/ipratropium for Nebulization 3 milliLiter(s) Nebulizer every 6 hours  dexAMETHasone  Injectable 2 milliGRAM(s) IV Push two times a day  dextrose 5% + sodium chloride 0.9%. 1000 milliLiter(s) (60 mL/Hr) IV Continuous <Continuous>  enoxaparin Injectable 50 milliGRAM(s) SubCutaneous every 12 hours  tamsulosin 0.4 milliGRAM(s) Oral at bedtime    MEDICATIONS  (PRN):  acetaminophen   IVPB .. 750 milliGRAM(s) IV Intermittent every 6 hours PRN Mild Pain (1 - 3)  morphine  - Injectable 2 milliGRAM(s) IV Push every 4 hours PRN Severe Pain (7 - 10)    PRESENT SYMPTOMS: [ ]Unable to self-report  [ ] CPOT [ ] PAINADs [ ] RDOS  Source if other than patient:  [ ]Family   [ ]Team     Pain: [ ]yes [ x]no  QOL impact -   Location -                    Aggravating factors -  Quality -  Radiation -  Timing-  Severity (0-10 scale):  Minimal acceptable level (0-10 scale):     Dyspnea:                           [ x]Mild [ ]Moderate [ ]Severe  Anxiety:                             [ ]Mild [ ]Moderate [ ]Severe  Fatigue:                             [ ]Mild [ ]Moderate [ ]Severe  Nausea:                             [ ]Mild [ ]Moderate [ ]Severe  Loss of appetite:              [ ]Mild [ ]Moderate [ ]Severe  Constipation:                    [ ]Mild [ ]Moderate [ ]Severe    Chaplaincy Referral: [ ] yes [ ] refused [ ] following [x ] Deferred     Caregiver [place]? : [x ] yes [ ] no [ ] Deferred [ ] Declined             Social work referral [ ] Patient & Family Centered Care Referral [ ]   Anticipatory Grief present?:  [ ] yes [x ] no  [ ] Deferred                  Social work referral [ ] Chaplaincy Referral [ ]    Other Symptoms:  +odynophagia, dysphagia, oropharyngeal secretions  [x ]All other review of systems negative     PHYSICAL EXAM:  Vital Signs Last 24 Hrs  T(C): 36.4 (06 Mar 2024 13:30), Max: 36.7 (05 Mar 2024 21:37)  T(F): 97.5 (06 Mar 2024 13:30), Max: 98 (05 Mar 2024 21:37)  HR: 87 (06 Mar 2024 13:30) (73 - 87)  BP: 100/66 (06 Mar 2024 13:30) (96/54 - 108/70)  BP(mean): --  RR: 18 (06 Mar 2024 13:30) (18 - 18)  SpO2: 91% (06 Mar 2024 13:30) (91% - 94%)    Parameters below as of 06 Mar 2024 13:30  Patient On (Oxygen Delivery Method): nasal cannula  O2 Flow (L/min): 4   I&O's Summary    05 Mar 2024 07:01  -  06 Mar 2024 07:00  --------------------------------------------------------  IN: 0 mL / OUT: 500 mL / NET: -500 mL    GENERAL: [x ]Cachexia    [ x]Alert  [x ]Oriented x3   [ ]Lethargic  [ ]Unarousable  [x ]Verbal  [ ]Non-Verbal  Behavioral:   [ ] Anxiety  [ ] Delirium [ ] Agitation [ ] Other  HEENT:  [ x]Normal   [ ]Dry mouth   [ ]ET Tube/Trach  [ ]Oral lesions  PULMONARY:   [x ]Clear [ ]Tachypnea  [ ]Audible excessive secretions   [ ]Rhonchi        [ ]Right [ ]Left [ ]Bilateral  [ ]Crackles        [ ]Right [ ]Left [ ]Bilateral  [ ]Wheezing     [ ]Right [ ]Left [ ]Bilateral  [ ]Diminished breath sounds [ ]right [ ]left [ ]bilateral  CARDIOVASCULAR:    [ ]Regular [ ]Irregular [ ]Tachy  [ ]Bobo [ ]Murmur [ ]Other  GASTROINTESTINAL:  [ x]Soft  [ ]Distended   [x ]+BS  [ x]Non tender [ ]Tender  [ ]Other [ ]PEG [ ]OGT/ NGT  Last BM:  GENITOURINARY:  [ ]Normal [ ] Incontinent   [ ]Oliguria/Anuria   [x ]Martinez  MUSCULOSKELETAL:   [ ]Normal   [ ]Weakness  [ ]Bed/Wheelchair bound [ ]Edema  NEUROLOGIC:   [ ]No focal deficits  [ ]Cognitive impairment  [ ]Dysphagia [ ]Dysarthria [ ]Paresis [ ]Other   SKIN:   [ ]Normal  [ ]Rash  [ ]Other  [ ]Pressure ulcer(s)       Present on admission [ ]y [ ]n    LABS:              13.6   8.56  )-----------( 220      ( 06 Mar 2024 05:30 )             43.1     136  |  103  |  8   ----------------------------<  100<H>  4.2   |  22  |  0.35<L>    Ca    9.4      06 Mar 2024 05:30  Phos  2.5     03-06  Mg     1.9     03-06    TPro  5.9<L>  /  Alb  2.3<L>  /  TBili  0.5  /  DBili  x   /  AST  17  /  ALT  19  /  AlkPhos  91  03-06    Urinalysis Basic - ( 06 Mar 2024 05:30 )  Color: x / Appearance: x / SG: x / pH: x  Gluc: 100 mg/dL / Ketone: x  / Bili: x / Urobili: x   Blood: x / Protein: x / Nitrite: x   Leuk Esterase: x / RBC: x / WBC x   Sq Epi: x / Non Sq Epi: x / Bacteria: x    RADIOLOGY & ADDITIONAL STUDIES: No new imaging    Height (cm): 170.2 (02-15-24 @ 01:51), 170.2 (01-11-24 @ 10:50), 170.2 (01-07-24 @ 22:09)  Weight (kg): 48.8 (03-01-24 @ 02:00), 43.5 (02-15-24 @ 01:51), 49.4 (01-11-24 @ 10:50)  BMI (kg/m2): 16.8 (03-01-24 @ 02:00), 15 (02-15-24 @ 01:51), 17.1 (01-11-24 @ 10:50)    [x ]PPSV2 < or = 30%  [ ]significant weight loss [ ]poor nutritional intake [ ]anasarca [ ]Artificial Nutrition  Protein Calorie Malnutrition Present: [ ]mild [ ]moderate [ ]severe [ ]underweight   [ ]morbid obesity > 40 BMI

## 2024-03-06 NOTE — PROGRESS NOTE ADULT - PROBLEM SELECTOR PLAN 8
Pt seen by [facility] palliative team (Dr. Kevin) during previous admission while actively undergoing cancer treatement. At that time, pt was not ready to participate in advanced directive and GOC planning. Currently pt is DNR/DNI.   - reconsulted palliative team in this admission for reinitiation of advance GOC conversation. Pt no longer will be eligible for active cancer treatment and likely qualify for home hospice.  - per discussion, pt made no escalation of care

## 2024-03-06 NOTE — PROGRESS NOTE ADULT - PROBLEM SELECTOR PLAN 5
.  Stage IIIB metastatic to brain and spine  - Oncologist Dr. Chapman  - s/p carboplatin/paclitaxel finished 10/27/23, recently taking repotrectinib to suppress disease growth as there was no role for RT or stent per Onc  - s/p OR craniotomy 1/11/24 by Dr. D'Amico.

## 2024-03-07 NOTE — PROGRESS NOTE ADULT - PROBLEM SELECTOR PLAN 2
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 D'Amico on 1/11  and recently diagnosed leptomeningeal disease. CT imaging w/ enlarging malignant mass compressing on mid esophagus and L. Atrium. On last hospitalization CT surg consulted and pt not candidate for intervention. Previously treated with repotrectinib w/ Dr. Chapman.   - CT from [facility] w/ 20% reduction in mediastinal lymphadenopathy, pending CT c/a/p here  - CTH w/ interval improvement in prepontine and cystic lesions   Further tx based on EGD finding:   - Per onc concern for tracheoesophageal fistula and if present, pt no longer a candidate for repotrectinib or any other treatement modality   - Currently s/p EGD -> presence of esophageal tumor invasion and fistulous tract. EGD aborted due to inability to pass. Rec palliation and NPO  - Pending further discussion with GI/CTS regarding stent Hx of  Stage IIIB NSCLC with multiple mets to brain s/p craniectomy and cerebellar resection w/ Dr. D'Amico on 1/11 and recently diagnosed leptomeningeal disease. CT imaging w/ enlarging malignant mass compressing on mid esophagus and L. Atrium. On last hospitalization CT surg consulted and pt not candidate for intervention. Previously treated with repotrectinib w/ Dr. Chapman.     - CT from [facility] w/ 20% reduction in mediastinal lymphadenopathy, pending CT c/a/p here  - CTH w/ interval improvement in prepontine and cystic lesions   Further tx based on EGD finding:   - Per onc concern for tracheoesophageal fistula and if present, pt no longer a candidate for repotrectinib or any other treatement modality   - Currently s/p EGD -> presence of esophageal tumor invasion and fistulous tract. EGD aborted due to inability to pass. Rec palliation and NPO  - Pending further discussion with GI/CTS regarding stent, however leaning towards home hospice. Hx of  Stage IIIB NSCLC with multiple mets to brain s/p craniectomy and cerebellar resection w/ Dr. D'Amico on 1/11 and recently diagnosed leptomeningeal disease. CT imaging w/ enlarging malignant mass compressing on mid esophagus and L. Atrium. On last hospitalization CT surg consulted and pt not candidate for intervention. Previously treated with repotrectinib w/ Dr. Chapman.     - CT from [facility] w/ 20% reduction in mediastinal lymphadenopathy  - CTH w/ interval improvement in prepontine and cystic lesions   - Per onc concern for tracheoesophageal fistula and if present, pt no longer a candidate for repotrectinib or any other treatement modality   - Currently s/p EGD -> presence of esophageal tumor invasion and fistulous tract. EGD aborted due to inability to pass. Rec palliation and NPO  - per GOC no longer pursing stent. Pending home hospice.

## 2024-03-07 NOTE — PROGRESS NOTE ADULT - ATTENDING COMMENTS
78M with h/o basal cell carcinoma and melanoma (resected several years ago), stage IIIB NSCLC s/p chemoRT (completed 10/2023) with multiple brain mets (s/p Left retrosigmoid craniectomy for resection of cerebellar lesion), leptomeningeal disease on MRI whole spine from 2/11/2024, BPH, pulmonary embolism, with chronic dysphagia and odynophagia, tx from [facility] to [facility] for continuity of care.    Labs and imaging reviewed    Problem List:  #Metastatic Lung Cancer  #Dysphagia  #Esophageal Tracheo fistula   #PE    Plan  -Discussion with Multidisciplinary Team regarding treatment options vs Hospice  -Currently Hospice would be in GOC but there is question as to whether stent would be offered in this case    Rest as above
Patient seen, examined, and discussed with Dr. Méndez. Agree with above. 78M with a h/o basal cell carcinoma and melanoma (resected several years ago), stage IIIB NSCLC s/p chemoRT (completed 10/2023) with multiple brain mets (s/p Left retrosigmoid craniectomy for resection of cerebellar lesion), leptomeningeal disease on MRI whole spine from 2/11/2024, BPH, pulmonary embolism, with chronic dysphagia and odynophagia, presenting with worsening swallowing symptoms. GI consulted for dysphagia in the setting of known malignancy and for concern for tracheo-esophageal fistula. Had extensive discussion with patient and family regarding risks/benefits of esophageal + tracheal/bronchial stenting. Ultimately, family decided to pursue hospice. We will sign off, please call back with questions or concerns.     John Chávez MD  Gastroenterology
77yo M with PMH of Metastatic Lung CA p/w inability to tolerate PO and found to have progression of disease. Palliative consulted for complex medical decision making in the setting of life-limiting illness. See GOC note above. No plans to continue DMT, hospice eligible and appropriate.    Counselled on symptom regimen and PRN use. Advised patient that he can ask for PRNs for any symptoms. Sent Rx's to [facility] so family can . Plan for discharge from with home with hospice but exploring [place] referral as a backup. Tolerating parenteral controlled substances without unexpected adverse effects/toxicities. Will require continued monitoring and titration of parenteral opiate regimen for adequate symptom management.    -Scopolamine Patch q72hr  -Robinul 0.4mg IV q6h PRN for Excessive Secretions  -Morphine 2mg IV q2h PRN for Moderate/Severe Pain or RR>22  -Ativan 0.5mg IV q4h PRN for Anxiety/Agitation    Will continue to follow for ongoing C discussion / titration of palliative regimen. Emotional support provided, questions answered.  Active Psychosocial Referrals:  [x]Social Work/Case management [x]PT/OT []Chaplaincy [x]Hospice  []Patient/Family Support []Holistic RN []Massage Therapy []Music Therapy []Ethics  Coping: [x] well [] with difficulty [] poor coping [] unable to assess  Support system: [x] strong [] adequate [] inadequate    For new or uncontrolled symptoms, please call Palliative Care at [phone]). The service is available 24/7 (including nights & weekends) to provide symptom management recommendations over the phone as appropriate
Complex medical decision making / symptom management in the setting of metastatic malignancy.    79yo M with PMH of Metastatic Lung CA p/w inability to tolerate PO and found to have progression of disease. Palliative consulted for complex medical decision making in the setting of life-limiting illness. See GOC note above. No acute symptom management needs at this time. Recommend Morphine 2mg IV q2h PRN for Severe Pain or RR >22 if symptom arise.    GOC addressed: pending discussion with [facility] whether palliative stent is possible. Patient and family are amenable to hospice referral. Ongoing coordination for discharge home with hospice. Symptom regimen adjusted in case symptoms arise.    Will continue to follow for ongoing GOC discussion / titration of palliative regimen. Emotional support provided, questions answered.  Active Psychosocial Referrals:  [x]Social Work/Case management [x]PT/OT []Chaplaincy [x]Hospice  []Patient/Family Support []Holistic RN []Massage Therapy []Music Therapy []Ethics  Coping: [x] well [] with difficulty [] poor coping [] unable to assess  Support system: [x] strong [] adequate [] inadequate    For new or uncontrolled symptoms, please call Palliative Care at [phone]). The service is available 24/7 (including nights & weekends) to provide symptom management recommendations over the phone as appropriate
77yo M with PMH of Metastatic Lung CA p/w inability to tolerate PO and found to have progression of disease. Palliative consulted for complex medical decision making in the setting of life-limiting illness. See GOC note above. No plans to continue DMT, hospice eligible and appropriate.    Finalized decision for home hospice, pending delivery of DME. Counselled on symptom regimen. Tolerating parenteral controlled substances without unexpected adverse effects/toxicities. Will require continued monitoring and titration of parenteral opiate regimen for adequate symptom management.    -Scopolamine Patch q72hr  -Robinul 0.4mg IV q6h PRN for Excessive Secretions  -Morphine 2mg IV q2h PRN for Moderate/Severe Pain or RR>22  -Ativan 0.5mg IV q4h PRN for Anxiety/Agitation    Will continue to follow for ongoing GOC discussion / titration of palliative regimen. Emotional support provided, questions answered.  Active Psychosocial Referrals:  [x]Social Work/Case management [x]PT/OT []Chaplaincy [x]Hospice  []Patient/Family Support []Holistic RN []Massage Therapy []Music Therapy []Ethics  Coping: [x] well [] with difficulty [] poor coping [] unable to assess  Support system: [x] strong [] adequate [] inadequate    For new or uncontrolled symptoms, please call Palliative Care at [phone]). The service is available 24/7 (including nights & weekends) to provide symptom management recommendations over the phone as appropriate
Complex medical decision making / symptom management in the setting of metastatic malignancy.    79yo M with PMH of Metastatic Lung CA p/w inability to tolerate PO and found to have progression of disease. Palliative consulted for complex medical decision making in the setting of life-limiting illness. See GOC note above. No plans to continue DMT, hospice eligible and appropriate.    Patient and family initially amenable to home hospice referral but later feeling hesitant. Explained that patient does not meet criteria for inpatient hospice at this time so the next best alternative would be getting insurance auth for [facility] for end of life care. Will send the referral and see if approved. Symptom regimen adjusted due to increased secretion burden.    -Scopolamine Patch q72hr  -Robinul 0.4mg IV q6h PRN for Excessive Secretions  -Morphine 2mg IV q2h PRN for Moderate/Severe Pain or RR>22  -Ativan 0.5mg IV q4h PRN for Anxiety/Agitation    Will continue to follow for ongoing GOC discussion / titration of palliative regimen. Emotional support provided, questions answered.  Active Psychosocial Referrals:  [x]Social Work/Case management [x]PT/OT []Chaplaincy [x]Hospice  []Patient/Family Support []Holistic RN []Massage Therapy []Music Therapy []Ethics  Coping: [x] well [] with difficulty [] poor coping [] unable to assess  Support system: [x] strong [] adequate [] inadequate    For new or uncontrolled symptoms, please call Palliative Care at [phone]). The service is available 24/7 (including nights & weekends) to provide symptom management recommendations over the phone as appropriate
78M with h/o basal cell carcinoma and melanoma (resected several years ago), stage IIIB NSCLC s/p chemoRT (completed 10/2023) with multiple brain mets (s/p Left retrosigmoid craniectomy for resection of cerebellar lesion), leptomeningeal disease on MRI whole spine from 2/11/2024, BPH, pulmonary embolism, with chronic dysphagia and odynophagia, [facility] care.    Labs and imaging reviewed    Problem List:  #Metastatic Lung Cancer  #Dysphagia  #Esophageal Tracheo fistula   #PE    Plan  -Discussion with Multidisciplinary Team regarding treatment options vs Hospice  -Currently Hospice would be in GOC but there is question as to whether stent would be offered in this case  -Plan for D/C tomorrow with home hospice    Rest as above
78M with h/o basal cell carcinoma and melanoma (resected several years ago), stage IIIB NSCLC s/p chemoRT (completed 10/2023) with multiple brain mets (s/p Left retrosigmoid craniectomy for resection of cerebellar lesion), leptomeningeal disease on MRI whole spine from 2/11/2024, BPH, pulmonary embolism, with chronic dysphagia and odynophagia, tx from [facility] to [facility] for continuity of care.    Labs and imaging reviewed    Problem List:  #Metastatic Lung Cancer  #Dysphagia  #Esophageal Tracheo fistula   #PE    Plan  -Discussion with Multidisciplinary Team regarding treatment options vs Hospice  -Currently Hospice would be in GOC but there is question as to whether stent would be offered in this case  -Plan for D/C tomorrow with home hospice    Rest as above
78M with metastatic Lung Cancer with new tracheoesophageal fistula, pending Home Hospice vs being eligible for [place] inpatient hospice. Pt is on IV D5NS and NPO.,  -will follow with Palliative SW re specifics of the dispo plan and if Home Hospice elected, pt will need equipment delivered first. Pt's daughter moved to [place] to take care of dad. Pt also has elder siblings.
78M with metastatic Lung Cancer with new tracheoesophageal fistula, pending Home Hospice vs being eligible for [place] inpatient hospice. Pt is on IV D5NS and NPO.,  -pt is planned for discharge home with home hospice tomorrow.

## 2024-03-07 NOTE — PROGRESS NOTE ADULT - ASSESSMENT
78M with h/o basal cell carcinoma and melanoma (resected several years ago), stage IIIB NSCLC s/p chemoRT (completed 10/2023) with multiple brain mets (s/p Left retrosigmoid craniectomy for resection of cerebellar lesion), leptomeningeal disease on MRI whole spine from 2/11/2024, BPH, pulmonary embolism, with chronic dysphagia and odynophagia, tx from [facility] to [facility] for continuity of care. Transitioned to comfort care, now pending home hospice vs. [place].  78M w/ PMHx of basal cell carcinoma and melanoma (resected several years ago), stage IIIB NSCLC s/p chemoRT (completed 10/2023) with multiple brain mets (s/p Left retrosigmoid craniectomy for resection of cerebellar lesion), leptomeningeal disease on MRI whole spine from 2/11/2024, BPH, pulmonary embolism, with chronic dysphagia and odynophagia, tx from [facility] to [facility] for continuity of care. Transitioned to comfort care, now pending home hospice vs. [place].

## 2024-03-07 NOTE — PROGRESS NOTE ADULT - PROBLEM SELECTOR PLAN 6
Pt with PE (1/12) involving b/l segmental RLL and subsegmental LLL likely provoked i/s/o malignancy. Pt on eliquis 5mg BID.  - on lovenox full AC given hx of PE and for now maybe a candidate for palliative stenting of fistula although unclear if possible.   - d/c lovenox if no possibility of further palliative intervention PE (1/12) involving b/l segmental RLL and subsegmental LLL likely provoked i/s/o malignancy. On eliquis 5mg BID.    - previously on lovenox full AC given hx of PE and for now maybe a candidate for palliative stenting of fistula although unclear if possible.   - d/c lovenox per pt wish. PE (1/12) involving b/l segmental RLL and subsegmental LLL likely provoked i/s/o malignancy. On eliquis 5mg BID.    - previously on lovenox full AC given hx of PE, d/c per pt and family wish.

## 2024-03-07 NOTE — PROGRESS NOTE ADULT - PROBLEM SELECTOR PLAN 1
.  Increased work of breathing with oropharyngeal secretions  -morphine 2mg IV q2h prn respiratory distress (required x1 between a 24 hour period 8am-8am)  -glycopyrrolate 0.4mg IV q6h prn excessive secretions (none required in a 24 hour period 8am-8am)  -scopolamine patch q72h .  Increased work of breathing with oropharyngeal secretions. Used morphine 2mg IV x1 which alleviated dyspnea. Continue current dose  -morphine 2mg IV q2h prn respiratory distress (required x1 between a 24 hour period 8am-8am)  -glycopyrrolate 0.4mg IV q6h prn excessive secretions (none required in a 24 hour period 8am-8am)  -scopolamine patch q72h

## 2024-03-07 NOTE — PROGRESS NOTE ADULT - SUBJECTIVE AND OBJECTIVE BOX
Physical Medicine and Rehabilitation Progress Note :       Patient is a 78y old  Male who presents with a chief complaint of odynophagia (07 Mar 2024 09:54)      HPI:  78M with h/o basal cell carcinoma and melanoma (resected several years ago), stage IIIB NSCLC s/p chemoRT (completed 10/2023) with multiple brain mets (s/p Left retrosigmoid craniectomy for resection of cerebellar lesion),   leptomeningeal disease on MRI whole spine from 2/11/2024, BPH, pulmonary embolism, with chronic dysphagia and odynophagia, presenting with worsening swallowing symptoms. Pt fell 2/24, no clear head trauma, brought to [facility]. Workup there notable for urinary retention (river placed), dysphagia (under consideration for EGD), ?PNA/UTI (Zosyn), and +adenovirus. Pt has been unable to tolerate PO intake x 1 week prior to admission; experiencing regurgitation with food/liquids. No n/v/abd pain. Reports vision is blurred and has diplopia but is stable s/p resection of brain lesion. Also reports generalized weakness. Denies focal weakness/numbness. (01 Mar 2024 00:53)                            13.6   8.56  )-----------( 220      ( 06 Mar 2024 05:30 )             43.1       03-06    136  |  103  |  8   ----------------------------<  100<H>  4.2   |  22  |  0.35<L>    Ca    9.4      06 Mar 2024 05:30  Phos  2.5     03-06  Mg     1.9     03-06    TPro  5.9<L>  /  Alb  2.3<L>  /  TBili  0.5  /  DBili  x   /  AST  17  /  ALT  19  /  AlkPhos  91  03-06    Vital Signs Last 24 Hrs  T(C): 36.3 (07 Mar 2024 05:36), Max: 36.4 (06 Mar 2024 13:30)  T(F): 97.3 (07 Mar 2024 05:36), Max: 97.5 (06 Mar 2024 13:30)  HR: 74 (07 Mar 2024 05:36) (74 - 87)  BP: 93/61 (07 Mar 2024 05:36) (93/61 - 101/65)  BP(mean): --  RR: 18 (07 Mar 2024 05:36) (18 - 18)  SpO2: 93% (07 Mar 2024 05:36) (91% - 93%)    Parameters below as of 07 Mar 2024 05:36    O2 Flow (L/min): 4      MEDICATIONS  (STANDING):  albuterol/ipratropium for Nebulization 3 milliLiter(s) Nebulizer every 6 hours  dexAMETHasone  Injectable 2 milliGRAM(s) IV Push two times a day  dextrose 5% + sodium chloride 0.9%. 1000 milliLiter(s) (60 mL/Hr) IV Continuous <Continuous>  scopolamine 1 mG/72 Hr(s) Patch 1 Patch Transdermal every 72 hours  tamsulosin 0.4 milliGRAM(s) Oral at bedtime    MEDICATIONS  (PRN):  acetaminophen   IVPB .. 750 milliGRAM(s) IV Intermittent every 6 hours PRN Mild Pain (1 - 3)  glycopyrrolate Injectable 0.4 milliGRAM(s) IV Push every 6 hours PRN Excessive Secretions  LORazepam   Injectable 0.5 milliGRAM(s) IV Push every 4 hours PRN Anxiety/Agitation/Insomnia  morphine  - Injectable 2 milliGRAM(s) IV Push every 2 hours PRN Severe Pain (7 - 10) or RR>22/SOB        3/6/2024 Functional Status Assessment :         Pain Assessment/Number Scale (0-10) Adult  Presence of Pain: denies pain/discomfort (Rating = 0)  Pain Rating (0-10): Rest: 0 (no pain/absence of nonverbal indicators of pain)  Pain Rating (0-10): Activity: 0 (no pain/absence of nonverbal indicators of pain)    Safety      AM-PAC Functional Assessment: Basic Mobility  Type of Assessment: Daily assessment  Turning from your back to your side while in a flat bed without using bedrails?: 3 = A little assistance  Moving from lying on your back to sitting on the flat side of a flat bed without using bedrails?: 3 = A little assistance  Moving to and from a bed to a chair (including a wheelchair)?: 2 = A lot of assistance  Standing up from a chair using your arms (e.g. wheelchair or bedside chair)?: 3 = A little assistance  Walking in hospital room?: 3 = A little assistance  Climbing 3-5 steps with a railing?: 2 = A lot of assistance  Score: 16   Row Comment: Ask the patient "How much help from another person do you currently need? (If the patient hasn't done an activity recently, how much help from another person do you think he/she needs if he/she tried?)    Cognitive/Neuro      Cognitive/Neuro/Behavioral  Cognitive/Neuro/Behavioral [WDL Definition: Alert; opens eyes spontaneously; arouses to voice or touch; oriented x 4; follows commands; speech spontaneous, logical; purposeful motor response; behavior appropriate to situation]: WDL    Language Assistance  Preferred Language to Address Healthcare Preferred Language to Address Healthcare: English    Therapeutic Interventions      Bed Mobility  Bed Mobility Training Rehab Potential: good, to achieve stated therapy goals  Bed Mobility Training Bridging: contact guard;  verbal cues  Bed Mobility Training Sit-to-Supine: moderate assist (50% patient effort);  1 person assist;  verbal cues;  LE assist  Bed Mobility Training Supine-to-Sit: minimum assist (75% patient effort);  1 person assist;  verbal cues  Bed Mobility Training Limitations: decreased strength;  impaired balance;  impaired postural control    Sit-Stand Transfer Training  Sit-to-Stand Transfer Training Rehab Potential: good, to achieve stated therapy goals  Transfer Training Sit-to-Stand Transfer: minimum assist (75% patient effort);  2 person assist;  verbal cues;  full weight-bearing   b/l HHA  Transfer Training Stand-to-Sit Transfer: minimum assist (75% patient effort);  2 person assist;  verbal cues;  full weight-bearing   b/l HHA  Sit-to-Stand Transfer Training Transfer Safety Analysis: decreased balance;  decreased weight-shifting ability;  decreased strength;  impaired balance    Gait Training  Gait Training Rehab Potential: good, to achieve stated therapy goals  Gait Training: minimum assist (75% patient effort);  2 person assist;  verbal cues;  full weight-bearing   b/l HHA;  4 side steps R  Gait Analysis: Pt demo decreased tolerance to prlonged standing, decreased step length and wt shifting. ;  decreased strength;  impaired balance;  4 side steps R;  b/l HHA    Therapeutic Exercise  Therapeutic Exercise Detail: Seated: Pt tolerated dangle EOB ~5 mins performing marching, LAQs, and ankle pumps 10x each       AM-PAC Functional Assessment: Daily Activity  Type of Assessment: Daily assessment  Putting on and taking off regular lower body clothing?: 2 = A lot of assistance  Bathing (including washing, rinsing, drying)?: 2 = A lot of assistance  Toileting, which includes using toilet, bedpan or urinal?: 3 = A little assistance  Putting on and taking off regular upper body clothing?: 3 = A little assistance  Take care of personal grooming such as brushing teeth?: 4 = No assist / stand by assistance  Eating meals?: 4 = No assist / stand by assistance  Score: 18   Row Comment: Ask the patient "How much help from another person do you currently need? (If the patient hasn't done an activity recently, how much help from another person do you think he/she needs if he/she tried?)    Cognitive/Neuro      Cognitive/Neuro/Behavioral  Cognitive/Neuro/Behavioral [WDL Definition: Alert; opens eyes spontaneously; arouses to voice or touch; oriented x 4; follows commands; speech spontaneous, logical; purposeful motor response; behavior appropriate to situation]: WDL    Language Assistance  Preferred Language to Address Healthcare Preferred Language to Address Healthcare: English    Therapeutic Interventions      Bed Mobility  Bed Mobility Training Bridging: contact guard;  verbal cues;  nonverbal cues (demo/gestures)  Bed Mobility Training Sit-to-Supine: moderate assist (50% patient effort);  1 person assist;  verbal cues;  nonverbal cues (demo/gestures)  Bed Mobility Training Supine-to-Sit: minimum assist (75% patient effort);  1 person assist;  nonverbal cues (demo/gestures);  verbal cues  Bed Mobility Training Limitations: decreased strength;  impaired balance;  impaired postural control;  impaired ability to control trunk for mobility;  decreased ability to use legs for bridging/pushing;  decreased ability to use arms for pushing/pulling    Sit-Stand Transfer Training  Transfer Training Sit-to-Stand Transfer: minimum assist (75% patient effort);  2 person assist;  verbal cues;  nonverbal cues (demo/gestures);  b/l HHA ;  full weight-bearing  Transfer Training Stand-to-Sit Transfer: minimum assist (75% patient effort);  2 person assist;  nonverbal cues (demo/gestures);  verbal cues;  b/l HHA ;  full weight-bearing  Sit-to-Stand Transfer Training Transfer Safety Analysis: decreased weight-shifting ability;  decreased step length;  decreased balance;  decreased strength;  impaired balance;  impaired postural control    Therapeutic Exercise  Therapeutic Exercise Charges: pt. tolerated dangle at EOB ~10 minutes to engage in BUE ther ex(scapular protraction/retraction, shoulder rolls, elevation/depression x10)     Upper Body Dressing Training  Upper Body Dressing Training Assistance: minimum assist (75% patient effort);  1 person assist;  nonverbal cues (demo/gestures);  verbal cues;  decreased strength;  impaired balance;  impaired postural control          PM&R Impression : as above    Current disposition plan recommendation :       - d/c home with home physical and occupational therapy

## 2024-03-07 NOTE — PROGRESS NOTE ADULT - REASON FOR ADMISSION
Dysphagia and Failure to Thrive
odynophagia
Weakness
Comfort Care
odynophagia

## 2024-03-07 NOTE — PROGRESS NOTE ADULT - PROBLEM SELECTOR PLAN 8
Pt seen by [facility] palliative team (Dr. Kevin) during previous admission while actively undergoing cancer treatement. At that time, pt was not ready to participate in advanced directive and GOC planning. Currently pt is DNR/DNI.   - reconsulted palliative team in this admission for reinitiation of advance GOC conversation. Pt no longer will be eligible for active cancer treatment and likely qualify for home hospice.  - per discussion, pt made no escalation of care Pt seen by [facility] palliative team (Dr. Kevin) during previous admission while actively undergoing cancer treatement. At that time, pt was not ready to participate in advanced directive and GOC planning. Currently pt is DNR/DNI, MEW exempt     - reconsulted palliative team in this admission for reinitiation of advance GOC conversation. Pt no longer will be eligible for active cancer treatment and likely qualify for home hospice.

## 2024-03-07 NOTE — PROGRESS NOTE ADULT - PROBLEM SELECTOR PLAN 7
Presenting with intermittent episode of junctional bradycardia to 60s. EP consulted by neurosurgery team. No acute intervention required.  - no further intervention or need for close cardiac monitoring P/w intermittent episode of junctional bradycardia to 60s. EP consulted by neurosurgery team. No acute intervention required.

## 2024-03-07 NOTE — PROGRESS NOTE ADULT - PROBLEM SELECTOR PLAN 7
.  Patient would benefit from outpatient Palliative/Supportive Oncology follow-up on discharge with Dr. Liss Kevin at [facility], include her name and contact info ([phone]) in Discharge Summary    Palliative Care is available 24/7, please call [phone] with any questions.    Verena Armenta MD, PGY4  Hospice & Palliative Medicine Fellow

## 2024-03-07 NOTE — DISCHARGE NOTE NURSING/CASE MANAGEMENT/SOCIAL WORK - PATIENT PORTAL LINK FT
You can access the FollowMyHealth Patient Portal offered by [facility] by registering at the following website: [email or web address]. By joining [facility]’s FollowMyHealth portal, you will also be able to view your health information using other applications (apps) compatible with our system.

## 2024-03-07 NOTE — PROGRESS NOTE ADULT - PROBLEM SELECTOR PLAN 5
Pt with hx of urinary retention 2/2 BPH. Pt failed TOV. Martinez placed by Urology   - c/w Martinez for now as failed TOV for comfort Hx of urinary retention 2/2 BPH. Pt failed TOV. Martinez placed by Urology     - Martinez for now as failed TOV for comfort  - Flomax 0.4 qHS

## 2024-03-07 NOTE — PROGRESS NOTE ADULT - SUBJECTIVE AND OBJECTIVE BOX
Date of Service: 03-07-24 @ 9:55 AM    SUBJECTIVE AND OBJECTIVE:  Indication for Geriatrics and Palliative Care Services/INTERVAL HPI: Mr. 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 EGD revealed esophageal fistula, GI has reviewed risks/benefits of esophageal stenting with patient.     OVERNIGHT EVENTS: Chart reviewed and patient seen and examined at the bedside. On Friday, EGD revealed esophageal fistula, GI has reviewed risks/benefits of esophageal stenting with patient who deferred procedure and leaning towards hospice.  He complains of dyspnea and respiratory secretions, PRN morphine, robinul, ativan ordered 3/5. The patient required 1x morphine PRN for respiratory distress within a 24hr period 8am-8am.    Allergies  No Known Allergies    MEDICATIONS  (STANDING):  albuterol/ipratropium for Nebulization 3 milliLiter(s) Nebulizer every 6 hours  dexAMETHasone  Injectable 2 milliGRAM(s) IV Push two times a day  dextrose 5% + sodium chloride 0.9%. 1000 milliLiter(s) (60 mL/Hr) IV Continuous <Continuous>  enoxaparin Injectable 50 milliGRAM(s) SubCutaneous every 12 hours  scopolamine 1 mG/72 Hr(s) Patch 1 Patch Transdermal every 72 hours  tamsulosin 0.4 milliGRAM(s) Oral at bedtime    MEDICATIONS  (PRN):  acetaminophen   IVPB .. 750 milliGRAM(s) IV Intermittent every 6 hours PRN Mild Pain (1 - 3)  glycopyrrolate Injectable 0.4 milliGRAM(s) IV Push every 6 hours PRN Excessive Secretions  LORazepam   Injectable 0.5 milliGRAM(s) IV Push every 4 hours PRN Anxiety/Agitation/Insomnia  morphine  - Injectable 2 milliGRAM(s) IV Push every 2 hours PRN Severe Pain (7 - 10) or RR>22/SOB    PRESENT SYMPTOMS: [ ]Unable to self-report  [ ] CPOT [ ] PAINADs [ ] RDOS  Source if other than patient:  [ ]Family   [ ]Team     Pain: [ ]yes [ x]no  QOL impact -   Location -                    Aggravating factors -  Quality -  Radiation -  Timing-  Severity (0-10 scale):  Minimal acceptable level (0-10 scale):     Dyspnea:                           [ ]Mild [ x]Moderate [ ]Severe  Anxiety:                             [ ]Mild [ ]Moderate [ ]Severe  Fatigue:                             [ ]Mild [ ]Moderate [ ]Severe  Nausea:                             [ ]Mild [ ]Moderate [ ]Severe  Loss of appetite:              [ ]Mild [ ]Moderate [ ]Severe  Constipation:                    [ ]Mild [ ]Moderate [ ]Severe    Chaplaincy Referral: [ ] yes [ ] refused [ ] following [x ] Deferred     Caregiver Toney? : [x ] yes [ ] no [ ] Deferred [ ] Declined             Social work referral [ ] Patient & Family Centered Care Referral [ ]   Anticipatory Grief present?:  [ ] yes [x ] no  [ ] Deferred                  Social work referral [ ] Chaplaincy Referral [ ]    Other Symptoms:  +odynophagia, dysphagia, oropharyngeal secretions  [x ]All other review of systems negative     PHYSICAL EXAM:  Vital Signs Last 24 Hrs  Vital Signs Last 24 Hrs  T(C): 36.3 (07 Mar 2024 05:36), Max: 36.4 (06 Mar 2024 13:30)  T(F): 97.3 (07 Mar 2024 05:36), Max: 97.5 (06 Mar 2024 13:30)  HR: 74 (07 Mar 2024 05:36) (74 - 87)  BP: 93/61 (07 Mar 2024 05:36) (93/61 - 101/65)  BP(mean): --  RR: 18 (07 Mar 2024 05:36) (18 - 18)  SpO2: 93% (07 Mar 2024 05:36) (91% - 93%)    Parameters below as of 07 Mar 2024 05:36    O2 Flow (L/min): 4   I&O's Summary    GENERAL: [x ]Cachexia    [ x]Alert  [x ]Oriented x3   [ ]Lethargic  [ ]Unarousable  [x ]Verbal  [ ]Non-Verbal  Behavioral:   [ ] Anxiety  [ ] Delirium [ ] Agitation [ ] Other  HEENT:  [ x]Normal   [ ]Dry mouth   [ ]ET Tube/Trach  [ ]Oral lesions  PULMONARY:   [x ]Clear [ ]Tachypnea  [ ]Audible excessive secretions   [ ]Rhonchi        [ ]Right [ ]Left [ ]Bilateral  [ ]Crackles        [ ]Right [ ]Left [ ]Bilateral  [ ]Wheezing     [ ]Right [ ]Left [ ]Bilateral  [ ]Diminished breath sounds [ ]right [ ]left [ ]bilateral  CARDIOVASCULAR:    [ ]Regular [ ]Irregular [ ]Tachy  [ ]Bobo [ ]Murmur [ ]Other  GASTROINTESTINAL:  [ x]Soft  [ ]Distended   [x ]+BS  [ x]Non tender [ ]Tender  [ ]Other [ ]PEG [ ]OGT/ NGT  Last BM:  GENITOURINARY:  [ ]Normal [ ] Incontinent   [ ]Oliguria/Anuria   [x ]Martinez  MUSCULOSKELETAL:   [ ]Normal   [ ]Weakness  [ ]Bed/Wheelchair bound [ ]Edema  NEUROLOGIC:   [ ]No focal deficits  [ ]Cognitive impairment  [ ]Dysphagia [ ]Dysarthria [ ]Paresis [ ]Other   SKIN:   [ ]Normal  [ ]Rash  [ ]Other  [ ]Pressure ulcer(s)       Present on admission [ ]y [ ]n    LABS: No new labs  RADIOLOGY & ADDITIONAL STUDIES: No new imaging    Height (cm): 170.2 (02-15-24 @ 01:51), 170.2 (01-11-24 @ 10:50), 170.2 (01-07-24 @ 22:09)  Weight (kg): 48.8 (03-01-24 @ 02:00), 43.5 (02-15-24 @ 01:51), 49.4 (01-11-24 @ 10:50)  BMI (kg/m2): 16.8 (03-01-24 @ 02:00), 15 (02-15-24 @ 01:51), 17.1 (01-11-24 @ 10:50)    [x ]PPSV2 < or = 30%  [ ]significant weight loss [ ]poor nutritional intake [ ]anasarca [ ]Artificial Nutrition  Protein Calorie Malnutrition Present: [ ]mild [ ]moderate [ ]severe [ ]underweight   [ ]morbid obesity > 40 BMI

## 2024-03-07 NOTE — PROGRESS NOTE ADULT - NUTRITIONAL ASSESSMENT
This patient has been assessed with a concern for Malnutrition and has been determined to have a diagnosis/diagnoses of Severe protein-calorie malnutrition and Underweight (BMI < 19).    This patient is being managed with:   Diet NPO-  Except Medications  Entered: Mar  1 2024  8:06AM  

## 2024-03-07 NOTE — PROGRESS NOTE ADULT - SUBJECTIVE AND OBJECTIVE BOX
--------INCOMPLETE NOTE--------  OVERNIGHT EVENTS: NAEO    SUBJECTIVE  Pt was seen and examined at bedside. Pt appears comfortable however expressed wish to discontinue Lovenox.     Patient denies any fevers/ chills, n/v, headache, acute SOB, chest pain, abdominal pain, genitourinary sx    12-point review of systems otherwise negative      Vital Signs Last 24 Hrs  T(C): 36.3 (07 Mar 2024 05:36), Max: 36.4 (06 Mar 2024 13:30)  T(F): 97.3 (07 Mar 2024 05:36), Max: 97.5 (06 Mar 2024 13:30)  HR: 74 (07 Mar 2024 05:36) (74 - 87)  BP: 93/61 (07 Mar 2024 05:36) (93/61 - 101/65)  BP(mean): --  RR: 18 (07 Mar 2024 05:36) (18 - 18)  SpO2: 93% (07 Mar 2024 05:36) (91% - 93%)    Parameters below as of 07 Mar 2024 05:36    O2 Flow (L/min): 4        PHYSICAL EXAM     General: NAD  HEENT: Neck Supple; MMM  Respiratory: CTA B/L; no wheezes/rales/rhonchi, normal WOB  Cardiovascular: RRR; no m/r/g  Gastrointestinal: Soft; NTND  : no suprapubic tenderness  Vascular: extremities WWP; no edema/cyanosis, 2+ distal pulses b/l   Neurological: A&Ox3        LABS:                        13.6   8.56  )-----------( 220      ( 06 Mar 2024 05:30 )             43.1     03-06    136  |  103  |  8   ----------------------------<  100<H>  4.2   |  22  |  0.35<L>    Ca    9.4      06 Mar 2024 05:30  Phos  2.5     03-06  Mg     1.9     03-06    TPro  5.9<L>  /  Alb  2.3<L>  /  TBili  0.5  /  DBili  x   /  AST  17  /  ALT  19  /  AlkPhos  91  03-06      Urinalysis Basic - ( 06 Mar 2024 05:30 )    Color: x / Appearance: x / SG: x / pH: x  Gluc: 100 mg/dL / Ketone: x  / Bili: x / Urobili: x   Blood: x / Protein: x / Nitrite: x   Leuk Esterase: x / RBC: x / WBC x   Sq Epi: x / Non Sq Epi: x / Bacteria: x      CAPILLARY BLOOD GLUCOSE       OVERNIGHT EVENTS: NAEO    SUBJECTIVE  Pt was seen and examined at bedside. Pt appears comfortable however pt and family expressed wish to discontinue Lovenox.     Patient denies any fevers/ chills, n/v, headache, acute SOB, chest pain, abdominal pain, genitourinary sx    12-point review of systems otherwise negative      Vital Signs Last 24 Hrs  T(C): 36.3 (07 Mar 2024 05:36), Max: 36.4 (06 Mar 2024 13:30)  T(F): 97.3 (07 Mar 2024 05:36), Max: 97.5 (06 Mar 2024 13:30)  HR: 74 (07 Mar 2024 05:36) (74 - 87)  BP: 93/61 (07 Mar 2024 05:36) (93/61 - 101/65)  BP(mean): --  RR: 18 (07 Mar 2024 05:36) (18 - 18)  SpO2: 93% (07 Mar 2024 05:36) (91% - 93%)    Parameters below as of 07 Mar 2024 05:36    O2 Flow (L/min): 4        PHYSICAL EXAM     General: NAD  HEENT: Neck Supple; MMM  Respiratory: CTA B/L; no wheezes/rales/rhonchi, normal WOB  Cardiovascular: RRR; no m/r/g  Gastrointestinal: Soft; NTND  : no suprapubic tenderness  Vascular: extremities WWP; no edema/cyanosis, 2+ distal pulses b/l   Neurological: A&Ox3        LABS:                        13.6   8.56  )-----------( 220      ( 06 Mar 2024 05:30 )             43.1     03-06    136  |  103  |  8   ----------------------------<  100<H>  4.2   |  22  |  0.35<L>    Ca    9.4      06 Mar 2024 05:30  Phos  2.5     03-06  Mg     1.9     03-06    TPro  5.9<L>  /  Alb  2.3<L>  /  TBili  0.5  /  DBili  x   /  AST  17  /  ALT  19  /  AlkPhos  91  03-06      Urinalysis Basic - ( 06 Mar 2024 05:30 )    Color: x / Appearance: x / SG: x / pH: x  Gluc: 100 mg/dL / Ketone: x  / Bili: x / Urobili: x   Blood: x / Protein: x / Nitrite: x   Leuk Esterase: x / RBC: x / WBC x   Sq Epi: x / Non Sq Epi: x / Bacteria: x      CAPILLARY BLOOD GLUCOSE

## 2024-03-07 NOTE — PROGRESS NOTE ADULT - PROBLEM SELECTOR PLAN 4
Patient presenting due to failure to thrive 2/2 to poor PO intake 2/2 progression of dysphagia to solid and liquids.     - plan as above   - Not a candidate for nutritional support as end of life care and focus on comfort 2/2 poor PO intake 2/2 progression of dysphagia to solid and liquids.     - plan as above   - Not a candidate for nutritional support as end of life care and focus on comfort

## 2024-03-07 NOTE — PROGRESS NOTE ADULT - PROBLEM SELECTOR PLAN 3
Likely 2/2 tumor shrinkage from repotrectinib therapy | Pt w/ hx of chronic dysphagia with gradual progression. Currently w/ inability to tolerate solid and liquids for 1 week. Per imaging and EGD 2/2 tumor invasion in mid esophagus. Visualized large solid food bolus sitting in fundus and inability to pass the scope. 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     - Keep NPO  - Given the nature of advanced disease, no a candidate for PEG or TPN.   - Palliative consult Likely 2/2 tumor shrinkage from repotrectinib therapy | Pt w/ hx of chronic dysphagia with gradual progression. Currently w/ inability to tolerate solid and liquids for 1 week. Per imaging and EGD 2/2 tumor invasion in mid esophagus. Visualized large solid food bolus sitting in fundus and inability to pass the scope. 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     - Keep NPO  - Given the nature of advanced disease, no a candidate for PEG or TPN.

## 2024-03-07 NOTE — PROGRESS NOTE ADULT - PROBLEM SELECTOR PLAN 1
Made DNR/DNI, MEWS exempt, comfort-care only on 03/01 after extensive discussions between primary/palliative teams, patient, and patient's family.    - C/w ofrimev 750mg q6h PRN for mild pain  - C/w morphine 2mg IV BID PRN for severe pain  - C/w decadron 2mg BID  - C/w Duonebs 16h  - C/w Flomax 0.4 qHS Made DNR/DNI, MEWS exempt, comfort-care only on 03/01 after extensive discussions between primary/palliative teams, patient, and patient's family.    - Ofrimev 750mg q6h PRN for mild pain  - Morphine 2mg IV BID PRN for severe pain  - Decadron 2mg BID  - Duonebs q6h

## 2024-03-07 NOTE — PROGRESS NOTE ADULT - PROBLEM SELECTOR PLAN 9
F: none  E: replete K<4, Mg<2  N: NPO  VTE Prophylaxis: Lovenox full AC   GI: not needed  C: DNR/DNI  D: RMF F: none  E: replete K<4, Mg<2  N: NPO  VTE Prophylaxis: Lovenox full AC   GI: not needed  C: DNR/DNI, MEW exempt  D: RMF

## 2024-03-07 NOTE — PROGRESS NOTE ADULT - ASSESSMENT
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 Transitioned to comfort care, now pending home hospice vs. [place].     Nutritional Assessment:  · Nutritional Assessment	This patient has been assessed with a concern for Malnutrition and has been determined to have a diagnosis/diagnoses of Severe protein-calorie malnutrition and Underweight (BMI < 19).    This patient is being managed with:   Diet NPO-  Except Medications  Entered: Mar  1 2024  8:06AM    Problem/Plan - 1:  ·  Problem: Comfort measures only status.   ·  Plan: Made DNR/DNI, MEWS exempt, comfort-care only on 03/01 after extensive discussions between primary/palliative teams, patient, and patient's family.    - Ofrimev 750mg q6h PRN for mild pain  - Morphine 2mg IV BID PRN for severe pain  - Decadron 2mg BID  - Duonebs q6h.    Problem/Plan - 2:  ·  Problem: Metastatic primary lung cancer.   ·  Plan: Hx of  Stage IIIB NSCLC with multiple mets to brain s/p craniectomy and cerebellar resection w/ Dr. D'Amico on 1/11 and recently diagnosed leptomeningeal disease. CT imaging w/ enlarging malignant mass compressing on mid esophagus and L. [facility]. On last hospitalization CT surg consulted and pt not candidate for intervention. Previously treated with repotrectinib w/ Dr. Chapman.     - CT from [facility] w/ 20% reduction in mediastinal lymphadenopathy  - CTH w/ interval improvement in prepontine and cystic lesions   - Per onc concern for tracheoesophageal fistula and if present, pt no longer a candidate for repotrectinib or any other treatement modality   - Currently s/p EGD -> presence of esophageal tumor invasion and fistulous tract. EGD aborted due to inability to pass. Rec palliation and NPO  - per GOC no longer pursing stent. Pending home hospice.    Problem/Plan - 3:  ·  Problem: Fistula, bronchoesophageal.   ·  Plan: Likely 2/2 tumor shrinkage from repotrectinib therapy | Pt w/ hx of chronic dysphagia with gradual progression. Currently w/ inability to tolerate solid and liquids for 1 week. Per imaging and EGD 2/2 tumor invasion in mid esophagus. Visualized large solid food bolus sitting in fundus and inability to pass the scope. 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     - Keep NPO  - Given the nature of advanced disease, no a candidate for PEG or TPN.    Problem/Plan - 4:  ·  Problem: Adult failure to thrive.   ·  Plan: 2/2 poor PO intake 2/2 progression of dysphagia to solid and liquids.     - plan as above   - Not a candidate for nutritional support as end of life care and focus on comfort.    Problem/Plan - 5:  ·  Problem: Urinary retention.   ·  Plan: Hx of urinary retention 2/2 BPH. Pt failed TOV. Martinez placed by Urology     - Martinez for now as failed TOV for comfort  - Flomax 0.4 qHS.    Problem/Plan - 6:  ·  Problem: Pulmonary thromboembolism.   ·  Plan: PE (1/12) involving b/l segmental RLL and subsegmental LLL likely provoked i/s/o malignancy. On eliquis 5mg BID.    - previously on lovenox full AC given hx of PE, d/c per pt and family wish.    Problem/Plan - 7:  ·  Problem: Junctional rhythm.   ·  Plan: P/w intermittent episode of junctional bradycardia to 60s. EP consulted by neurosurgery team. No acute intervention required.    Problem/Plan - 8:  ·  Problem: Encounter for palliative care.   ·  Plan: Pt seen by [facility] palliative team (Dr. Kevin) during previous admission while actively undergoing cancer treatement. At that time, pt was not ready to participate in advanced directive and GOC planning. Currently pt is DNR/DNI, MEW exempt     - reconsulted palliative team in this admission for reinitiation of advance GOC conversation. Pt no longer will be eligible for active cancer treatment and likely qualify for home hospice.    Problem/Plan - 9:  ·  Problem: Prophylactic measure.   ·  Plan: F: none  E: replete K<4, Mg<2  N: NPO  VTE Prophylaxis: Lovenox full AC   GI: not needed  C: DNR/DNI, MEW exempt  D: RMBALWINDER.

## 2024-03-08 NOTE — PROGRESS NOTE ADULT - PROBLEM SELECTOR PROBLEM 5
Encounter for palliative care
Encounter for palliative care
Pulmonary thromboembolism
Encounter for palliative care
Pulmonary thromboembolism
NSCLC metastatic to brain
Junctional rhythm
NSCLC metastatic to brain
Urinary retention
Urinary retention
Pulmonary thromboembolism
Urinary retention
Urinary retention
Mood symptoms leading to interference with daily activities

## 2024-03-08 NOTE — PROGRESS NOTE ADULT - PROVIDER SPECIALTY LIST ADULT
Electrophysiology
Internal Medicine
Internal Medicine
Rehab Medicine
Heme/Onc
Hospitalist
Internal Medicine
Rehab Medicine
Gastroenterology
Internal Medicine
Rehab Medicine
Internal Medicine
Palliative Care
Internal Medicine
Hospitalist

## 2024-03-08 NOTE — PROGRESS NOTE ADULT - PROBLEM SELECTOR PROBLEM 1
Dyspnea
Metastatic primary lung cancer
Comfort measures only status
Comfort measures only status
Debility
Dysphagia
Neoplasm related pain
Debility
Metastatic primary lung cancer
Metastatic primary lung cancer
Dyspnea
Comfort measures only status
Comfort measures only status
Length To Time In Minutes Device Was In Place: 10

## 2024-03-08 NOTE — PROGRESS NOTE ADULT - ASSESSMENT
79yo M with PMH of Metastatic Lung CA p/w inability to tolerate PO and found to have progression of disease. Palliative consulted for complex medical decision making in the setting of life-limiting illness. See GOC note above. No plans to continue DMT, hospice eligible and appropriate.    ·	advised family to start Morphine Solution 2mL q6h ATC on discharge  ·	further counselling and support provided prior to discharge home with hospice

## 2024-03-08 NOTE — PROGRESS NOTE ADULT - PROBLEM SELECTOR PLAN 1
.  -Scopolamine Patch q72hr  -Robinul 0.4mg IV q6h PRN for Excessive Secretions  -Morphine 2mg IV q2h PRN for Moderate/Severe Pain or RR>22  -Ativan 0.5mg IV q4h PRN for Anxiety/Agitation

## 2024-03-08 NOTE — PROGRESS NOTE ADULT - PROBLEM SELECTOR PLAN 3
.  Metastatic disease, progressed through previous lines of treatment  -no longer a candidate for DMT, hospice eligible and appropriate

## 2024-03-08 NOTE — PROGRESS NOTE ADULT - PROBLEM SELECTOR PROBLEM 2
Metastatic primary lung cancer
Pulmonary thromboembolism
Anxiety
Debility
Metastatic primary lung cancer
Dysphagia
Anxiety
Dysphagia
Dysphagia
Metastatic primary lung cancer
Metastatic primary lung cancer

## 2024-03-08 NOTE — PROGRESS NOTE ADULT - PROBLEM SELECTOR PLAN 4
.  Patient is DNR/DNI, MOLST in chart  -MEWS Exempt, CMO  -see previous GOC notes  -planned for discharge home with hospice

## 2024-03-08 NOTE — PROGRESS NOTE ADULT - PROBLEM SELECTOR PROBLEM 3
Fistula, bronchoesophageal
Debility
Fistula, bronchoesophageal
Metastatic primary lung cancer
NSCLC metastatic to brain
Adult failure to thrive
Fistula, bronchoesophageal
Adult failure to thrive
Adult failure to thrive
Fistula, bronchoesophageal
Debility

## 2024-03-08 NOTE — PROGRESS NOTE ADULT - PROBLEM SELECTOR PLAN 5
.  Emotional support provided, questions answered.  Active Psychosocial Referrals:  [x]Social Work/Case management [x]PT/OT []Chaplaincy [x]Hospice  []Patient/Family Support []Holistic RN []Massage Therapy []Music Therapy []Ethics  Coping: [x] well [] with difficulty [] poor coping [] unable to assess  Support system: [x] strong [] adequate [] inadequate    For new or uncontrolled symptoms, please call Palliative Care at [phone]). The service is available 24/7 (including nights & weekends) to provide symptom management recommendations over the phone as appropriate

## 2024-03-08 NOTE — PROGRESS NOTE ADULT - NS ATTEST RISK PROBLEM GEN_ALL_CORE FT
Chronic Illness With Severe Exacerbation/Progression  Decision Made To De-escalate Care Due To Poor Prognosis  Prescription Of Parenteral Controlled Substances
Chronic Illness With Severe Exacerbation/Progression  Prescription Of Parenteral Controlled Substances
Chronic Illness With Severe Exacerbation/Progression  Decision Made To De-escalate Care Due To Poor Prognosis  Prescription Of Parenteral Controlled Substances
Chronic Illness With Severe Exacerbation/Progression  Prescription Of Parenteral Controlled Substances
Chronic Illness With Severe Exacerbation/Progression  Prescription Of Parenteral Controlled Substances

## 2024-03-08 NOTE — PROGRESS NOTE ADULT - PROBLEM SELECTOR PROBLEM 4
Urinary retention
Adult failure to thrive
Advanced care planning/counseling discussion
Dysphagia
Adult failure to thrive
Urinary retention
Advanced care planning/counseling discussion
Advanced care planning/counseling discussion
Urinary retention
Urinary retention
Adult failure to thrive
Dysphagia
Adult failure to thrive

## 2024-03-08 NOTE — PROGRESS NOTE ADULT - SUBJECTIVE AND OBJECTIVE BOX
[facility] Geriatrics and Palliative Care  Min Cervantes, Palliative Care Attending  Contact Info: Call [phone] (HEAL Line) or message on Microsoft Teams (Min Cervantes)    SUBJECTIVE AND OBJECTIVE:  INTERVAL HPI/OVERNIGHT EVENTS: Interval events noted. Symptoms are escalating but managed with PRNs. Patient is comfortable with Morphine PRNs. See patient's PRN use for the past 24hrs noted below. Comprehensive symptom assessment and GOC exploration as noted below. Extensive time spent discussing plan of care with patient/family. No unexpected adverse effects of opiates noted: no sedation/dizziness/nausea.    ALLERGIES:  No Known Allergies    MEDICATIONS  (STANDING):  albuterol/ipratropium for Nebulization 3 milliLiter(s) Nebulizer every 6 hours  dexAMETHasone  Injectable 2 milliGRAM(s) IV Push two times a day  dextrose 5% + sodium chloride 0.9%. 1000 milliLiter(s) (60 mL/Hr) IV Continuous <Continuous>  enoxaparin Injectable 50 milliGRAM(s) SubCutaneous every 12 hours  scopolamine 1 mG/72 Hr(s) Patch 1 Patch Transdermal every 72 hours  tamsulosin 0.4 milliGRAM(s) Oral at bedtime    MEDICATIONS  (PRN):  acetaminophen   IVPB .. 750 milliGRAM(s) IV Intermittent every 6 hours PRN Mild Pain (1 - 3)  glycopyrrolate Injectable 0.4 milliGRAM(s) IV Push every 6 hours PRN Excessive Secretions  LORazepam   Injectable 0.5 milliGRAM(s) IV Push every 4 hours PRN Anxiety/Agitation/Insomnia  morphine  - Injectable 2 milliGRAM(s) IV Push every 2 hours PRN Severe Pain (7 - 10) or RR>22/SOB    Analgesic Use (Scheduled and PRNs) for past 24 hours:  morphine  - Injectable   2 milliGRAM(s) IV Push (03-08-24 @ 09:42)      ITEMS UNCHECKED ARE NOT PRESENT  PRESENT SYMPTOMS/REVIEW OF SYSTEMS: []Unable to obtain due to poor mentation   Source if other than patient:  []Family   []Team     Pain: [x] yes [] no  QOL impact - tolerable  Location - ribs, sacrum  Aggravating factors -   Quality - tightness  Radiation -  Timing - intermittent  Severity (0-10 scale) -  Minimal acceptable level (0-10 scale) -    Dyspnea:                           [x]Mild  []Moderate []Severe  Anxiety:                             []Mild []Moderate []Severe  Fatigue:                             []Mild []Moderate []Severe  Nausea:                             []Mild []Moderate []Severe  Loss of appetite:              []Mild []Moderate []Severe  Constipation:                    []Mild []Moderate []Severe    Other Symptoms:  [x]All other review of systems negative     Palliative Performance Status Version 2: 30%    GENERAL:  [x] NAD [x]Alert []Lethargic  []Cachexia  []Unarousable  [x]Verbal  []Non-Verbal  BEHAVIORAL:   []Anxiety  []Delirium []Agitation [x]Cooperative [x]Oriented x3  HEENT:  [x]Normal  [x] Moist Mucous Membranes []Dry mouth   []ET Tube/Trach  []Oral lesions  PULMONARY:   []Clear []Tachypnea  []Audible excessive secretions  []Normal Work of Breathing []Labored Breathing  [x]Rhonchi []Crackles []Wheezing  CARDIOVASCULAR:    [x]Regular Rate [x]Regular Rhythm []Irregular []Tachy  []Bobo  GASTROINTESTINAL:  [x]Soft  []Distended   [x]+BS  [x]Non tender []Tender  []PEG []OGT/ NGT  Last BM:  GENITOURINARY:  [x]Normal [] Incontinent   []Oliguria/Anuria   []Martinez  MUSCULOSKELETAL:   []Normal Extremities  [x]Weakness  [x]Bed/Wheelchair bound []Edema  NEUROLOGIC:   [x]No focal deficits  []Cognitive impairment  [x]Dysphagia []Dysarthria []Paresis []Encephalopathic  SKIN:   [x]Normal   []Pressure ulcer(s)  []Rash    Vital Signs Last 24 Hrs  T(C): --  T(F): --  HR: --  BP: --  BP(mean): --  RR: --  SpO2: --    LABS: Personally reviewed and interpreted    RADIOLOGY & ADDITIONAL STUDIES: Personally reviewed and interpreted  None new    DISCUSSION OF CASE: Family - to provide updates and emotional support; Primary Team/RN - to discuss plan of care

## 2024-03-19 NOTE — HISTORY OF PRESENT ILLNESS
[FreeTextEntry1] : Mr. Domingo Galeana is a 79 y/o male, former smoker (40 pack years), who completed radiation therapy 60 Gy to the RIGHT lung from 9/18/23 - 10/27/23 for a cT3N2, Stage IIIB NSCLC of the RUL with involved paratracheal lymph node.   3/14/2024 - Follow-up Mr Galeana returns today for his 4-month follow up. Patient is s/p Crainresection 1/11/2024. He has new leptominengeal disease. PE noted during hospital admission, pt on Eliquis. Taking reportrectinib 160 mg daily x 14 days, then 160 mg BID ongoing, taking Dex 2mg BID .Followed by Dr. Chapman.   MR CERVICAL SPINE WITHOUT AND WITH IV CONTRAST, MR THORACIC SPINE WITHOUT AND WITH IV CONTRAST, MR LUMBAR SPINE WITHOUT AND WITH IV CONTRAST 02/11/2024 FINDINGS: SPINAL CORD AND CAUDA EQUINA: Enhancing focus is seen involving the left posterior margin of the cord at the level of C7-T1. Small enhancing foci are present along the posterior margin of the cord at the level of T8 and T9. Conus terminates at L1. Enhancing lesion is present within the central to right portion of the cauda equina at the level of the L2 superior endplate measuring approximately 1.0 x 0.8 x 0.8 cm in size. IMPRESSION: 1.  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 2.  Enhancing lesion measuring up to 1.0 cm is present within the cauda equina at the level of L2. 3.  Several millimeter enhancing focus is present within the T9 superior endplate that could reflect metastatic disease or an atypical lipid poor hemangioma. 4.  Multilevel degenerative disc disease is present throughout the spine. 5.  Large subcarinal mass is again seen with involvement of the esophagus and dilatation of the more proximal esophagus. 6.  Consider follow-up imaging to assess stability as warranted.  EXAM:  MR BRAIN WAW IC 01/10/2024 FINDINGS: Since prior examination, there is interval development of multiple ring-enhancing lesions suspicious for intracranial metastases. There is a peripherally enhancing mass within the left cerebellar hemisphere which measures 3.5 cm transverse by 3.1 cm AP by 2.5 cm craniocaudal (series 13 image 48 and series 15 image 71). There is an exophytic mass arising from the right hemipons which measures 1.6 x 2.5 x 2.4 cm. There is significant vasogenic edema involving the left cerebellar hemisphere and brainstem. There is partial effacement of the fourth ventricle with interval dilatation of the lateral and third ventricles consistent with hydrocephalus.  There is a 1.2 x 1.2 x 1.2 cm ring-enhancing lesion in the left frontal lobe (series 13 image 104). There is a 0.8 cm enhancing lesion in the left occipital lobe (series 13 image 96). There is a 0.6 cm enhancing lesion in the inferior right cerebellar hemisphere (series 13 image 30). There is vasogenic edema surrounding the left occipital left frontal lobe lesions without significant mass effect. There is no evidence of recent infarction diffusion-weighted imaging. There is susceptibility related signal loss in the left cerebellar lesion consistent with hemorrhagic metastasis.  IMPRESSION: Since prior MRI brain 10/5/2023, interval development of 5 enhancing lesion suspicious for intracranial metastasis. The largest lesions include a 3.5 cm left cerebellar mass and a 2.5 cm exophytic mass arising from the right hemipons. There is resultant mass effect with partial effacement of the fourth ventricle and dilatation of the lateral and third ventricles consistent with hydrocephalus. Per the electronic medical record, the neurosurgical team was aware of the findings at the time of dictation. _____________________________________________ 12/7/23 - PTE Mr. Galeana returns for post-treatment evaluation.  CT chest done on 11/27 showed slightly decreased left upper lobe malignancy with new treatment induced changes, decreased thoracic melly metastases, post radiation pneumonitis.  He saw Dr. Chapman on 12/4 regarding immunotherapy, he would like to think about it before committing to treatment and plan for PET in 4 months.   11/27/23 CT chest 1. Since July 7, 2023, slightly decreased left upper lobe malignancy with new treatment induced changes and decreased thoracic melly metastases. 2. Postradiation pneumonitis and foci of large and small airway disease in right lung. -------------------------------------------------- Brief Oncological History: 7/7/23: CT Chest found large 3.4x4.8cm rregular mass in the right upper lobe with stranding to the the pleural surface. There are subcarinal lymphadenopathy which is likely metastatic including a 3.6x3.5cm subcarinal LN.   7/18/23: PET showing a hypermetabolic right upper lobe pulmonary mass 4.1 x 3.7 cm with central necrosis. SUV 5.3-21.9.Hypermetabolic metabolic mediastinal lymphadenopathy including a posterior trachea wall node 4.4 x 3.1cm, SUV 6.3-13.5. Additionally a 1cm node at R tracheobronchial angle - SUV 7.6-14.5.  7/21/23 - Underwent EBUS guided biopsy of 4L LN showing atypical cells but no lymphoid tissue present and 3P node positive for NSCLC, with features of adenocarcinoma.

## 2024-03-22 NOTE — DIETITIAN INITIAL EVALUATION ADULT - NSICDXPASTSURGICALHX_GEN_ALL_CORE_FT
LVM to patient to remind of 3/26/24 appointment. Provided New Patient instructions.       PAST SURGICAL HISTORY:  H/O brain tumor     History of dental surgery     S/P skin cancer resection

## 2024-03-26 ENCOUNTER — APPOINTMENT (OUTPATIENT)
Dept: INFUSION THERAPY | Facility: CLINIC | Age: 79
End: 2024-03-26

## 2024-04-10 ENCOUNTER — NON-APPOINTMENT (OUTPATIENT)
Age: 79
End: 2024-04-10

## 2024-04-11 ENCOUNTER — APPOINTMENT (OUTPATIENT)
Dept: INFUSION THERAPY | Facility: CLINIC | Age: 79
End: 2024-04-11

## 2024-04-16 ENCOUNTER — APPOINTMENT (OUTPATIENT)
Dept: INFUSION THERAPY | Facility: CLINIC | Age: 79
End: 2024-04-16

## 2024-05-07 ENCOUNTER — APPOINTMENT (OUTPATIENT)
Dept: INFUSION THERAPY | Facility: CLINIC | Age: 79
End: 2024-05-07

## 2024-05-28 ENCOUNTER — APPOINTMENT (OUTPATIENT)
Dept: INFUSION THERAPY | Facility: CLINIC | Age: 79
End: 2024-05-28

## 2024-06-09 NOTE — ED ADULT NURSE NOTE - NSFALLRISKASMT_ED_ALL_ED_DT
29-year-old male presenting with acute left lower quad abdominal pain.  Symptoms suggestive of renal colic.  Plan to perform labs urinalysis, CT abdomen pelvis and reassess.
14-Feb-2024 12:52

## 2024-06-18 ENCOUNTER — APPOINTMENT (OUTPATIENT)
Dept: INFUSION THERAPY | Facility: CLINIC | Age: 79
End: 2024-06-18

## 2024-07-09 ENCOUNTER — APPOINTMENT (OUTPATIENT)
Dept: INFUSION THERAPY | Facility: CLINIC | Age: 79
End: 2024-07-09

## 2024-07-30 ENCOUNTER — APPOINTMENT (OUTPATIENT)
Dept: INFUSION THERAPY | Facility: CLINIC | Age: 79
End: 2024-07-30

## 2024-08-03 NOTE — ED PROVIDER NOTE - PRINCIPAL DIAGNOSIS
Clinical Pharmacy Progress Note  Medication History     Admit Date: 8/3/24    Pharmacy has been consulted to review this patient's home medication list by ED RN.    List of current medications the patient is taking is In Progress, and home medication list in Epic has been updated to reflect the changes noted below.    Source(s) of information: SureScripts, Encounter Notes, and [place]    Changes made to medication list:    Medications removed (no longer taking):  Duloxetine (duplicate)  Eliquis (duplicate)  Glucagon (pt on Gvoke now)  Dexcom (pt states allergic to adhesive)    Medications added:  Sumatriptan     Other notes:   Medications with lingering questions that pharmacy unable to verify - family will have to be called to help verify.   Eliquis - Per SureScripts, pt last filled a 30-day supply on 5/1/24. Is pt still taking? Where is he getting from?  Aspirin - Is pt buying OTC and taking daily?   Vitamin D3 5,000 units - Per SureScripts, pt last filled this in 2017. Is pt buying OTC and taking daily? Is this the right dose?  Aimoving - When did pt have last injection. Per SureScripts, it was last filled 7/18/24.   Flaxseed - Is pt buying OTC and taking? It says he takes 14 grams - is this correct? It says he takes PRN - PRN for what? How many times a day will he take it?   Apidra - need to verify that this is the correct dose and frequency  Ketorolac - Per SureScripts, this was last filled in 2021. Is pt still using? Buying OTC? Using daily or PRN?  Lantus - need to verify correct dose and frequency  Multivitamin - Per SureScripts, this was last filled in 2017. Is pt still taking? Buying OTC and taking daily?  Lovaza - Per SureScripts, pt last filled this in 2017. Is pt still taking? Where is pt getting from?   Protonix - Per SureScripts, this was last filled in 2021. Is pt still taking? Where is he getting from?  Polyvinyl alcohol solution - Per SureScripts, this was last filled in 2019. Is pt still taking?  Where is he getting from?  Prednisolone suspension - Per SureScripts, this was last filled in 2022. Is pt still taking? Where is he getting from?  Probiotic - Per SureScripts, this was last filled in 2016. Is pt still taking?   Promethazine - Per  [employer], pt last filled a 6-day supply on 2/16/24. Does pt still have? Still taking?   Propranolol - Per SureScripts, this was last filled in 2021. Is pt still taking? Where is he getting from?    Recently used [facility]: [facility] ([phone])    Thank you for the consult.     Deja Duke, PharmD, [facility], 8/3/2024 7:36 AM       Dysphagia

## 2024-08-20 ENCOUNTER — APPOINTMENT (OUTPATIENT)
Dept: INFUSION THERAPY | Facility: CLINIC | Age: 79
End: 2024-08-20
